# Patient Record
Sex: MALE | Race: BLACK OR AFRICAN AMERICAN | Employment: UNEMPLOYED | URBAN - METROPOLITAN AREA
[De-identification: names, ages, dates, MRNs, and addresses within clinical notes are randomized per-mention and may not be internally consistent; named-entity substitution may affect disease eponyms.]

---

## 2018-04-05 PROBLEM — N17.9 ACUTE KIDNEY INJURY (HCC): Status: ACTIVE | Noted: 2018-04-05

## 2018-04-09 PROBLEM — R31.9 HEMATURIA: Status: ACTIVE | Noted: 2018-04-09

## 2018-04-09 PROBLEM — N32.0 BLADDER OUTLET OBSTRUCTION: Status: ACTIVE | Noted: 2018-04-09

## 2018-04-12 PROBLEM — J44.1 COPD EXACERBATION (HCC): Status: ACTIVE | Noted: 2018-04-12

## 2018-04-29 PROBLEM — R31.0 GROSS HEMATURIA: Status: ACTIVE | Noted: 2018-04-29

## 2018-05-01 PROBLEM — N39.0 UTI (URINARY TRACT INFECTION): Status: ACTIVE | Noted: 2018-05-01

## 2018-05-31 PROBLEM — N39.0 UTI (URINARY TRACT INFECTION): Status: RESOLVED | Noted: 2018-05-01 | Resolved: 2018-05-31

## 2018-07-12 PROBLEM — R33.9 CHRONIC RETENTION OF URINE: Status: ACTIVE | Noted: 2018-07-12

## 2018-07-12 PROBLEM — Z90.79 S/P TURP: Status: ACTIVE | Noted: 2018-07-12

## 2018-09-05 NOTE — ANESTHESIA PRE-OP
2013. Last exacerbation 4/2018. Reports uses prednisone and Doxycycline as needed per [facility]. Former smoker, hx 1ppd x 30 years, quit 2014. Able to lie flat. PFT's 2013 Pre FEV1 0.85L, Post FEV1 1.09L  31% of pred. [facility] for [facility]. Followed by Dr. Gray Cantu @ [facility]. 3.  Controlled reflux, bowel resection s/p colostomy with reversal, 1974. Hx volvulus with SBO. Abdm mass excised with colectomy, 2013. 4.   BPH with hx hematuria, Urinary retention secondary to bladder outlet obstruction, 4/2018. Stevens placed. S/p TURP 7/2018. Planned repeat TURP. BUN: 32, CR: 1.4.     5.  Anemia. HGB: 11.1. Previous HGB: 10. HCT: 36.6. PLT: 463. 6.  DM, HbA1c from [facility] was 7.3, 6/2018. Random BS today: 210. On metformin. Counseled on tight glycemic control with medication compliance to promote healing and prevent diabetic complications. Informed if BS elevated DOS, surgery could be delayed or cancelled. 7.  Full ROM extremities. 8.  Poor dentition. periodontal dz  Missing and broken teeth. Denies loose teeth       Anesthesia Evaluation will follow by a certified practitioner prior to surgery.     Electronically signed by ERNESTO Calderón CNP on 9/5/2018 at 1:31 PM

## 2018-09-07 ENCOUNTER — HOSPITAL ENCOUNTER (OUTPATIENT)
Dept: PREADMISSION TESTING | Age: 73
Discharge: HOME | End: 2018-09-07
Attending: UROLOGY | Admitting: UROLOGY

## 2018-09-07 VITALS
DIASTOLIC BLOOD PRESSURE: 81 MMHG | HEIGHT: 71 IN | SYSTOLIC BLOOD PRESSURE: 125 MMHG | BODY MASS INDEX: 21.84 KG/M2 | TEMPERATURE: 97.8 F | WEIGHT: 156 LBS | RESPIRATION RATE: 16 BRPM | OXYGEN SATURATION: 95 % | HEART RATE: 76 BPM

## 2018-09-07 LAB
ANION GAP SERPL CALCULATED.3IONS-SCNC: 10 MMOL/L (ref 4–16)
BUN BLDV-MCNC: 32 MG/DL (ref 6–23)
CALCIUM SERPL-MCNC: 9.4 MG/DL (ref 8.3–10.6)
CHLORIDE BLD-SCNC: 103 MMOL/L (ref 99–110)
CO2: 28 MMOL/L (ref 21–32)
CREAT SERPL-MCNC: 1.4 MG/DL (ref 0.9–1.3)
GFR AFRICAN AMERICAN: >60 ML/MIN/1.73M2
GFR NON-AFRICAN AMERICAN: 50 ML/MIN/1.73M2
GLUCOSE BLD-MCNC: 210 MG/DL (ref 70–99)
HCT VFR BLD CALC: 36.6 % (ref 42–52)
HEMOGLOBIN: 11.1 GM/DL (ref 13.5–18)
MCH RBC QN AUTO: 27.8 PG (ref 27–31)
MCHC RBC AUTO-ENTMCNC: 30.3 % (ref 32–36)
MCV RBC AUTO: 91.5 FL (ref 78–100)
PDW BLD-RTO: 17.2 % (ref 11.7–14.9)
PLATELET # BLD: 463 K/CU MM (ref 140–440)
PMV BLD AUTO: 9.9 FL (ref 7.5–11.1)
POTASSIUM SERPL-SCNC: 4 MMOL/L (ref 3.5–5.1)
RBC # BLD: 4 M/CU MM (ref 4.6–6.2)
SODIUM BLD-SCNC: 141 MMOL/L (ref 135–145)
WBC # BLD: 8.5 K/CU MM (ref 4–10.5)

## 2018-09-07 RX ORDER — CIPROFLOXACIN 2 MG/ML
400 INJECTION, SOLUTION INTRAVENOUS ONCE
Status: CANCELLED | OUTPATIENT
Start: 2018-09-18

## 2018-09-07 RX ORDER — CIPROFLOXACIN 2 MG/ML
400 INJECTION, SOLUTION INTRAVENOUS ONCE
Status: DISCONTINUED | OUTPATIENT
Start: 2018-09-17 | End: 2018-09-07 | Stop reason: CLARIF

## 2018-09-07 RX ORDER — DOXYCYCLINE HYCLATE 100 MG/1
100 CAPSULE ORAL 2 TIMES DAILY PRN
Status: ON HOLD | COMMUNITY
End: 2018-09-18

## 2018-09-07 ASSESSMENT — PAIN SCALES - GENERAL: PAINLEVEL_OUTOF10: 0

## 2018-09-10 ENCOUNTER — TELEPHONE (OUTPATIENT)
Dept: CARDIOLOGY CLINIC | Age: 73
End: 2018-09-10

## 2018-09-10 LAB
CULTURE: NORMAL
Lab: NORMAL
ORGANISM: NORMAL
REPORT STATUS: NORMAL
SPECIMEN: NORMAL
TOTAL COLONY COUNT: NORMAL

## 2018-09-10 NOTE — TELEPHONE ENCOUNTER
Cardiac clearance request received from [facility] [id number] for Dr. Km Covarrubias.  FAX # [id number]  Scanned to media

## 2018-09-10 NOTE — LETTER
[facility] [facility] [address] [facility],Third [address]  Phone: [phone]    Fax [phone]                  Yasmine Virgen MD, Ramonita Garcia MD, [address] Gisel Murphy MD, MD [place] MD Hilliard Muscat, MD  [address] [facility] ERNESTO Mullen    Cardiac Risk Assessment     7/31/2018        Surgery Date: 9/17/18  Surgery: Cystoscopy, Redo TURP   Anesthesia Type: General  Fax Number: [phone]    To: Dr. Fabienne Pillai  From : ERNESTO Dennis, CNP    Patient Name: Magnus Bird     YOB: 1945    Magnus Bird was evaluated from a cardiac standpoint for his surgery and based on his history, diagnosis, recent cardiac testing, he is considered a low risk candidate for any phyllis-operative cardiac complications. Antiplatelet therapy can be held prior to the surgery at the discretion of the surgeon to be resumed as soon as possible if held. Please call with any further questions.     Respectfully,         Rober OROZCO-IAN  EN/cjs

## 2021-05-14 ENCOUNTER — APPOINTMENT (OUTPATIENT)
Dept: GENERAL RADIOLOGY | Age: 76
DRG: 552 | End: 2021-05-14
Payer: MEDICARE

## 2021-05-14 ENCOUNTER — HOSPITAL ENCOUNTER (INPATIENT)
Age: 76
LOS: 2 days | Discharge: HOME | DRG: 552 | End: 2021-05-19
Attending: EMERGENCY MEDICINE | Admitting: INTERNAL MEDICINE
Payer: MEDICARE

## 2021-05-14 ENCOUNTER — APPOINTMENT (OUTPATIENT)
Dept: CT IMAGING | Age: 76
DRG: 552 | End: 2021-05-14
Payer: MEDICARE

## 2021-05-14 DIAGNOSIS — R29.898 LEFT HAND WEAKNESS: ICD-10-CM

## 2021-05-14 DIAGNOSIS — R77.8 TROPONIN LEVEL ELEVATED: ICD-10-CM

## 2021-05-14 DIAGNOSIS — R20.2 PARESTHESIA: Primary | ICD-10-CM

## 2021-05-14 DIAGNOSIS — E83.42 HYPOMAGNESEMIA: ICD-10-CM

## 2021-05-14 DIAGNOSIS — M79.642 LEFT HAND PAIN: ICD-10-CM

## 2021-05-14 PROBLEM — R53.1 ACUTE LEFT-SIDED WEAKNESS: Status: ACTIVE | Noted: 2021-05-14

## 2021-05-14 LAB
ALBUMIN SERPL-MCNC: 3.8 GM/DL (ref 3.4–5)
ALP BLD-CCNC: 103 IU/L (ref 40–129)
ALT SERPL-CCNC: 12 U/L (ref 10–40)
ANION GAP SERPL CALCULATED.3IONS-SCNC: 4 MMOL/L (ref 4–16)
AST SERPL-CCNC: 18 IU/L (ref 15–37)
BASOPHILS ABSOLUTE: 0.1 K/CU MM
BASOPHILS RELATIVE PERCENT: 0.6 % (ref 0–1)
BILIRUB SERPL-MCNC: 0.3 MG/DL (ref 0–1)
BUN BLDV-MCNC: 17 MG/DL (ref 6–23)
CALCIUM SERPL-MCNC: 9.2 MG/DL (ref 8.3–10.6)
CHLORIDE BLD-SCNC: 103 MMOL/L (ref 99–110)
CO2: 32 MMOL/L (ref 21–32)
CREAT SERPL-MCNC: 1 MG/DL (ref 0.9–1.3)
DIFFERENTIAL TYPE: ABNORMAL
DOSE AMOUNT: ABNORMAL
DOSE TIME: ABNORMAL
EKG ATRIAL RATE: 70 BPM
EKG DIAGNOSIS: NORMAL
EKG P AXIS: 26 DEGREES
EKG P-R INTERVAL: 172 MS
EKG Q-T INTERVAL: 406 MS
EKG QRS DURATION: 148 MS
EKG QTC CALCULATION (BAZETT): 438 MS
EKG R AXIS: -79 DEGREES
EKG T AXIS: 102 DEGREES
EKG VENTRICULAR RATE: 70 BPM
EOSINOPHILS ABSOLUTE: 0.4 K/CU MM
EOSINOPHILS RELATIVE PERCENT: 4.5 % (ref 0–3)
FOLATE: 17.2 NG/ML (ref 3.1–17.5)
GFR AFRICAN AMERICAN: >60 ML/MIN/1.73M2
GFR NON-AFRICAN AMERICAN: >60 ML/MIN/1.73M2
GLUCOSE BLD-MCNC: 152 MG/DL (ref 70–99)
GLUCOSE BLD-MCNC: 171 MG/DL (ref 70–99)
GLUCOSE BLD-MCNC: 184 MG/DL (ref 70–99)
HCT VFR BLD CALC: 36.8 % (ref 42–52)
HEMOGLOBIN: 11.9 GM/DL (ref 13.5–18)
IMMATURE NEUTROPHIL %: 0.4 % (ref 0–0.43)
LYMPHOCYTES ABSOLUTE: 1.1 K/CU MM
LYMPHOCYTES RELATIVE PERCENT: 12.8 % (ref 24–44)
MAGNESIUM: 1.6 MG/DL (ref 1.8–2.4)
MCH RBC QN AUTO: 30.9 PG (ref 27–31)
MCHC RBC AUTO-ENTMCNC: 32.3 % (ref 32–36)
MCV RBC AUTO: 95.6 FL (ref 78–100)
MONOCYTES ABSOLUTE: 0.7 K/CU MM
MONOCYTES RELATIVE PERCENT: 9 % (ref 0–4)
NUCLEATED RBC %: 0 %
PDW BLD-RTO: 13.7 % (ref 11.7–14.9)
PLATELET # BLD: 315 K/CU MM (ref 140–440)
PMV BLD AUTO: 9.7 FL (ref 7.5–11.1)
POTASSIUM SERPL-SCNC: 4.3 MMOL/L (ref 3.5–5.1)
PRO-BNP: 663.2 PG/ML
RBC # BLD: 3.85 M/CU MM (ref 4.6–6.2)
SEGMENTED NEUTROPHILS ABSOLUTE COUNT: 6 K/CU MM
SEGMENTED NEUTROPHILS RELATIVE PERCENT: 72.7 % (ref 36–66)
SODIUM BLD-SCNC: 139 MMOL/L (ref 135–145)
THEOPHYLLINE LEVEL: <0.8 UG/ML (ref 10–20)
TOTAL CK: 148 IU/L (ref 38–174)
TOTAL IMMATURE NEUTOROPHIL: 0.03 K/CU MM
TOTAL NUCLEATED RBC: 0 K/CU MM
TOTAL PROTEIN: 6.3 GM/DL (ref 6.4–8.2)
TROPONIN T: 0.04 NG/ML
TROPONIN T: 0.04 NG/ML
TSH HIGH SENSITIVITY: 0.37 UIU/ML (ref 0.27–4.2)
VITAMIN B-12: >2000 PG/ML (ref 211–911)
WBC # BLD: 8.3 K/CU MM (ref 4–10.5)

## 2021-05-14 PROCEDURE — 82550 ASSAY OF CK (CPK): CPT

## 2021-05-14 PROCEDURE — G0378 HOSPITAL OBSERVATION PER HR: HCPCS | Performed by: INTERNAL MEDICINE

## 2021-05-14 PROCEDURE — 93005 ELECTROCARDIOGRAM TRACING: CPT | Performed by: EMERGENCY MEDICINE

## 2021-05-14 PROCEDURE — 36415 COLL VENOUS BLD VENIPUNCTURE: CPT

## 2021-05-14 PROCEDURE — G0378 HOSPITAL OBSERVATION PER HR: HCPCS

## 2021-05-14 PROCEDURE — 84484 ASSAY OF TROPONIN QUANT: CPT

## 2021-05-14 PROCEDURE — 6370000000 HC RX 637 (ALT 250 FOR IP): Performed by: EMERGENCY MEDICINE

## 2021-05-14 PROCEDURE — 80198 ASSAY OF THEOPHYLLINE: CPT

## 2021-05-14 PROCEDURE — 73130 X-RAY EXAM OF HAND: CPT

## 2021-05-14 PROCEDURE — 6360000002 HC RX W HCPCS: Performed by: EMERGENCY MEDICINE

## 2021-05-14 PROCEDURE — 83735 ASSAY OF MAGNESIUM: CPT

## 2021-05-14 PROCEDURE — 2500000003 HC RX 250 WO HCPCS: Performed by: NURSE PRACTITIONER

## 2021-05-14 PROCEDURE — 74018 RADEX ABDOMEN 1 VIEW: CPT

## 2021-05-14 PROCEDURE — 2580000003 HC RX 258: Performed by: NURSE PRACTITIONER

## 2021-05-14 PROCEDURE — 96366 THER/PROPH/DIAG IV INF ADDON: CPT

## 2021-05-14 PROCEDURE — 80053 COMPREHEN METABOLIC PANEL: CPT

## 2021-05-14 PROCEDURE — 82746 ASSAY OF FOLIC ACID SERUM: CPT

## 2021-05-14 PROCEDURE — 70450 CT HEAD/BRAIN W/O DYE: CPT

## 2021-05-14 PROCEDURE — 82607 VITAMIN B-12: CPT

## 2021-05-14 PROCEDURE — 71045 X-RAY EXAM CHEST 1 VIEW: CPT

## 2021-05-14 PROCEDURE — 73110 X-RAY EXAM OF WRIST: CPT

## 2021-05-14 PROCEDURE — 83880 ASSAY OF NATRIURETIC PEPTIDE: CPT

## 2021-05-14 PROCEDURE — 84443 ASSAY THYROID STIM HORMONE: CPT

## 2021-05-14 PROCEDURE — 6370000000 HC RX 637 (ALT 250 FOR IP): Performed by: NURSE PRACTITIONER

## 2021-05-14 PROCEDURE — 6360000002 HC RX W HCPCS: Performed by: NURSE PRACTITIONER

## 2021-05-14 PROCEDURE — 96365 THER/PROPH/DIAG IV INF INIT: CPT

## 2021-05-14 PROCEDURE — 94640 AIRWAY INHALATION TREATMENT: CPT

## 2021-05-14 PROCEDURE — 85025 COMPLETE CBC W/AUTO DIFF WBC: CPT

## 2021-05-14 PROCEDURE — 96375 TX/PRO/DX INJ NEW DRUG ADDON: CPT

## 2021-05-14 PROCEDURE — 93010 ELECTROCARDIOGRAM REPORT: CPT | Performed by: INTERNAL MEDICINE

## 2021-05-14 PROCEDURE — 96372 THER/PROPH/DIAG INJ SC/IM: CPT

## 2021-05-14 PROCEDURE — 82962 GLUCOSE BLOOD TEST: CPT

## 2021-05-14 PROCEDURE — 99285 EMERGENCY DEPT VISIT HI MDM: CPT

## 2021-05-14 RX ORDER — ONDANSETRON 2 MG/ML
4 INJECTION INTRAMUSCULAR; INTRAVENOUS EVERY 6 HOURS PRN
Status: DISCONTINUED | OUTPATIENT
Start: 2021-05-14 | End: 2021-05-19 | Stop reason: HOSPADM

## 2021-05-14 RX ORDER — ASPIRIN 81 MG/1
324 TABLET, CHEWABLE ORAL ONCE
Status: COMPLETED | OUTPATIENT
Start: 2021-05-14 | End: 2021-05-14

## 2021-05-14 RX ORDER — LORATADINE 10 MG/1
10 CAPSULE, LIQUID FILLED ORAL DAILY
Status: DISCONTINUED | OUTPATIENT
Start: 2021-05-14 | End: 2021-05-14 | Stop reason: CLARIF

## 2021-05-14 RX ORDER — ATORVASTATIN CALCIUM 80 MG/1
80 TABLET, FILM COATED ORAL NIGHTLY
Status: DISCONTINUED | OUTPATIENT
Start: 2021-05-14 | End: 2021-05-19 | Stop reason: HOSPADM

## 2021-05-14 RX ORDER — ALBUTEROL SULFATE 90 UG/1
2 AEROSOL, METERED RESPIRATORY (INHALATION) ONCE
Status: COMPLETED | OUTPATIENT
Start: 2021-05-14 | End: 2021-05-14

## 2021-05-14 RX ORDER — MONTELUKAST SODIUM 10 MG/1
10 TABLET ORAL NIGHTLY
Status: DISCONTINUED | OUTPATIENT
Start: 2021-05-14 | End: 2021-05-19 | Stop reason: HOSPADM

## 2021-05-14 RX ORDER — SODIUM CHLORIDE 0.9 % (FLUSH) 0.9 %
5-40 SYRINGE (ML) INJECTION PRN
Status: DISCONTINUED | OUTPATIENT
Start: 2021-05-14 | End: 2021-05-17 | Stop reason: SDUPTHER

## 2021-05-14 RX ORDER — ALBUTEROL SULFATE 90 UG/1
2 AEROSOL, METERED RESPIRATORY (INHALATION)
Status: DISCONTINUED | OUTPATIENT
Start: 2021-05-14 | End: 2021-05-19 | Stop reason: HOSPADM

## 2021-05-14 RX ORDER — LABETALOL HYDROCHLORIDE 5 MG/ML
10 INJECTION, SOLUTION INTRAVENOUS EVERY 10 MIN PRN
Status: DISCONTINUED | OUTPATIENT
Start: 2021-05-14 | End: 2021-05-19 | Stop reason: HOSPADM

## 2021-05-14 RX ORDER — PANTOPRAZOLE SODIUM 20 MG/1
20 TABLET, DELAYED RELEASE ORAL
Status: DISCONTINUED | OUTPATIENT
Start: 2021-05-15 | End: 2021-05-19 | Stop reason: HOSPADM

## 2021-05-14 RX ORDER — NICOTINE POLACRILEX 4 MG
15 LOZENGE BUCCAL PRN
Status: DISCONTINUED | OUTPATIENT
Start: 2021-05-14 | End: 2021-05-19 | Stop reason: HOSPADM

## 2021-05-14 RX ORDER — ASPIRIN 300 MG/1
300 SUPPOSITORY RECTAL DAILY
Status: DISCONTINUED | OUTPATIENT
Start: 2021-05-15 | End: 2021-05-19 | Stop reason: HOSPADM

## 2021-05-14 RX ORDER — ASPIRIN 81 MG/1
81 TABLET ORAL DAILY
Status: DISCONTINUED | OUTPATIENT
Start: 2021-05-15 | End: 2021-05-19 | Stop reason: HOSPADM

## 2021-05-14 RX ORDER — OMEPRAZOLE 20 MG/1
20 CAPSULE, DELAYED RELEASE ORAL DAILY
Status: DISCONTINUED | OUTPATIENT
Start: 2021-05-14 | End: 2021-05-14 | Stop reason: CLARIF

## 2021-05-14 RX ORDER — DEXTROSE MONOHYDRATE 50 MG/ML
100 INJECTION, SOLUTION INTRAVENOUS PRN
Status: DISCONTINUED | OUTPATIENT
Start: 2021-05-14 | End: 2021-05-19 | Stop reason: HOSPADM

## 2021-05-14 RX ORDER — DEXTROSE MONOHYDRATE 25 G/50ML
12.5 INJECTION, SOLUTION INTRAVENOUS PRN
Status: DISCONTINUED | OUTPATIENT
Start: 2021-05-14 | End: 2021-05-19 | Stop reason: HOSPADM

## 2021-05-14 RX ORDER — MAGNESIUM SULFATE IN WATER 40 MG/ML
2000 INJECTION, SOLUTION INTRAVENOUS ONCE
Status: COMPLETED | OUTPATIENT
Start: 2021-05-14 | End: 2021-05-14

## 2021-05-14 RX ORDER — SODIUM CHLORIDE 0.9 % (FLUSH) 0.9 %
5-40 SYRINGE (ML) INJECTION EVERY 12 HOURS SCHEDULED
Status: DISCONTINUED | OUTPATIENT
Start: 2021-05-14 | End: 2021-05-17 | Stop reason: SDUPTHER

## 2021-05-14 RX ORDER — SODIUM CHLORIDE 9 MG/ML
25 INJECTION, SOLUTION INTRAVENOUS PRN
Status: DISCONTINUED | OUTPATIENT
Start: 2021-05-14 | End: 2021-05-17 | Stop reason: SDUPTHER

## 2021-05-14 RX ORDER — PROMETHAZINE HYDROCHLORIDE 25 MG/1
12.5 TABLET ORAL EVERY 6 HOURS PRN
Status: DISCONTINUED | OUTPATIENT
Start: 2021-05-14 | End: 2021-05-19 | Stop reason: HOSPADM

## 2021-05-14 RX ORDER — CETIRIZINE HYDROCHLORIDE 10 MG/1
10 TABLET ORAL DAILY
Status: DISCONTINUED | OUTPATIENT
Start: 2021-05-15 | End: 2021-05-19 | Stop reason: HOSPADM

## 2021-05-14 RX ORDER — POLYETHYLENE GLYCOL 3350 17 G/17G
17 POWDER, FOR SOLUTION ORAL DAILY PRN
Status: DISCONTINUED | OUTPATIENT
Start: 2021-05-14 | End: 2021-05-19 | Stop reason: HOSPADM

## 2021-05-14 RX ORDER — BISACODYL 10 MG
10 SUPPOSITORY, RECTAL RECTAL DAILY PRN
Status: DISCONTINUED | OUTPATIENT
Start: 2021-05-14 | End: 2021-05-19 | Stop reason: HOSPADM

## 2021-05-14 RX ADMIN — THEOPHYLLINE ANHYDROUS 200 MG: 200 CAPSULE, EXTENDED RELEASE ORAL at 17:58

## 2021-05-14 RX ADMIN — SODIUM CHLORIDE, PRESERVATIVE FREE 10 ML: 5 INJECTION INTRAVENOUS at 22:00

## 2021-05-14 RX ADMIN — ALBUTEROL SULFATE 2 PUFF: 90 AEROSOL, METERED RESPIRATORY (INHALATION) at 20:19

## 2021-05-14 RX ADMIN — MAGNESIUM SULFATE IN WATER 2000 MG: 40 INJECTION, SOLUTION INTRAVENOUS at 15:31

## 2021-05-14 RX ADMIN — LABETALOL HYDROCHLORIDE 10 MG: 5 INJECTION, SOLUTION INTRAVENOUS at 17:58

## 2021-05-14 RX ADMIN — ALBUTEROL SULFATE 2 PUFF: 90 AEROSOL, METERED RESPIRATORY (INHALATION) at 12:39

## 2021-05-14 RX ADMIN — POTASSIUM BICARBONATE 20 MEQ: 782 TABLET, EFFERVESCENT ORAL at 17:58

## 2021-05-14 RX ADMIN — Medication 2 PUFF: at 20:19

## 2021-05-14 RX ADMIN — Medication 2 PUFF: at 12:39

## 2021-05-14 RX ADMIN — ENOXAPARIN SODIUM 30 MG: 30 INJECTION SUBCUTANEOUS at 17:59

## 2021-05-14 RX ADMIN — ATORVASTATIN CALCIUM 80 MG: 80 TABLET, FILM COATED ORAL at 21:28

## 2021-05-14 RX ADMIN — ASPIRIN 324 MG: 81 TABLET, CHEWABLE ORAL at 15:31

## 2021-05-14 RX ADMIN — MONTELUKAST 10 MG: 10 TABLET, FILM COATED ORAL at 21:28

## 2021-05-14 ASSESSMENT — ENCOUNTER SYMPTOMS
GASTROINTESTINAL NEGATIVE: 1
ALLERGIC/IMMUNOLOGIC NEGATIVE: 1
RESPIRATORY NEGATIVE: 1
EYES NEGATIVE: 1

## 2021-05-14 ASSESSMENT — PAIN DESCRIPTION - LOCATION: LOCATION: WRIST

## 2021-05-14 ASSESSMENT — PAIN DESCRIPTION - ORIENTATION: ORIENTATION: LEFT

## 2021-05-14 NOTE — ED NOTES
Patient noted to have bed bugs on all articles of clothing. Clothing bagged in yellow bags and closed shut. Patient placed in gown.       Giuseppe Luong RN  05/14/21 [id number]

## 2021-05-14 NOTE — ED NOTES
Patient updated on plan of care. Resting in bed with no signs of distress. Remains on cardiac monitor. Resps even and unlabored. Denies any needs. Side rails up x 2.       Gary Mckeon RN  05/14/21 8214

## 2021-05-14 NOTE — PLAN OF CARE
Problem: Pain:  Description: Pain management should include both nonpharmacologic and pharmacologic interventions.   Goal: Pain level will decrease  Description: Pain level will decrease  Outcome: Ongoing  Goal: Control of acute pain  Description: Control of acute pain  Outcome: Ongoing  Goal: Control of chronic pain  Description: Control of chronic pain  Outcome: Ongoing     Problem: Falls - Risk of:  Goal: Will remain free from falls  Description: Will remain free from falls  Outcome: Ongoing  Goal: Absence of physical injury  Description: Absence of physical injury  Outcome: Ongoing     Problem: HEMODYNAMIC STATUS  Goal: Patient has stable vital signs and fluid balance  Outcome: Ongoing     Problem: ACTIVITY INTOLERANCE/IMPAIRED MOBILITY  Goal: Mobility/activity is maintained at optimum level for patient  Outcome: Ongoing     Problem: COMMUNICATION IMPAIRMENT  Goal: Ability to express needs and understand communication  Outcome: Ongoing

## 2021-05-14 NOTE — ED NOTES
Patient updated on plan of care. Resting in bed with no signs of distress. Remains on cardiac monitor. Resps even and unlabored. Denies any needs. Side rails up x 2.       Amaury Chacon RN  05/14/21 0330

## 2021-05-14 NOTE — ED PROVIDER NOTES
[address] [facility]      TRIAGE CHIEF COMPLAINT:   Numbness (left arm numbess from elbow down for 2 days)      Eek:  Olivia Adkins is a 78 y.o. male that presents with complaint of left hand and wrist pain paresthesias weakness for the last 2 days. Denies any headache fever chest pain shortness of breath nausea vomiting diarrhea other weakness or other symptoms. Went to urgent care today, they sent him here. Denies history of stroke. No other questions or concerns he is right-handed. REVIEW OF SYSTEMS:  At least 10 systems reviewed and otherwise acutely negative except as in the [address] Review of Systems   Constitutional: Negative. HENT: Negative. Eyes: Negative. Respiratory: Negative. Cardiovascular: Negative. Gastrointestinal: Negative. Endocrine: Negative. Genitourinary: Negative. Musculoskeletal: Positive for arthralgias and myalgias. Skin: Negative. Allergic/Immunologic: Negative. Neurological: Positive for weakness and numbness. Hematological: Negative. Psychiatric/Behavioral: Negative. All other systems reviewed and are negative.       Past Medical History:   Diagnosis Date    Acid reflux     Anemia     \"dx age 11    COPD (chronic obstructive pulmonary disease) ([facility] [address]     follows with [facility] decay     Diabetes mellitus ([facility] [address]     \"dx 2015 or so\"    Emphysema of lung ([facility] [address]     Enlarged prostate     Stevens catheter in place     \"put in catheter since mid April 2018\"    H/O cardiovascular stress test 05/01/2018    Nml, 62%    Hematuria     dx with admission 4/29/2018    HX OTHER MEDICAL     \"tried 3 times to do sleep study could not tolerate    Hypertension     per pt on 7/11/2018\"my blood pressure has been low so stopped the Amlodopine for now\"    Low back sprain 1983    Megacolon     On home oxygen therapy     \"wear it at night at 3l/nc\"    Wears glasses     to read     Past Surgical History:   Procedure Laterality Date    ABDOMEN SURGERY      \"had abd surgery in the service one in [place] and one in [facility] [address]   [address]    volvulus\"had colostomy for 2 weeks then did reversal 2 weeks later\"( was in [facility])    APPENDECTOMY  7years old   [address]      exp lap,colon resection,subtotal colectomy,cysto     OTHER SURGICAL HISTORY  2013    exp lap,colon resection, subtotal colectomy, cysto    OTHER SURGICAL HISTORY  2018    TURP    REVISION COLOSTOMY  1974    TUMOR REMOVAL      ABD TUMOR/MASS 2013    TURP N/A 2018    cystoscopy, bipolar TURP     Family History   Problem Relation Age of Onset    Coronary Art Dis Mother     Diabetes Mother     Stroke Father     Other Sister         aneurysm    Stroke Brother      Social History     Socioeconomic History    Marital status:       Spouse name: Not on file    Number of children: 11    Years of education: Not on file    Highest education level: Not on file   Occupational History    Not on file   Social Needs    Financial resource strain: Not on file    Food insecurity     Worry: Not on file     Inability: Not on file    Transportation needs     Medical: Not on file     Non-medical: Not on file   Tobacco Use    Smoking status: Former Smoker     Packs/day: 0.50     Years: 30.00     Pack years: 15.00     Types: Cigarettes     Quit date: 10/25/2012     Years since quittin.5    Smokeless tobacco: Never Used   Substance and Sexual Activity    Alcohol use: No    Drug use: No    Sexual activity: Not on file   Lifestyle    Physical activity     Days per week: Not on file     Minutes per session: Not on file    Stress: Not on file   Relationships    Social connections     Talks on phone: Not on file     Gets together: Not on file     Attends Gnosticist service: Not on file     Active member of club or organization: Not on file     Attends meetings of clubs or organizations: Not on file     Relationship status: Not on file    Intimate partner violence     Fear of current or ex partner: Not on file     Emotionally abused: Not on file     Physically abused: Not on file     Forced sexual activity: Not on file   Other Topics Concern    Not on file   Social History Narrative    , semi retired , retired [employer]     Current Facility-Administered Medications   Medication Dose Route Frequency Provider Last Rate Last Admin    magnesium sulfate 2000 mg in 50 mL IVPB premix  2,000 mg Intravenous Once [employer], DO 25 mL/hr at 05/14/21 1531 2,000 mg at 05/14/21 1531     Current Outpatient Medications   Medication Sig Dispense Refill    potassium chloride (KLOR-CON) 20 MEQ packet Take 20 mEq by mouth daily      albuterol (ACCUNEB) 0.63 MG/3ML nebulizer solution Take 1 ampule by nebulization every 6 hours as needed for Wheezing      fluticasone (FLONASE) 50 MCG/ACT nasal spray 1 spray by Nasal route 2 times daily      loratadine (CLARITIN) 10 MG capsule Take 10 mg by mouth daily      predniSONE (DELTASONE) 10 MG tablet Take 20 mg by mouth daily as needed      guaiFENesin 400 MG tablet Take 400 mg by mouth 2 times daily      metFORMIN (GLUCOPHAGE-XR) 500 MG extended release tablet Take 500 mg by mouth daily (with breakfast)      montelukast (SINGULAIR) 10 MG tablet Take 10 mg by mouth nightly      omeprazole (PRILOSEC) 20 MG delayed release capsule Take 20 mg by mouth daily      cyanocobalamin 1000 MCG tablet Take 1,000 mcg by mouth daily      albuterol-ipratropium (COMBIVENT RESPIMAT)  MCG/ACT AERS inhaler Inhale 1 puff into the lungs every 6 hours 1 Inhaler 1    tiotropium (SPIRIVA) 18 MCG inhalation capsule Inhale 18 mcg into the lungs daily      Budesonide-Formoterol Fumarate (SYMBICORT IN) Inhale 2 puffs into the lungs daily      theophylline CR, 12 hour, (THEODUR) 200 MG CR tablet Take 1 tablet by mouth 2 times daily (Patient taking differently: Take 200 mg by mouth daily ) 60 tablet 0    amLODIPine (NORVASC) 10 MG tablet Take 1 tablet by mouth daily. 30 tablet 2    albuterol (PROVENTIL HFA;VENTOLIN HFA) 108 (90 BASE) MCG/ACT inhaler Inhale 2 puffs into the lungs every 6 hours as needed.           Allergies   Allergen Reactions    Dye [Iodides] Other (See Comments)     \"had iodine for IVP test- went into seizures\"    Lorazepam Other (See Comments)     CAUSES HIM TO HAVE NO SELF CONTROL     Current Facility-Administered Medications   Medication Dose Route Frequency Provider Last Rate Last Admin    magnesium sulfate 2000 mg in 50 mL IVPB premix  2,000 mg Intravenous Once Darrol Celia, DO 25 mL/hr at 05/14/21 1531 2,000 mg at 05/14/21 1531     Current Outpatient Medications   Medication Sig Dispense Refill    potassium chloride (KLOR-CON) 20 MEQ packet Take 20 mEq by mouth daily      albuterol (ACCUNEB) 0.63 MG/3ML nebulizer solution Take 1 ampule by nebulization every 6 hours as needed for Wheezing      fluticasone (FLONASE) 50 MCG/ACT nasal spray 1 spray by Nasal route 2 times daily      loratadine (CLARITIN) 10 MG capsule Take 10 mg by mouth daily      predniSONE (DELTASONE) 10 MG tablet Take 20 mg by mouth daily as needed      guaiFENesin 400 MG tablet Take 400 mg by mouth 2 times daily      metFORMIN (GLUCOPHAGE-XR) 500 MG extended release tablet Take 500 mg by mouth daily (with breakfast)      montelukast (SINGULAIR) 10 MG tablet Take 10 mg by mouth nightly      omeprazole (PRILOSEC) 20 MG delayed release capsule Take 20 mg by mouth daily      cyanocobalamin 1000 MCG tablet Take 1,000 mcg by mouth daily      albuterol-ipratropium (COMBIVENT RESPIMAT)  MCG/ACT AERS inhaler Inhale 1 puff into the lungs every 6 hours 1 Inhaler 1    tiotropium (SPIRIVA) 18 MCG inhalation capsule Inhale 18 mcg into the lungs daily      Budesonide-Formoterol Fumarate (SYMBICORT IN) Inhale 2 puffs into the lungs daily      theophylline CR, 12 hour, (THEODUR) 200 MG CR tablet Take 1 tablet by mouth 2 times daily (Patient taking differently: Take 200 mg by mouth daily ) 60 tablet 0    amLODIPine (NORVASC) 10 MG tablet Take 1 tablet by mouth daily. 30 tablet 2    albuterol (PROVENTIL HFA;VENTOLIN HFA) 108 (90 BASE) MCG/ACT inhaler Inhale 2 puffs into the lungs every 6 hours as needed. Nursing Notes Reviewed    VITAL SIGNS:  ED Triage Vitals [05/14/21 1023]   Enc Vitals Group      BP (!) 179/115      Pulse 69      Resp 20      Temp 97.9 °F (36.6 °C)      Temp Source Oral      SpO2 97 %      Weight 165 lb (74.8 kg)      Height 5' 11\" (1.803 m)      Head Circumference       Peak Flow       Pain Score       Pain Loc       Pain Edu? Excl. in [address]? PHYSICAL EXAM:  Physical Exam  Vitals signs and nursing note reviewed. Constitutional:       General: He is not in acute distress. Appearance: Normal appearance. He is well-developed and well-groomed. He is not ill-appearing, toxic-appearing or diaphoretic. HENT:      Head: Normocephalic. Right Ear: External ear normal.      Left Ear: External ear normal.      Nose: No congestion or rhinorrhea. Mouth/Throat:      Pharynx: No oropharyngeal exudate or posterior oropharyngeal erythema. Eyes:      General: No scleral icterus. Right eye: No discharge. Left eye: No discharge. Extraocular Movements: Extraocular movements intact. Conjunctiva/sclera: Conjunctivae normal.      Pupils: Pupils are equal, round, and reactive to light. Neck:      Musculoskeletal: Full passive range of motion without pain and normal range of motion. Normal range of motion. No edema, erythema, neck rigidity, crepitus, injury, pain with movement or torticollis. Vascular: No JVD. Trachea: Phonation normal.   Cardiovascular:      Rate and Rhythm: Normal rate and regular rhythm. Pulses: Normal pulses. Heart sounds: Normal heart sounds. No murmur. No friction rub.  No gallop. Pulmonary:      Effort: Pulmonary effort is normal. No respiratory distress. Breath sounds: Normal breath sounds. No stridor. No wheezing, rhonchi or rales. Abdominal:      General: There is no distension. Palpations: Abdomen is soft. There is no mass. Tenderness: There is no abdominal tenderness. There is no guarding or rebound. Negative signs include Borges's sign, Rovsing's sign and McBurney's sign. Hernia: No hernia is present. Musculoskeletal:         General: Tenderness present. No swelling, deformity or signs of injury. Right lower leg: No edema. Left lower leg: No edema. Skin:     General: Skin is warm. Coloration: Skin is not jaundiced or pale. Findings: No bruising, erythema, lesion or rash. Neurological:      General: No focal deficit present. Mental Status: He is alert and oriented to person, place, and time. GCS: GCS eye subscore is 4. GCS verbal subscore is 5. GCS motor subscore is 6. Cranial Nerves: Cranial nerves are intact. No cranial nerve deficit, dysarthria or facial asymmetry. Sensory: Sensory deficit present. Motor: Weakness present. No tremor, atrophy, abnormal muscle tone or seizure activity. Coordination: Coordination is intact. Coordination normal.      Comments: Paresthesias decreased  strength to left wrist.  Tinel's sign positive Phalen signs positive   Psychiatric:         Mood and Affect: Mood normal.         Behavior: Behavior normal. Behavior is cooperative. Thought Content:  Thought content normal.         Judgment: Judgment normal.           I have reviewed andinterpreted all of the currently available lab results from this visit (if applicable):    Results for orders placed or performed during the hospital encounter of 05/14/21   CBC Auto Differential   Result Value Ref Range    WBC 8.3 4.0 - 10.5 K/CU MM    RBC 3.85 (L) 4.6 - 6.2 M/CU MM    Hemoglobin 11.9 (L) 13.5 - 18.0 GM/DL Hematocrit 36.8 (L) 42 - 52 %    MCV 95.6 78 - 100 FL    MCH 30.9 27 - 31 PG    MCHC 32.3 32.0 - 36.0 %    RDW 13.7 11.7 - 14.9 %    Platelets 873 339 - 331 K/CU MM    MPV 9.7 7.5 - 11.1 FL    Differential Type AUTOMATED DIFFERENTIAL     Segs Relative 72.7 (H) 36 - 66 %    Lymphocytes % 12.8 (L) 24 - 44 %    Monocytes % 9.0 (H) 0 - 4 %    Eosinophils % 4.5 (H) 0 - 3 %    Basophils % 0.6 0 - 1 %    Segs Absolute 6.0 K/CU MM    Lymphocytes Absolute 1.1 K/CU MM    Monocytes Absolute 0.7 K/CU MM    Eosinophils Absolute 0.4 K/CU MM    Basophils Absolute 0.1 K/CU MM    Nucleated RBC % 0.0 %    Total Nucleated RBC 0.0 K/CU MM    Total Immature Neutrophil 0.03 K/CU MM    Immature Neutrophil % 0.4 0 - 0.43 %   CMP   Result Value Ref Range    Sodium 139 135 - 145 MMOL/L    Potassium 4.3 3.5 - 5.1 MMOL/L    Chloride 103 99 - 110 mMol/L    CO2 32 21 - 32 MMOL/L    BUN 17 6 - 23 MG/DL    CREATININE 1.0 0.9 - 1.3 MG/DL    Glucose 171 (H) 70 - 99 MG/DL    Calcium 9.2 8.3 - 10.6 MG/DL    Albumin 3.8 3.4 - 5.0 GM/DL    Total Protein 6.3 (L) 6.4 - 8.2 GM/DL    Total Bilirubin 0.3 0.0 - 1.0 MG/DL    ALT 12 10 - 40 U/L    AST 18 15 - 37 IU/L    Alkaline Phosphatase 103 40 - 129 IU/L    GFR Non-African American >60 >60 mL/min/1.73m2    GFR African American >60 >60 mL/min/1.73m2    Anion Gap 4 4 - 16   Troponin   Result Value Ref Range    Troponin T 0.035 (H) <0.01 NG/ML   Magnesium   Result Value Ref Range    Magnesium 1.6 (L) 1.8 - 2.4 mg/dl   TSH without Reflex   Result Value Ref Range    TSH, High Sensitivity 0.368 0.270 - 4.20 uIu/ml   Brain Natriuretic Peptide   Result Value Ref Range    Pro-.2 (H) <300 PG/ML   CK   Result Value Ref Range    Total  38 - 174 IU/L   EKG 12 Lead   Result Value Ref Range    Ventricular Rate 70 BPM    Atrial Rate 70 BPM    P-R Interval 172 ms    QRS Duration 148 ms    Q-T Interval 406 ms    QTc Calculation (Bazett) 438 ms    P Axis 26 degrees    R Axis -79 degrees    T Axis 102 degrees Diagnosis       Normal sinus rhythm  Left axis deviation  Right bundle branch block  T wave abnormality, consider lateral ischemia  Abnormal ECG  When compared with ECG of 29-APR-2018 15:11,  Questionable change in QRS duration          Radiographs (if obtained):  [] The following radiograph was interpreted by myself in the absence of a radiologist:  [x] Radiologist's Report Reviewed:    CT Bran, CXR, KUB, Xray left hand/wrist    EKG (if obtained): (All EKG's are interpreted by myself in the absence of a cardiologist)    12 lead EKG per my interpretation:  Normal Sinus Rhythm 70  Axis is   Left axis deviation  QTc is  438  There is no specific T wave changes appreciated. There is no specific ST wave changes appreciated. Prior EKG to compare with was not available         MDM:    Patient here with left hand pain weakness paresthesias. Again he was at urgent care today they sent him here. Symptoms for 2 days. He is right-handed. Denies any injury or trauma no fevers chest pain shortness of breath nausea vomiting diarrhea. History of megacolon. No other questions or concerns. Patient has good pulses does have slight decrease strength of his left wrist consistent with his history of weakness. Again 2 days I did not call stroke alert. Outside window for TPA. He does have a positive Tinel's sign and Phalen's test I am concerned with possible either arthritis of his left hand versus carpal tunnel. X-ray does show arthritis. Does have elevated troponin elevated blood pressure again no headache chest pain shortness of breath he does not take blood pressure medication. Due to his age and left hand weakness for 2 days I will admit for stroke work-up ACS work-up given abnormal labs. Otherwise patient stable but this is likely peripheral neuropathy will admit stable. KUB shows similar constipation negative: Has had before.   He has no abdominal pain    CLINICAL IMPRESSION:  Final diagnoses:   Paresthesia   Left

## 2021-05-14 NOTE — H&P
the wrist.  Denies visual changes, chest pain, shortness of breath, fever, chills, or trauma. History of megacolon, HTN, DM 2, and COPD. Review of Systems   Ten point ROS reviewed and negative, unless as noted below. GENERAL:  Denies fever, chills, night sweats, or changes in weight. EYES:  Denies recent visual changes. ENT:  Denies ear pain, hearing loss or tinnitus  RESP:  Denies any cough, dyspnea, or wheezing. CV:  Denies any chest pain with exertion or at rest, palpitations, syncope, or edema. GI:  Denies any dysphagia, nausea, vomiting, abdominal pain, heartburn, changes in bowel habit, melena or rectal bleeding  MUSCULOSKELETAL:  Denies any joint swelling, joint pain, or loss of range of motion. NEURO:  Denies any headaches, tremors, dizziness, vertigo, memory loss, confusion, weakness, + Left arm numbness and tingling. PSYCH:  Denies any sleeping problems, history of abuse, marital discord. HEME/LYMPHATIC/IMMUNO:  Denies , bruising, bleeding abnormalities   ENDO:  Denies any heat or cold intolerance, panemiaolyuria or polydipsia. Objective:   No intake or output data in the 24 hours ending 05/14/21 1401   Vitals:   Vitals:    05/14/21 1240   BP:    Pulse:    Resp:    Temp:    SpO2: 96%     Physical Exam:   Gen:  awake, alert, cooperative, no apparent distress  EYES:Lids and lashes normal, pupils equal, round ,extra ocular muscles intact, sclera clear, conjunctiva normal  ENT:  Normocephalic, oral pharynx with moist mucus membranes  NECK:  Supple, symmetrical, trachea midline, no adenopathy,  LUNGS:  Clear to auscultate bilaterally, no rales ronchi or wheezing noted. CARDIOVASCULAR:  regular rate and rhythm, normal S1 and S2,no murmur noted, peripheral pulses 2+, no pitting edema  ABDOMEN: Normal BS, Non tender, distended, no HSM noted. No rebound or guarding  MUSCULOSKELETAL:  ROM of all extremities grossly wnl  NEUROLOGIC: AOx 3,  Cranial nerves II-XII are grossly intact.   Motor is 4 out of 5 on left side. Sensory is intact, no lateralizing findings. No slurred speech or guarding  SKIN:  no bruising or bleeding, normal skin color, turgor, no redness, warmth, or swelling      Past Medical History:      Past Medical History:   Diagnosis Date    Acid reflux     Anemia     \"dx age 10    COPD (chronic obstructive pulmonary disease) ([facility]     follows with [address] [facility] [address] decay     Diabetes mellitus ([facility]     \"dx 2015 or so\"    Emphysema of lung ([facility]     Enlarged prostate     Stevens catheter in place     \"put in catheter since mid April 2018\"    H/O cardiovascular stress test 05/01/2018    Nml, 62%    Hematuria     dx with admission 4/29/2018    HX OTHER MEDICAL     \"tried 3 times to do sleep study could not tolerate    Hypertension     per pt on 7/11/2018\"my blood pressure has been low so stopped the Amlodopine for now\"    Low back sprain 1983    Megacolon     On home oxygen therapy     \"wear it at night at 3l/nc\"    Wears glasses     to read     PSHX:  has a past surgical history that includes Revision Colostomy (1974); Abdominal exploration surgery (4085); other surgical history; other surgical history (01 07 2013); tumor removal; Appendectomy (7years old); Abdomen surgery; TURP (N/A, 07/12/2018); Dental surgery; and other surgical history (09/18/2018). Allergies: Allergies   Allergen Reactions    Dye [Iodides] Other (See Comments)     \"had iodine for IVP test- went into seizures\"    Lorazepam Other (See Comments)     CAUSES HIM TO HAVE NO SELF CONTROL       FAM HX: family history includes Coronary Art Dis in his mother; Diabetes in his mother; Other in his sister; Stroke in his brother and father. Family history reviewed and is essentially negative unless as stated above. Soc HX:   Social History     Socioeconomic History    Marital status:       Spouse name: Not on file    Number of children: 11    Years of education: Not on file    Highest education level: Not on file   Occupational History    Not on file   Social Needs    Financial resource strain: Not on file    Food insecurity     Worry: Not on file     Inability: Not on file    Transportation needs     Medical: Not on file     Non-medical: Not on file   Tobacco Use    Smoking status: Former Smoker     Packs/day: 0.50     Years: 30.00     Pack years: 15.00     Types: Cigarettes     Quit date: 10/25/2012     Years since quittin.5    Smokeless tobacco: Never Used   Substance and Sexual Activity    Alcohol use: No    Drug use: No    Sexual activity: Not on file   Lifestyle    Physical activity     Days per week: Not on file     Minutes per session: Not on file    Stress: Not on file   Relationships    Social connections     Talks on phone: Not on file     Gets together: Not on file     Attends Confucianist service: Not on file     Active member of club or organization: Not on file     Attends meetings of clubs or organizations: Not on file     Relationship status: Not on file    Intimate partner violence     Fear of current or ex partner: Not on file     Emotionally abused: Not on file     Physically abused: Not on file     Forced sexual activity: Not on file   Other Topics Concern    Not on file   Social History Narrative    , semi retired , retired [employer]       Electronically signed by Kevin Dover, [address] [place] [address] Drive - [address] [facility] on 2021 at 2:01 PM

## 2021-05-15 ENCOUNTER — APPOINTMENT (OUTPATIENT)
Dept: MRI IMAGING | Age: 76
DRG: 552 | End: 2021-05-15
Payer: MEDICARE

## 2021-05-15 LAB
CHOLESTEROL: 122 MG/DL
ESTIMATED AVERAGE GLUCOSE: 237 MG/DL
GLUCOSE BLD-MCNC: 110 MG/DL (ref 70–99)
GLUCOSE BLD-MCNC: 187 MG/DL (ref 70–99)
GLUCOSE BLD-MCNC: 87 MG/DL (ref 70–99)
HBA1C MFR BLD: 9.9 % (ref 4.2–6.3)
HCT VFR BLD CALC: 38.5 % (ref 42–52)
HDLC SERPL-MCNC: 51 MG/DL
HEMOGLOBIN: 11.7 GM/DL (ref 13.5–18)
LDL CHOLESTEROL DIRECT: 70 MG/DL
MCH RBC QN AUTO: 29.5 PG (ref 27–31)
MCHC RBC AUTO-ENTMCNC: 30.4 % (ref 32–36)
MCV RBC AUTO: 97.2 FL (ref 78–100)
PDW BLD-RTO: 13.6 % (ref 11.7–14.9)
PLATELET # BLD: 314 K/CU MM (ref 140–440)
PMV BLD AUTO: 10.2 FL (ref 7.5–11.1)
RBC # BLD: 3.96 M/CU MM (ref 4.6–6.2)
TRIGL SERPL-MCNC: 41 MG/DL
TROPONIN T: 0.04 NG/ML
WBC # BLD: 8 K/CU MM (ref 4–10.5)

## 2021-05-15 PROCEDURE — 70551 MRI BRAIN STEM W/O DYE: CPT

## 2021-05-15 PROCEDURE — 6370000000 HC RX 637 (ALT 250 FOR IP): Performed by: INTERNAL MEDICINE

## 2021-05-15 PROCEDURE — 6370000000 HC RX 637 (ALT 250 FOR IP): Performed by: NURSE PRACTITIONER

## 2021-05-15 PROCEDURE — 82962 GLUCOSE BLOOD TEST: CPT

## 2021-05-15 PROCEDURE — 80061 LIPID PANEL: CPT

## 2021-05-15 PROCEDURE — 97161 PT EVAL LOW COMPLEX 20 MIN: CPT

## 2021-05-15 PROCEDURE — 94150 VITAL CAPACITY TEST: CPT

## 2021-05-15 PROCEDURE — 6360000002 HC RX W HCPCS: Performed by: NURSE PRACTITIONER

## 2021-05-15 PROCEDURE — 85027 COMPLETE CBC AUTOMATED: CPT

## 2021-05-15 PROCEDURE — 96372 THER/PROPH/DIAG INJ SC/IM: CPT

## 2021-05-15 PROCEDURE — 92610 EVALUATE SWALLOWING FUNCTION: CPT

## 2021-05-15 PROCEDURE — 94640 AIRWAY INHALATION TREATMENT: CPT

## 2021-05-15 PROCEDURE — 2700000000 HC OXYGEN THERAPY PER DAY

## 2021-05-15 PROCEDURE — G0378 HOSPITAL OBSERVATION PER HR: HCPCS

## 2021-05-15 PROCEDURE — 83036 HEMOGLOBIN GLYCOSYLATED A1C: CPT

## 2021-05-15 PROCEDURE — 36415 COLL VENOUS BLD VENIPUNCTURE: CPT

## 2021-05-15 PROCEDURE — 83721 ASSAY OF BLOOD LIPOPROTEIN: CPT

## 2021-05-15 PROCEDURE — 97110 THERAPEUTIC EXERCISES: CPT

## 2021-05-15 PROCEDURE — 2580000003 HC RX 258: Performed by: NURSE PRACTITIONER

## 2021-05-15 PROCEDURE — 94761 N-INVAS EAR/PLS OXIMETRY MLT: CPT

## 2021-05-15 RX ORDER — AMLODIPINE BESYLATE 10 MG/1
10 TABLET ORAL DAILY
Status: DISCONTINUED | OUTPATIENT
Start: 2021-05-15 | End: 2021-05-19 | Stop reason: HOSPADM

## 2021-05-15 RX ADMIN — Medication 2 PUFF: at 05:11

## 2021-05-15 RX ADMIN — SODIUM CHLORIDE, PRESERVATIVE FREE 10 ML: 5 INJECTION INTRAVENOUS at 09:03

## 2021-05-15 RX ADMIN — Medication 2 PUFF: at 12:11

## 2021-05-15 RX ADMIN — ALBUTEROL SULFATE 2 PUFF: 90 AEROSOL, METERED RESPIRATORY (INHALATION) at 08:44

## 2021-05-15 RX ADMIN — ENOXAPARIN SODIUM 30 MG: 30 INJECTION SUBCUTANEOUS at 08:59

## 2021-05-15 RX ADMIN — Medication 2 PUFF: at 16:10

## 2021-05-15 RX ADMIN — ALBUTEROL SULFATE 2 PUFF: 90 AEROSOL, METERED RESPIRATORY (INHALATION) at 16:09

## 2021-05-15 RX ADMIN — THEOPHYLLINE ANHYDROUS 200 MG: 200 CAPSULE, EXTENDED RELEASE ORAL at 08:59

## 2021-05-15 RX ADMIN — AMLODIPINE BESYLATE 10 MG: 10 TABLET ORAL at 20:27

## 2021-05-15 RX ADMIN — SODIUM CHLORIDE, PRESERVATIVE FREE 10 ML: 5 INJECTION INTRAVENOUS at 20:29

## 2021-05-15 RX ADMIN — ALBUTEROL SULFATE 2 PUFF: 90 AEROSOL, METERED RESPIRATORY (INHALATION) at 05:11

## 2021-05-15 RX ADMIN — Medication 2 PUFF: at 20:01

## 2021-05-15 RX ADMIN — ALBUTEROL SULFATE 2 PUFF: 90 AEROSOL, METERED RESPIRATORY (INHALATION) at 12:11

## 2021-05-15 RX ADMIN — POTASSIUM BICARBONATE 20 MEQ: 782 TABLET, EFFERVESCENT ORAL at 08:59

## 2021-05-15 RX ADMIN — PANTOPRAZOLE SODIUM 20 MG: 20 TABLET, DELAYED RELEASE ORAL at 06:45

## 2021-05-15 RX ADMIN — Medication 2 PUFF: at 08:44

## 2021-05-15 RX ADMIN — ATORVASTATIN CALCIUM 80 MG: 80 TABLET, FILM COATED ORAL at 20:27

## 2021-05-15 RX ADMIN — ASPIRIN 81 MG: 81 TABLET, COATED ORAL at 08:59

## 2021-05-15 RX ADMIN — ALBUTEROL SULFATE 2 PUFF: 90 AEROSOL, METERED RESPIRATORY (INHALATION) at 20:02

## 2021-05-15 RX ADMIN — MONTELUKAST 10 MG: 10 TABLET, FILM COATED ORAL at 20:27

## 2021-05-15 RX ADMIN — CETIRIZINE HYDROCHLORIDE 10 MG: 10 TABLET, FILM COATED ORAL at 08:58

## 2021-05-15 ASSESSMENT — PAIN DESCRIPTION - PAIN TYPE: TYPE: ACUTE PAIN

## 2021-05-15 ASSESSMENT — PAIN SCALES - GENERAL
PAINLEVEL_OUTOF10: 5
PAINLEVEL_OUTOF10: 0

## 2021-05-15 ASSESSMENT — PAIN DESCRIPTION - LOCATION: LOCATION: ARM;HAND

## 2021-05-15 ASSESSMENT — PAIN DESCRIPTION - PROGRESSION: CLINICAL_PROGRESSION: GRADUALLY IMPROVING

## 2021-05-15 ASSESSMENT — PAIN - FUNCTIONAL ASSESSMENT: PAIN_FUNCTIONAL_ASSESSMENT: ACTIVITIES ARE NOT PREVENTED

## 2021-05-15 NOTE — PLAN OF CARE
Problem: Pain:  Goal: Pain level will decrease  Description: Pain level will decrease  Outcome: Ongoing  Goal: Control of acute pain  Description: Control of acute pain  Outcome: Ongoing  Goal: Control of chronic pain  Description: Control of chronic pain  Outcome: Ongoing     Problem: Falls - Risk of:  Goal: Will remain free from falls  Description: Will remain free from falls  Outcome: Ongoing  Goal: Absence of physical injury  Description: Absence of physical injury  Outcome: Ongoing     Problem: HEMODYNAMIC STATUS  Goal: Patient has stable vital signs and fluid balance  Outcome: Ongoing     Problem: ACTIVITY INTOLERANCE/IMPAIRED MOBILITY  Goal: Mobility/activity is maintained at optimum level for patient  Outcome: Ongoing     Problem: COMMUNICATION IMPAIRMENT  Goal: Ability to express needs and understand communication  Outcome: Ongoing

## 2021-05-15 NOTE — PROGRESS NOTES
Speech Language Pathology  Facility/Department: [facility] EVALUATION    NAME: Tyshawn Kuo  : 1945  MRN: [id number]    IMPRESSIONS: Tyshawn Kuo was referred for a bedside swallow evaluation after being admitted to [facility] with left arm numbness and concern for CVA. MRI pending. Medical hx includes COPD, DM, HTN. No known history of dysphagia prior to admission. Pt seen for evaluation seated upright in bed, alert, pleasant and cooperative. Oral mechanism examination WFL/no asymmetry. Noted pt is edentulous. He reports his dentures are at home. He was presented with PO trials of thin liquids via cup/straw, puree, and regular solids. Oral stage WFL with intact mastication, AP transit, oral clearance. Pharyngeal stage WFL with intact swallow initiation/laryngeal elevation and no s/s aspiration. Speech/language/cognition screened and judged intact in conversation. Recommend continued regular diet/thin liquids. No further acute SLP needs identified at this time. ADMISSION DATE: 2021  ADMITTING DIAGNOSIS: has Megacolon; Bladder distention; Hypertension; Hypokalemia; Hypomagnesemia; Hyperglycemia; Respiratory acidosis; Pneumonia; Partial small bowel obstruction (HCC); COPD (chronic obstructive pulmonary disease) ([address] Chronic bronchitis ([address] Diabetes mellitus type II, non insulin dependent ([address] Acute kidney injury ([address] Hematuria; Bladder outlet obstruction; COPD exacerbation ([address] Gross hematuria; Dizziness; Diet-controlled diabetes mellitus ([address]  Chronic retention of urine; S/P TURP; and Acute left-sided weakness on their problem list.  ONSET DATE: this admission    Recent Chest Xray/CT of Chest: see chart    Date of Eval: 5/15/2021  Evaluating Therapist: Marisol Reyes    Current Diet level:  Current Diet : Regular  Current Liquid Diet : Thin      Primary Complaint  Patient Complaint: reports anxiety/perceived difficulty breathing, denies additional complaints    Pain:  Pain Assessment  Pain Assessment: 0-10  Pain Level: 5  Patient's Stated Pain Goal: No pain  Pain Type: Acute pain  Pain Location: Arm, Hand  Pain Orientation: Left  Pain Descriptors: Tingling  Pain Frequency: Continuous  Clinical Progression: Gradually improving  Functional Pain Assessment: Activities are not prevented    Reason for Referral  Isai Vargas was referred for a bedside swallow evaluation to assess the efficiency of his swallow function, identify signs and symptoms of aspiration and make recommendations regarding safe dietary consistencies, effective compensatory strategies, and safe eating environment. Impression  Dysphagia Diagnosis: Swallow function appears grossly intact  Dysphagia Outcome Severity Scale: Level 6: Within functional limits/Modified independence     Treatment Plan  Requires SLP Intervention: No  Duration/Frequency of Treatment: N/A          Recommended Diet and Intervention  Diet Solids Recommendation: Regular  Liquid Consistency Recommendation: Thin  Recommended Form of Meds: PO          Compensatory Swallowing Strategies  Compensatory Swallowing Strategies: Upright as possible for all oral intake    Treatment/Goals  Short-term Goals  Timeframe for Short-term Goals: N/A    General  Chart Reviewed: Yes  Behavior/Cognition: Alert; Cooperative;Pleasant mood (reports anxiety from perceived difficulty breathing)  Respiratory Status: O2 via nasual cannula  O2 Device: Nasal cannula  Communication Observation: Functional  Follows Directions: Simple  Dentition: Edentulous (reports he has dentures at home)  Patient Positioning: Upright in bed  Baseline Vocal Quality: Normal  Prior Dysphagia History: none known prior to admission  Consistencies Administered: Dysphagia Pureed (Dysphagia I); Thin - cup; Thin - straw;Reg solid           Vision/Hearing  Vision  Vision: Within Functional Limits  Hearing  Hearing: Within functional limits    Oral Motor Deficits  Oral/Motor  Oral Motor: Within functional limits    Oral Phase Dysfunction  Oral Phase  Oral Phase: WFL     Indicators of Pharyngeal Phase Dysfunction   Pharyngeal Phase  Pharyngeal Phase: WFL    Prognosis       Education  Patient Education: results, recommendations  Patient Education Response: Verbalizes understanding  Safety Devices in place: Yes  Type of devices:  All fall risk precautions in place       Therapy Time  SLP Individual Minutes  Time In: 1200  Time Out: [address]15 [address]  Minutes: [address] [facility], [address] [place] [address] Road  5/15/2021 12:59 PM

## 2021-05-15 NOTE — PROGRESS NOTES
Physical Therapy    Facility/Department: [facility]  Initial Assessment    NAME: Chayito Rivera  : 1945  MRN: [id number]    Date of Service: 5/15/2021    Discharge Recommendations:  Continue to assess pending progress        Assessment   Body structures, Functions, Activity limitations: Decreased ADL status; Decreased strength;Decreased sensation;Decreased high-level IADLs  Assessment: 78year old male admitted to [facility] with acute left sided weakness. Based on his impairments and functional limitations, he will benefit from skilled physical therapy intervention for left upper extremity strengthening. Treatment Diagnosis: Acute left sided weakness. Prognosis: Good  Decision Making: Low Complexity  Clinical Presentation: Patient presents with left upper extremity weakness about his left hand/ strength. PT Education: Goals;PT Role;Plan of Care;Home Exercise Program;General Safety  Barriers to Learning: None noted. REQUIRES PT FOLLOW UP: Yes  Activity Tolerance  Activity Tolerance: Patient Tolerated treatment well       Patient Diagnosis(es): The primary encounter diagnosis was Paresthesia. Diagnoses of Left hand pain, Left hand weakness, Troponin level elevated, and Hypomagnesemia were also pertinent to this visit. has a past medical history of Acid reflux, Anemia, COPD (chronic obstructive pulmonary disease) ([address] Dental decay, Diabetes mellitus ([address] Emphysema of lung ([address] Enlarged prostate, Stevens catheter in place, H/O cardiovascular stress test, Hematuria, HX OTHER MEDICAL, Hypertension, Low back sprain, Megacolon, On home oxygen therapy, and Wears glasses. has a past surgical history that includes Revision Colostomy (); Abdominal exploration surgery (283); other surgical history; other surgical history (2013); tumor removal; Appendectomy (7years old); Abdomen surgery; TURP (N/A, 2018); Dental surgery; and other surgical history (2018).     Restrictions Vision/Hearing  Vision: Within Functional Limits  Hearing: Within functional limits     Subjective  General  Chart Reviewed: Yes  Patient assessed for rehabilitation services?: Yes  Family / Caregiver Present: No  Referral Date : 05/14/21  Diagnosis: Acute left sided weakness. Follows Commands: Within Functional Limits  Subjective  Subjective: Patient stated he noticed left arm/hand tingling and weakness so he went to the hospital.  Reported he is under a lot of stress right now in his life due to bills and companies calling him regarding bills he owes. He is also stressed about his vehicle(s) needing repair and not running currently. Pain Screening  Patient Currently in Pain: Yes  Pain Assessment  Pain Assessment: 0-10  Pain Level: 5  Pain Type: Acute pain  Pain Location: Arm;Hand  Pain Orientation: Left  Pain Descriptors: Tingling  Pain Frequency: Continuous  Clinical Progression: Gradually improving  Functional Pain Assessment: Activities are not prevented  Vital Signs  Patient Currently in Pain: Yes       Orientation  Orientation  Overall Orientation Status: Within Normal Limits  Social/Functional History  Social/Functional History  Lives With: Alone  Type of Home: House  Home Layout: One level  Home Access: Stairs to enter without rails  Bathroom Shower/Tub: Walk-in shower  Bathroom Toilet: Standard  Home Equipment: Cane  Receives Help From: Other (comment) (Receives Meals on [employer])  ADL Assistance: Independent  Homemaking Assistance: Independent  Homemaking Responsibilities: Yes  Ambulation Assistance: Independent  Transfer Assistance: Independent  Active : Yes  Mode of Transportation: Car  Education: IT education as well as health care education for nursing assistant  Occupation: Retired  Type of occupation: IT and healthcare  Cognition        Objective     Observation/Palpation  Posture: Good  Observation: Patient found resting in bed.     PROM RLE (degrees)  RLE PROM: WFL  AROM RLE (degrees)  RLE ADLs without difficulty. Short term goal 2: Patient will tolerate performing > or = 1 set of 10 reps of left upper extremity therapeutic exercises. Patient Goals   Patient goals : To return home.        Therapy Time   Individual Concurrent Group Co-treatment   Time In 5901         Time Out 1144         Minutes 22         Timed Code Treatment Minutes: Matt Duarte

## 2021-05-15 NOTE — PROGRESS NOTES
mg Oral Daily    Or    aspirin  300 mg Rectal Daily    atorvastatin  80 mg Oral Nightly    albuterol sulfate HFA  2 puff Inhalation Q6H WA    pantoprazole  20 mg Oral QAM AC    cetirizine  10 mg Oral Daily    ipratropium  2 puff Inhalation Q6H WA      Infusions:    dextrose      sodium chloride       PRN Meds: glucose, 15 g, PRN  dextrose, 12.5 g, PRN  glucagon (rDNA), 1 mg, PRN  dextrose, 100 mL/hr, PRN  sodium chloride flush, 5-40 mL, PRN  sodium chloride, 25 mL, PRN  promethazine, 12.5 mg, Q6H PRN   Or  ondansetron, 4 mg, Q6H PRN  polyethylene glycol, 17 g, Daily PRN  labetalol, 10 mg, Q10 Min PRN  bisacodyl, 10 mg, Daily PRN            Pertinent New Labs & Imaging Studies     CBC with Differential:    Lab Results   Component Value Date    WBC 8.0 05/15/2021    RBC 3.96 05/15/2021    HGB 11.7 05/15/2021    HCT 38.5 05/15/2021     05/15/2021    MCV 97.2 05/15/2021    MCH 29.5 05/15/2021    MCHC 30.4 05/15/2021    RDW 13.6 05/15/2021    SEGSPCT 72.7 05/14/2021    BANDSPCT 28 04/13/2018    LYMPHOPCT 12.8 05/14/2021    MONOPCT 9.0 05/14/2021    MYELOPCT 1 01/12/2013    BASOPCT 0.6 05/14/2021    MONOSABS 0.7 05/14/2021    LYMPHSABS 1.1 05/14/2021    EOSABS 0.4 05/14/2021    BASOSABS 0.1 05/14/2021    DIFFTYPE AUTOMATED DIFFERENTIAL 05/14/2021     CMP:    Lab Results   Component Value Date     05/14/2021    K 4.3 05/14/2021     05/14/2021    CO2 32 05/14/2021    BUN 17 05/14/2021    CREATININE 1.0 05/14/2021    GFRAA >60 05/14/2021    LABGLOM >60 05/14/2021    GLUCOSE 171 05/14/2021    PROT 6.3 05/14/2021    PROT 7.9 03/09/2013    LABALBU 3.8 05/14/2021    CALCIUM 9.2 05/14/2021    BILITOT 0.3 05/14/2021    ALKPHOS 103 05/14/2021    AST 18 05/14/2021    ALT 12 05/14/2021     XR WRIST LEFT (MIN 3 VIEWS)    Result Date: 5/14/2021  EXAMINATION: THREE XRAY VIEWS OF THE LEFT HAND; THREE XRAY VIEWS OF THE LEFT WRIST 5/14/2021 11:52 am COMPARISON: None.  HISTORY: ORDERING SYSTEM PROVIDED HISTORY: hand TECHNOLOGIST PROVIDED HISTORY: Reason for exam:->hand; ORDERING SYSTEM PROVIDED HISTORY: pain TECHNOLOGIST PROVIDED HISTORY: Reason for exam:->pain FINDINGS: Left wrist: Osteopenia. There is chondrocalcinosis in the region of the triangular fibrocartilage complex. No acute fracture or dislocation. Mild degenerative changes of the 1st [facility] joint. Joint spaces and alignment are otherwise maintained. Left hand: Osteopenia. Mild degenerative changes of the 1st [facility] joint. No acute fracture or dislocation. Joint spaces and alignment are otherwise maintained. Soft tissues are unremarkable. Mild degenerative changes of the 1st [facility] joint with chondrocalcinosis in the region of the triangular fibrocartilage complex. No acute osseous abnormality in the left wrist or left hand. Diffuse osteopenia. XR HAND LEFT (MIN 3 VIEWS)    Result Date: 5/14/2021  EXAMINATION: THREE XRAY VIEWS OF THE LEFT HAND; THREE XRAY VIEWS OF THE LEFT WRIST 5/14/2021 11:52 am COMPARISON: None. HISTORY: ORDERING SYSTEM PROVIDED HISTORY: hand TECHNOLOGIST PROVIDED HISTORY: Reason for exam:->hand; ORDERING SYSTEM PROVIDED HISTORY: pain TECHNOLOGIST PROVIDED HISTORY: Reason for exam:->pain FINDINGS: Left wrist: Osteopenia. There is chondrocalcinosis in the region of the triangular fibrocartilage complex. No acute fracture or dislocation. Mild degenerative changes of the 1st [facility] joint. Joint spaces and alignment are otherwise maintained. Left hand: Osteopenia. Mild degenerative changes of the 1st [facility] joint. No acute fracture or dislocation. Joint spaces and alignment are otherwise maintained. Soft tissues are unremarkable. Mild degenerative changes of the 1st [facility] joint with chondrocalcinosis in the region of the triangular fibrocartilage complex. No acute osseous abnormality in the left wrist or left hand. Diffuse osteopenia.      XR ABDOMEN (KUB) (SINGLE AP VIEW)    Result Date: 5/14/2021  EXAMINATION: ONE SUPINE XRAY VIEW(S) OF THE ABDOMEN 5/14/2021 1:59 pm COMPARISON: 04/23/2016 HISTORY: ORDERING SYSTEM PROVIDED HISTORY: distended loops of bowel? TECHNOLOGIST PROVIDED HISTORY: Abd KUB Reason for exam:->distended loops of bowel? Reason for Exam: distended loops of bowel? Acuity: Acute Type of Exam: Initial Additional signs and symptoms: na Relevant Medical/Surgical History: copd, hypertension FINDINGS: Grossly distended colon with a large stool burden in the colon. Stool is seen in the rectum. No definite extraluminal gas. Grossly distended colon with a large stool burden, similar in appearance to 04/23/2016     CT HEAD WO CONTRAST    Result Date: 5/14/2021  EXAMINATION: CT OF THE HEAD WITHOUT CONTRAST  5/14/2021 11:46 am TECHNIQUE: CT of the head was performed without the administration of intravenous contrast. Dose modulation, iterative reconstruction, and/or weight based adjustment of the mA/kV was utilized to reduce the radiation dose to as low as reasonably achievable. COMPARISON: 02/06/2013 HISTORY: ORDERING SYSTEM PROVIDED HISTORY: L hand paresthesias TECHNOLOGIST PROVIDED HISTORY: Has a \"code stroke\" or \"stroke alert\" been called? ->No Reason for exam:->L hand paresthesias Decision Support Exception - unselect if not a suspected or confirmed emergency medical condition->Emergency Medical Condition (MA) Reason for Exam: L hand paresthesias Acuity: Acute Type of Exam: Initial FINDINGS: BRAIN/VENTRICLES: There is no acute intracranial hemorrhage, mass effect or midline shift. No abnormal extra-axial fluid collection. The gray-white differentiation is maintained without evidence of an acute infarct. There is no evidence of hydrocephalus. Moderate chronic microvascular ischemic change. ORBITS: The visualized portion of the orbits demonstrate no acute abnormality. SINUSES: The visualized paranasal sinuses and mastoid air cells demonstrate no acute abnormality.  SOFT TISSUES/SKULL:  No acute abnormality of the visualized skull or soft tissues. No acute intracranial abnormality. XR CHEST PORTABLE    Result Date: 5/14/2021  EXAMINATION: ONE XRAY VIEW OF THE CHEST 5/14/2021 11:52 am COMPARISON: 04/12/2018 HISTORY: ORDERING SYSTEM PROVIDED HISTORY: L hand paresthesias TECHNOLOGIST PROVIDED HISTORY: Reason for exam:->L hand paresthesias FINDINGS: Heart size stable. Linear opacities right lung base unchanged. No new airspace disease. No pneumothorax. No pleural effusion. Distended loops of bowel seen in the abdomen. No acute cardiopulmonary process Distended loops of bowel seen in the visualized portion of the abdomen       Assessment and Plan:   Sonya Jones is a 78 y.o.  male  who presents with Left upper extremity weakness    Left arm numbness  Start ASA and statins  Allow permissive HTN  Check lipid panel and A1c  MRI brain with no acute findings  Neuro check  Neurology following  Ordered CT cervical spine    Elevated troponin  EKG with some T wave changes  Consulted cardiology     Megacolon, hx  Xray abdomen: Grossly distended colon with a large stool burden, similar in appearance to 04/23/2016   Seems like chronic issue  Continue Miralax  Dulcolax PRN  Consider Enema if no improvement     Left wrist degenerative disease  Osteopenia  Xray left wrist: Mild degenerative changes of the 1st [facility] joint with chondrocalcinosis in the region of the triangular fibrocartilage complex.  No acute osseous abnormality in the left wrist or left hand. Diffuse osteopenia.    Pain control    COPD, not in exacerbation  Continue Singular, Combivent, and Symbicort  Check theophylline level     HTN  Resume Norvasc     DMII with chronic complications  Hold Metformin  SSI  Hypoglycemia protocol      Diet DIET GENERAL; Carb Control: 4 carb choices (60 gms)/meal   DVT Prophylaxis [x] Lovenox, []  Heparin, [] SCDs, [] Ambulation   GI Prophylaxis [x] PPI,  [] H2 Blocker,  [] Carafate,  [] Diet/Tube Feeds   Code Status Full Code   Disposition Patient requires continued admission due to left sided numbness and uncontrolled HTN   MDM [] Low, [x] Moderate,[]  High     Electronically signed by Marline Arrington MD on 5/15/2021 at 12:43 PM

## 2021-05-16 ENCOUNTER — APPOINTMENT (OUTPATIENT)
Dept: CT IMAGING | Age: 76
DRG: 552 | End: 2021-05-16
Payer: MEDICARE

## 2021-05-16 LAB
GLUCOSE BLD-MCNC: 122 MG/DL (ref 70–99)
GLUCOSE BLD-MCNC: 191 MG/DL (ref 70–99)
GLUCOSE BLD-MCNC: 204 MG/DL (ref 70–99)
GLUCOSE BLD-MCNC: 227 MG/DL (ref 70–99)

## 2021-05-16 PROCEDURE — 99244 OFF/OP CNSLTJ NEW/EST MOD 40: CPT | Performed by: INTERNAL MEDICINE

## 2021-05-16 PROCEDURE — 94761 N-INVAS EAR/PLS OXIMETRY MLT: CPT

## 2021-05-16 PROCEDURE — G0378 HOSPITAL OBSERVATION PER HR: HCPCS

## 2021-05-16 PROCEDURE — 6370000000 HC RX 637 (ALT 250 FOR IP): Performed by: INTERNAL MEDICINE

## 2021-05-16 PROCEDURE — 72125 CT NECK SPINE W/O DYE: CPT

## 2021-05-16 PROCEDURE — 6370000000 HC RX 637 (ALT 250 FOR IP): Performed by: NURSE PRACTITIONER

## 2021-05-16 PROCEDURE — 96372 THER/PROPH/DIAG INJ SC/IM: CPT

## 2021-05-16 PROCEDURE — 82962 GLUCOSE BLOOD TEST: CPT

## 2021-05-16 PROCEDURE — 6360000002 HC RX W HCPCS: Performed by: NURSE PRACTITIONER

## 2021-05-16 PROCEDURE — 2580000003 HC RX 258: Performed by: NURSE PRACTITIONER

## 2021-05-16 PROCEDURE — 94640 AIRWAY INHALATION TREATMENT: CPT

## 2021-05-16 RX ORDER — METOPROLOL SUCCINATE 25 MG/1
25 TABLET, EXTENDED RELEASE ORAL DAILY
Status: DISCONTINUED | OUTPATIENT
Start: 2021-05-16 | End: 2021-05-19 | Stop reason: HOSPADM

## 2021-05-16 RX ADMIN — CETIRIZINE HYDROCHLORIDE 10 MG: 10 TABLET, FILM COATED ORAL at 09:45

## 2021-05-16 RX ADMIN — SODIUM CHLORIDE, PRESERVATIVE FREE 10 ML: 5 INJECTION INTRAVENOUS at 09:45

## 2021-05-16 RX ADMIN — Medication 2 PUFF: at 20:59

## 2021-05-16 RX ADMIN — ALBUTEROL SULFATE 2 PUFF: 90 AEROSOL, METERED RESPIRATORY (INHALATION) at 15:23

## 2021-05-16 RX ADMIN — Medication 2 PUFF: at 12:09

## 2021-05-16 RX ADMIN — ALBUTEROL SULFATE 2 PUFF: 90 AEROSOL, METERED RESPIRATORY (INHALATION) at 12:09

## 2021-05-16 RX ADMIN — MONTELUKAST 10 MG: 10 TABLET, FILM COATED ORAL at 21:38

## 2021-05-16 RX ADMIN — ENOXAPARIN SODIUM 30 MG: 30 INJECTION SUBCUTANEOUS at 09:44

## 2021-05-16 RX ADMIN — ALBUTEROL SULFATE 2 PUFF: 90 AEROSOL, METERED RESPIRATORY (INHALATION) at 07:20

## 2021-05-16 RX ADMIN — THEOPHYLLINE ANHYDROUS 200 MG: 200 CAPSULE, EXTENDED RELEASE ORAL at 09:45

## 2021-05-16 RX ADMIN — ATORVASTATIN CALCIUM 80 MG: 80 TABLET, FILM COATED ORAL at 21:38

## 2021-05-16 RX ADMIN — ALBUTEROL SULFATE 2 PUFF: 90 AEROSOL, METERED RESPIRATORY (INHALATION) at 20:58

## 2021-05-16 RX ADMIN — AMLODIPINE BESYLATE 10 MG: 10 TABLET ORAL at 09:44

## 2021-05-16 RX ADMIN — ASPIRIN 81 MG: 81 TABLET, COATED ORAL at 09:45

## 2021-05-16 RX ADMIN — POTASSIUM BICARBONATE 20 MEQ: 782 TABLET, EFFERVESCENT ORAL at 09:44

## 2021-05-16 RX ADMIN — PANTOPRAZOLE SODIUM 20 MG: 20 TABLET, DELAYED RELEASE ORAL at 06:21

## 2021-05-16 RX ADMIN — ALBUTEROL SULFATE 2 PUFF: 90 AEROSOL, METERED RESPIRATORY (INHALATION) at 03:29

## 2021-05-16 RX ADMIN — Medication 2 PUFF: at 03:30

## 2021-05-16 RX ADMIN — Medication 2 PUFF: at 07:21

## 2021-05-16 RX ADMIN — Medication 2 PUFF: at 15:23

## 2021-05-16 RX ADMIN — METOPROLOL SUCCINATE 25 MG: 25 TABLET, EXTENDED RELEASE ORAL at 11:51

## 2021-05-16 ASSESSMENT — PAIN SCALES - GENERAL: PAINLEVEL_OUTOF10: 0

## 2021-05-16 NOTE — PLAN OF CARE
Problem: Pain:  Goal: Pain level will decrease  Description: Pain level will decrease  5/16/2021 0017 by Francisco Sepulveda RN  Outcome: Ongoing  5/15/2021 1606 by Cachorro Hayward RN  Outcome: Ongoing  Goal: Control of acute pain  Description: Control of acute pain  5/16/2021 0017 by Francisco Sepulveda RN  Outcome: Ongoing  5/15/2021 1606 by Cachorro Hayward RN  Outcome: Ongoing  Goal: Control of chronic pain  Description: Control of chronic pain  5/16/2021 0017 by Francisco Sepulveda RN  Outcome: Ongoing  5/15/2021 1606 by Cachorro Hayward RN  Outcome: Ongoing     Problem: Falls - Risk of:  Goal: Will remain free from falls  Description: Will remain free from falls  5/16/2021 0017 by Francisco Sepulveda RN  Outcome: Ongoing  5/15/2021 1606 by Cachorro Hayward RN  Outcome: Ongoing  Goal: Absence of physical injury  Description: Absence of physical injury  5/16/2021 0017 by Francisco Sepulveda RN  Outcome: Ongoing  5/15/2021 1606 by Cachorro Hayward RN  Outcome: Ongoing     Problem: HEMODYNAMIC STATUS  Goal: Patient has stable vital signs and fluid balance  5/16/2021 0017 by Francisco Sepulveda RN  Outcome: Ongoing  5/15/2021 1606 by Cachorro Hayward RN  Outcome: Ongoing     Problem: ACTIVITY INTOLERANCE/IMPAIRED MOBILITY  Goal: Mobility/activity is maintained at optimum level for patient  5/16/2021 0017 by Francisco Sepulveda RN  Outcome: Ongoing  5/15/2021 1606 by Cachorro Hayward RN  Outcome: Ongoing     Problem: COMMUNICATION IMPAIRMENT  Goal: Ability to express needs and understand communication  5/16/2021 0017 by Francisco Sepulveda RN  Outcome: Ongoing  5/15/2021 1606 by Cachorro Hayward RN  Outcome: Ongoing

## 2021-05-16 NOTE — PLAN OF CARE
Problem: Pain:  Goal: Pain level will decrease  Description: Pain level will decrease  5/16/2021 1651 by Anand Gill RN  Outcome: Ongoing  5/16/2021 1651 by Anand Gill RN  Outcome: Ongoing  Goal: Control of acute pain  Description: Control of acute pain  5/16/2021 1651 by Anand Gill RN  Outcome: Ongoing  5/16/2021 1651 by Anand Gill RN  Outcome: Ongoing  Goal: Control of chronic pain  Description: Control of chronic pain  5/16/2021 1651 by Anand Gill RN  Outcome: Ongoing  5/16/2021 1651 by Anand Gill RN  Outcome: Ongoing     Problem: Falls - Risk of:  Goal: Will remain free from falls  Description: Will remain free from falls  5/16/2021 1651 by Anand Gill RN  Outcome: Ongoing  5/16/2021 1651 by Anand Gill RN  Outcome: Ongoing  Goal: Absence of physical injury  Description: Absence of physical injury  5/16/2021 1651 by Anand Gill RN  Outcome: Ongoing  5/16/2021 1651 by Anand Gill RN  Outcome: Ongoing     Problem: HEMODYNAMIC STATUS  Goal: Patient has stable vital signs and fluid balance  5/16/2021 1651 by Anand Gill RN  Outcome: Ongoing  5/16/2021 1651 by Anand Gill RN  Outcome: Ongoing     Problem: ACTIVITY INTOLERANCE/IMPAIRED MOBILITY  Goal: Mobility/activity is maintained at optimum level for patient  5/16/2021 1651 by Anand Gill RN  Outcome: Ongoing  5/16/2021 1651 by Anand Gill RN  Outcome: Ongoing     Problem: COMMUNICATION IMPAIRMENT  Goal: Ability to express needs and understand communication  5/16/2021 1651 by Anand Gill RN  Outcome: Ongoing  5/16/2021 1651 by Anand Gill RN  Outcome: Ongoing

## 2021-05-16 NOTE — CONSULTS
INPATIENT CARDIOLOGY CONSULT NOTE       Reason for consultation:  Elevated troponins , HTN     Referring physician:  Hayder Glez MD     Primary care physician: Kadeem Oleary      Dear Hayder Glez MD Thank you for the consult    Chief Complaint   Patient presents with    Numbness     left arm numbess from elbow down for 2 days       History of present illness:Billy is a 78 y. o.year old who  presents with  Chief Complaint   Patient presents with    Numbness     left arm numbess from elbow down for 2 days       Patient is a pleasant 73-year gentleman with prior medical history significant for hypertension, type 1 diabetes mellitus, who presents to the hospital with chief complaint of left-sided numbness. Patient mentions that over the past few days she has been experiencing left-sided arm pressure as well as numbness in the finger  Patient denies any discrete chest pain or new shortness of breath. Patient has a family history which is positive for congestive heart failure and stroke    Cardiology consulted to evaluate patient for elevated troponin and abnormal EKG. Serial troponins obtained in the hospital shows a positive range from 0.035-0.038. Patient previously has had elevated troponins on prior admissions. EKG shows sinus rhythm with right bundle branch block and T wave inversions in lateral leads. Past medical history:    has a past medical history of Acid reflux, Anemia, COPD (chronic obstructive pulmonary disease) ([address] Dental decay, Diabetes mellitus ([address] Emphysema of lung ([address] Enlarged prostate, Stevens catheter in place, H/O cardiovascular stress test, Hematuria, HX OTHER MEDICAL, Hypertension, Low back sprain, Megacolon, On home oxygen therapy, and Wears glasses. Past surgical history:   has a past surgical history that includes Revision Colostomy (1974);  Abdominal exploration surgery (2101); other surgical history; other surgical history (01 07 2013); tumor removal; Appendectomy (7years old); Abdomen surgery; TURP (N/A, 07/12/2018); Dental surgery; and other surgical history (09/18/2018). Social History:   reports that he quit smoking about 8 years ago. His smoking use included cigarettes. He has a 15.00 pack-year smoking history. He has never used smokeless tobacco. He reports that he does not drink alcohol and does not use drugs.   Family history:   no family history of CAD, STROKE of DM    Allergies   Allergen Reactions    Lisinopril Other (See Comments)     Pt coughs uncontrollably    Dye [Iodides] Other (See Comments)     \"had iodine for IVP test- went into seizures\"    Lorazepam Other (See Comments)     CAUSES HIM TO HAVE NO SELF CONTROL       amLODIPine (NORVASC) tablet 10 mg, Daily  theophylline (RAMIRO-24) extended release capsule 200 mg, Daily  potassium bicarb-citric acid (EFFER-K) effervescent tablet 20 mEq, Daily  montelukast (SINGULAIR) tablet 10 mg, Nightly  insulin lispro (HUMALOG) injection vial 0-6 Units, TID WC  insulin lispro (HUMALOG) injection vial 0-3 Units, Nightly  glucose (GLUTOSE) 40 % oral gel 15 g, PRN  dextrose 50 % IV solution, PRN  glucagon (rDNA) injection 1 mg, PRN  dextrose 5 % solution, PRN  sodium chloride flush 0.9 % injection 5-40 mL, 2 times per day  sodium chloride flush 0.9 % injection 5-40 mL, PRN  0.9 % sodium chloride infusion, PRN  promethazine (PHENERGAN) tablet 12.5 mg, Q6H PRN   Or  ondansetron (ZOFRAN) injection 4 mg, Q6H PRN  polyethylene glycol (GLYCOLAX) packet 17 g, Daily PRN  enoxaparin (LOVENOX) injection 30 mg, Daily  aspirin EC tablet 81 mg, Daily   Or  aspirin suppository 300 mg, Daily  atorvastatin (LIPITOR) tablet 80 mg, Nightly  labetalol (NORMODYNE;TRANDATE) injection 10 mg, Q10 Min PRN  albuterol sulfate  (90 Base) MCG/ACT inhaler 2 puff, Q6H WA  pantoprazole (PROTONIX) tablet 20 mg, QAM AC  cetirizine (ZYRTEC) tablet 10 mg, Daily  ipratropium (ATROVENT HFA) 17 MCG/ACT inhaler 2 puff, Q6H WA  bisacodyl (DULCOLAX) suppository 10 mg, Daily PRN        Current Facility-Administered Medications   Medication Dose Route Frequency Provider Last Rate Last Admin    amLODIPine (NORVASC) tablet 10 mg  10 mg Oral Daily Vinicio De Dios MD   10 mg at 05/15/21 2027    theophylline (ARMIRO-24) extended release capsule 200 mg  200 mg Oral Daily ERNESTO Carter CNP   200 mg at 05/15/21 0859    potassium bicarb-citric acid (EFFER-K) effervescent tablet 20 mEq  20 mEq Oral Daily Margie Meza APRN - CNP   20 mEq at 05/15/21 0859    montelukast (SINGULAIR) tablet 10 mg  10 mg Oral Nightly ERNESTO Carter - CNP   10 mg at 05/15/21 2027    insulin lispro (HUMALOG) injection vial 0-6 Units  0-6 Units Subcutaneous TID WC Margie Meza APRN - CNP   1 Units at 05/15/21 1743    insulin lispro (HUMALOG) injection vial 0-3 Units  0-3 Units Subcutaneous Nightly ERNESTO Carter - CNP   1 Units at 05/14/21 2128    glucose (GLUTOSE) 40 % oral gel 15 g  15 g Oral PRN ERNESTO Carter - CNP        dextrose 50 % IV solution  12.5 g Intravenous PRN ERNESTO Carter - CNP        glucagon (rDNA) injection 1 mg  1 mg Intramuscular PRN ERNESTO Carter - CNP        dextrose 5 % solution  100 mL/hr Intravenous PRN ERNESTO Carter - CNP        sodium chloride flush 0.9 % injection 5-40 mL  5-40 mL Intravenous 2 times per day ERNESTO Carter - CNP   10 mL at 05/15/21 2029    sodium chloride flush 0.9 % injection 5-40 mL  5-40 mL Intravenous PRN ERNESTO Carter - CNP        0.9 % sodium chloride infusion  25 mL Intravenous PRN ERNESTO Carter - IAN        promethazine (PHENERGAN) tablet 12.5 mg  12.5 mg Oral Q6H PRN ERNESTO Carter CNP        Or    ondansetron (ZOFRAN) injection 4 mg  4 mg Intravenous Q6H PRN ERNESTO Carter - IAN        polyethylene glycol (GLYCOLAX) packet 17 g  17 g Oral Daily PRN ERNESTO Carter - CNP        enoxaparin obstructive pulmonary disease) ([facility] Dental decay, Diabetes mellitus ([facility] Emphysema of lung ([facility] Enlarged prostate, Stevens catheter in place, H/O cardiovascular stress test, Hematuria, HX OTHER MEDICAL, Hypertension, Low back sprain, Megacolon, On home oxygen therapy, and Wears glasses. Recommendations:      1. Left arm numbness with elevated troponins ? Angina equivalents -will obtain stress MPI and echocardiogram for risk stratification. Recommend to start patient on aspirin and statins. 2. Essential hypertension: Patient was previously on losartan from [facility] however quit taking since 5 years. Currently started on Norvasc. We will add Toprol-XL to current regimen. 3. Hyperlipidemia: With history of TIA/type 1 diabetes mellitus recommend statin therapy. Started on Lipitor 80 mg daily. 4. Health maintenance: Exercise and Diet  5. ? TIA : Neuro following   6.  DVT prophylaxis on Lovenox      Thank you for the consult    Dr. Mahsa Rosales  5/16/2021 8:11 AM

## 2021-05-16 NOTE — PROGRESS NOTES
Hospitalist Progress Note      Name:  Nela Pennington /Age/Sex: 1945  (89 y.o. male)   MRN & CSN:  [id number] & [id number] Admission Date/Time: 2021 10:22 AM   Location:  [id number]/[id number]-[id number] PCP: Madelynn Bosworth Day: 3    History of Present Illness:     Chief Complaint: Nela Pennington is a 78 y.o.  male  who presents with Left sided numbness     The patient seen and examined at bed side. Says his pain and numbness of the left upper extremity improved. Denies any chest pain or SOB. Ten point ROS reviewed negative, unless as noted above    Objective: Intake/Output Summary (Last 24 hours) at 2021 1408  Last data filed at 2021 0515  Gross per 24 hour   Intake --   Output 1150 ml   Net -1150 ml      Vitals:   Vitals:    21 1212   BP:    Pulse:    Resp:    Temp:    SpO2: 95%     Physical Exam:   General Appearance: alert and oriented to person, place and time, in no acute distress  Cardiovascular: normal rate, regular rhythm, normal S1 and S2  Pulmonary/Chest: clear to auscultation bilaterally  Abdomen: soft, non-tender, +distention, normal bowel sounds, no masses   Extremities: no cyanosis, clubbing or edema, left upper extremity weakness  Skin: warm and dry, no rash or erythema  Head: normocephalic and atraumatic  Eyes: pupils equal, round, and reactive to light  Neck: supple and non-tender without mass, no thyromegaly   Musculoskeletal: normal range of motion, no joint swelling, deformity or tenderness  Neurological: alert, oriented, normal speech, no focal findings or movement disorder noted.  Left ipper extremity weakness    Medications:   Medications:    metoprolol succinate  25 mg Oral Daily    amLODIPine  10 mg Oral Daily    theophylline  200 mg Oral Daily    potassium bicarb-citric acid  20 mEq Oral Daily    montelukast  10 mg Oral Nightly    insulin lispro  0-6 Units Subcutaneous TID WC    insulin lispro  0-3 Units Subcutaneous Nightly    sodium chloride flush  5-40 mL Intravenous 2 times per day    enoxaparin  30 mg Subcutaneous Daily    aspirin  81 mg Oral Daily    Or    aspirin  300 mg Rectal Daily    atorvastatin  80 mg Oral Nightly    albuterol sulfate HFA  2 puff Inhalation Q6H WA    pantoprazole  20 mg Oral QAM AC    cetirizine  10 mg Oral Daily    ipratropium  2 puff Inhalation Q6H WA      Infusions:    dextrose      sodium chloride       PRN Meds: glucose, 15 g, PRN  dextrose, 12.5 g, PRN  glucagon (rDNA), 1 mg, PRN  dextrose, 100 mL/hr, PRN  sodium chloride flush, 5-40 mL, PRN  sodium chloride, 25 mL, PRN  promethazine, 12.5 mg, Q6H PRN   Or  ondansetron, 4 mg, Q6H PRN  polyethylene glycol, 17 g, Daily PRN  labetalol, 10 mg, Q10 Min PRN  bisacodyl, 10 mg, Daily PRN            Pertinent New Labs & Imaging Studies     CBC with Differential:    Lab Results   Component Value Date    WBC 8.0 05/15/2021    RBC 3.96 05/15/2021    HGB 11.7 05/15/2021    HCT 38.5 05/15/2021     05/15/2021    MCV 97.2 05/15/2021    MCH 29.5 05/15/2021    MCHC 30.4 05/15/2021    RDW 13.6 05/15/2021    SEGSPCT 72.7 05/14/2021    BANDSPCT 28 04/13/2018    LYMPHOPCT 12.8 05/14/2021    MONOPCT 9.0 05/14/2021    MYELOPCT 1 01/12/2013    BASOPCT 0.6 05/14/2021    MONOSABS 0.7 05/14/2021    LYMPHSABS 1.1 05/14/2021    EOSABS 0.4 05/14/2021    BASOSABS 0.1 05/14/2021    DIFFTYPE AUTOMATED DIFFERENTIAL 05/14/2021     CMP:    Lab Results   Component Value Date     05/14/2021    K 4.3 05/14/2021     05/14/2021    CO2 32 05/14/2021    BUN 17 05/14/2021    CREATININE 1.0 05/14/2021    GFRAA >60 05/14/2021    LABGLOM >60 05/14/2021    GLUCOSE 171 05/14/2021    PROT 6.3 05/14/2021    PROT 7.9 03/09/2013    LABALBU 3.8 05/14/2021    CALCIUM 9.2 05/14/2021    BILITOT 0.3 05/14/2021    ALKPHOS 103 05/14/2021    AST 18 05/14/2021    ALT 12 05/14/2021     XR WRIST LEFT (MIN 3 VIEWS)    Result Date: 5/14/2021  EXAMINATION: THREE XRAY VIEWS OF THE LEFT HAND; THREE XRAY VIEWS OF THE LEFT WRIST 5/14/2021 11:52 am COMPARISON: None. HISTORY: ORDERING SYSTEM PROVIDED HISTORY: hand TECHNOLOGIST PROVIDED HISTORY: Reason for exam:->hand; ORDERING SYSTEM PROVIDED HISTORY: pain TECHNOLOGIST PROVIDED HISTORY: Reason for exam:->pain FINDINGS: Left wrist: Osteopenia. There is chondrocalcinosis in the region of the triangular fibrocartilage complex. No acute fracture or dislocation. Mild degenerative changes of the 1st [facility] joint. Joint spaces and alignment are otherwise maintained. Left hand: Osteopenia. Mild degenerative changes of the 1st [facility] joint. No acute fracture or dislocation. Joint spaces and alignment are otherwise maintained. Soft tissues are unremarkable. Mild degenerative changes of the 1st [facility] joint with chondrocalcinosis in the region of the triangular fibrocartilage complex. No acute osseous abnormality in the left wrist or left hand. Diffuse osteopenia. XR HAND LEFT (MIN 3 VIEWS)    Result Date: 5/14/2021  EXAMINATION: THREE XRAY VIEWS OF THE LEFT HAND; THREE XRAY VIEWS OF THE LEFT WRIST 5/14/2021 11:52 am COMPARISON: None. HISTORY: ORDERING SYSTEM PROVIDED HISTORY: hand TECHNOLOGIST PROVIDED HISTORY: Reason for exam:->hand; ORDERING SYSTEM PROVIDED HISTORY: pain TECHNOLOGIST PROVIDED HISTORY: Reason for exam:->pain FINDINGS: Left wrist: Osteopenia. There is chondrocalcinosis in the region of the triangular fibrocartilage complex. No acute fracture or dislocation. Mild degenerative changes of the 1st [facility] joint. Joint spaces and alignment are otherwise maintained. Left hand: Osteopenia. Mild degenerative changes of the 1st [facility] joint. No acute fracture or dislocation. Joint spaces and alignment are otherwise maintained. Soft tissues are unremarkable. Mild degenerative changes of the 1st [facility] joint with chondrocalcinosis in the region of the triangular fibrocartilage complex. No acute osseous abnormality in the left wrist or left hand.  Diffuse osteopenia. XR ABDOMEN (KUB) (SINGLE AP VIEW)    Result Date: 5/14/2021  EXAMINATION: ONE SUPINE XRAY VIEW(S) OF THE ABDOMEN 5/14/2021 1:59 pm COMPARISON: 04/23/2016 HISTORY: ORDERING SYSTEM PROVIDED HISTORY: distended loops of bowel? TECHNOLOGIST PROVIDED HISTORY: Abd KUB Reason for exam:->distended loops of bowel? Reason for Exam: distended loops of bowel? Acuity: Acute Type of Exam: Initial Additional signs and symptoms: na Relevant Medical/Surgical History: copd, hypertension FINDINGS: Grossly distended colon with a large stool burden in the colon. Stool is seen in the rectum. No definite extraluminal gas. Grossly distended colon with a large stool burden, similar in appearance to 04/23/2016     CT HEAD WO CONTRAST    Result Date: 5/14/2021  EXAMINATION: CT OF THE HEAD WITHOUT CONTRAST  5/14/2021 11:46 am TECHNIQUE: CT of the head was performed without the administration of intravenous contrast. Dose modulation, iterative reconstruction, and/or weight based adjustment of the mA/kV was utilized to reduce the radiation dose to as low as reasonably achievable. COMPARISON: 02/06/2013 HISTORY: ORDERING SYSTEM PROVIDED HISTORY: L hand paresthesias TECHNOLOGIST PROVIDED HISTORY: Has a \"code stroke\" or \"stroke alert\" been called? ->No Reason for exam:->L hand paresthesias Decision Support Exception - unselect if not a suspected or confirmed emergency medical condition->Emergency Medical Condition (MA) Reason for Exam: L hand paresthesias Acuity: Acute Type of Exam: Initial FINDINGS: BRAIN/VENTRICLES: There is no acute intracranial hemorrhage, mass effect or midline shift. No abnormal extra-axial fluid collection. The gray-white differentiation is maintained without evidence of an acute infarct. There is no evidence of hydrocephalus. Moderate chronic microvascular ischemic change. ORBITS: The visualized portion of the orbits demonstrate no acute abnormality.  SINUSES: The visualized paranasal sinuses and mastoid air cells demonstrate no acute abnormality. SOFT TISSUES/SKULL:  No acute abnormality of the visualized skull or soft tissues. No acute intracranial abnormality. XR CHEST PORTABLE    Result Date: 5/14/2021  EXAMINATION: ONE XRAY VIEW OF THE CHEST 5/14/2021 11:52 am COMPARISON: 04/12/2018 HISTORY: ORDERING SYSTEM PROVIDED HISTORY: L hand paresthesias TECHNOLOGIST PROVIDED HISTORY: Reason for exam:->L hand paresthesias FINDINGS: Heart size stable. Linear opacities right lung base unchanged. No new airspace disease. No pneumothorax. No pleural effusion. Distended loops of bowel seen in the abdomen. No acute cardiopulmonary process Distended loops of bowel seen in the visualized portion of the abdomen       Assessment and Plan:   Ag Hunt is a 78 y.o.  male  who presents with Left upper extremity weakness    Left arm numbness/weakness  Start ASA and statins  Allow permissive HTN  Check lipid panel and A1c  MRI brain with no acute findings  Neuro check  Neurology following  CT spine with severe cervical stenosis, ordered MRI of neck. Consult to neurosurgery placed  Cardiology following-Continue Aspirin and Statin. Cardiology to do further work up    Elevated troponin  EKG with some T wave changes  Consulted cardiology     Megacolon, hx  Xray abdomen: Grossly distended colon with a large stool burden, similar in appearance to 04/23/2016   Seems like chronic issue  Continue Miralax  Dulcolax PRN  Consider Enema if no improvement     Left wrist degenerative disease  Osteopenia  Xray left wrist: Mild degenerative changes of the 1st [facility] joint with chondrocalcinosis in the region of the triangular fibrocartilage complex.  No acute osseous abnormality in the left wrist or left hand. Diffuse osteopenia.    Pain control    COPD, not in exacerbation  Continue Singular, Combivent, and Symbicort  Check theophylline level     HTN  Resume Norvasc     DMII with chronic complications  Hold Metformin  SSI  Hypoglycemia protocol      Diet DIET GENERAL; Carb Control: 4 carb choices (60 gms)/meal   DVT Prophylaxis [x] Lovenox, []  Heparin, [] SCDs, [] Ambulation   GI Prophylaxis [x] PPI,  [] H2 Blocker,  [] Carafate,  [] Diet/Tube Feeds   Code Status Full Code   Disposition Patient requires continued admission due to left sided numbness and uncontrolled HTN   MDM [] Low, [x] Moderate,[]  High     Electronically signed by Bob Cohn MD on 5/16/2021 at 2:08 PM

## 2021-05-17 ENCOUNTER — APPOINTMENT (OUTPATIENT)
Dept: NUCLEAR MEDICINE | Age: 76
DRG: 552 | End: 2021-05-17
Payer: MEDICARE

## 2021-05-17 PROBLEM — R94.39 ABNORMAL NUCLEAR STRESS TEST: Status: ACTIVE | Noted: 2021-05-17

## 2021-05-17 LAB
GLUCOSE BLD-MCNC: 141 MG/DL (ref 70–99)
GLUCOSE BLD-MCNC: 221 MG/DL (ref 70–99)
GLUCOSE BLD-MCNC: 270 MG/DL (ref 70–99)
GLUCOSE BLD-MCNC: 370 MG/DL (ref 70–99)
LV EF: 38 %
LV EF: 39 %
LVEF MODALITY: NORMAL
LVEF MODALITY: NORMAL
SARS-COV-2, NAAT: NOT DETECTED
SOURCE: NORMAL

## 2021-05-17 PROCEDURE — 6370000000 HC RX 637 (ALT 250 FOR IP): Performed by: INTERNAL MEDICINE

## 2021-05-17 PROCEDURE — B2151ZZ FLUOROSCOPY OF LEFT HEART USING LOW OSMOLAR CONTRAST: ICD-10-PCS | Performed by: INTERNAL MEDICINE

## 2021-05-17 PROCEDURE — 3430000000 HC RX DIAGNOSTIC RADIOPHARMACEUTICAL: Performed by: INTERNAL MEDICINE

## 2021-05-17 PROCEDURE — 4A023N7 MEASUREMENT OF CARDIAC SAMPLING AND PRESSURE, LEFT HEART, PERCUTANEOUS APPROACH: ICD-10-PCS | Performed by: INTERNAL MEDICINE

## 2021-05-17 PROCEDURE — 93017 CV STRESS TEST TRACING ONLY: CPT

## 2021-05-17 PROCEDURE — 99233 SBSQ HOSP IP/OBS HIGH 50: CPT | Performed by: INTERNAL MEDICINE

## 2021-05-17 PROCEDURE — 2500000003 HC RX 250 WO HCPCS

## 2021-05-17 PROCEDURE — 93458 L HRT ARTERY/VENTRICLE ANGIO: CPT | Performed by: INTERNAL MEDICINE

## 2021-05-17 PROCEDURE — B2111ZZ FLUOROSCOPY OF MULTIPLE CORONARY ARTERIES USING LOW OSMOLAR CONTRAST: ICD-10-PCS | Performed by: INTERNAL MEDICINE

## 2021-05-17 PROCEDURE — 6360000002 HC RX W HCPCS

## 2021-05-17 PROCEDURE — G0378 HOSPITAL OBSERVATION PER HR: HCPCS

## 2021-05-17 PROCEDURE — 1200000000 HC SEMI PRIVATE

## 2021-05-17 PROCEDURE — A9500 TC99M SESTAMIBI: HCPCS | Performed by: INTERNAL MEDICINE

## 2021-05-17 PROCEDURE — 2709999900 HC NON-CHARGEABLE SUPPLY

## 2021-05-17 PROCEDURE — 2580000003 HC RX 258: Performed by: INTERNAL MEDICINE

## 2021-05-17 PROCEDURE — 6360000002 HC RX W HCPCS: Performed by: INTERNAL MEDICINE

## 2021-05-17 PROCEDURE — 82962 GLUCOSE BLOOD TEST: CPT

## 2021-05-17 PROCEDURE — 6360000004 HC RX CONTRAST MEDICATION

## 2021-05-17 PROCEDURE — 94640 AIRWAY INHALATION TREATMENT: CPT

## 2021-05-17 PROCEDURE — 2700000000 HC OXYGEN THERAPY PER DAY

## 2021-05-17 PROCEDURE — 94761 N-INVAS EAR/PLS OXIMETRY MLT: CPT

## 2021-05-17 PROCEDURE — 78452 HT MUSCLE IMAGE SPECT MULT: CPT

## 2021-05-17 PROCEDURE — 93306 TTE W/DOPPLER COMPLETE: CPT

## 2021-05-17 PROCEDURE — C1894 INTRO/SHEATH, NON-LASER: HCPCS

## 2021-05-17 PROCEDURE — 87635 SARS-COV-2 COVID-19 AMP PRB: CPT

## 2021-05-17 PROCEDURE — 93458 L HRT ARTERY/VENTRICLE ANGIO: CPT

## 2021-05-17 PROCEDURE — APPSS60 APP SPLIT SHARED TIME 46-60 MINUTES: Performed by: NURSE PRACTITIONER

## 2021-05-17 RX ORDER — SODIUM CHLORIDE 0.9 % (FLUSH) 0.9 %
5-40 SYRINGE (ML) INJECTION PRN
Status: DISCONTINUED | OUTPATIENT
Start: 2021-05-17 | End: 2021-05-19 | Stop reason: HOSPADM

## 2021-05-17 RX ORDER — SODIUM CHLORIDE 9 MG/ML
25 INJECTION, SOLUTION INTRAVENOUS PRN
Status: DISCONTINUED | OUTPATIENT
Start: 2021-05-17 | End: 2021-05-19 | Stop reason: HOSPADM

## 2021-05-17 RX ORDER — FLUTICASONE PROPIONATE 50 MCG
1 SPRAY, SUSPENSION (ML) NASAL 2 TIMES DAILY
Status: DISCONTINUED | OUTPATIENT
Start: 2021-05-17 | End: 2021-05-19 | Stop reason: HOSPADM

## 2021-05-17 RX ORDER — ACETAMINOPHEN 325 MG/1
650 TABLET ORAL EVERY 4 HOURS PRN
Status: DISCONTINUED | OUTPATIENT
Start: 2021-05-17 | End: 2021-05-19 | Stop reason: HOSPADM

## 2021-05-17 RX ORDER — SODIUM CHLORIDE 0.9 % (FLUSH) 0.9 %
5-40 SYRINGE (ML) INJECTION EVERY 12 HOURS SCHEDULED
Status: DISCONTINUED | OUTPATIENT
Start: 2021-05-17 | End: 2021-05-19 | Stop reason: HOSPADM

## 2021-05-17 RX ORDER — SODIUM CHLORIDE 0.9 % (FLUSH) 0.9 %
5-40 SYRINGE (ML) INJECTION PRN
Status: DISCONTINUED | OUTPATIENT
Start: 2021-05-17 | End: 2021-05-17

## 2021-05-17 RX ORDER — LANOLIN ALCOHOL/MO/W.PET/CERES
1000 CREAM (GRAM) TOPICAL DAILY
Status: DISCONTINUED | OUTPATIENT
Start: 2021-05-17 | End: 2021-05-19 | Stop reason: HOSPADM

## 2021-05-17 RX ORDER — SODIUM CHLORIDE 9 MG/ML
INJECTION, SOLUTION INTRAVENOUS CONTINUOUS
Status: DISCONTINUED | OUTPATIENT
Start: 2021-05-17 | End: 2021-05-17

## 2021-05-17 RX ORDER — ALBUTEROL SULFATE 2.5 MG/3ML
0.63 SOLUTION RESPIRATORY (INHALATION) EVERY 6 HOURS PRN
Status: DISCONTINUED | OUTPATIENT
Start: 2021-05-17 | End: 2021-05-19 | Stop reason: HOSPADM

## 2021-05-17 RX ORDER — SODIUM CHLORIDE 9 MG/ML
INJECTION, SOLUTION INTRAVENOUS CONTINUOUS
Status: DISCONTINUED | OUTPATIENT
Start: 2021-05-17 | End: 2021-05-19 | Stop reason: HOSPADM

## 2021-05-17 RX ORDER — GUAIFENESIN 100 MG/5ML
400 SOLUTION ORAL 2 TIMES DAILY
Status: DISCONTINUED | OUTPATIENT
Start: 2021-05-17 | End: 2021-05-19 | Stop reason: HOSPADM

## 2021-05-17 RX ORDER — SODIUM CHLORIDE 9 MG/ML
25 INJECTION, SOLUTION INTRAVENOUS PRN
Status: DISCONTINUED | OUTPATIENT
Start: 2021-05-17 | End: 2021-05-17

## 2021-05-17 RX ADMIN — ALBUTEROL SULFATE 2 PUFF: 90 AEROSOL, METERED RESPIRATORY (INHALATION) at 21:29

## 2021-05-17 RX ADMIN — Medication 10 MILLICURIE: at 07:20

## 2021-05-17 RX ADMIN — SODIUM CHLORIDE, PRESERVATIVE FREE 10 ML: 5 INJECTION INTRAVENOUS at 20:35

## 2021-05-17 RX ADMIN — Medication 30 MILLICURIE: at 11:20

## 2021-05-17 RX ADMIN — SODIUM CHLORIDE: 9 INJECTION, SOLUTION INTRAVENOUS at 15:30

## 2021-05-17 RX ADMIN — Medication 2 PUFF: at 21:30

## 2021-05-17 RX ADMIN — ATORVASTATIN CALCIUM 80 MG: 80 TABLET, FILM COATED ORAL at 20:35

## 2021-05-17 RX ADMIN — ALBUTEROL SULFATE 2 PUFF: 90 AEROSOL, METERED RESPIRATORY (INHALATION) at 15:12

## 2021-05-17 RX ADMIN — ALBUTEROL SULFATE 2 PUFF: 90 AEROSOL, METERED RESPIRATORY (INHALATION) at 08:42

## 2021-05-17 RX ADMIN — REGADENOSON 0.4 MG: 0.08 INJECTION, SOLUTION INTRAVENOUS at 11:20

## 2021-05-17 RX ADMIN — Medication 2 PUFF: at 01:53

## 2021-05-17 RX ADMIN — FLUTICASONE PROPIONATE 1 SPRAY: 50 SPRAY, METERED NASAL at 20:35

## 2021-05-17 RX ADMIN — Medication 2 PUFF: at 08:41

## 2021-05-17 RX ADMIN — PERFLUTREN 2.2 MG: 6.52 INJECTION, SUSPENSION INTRAVENOUS at 09:53

## 2021-05-17 RX ADMIN — Medication 2 PUFF: at 15:12

## 2021-05-17 RX ADMIN — ALBUTEROL SULFATE 2 PUFF: 90 AEROSOL, METERED RESPIRATORY (INHALATION) at 01:52

## 2021-05-17 RX ADMIN — MONTELUKAST 10 MG: 10 TABLET, FILM COATED ORAL at 20:35

## 2021-05-17 NOTE — PLAN OF CARE
Problem: Pain:  Goal: Pain level will decrease  Description: Pain level will decrease  5/17/2021 0129 by Kade Hernandez RN  Outcome: Ongoing  5/16/2021 1651 by Darren Saini RN  Outcome: Ongoing  5/16/2021 1651 by Darren Saini RN  Outcome: Ongoing  Goal: Control of acute pain  Description: Control of acute pain  5/17/2021 0129 by Kade Hernandez RN  Outcome: Ongoing  5/16/2021 1651 by Darren Saini RN  Outcome: Ongoing  5/16/2021 1651 by Darren Saini RN  Outcome: Ongoing  Goal: Control of chronic pain  Description: Control of chronic pain  5/17/2021 0129 by Kade Hernandez RN  Outcome: Ongoing  5/16/2021 1651 by Darren Saini RN  Outcome: Ongoing  5/16/2021 1651 by Darren Saini RN  Outcome: Ongoing     Problem: Falls - Risk of:  Goal: Will remain free from falls  Description: Will remain free from falls  5/17/2021 0129 by Kade Hernandez RN  Outcome: Ongoing  5/16/2021 1651 by Darren Saini RN  Outcome: Ongoing  5/16/2021 1651 by Darren Saini RN  Outcome: Ongoing  Goal: Absence of physical injury  Description: Absence of physical injury  5/17/2021 0129 by Kade Hernandez RN  Outcome: Ongoing  5/16/2021 1651 by Darren Saini RN  Outcome: Ongoing  5/16/2021 1651 by Darren Saini RN  Outcome: Ongoing     Problem: HEMODYNAMIC STATUS  Goal: Patient has stable vital signs and fluid balance  5/17/2021 0129 by Kade Hernandez RN  Outcome: Ongoing  5/16/2021 1651 by Darren Saini RN  Outcome: Ongoing  5/16/2021 1651 by Darren Saini RN  Outcome: Ongoing     Problem: ACTIVITY INTOLERANCE/IMPAIRED MOBILITY  Goal: Mobility/activity is maintained at optimum level for patient  5/17/2021 0129 by Kade Hernandez RN  Outcome: Ongoing  5/16/2021 1651 by Darren Saini RN  Outcome: Ongoing  5/16/2021 1651 by Darren Saini RN  Outcome: Ongoing     Problem: COMMUNICATION IMPAIRMENT  Goal: Ability to express needs and understand communication  5/17/2021 0129 by Som Romano

## 2021-05-17 NOTE — PLAN OF CARE
Problem: Pain:  Goal: Pain level will decrease  Description: Pain level will decrease  5/17/2021 1335 by Manda Krishnamurthy LPN  Outcome: Ongoing  5/17/2021 0129 by Heather De La Rosa RN  Outcome: Ongoing  Goal: Control of acute pain  Description: Control of acute pain  5/17/2021 1335 by Manda Krishnamurthy LPN  Outcome: Ongoing  5/17/2021 0129 by Heather De La Rosa RN  Outcome: Ongoing  Goal: Control of chronic pain  Description: Control of chronic pain  5/17/2021 1335 by Manda Krishnamurthy LPN  Outcome: Ongoing  5/17/2021 0129 by Heather De La Rosa RN  Outcome: Ongoing     Problem: Falls - Risk of:  Goal: Will remain free from falls  Description: Will remain free from falls  5/17/2021 1335 by Manda Krishnamurthy LPN  Outcome: Ongoing  5/17/2021 0129 by Heather De La Rosa RN  Outcome: Ongoing  Goal: Absence of physical injury  Description: Absence of physical injury  5/17/2021 1335 by Manda Krishnamurthy LPN  Outcome: Ongoing  5/17/2021 0129 by Heather De La Rosa RN  Outcome: Ongoing     Problem: HEMODYNAMIC STATUS  Goal: Patient has stable vital signs and fluid balance  5/17/2021 1335 by Manda Krishnamurthy LPN  Outcome: Ongoing  5/17/2021 0129 by Heather De La Rosa RN  Outcome: Ongoing     Problem: ACTIVITY INTOLERANCE/IMPAIRED MOBILITY  Goal: Mobility/activity is maintained at optimum level for patient  5/17/2021 1335 by Manda Krishnamurthy LPN  Outcome: Ongoing  5/17/2021 0129 by Heather De La Rosa RN  Outcome: Ongoing     Problem: COMMUNICATION IMPAIRMENT  Goal: Ability to express needs and understand communication  5/17/2021 1335 by Manda Krishnamurthy LPN  Outcome: Ongoing  5/17/2021 0129 by Heather De La Rosa RN  Outcome: Ongoing

## 2021-05-17 NOTE — PROGRESS NOTES
Hospitalist Progress Note      Name:  Cyn Friday /Age/Sex: 1945  (47 y.o. male)   MRN & CSN:  [id number] & [id number] Admission Date/Time: 2021 10:22 AM   Location:  8559/8563-Z PCP: Britton Charles Day: 4    History of Present Illness:     Chief Complaint: Cyn Friday is a 78 y.o.  male  who presents with Left sided numbness     The patient seen and examined at bed side. Says his pain and numbness of the left upper extremity improved. Denies any chest pain or SOB. Ten point ROS reviewed negative, unless as noted above    Objective:     No intake or output data in the 24 hours ending 21 1725   Vitals:   Vitals:    21 1505   BP: (!) 163/107   Pulse: 67   Resp:    Temp:    SpO2:      Physical Exam:   General Appearance: alert and oriented to person, place and time, in no acute distress  Cardiovascular: normal rate, regular rhythm, normal S1 and S2  Pulmonary/Chest: clear to auscultation bilaterally  Abdomen: soft, non-tender, +distention, normal bowel sounds, no masses   Extremities: no cyanosis, clubbing or edema, left upper extremity weakness  Skin: warm and dry, no rash or erythema  Head: normocephalic and atraumatic  Eyes: pupils equal, round, and reactive to light  Neck: supple and non-tender without mass, no thyromegaly   Musculoskeletal: normal range of motion, no joint swelling, deformity or tenderness  Neurological: alert, oriented, normal speech, no focal findings or movement disorder noted.  Left ipper extremity weakness    Medications:   Medications:    sodium chloride flush  5-40 mL Intravenous 2 times per day    cyanocobalamin  1,000 mcg Oral Daily    fluticasone  1 spray Nasal BID    guaiFENesin  400 mg Oral BID    sodium chloride flush  5-40 mL Intravenous 2 times per day    metoprolol succinate  25 mg Oral Daily    amLODIPine  10 mg Oral Daily    theophylline  200 mg Oral Daily    potassium bicarb-citric acid  20 mEq Oral Daily    montelukast  10 mg Oral Nightly    insulin lispro  0-6 Units Subcutaneous TID     insulin lispro  0-3 Units Subcutaneous Nightly    enoxaparin  30 mg Subcutaneous Daily    aspirin  81 mg Oral Daily    Or    aspirin  300 mg Rectal Daily    atorvastatin  80 mg Oral Nightly    albuterol sulfate HFA  2 puff Inhalation Q6H WA    pantoprazole  20 mg Oral QAM AC    cetirizine  10 mg Oral Daily    ipratropium  2 puff Inhalation Q6H WA      Infusions:    sodium chloride      sodium chloride      dextrose       PRN Meds: albuterol, 0.63 mg, Q6H PRN  sodium chloride flush, 5-40 mL, PRN  sodium chloride, 25 mL, PRN  acetaminophen, 650 mg, Q4H PRN  glucose, 15 g, PRN  dextrose, 12.5 g, PRN  glucagon (rDNA), 1 mg, PRN  dextrose, 100 mL/hr, PRN  promethazine, 12.5 mg, Q6H PRN   Or  ondansetron, 4 mg, Q6H PRN  polyethylene glycol, 17 g, Daily PRN  labetalol, 10 mg, Q10 Min PRN  bisacodyl, 10 mg, Daily PRN            Pertinent New Labs & Imaging Studies     CBC with Differential:    Lab Results   Component Value Date    WBC 8.0 05/15/2021    RBC 3.96 05/15/2021    HGB 11.7 05/15/2021    HCT 38.5 05/15/2021     05/15/2021    MCV 97.2 05/15/2021    MCH 29.5 05/15/2021    MCHC 30.4 05/15/2021    RDW 13.6 05/15/2021    SEGSPCT 72.7 05/14/2021    BANDSPCT 28 04/13/2018    LYMPHOPCT 12.8 05/14/2021    MONOPCT 9.0 05/14/2021    MYELOPCT 1 01/12/2013    BASOPCT 0.6 05/14/2021    MONOSABS 0.7 05/14/2021    LYMPHSABS 1.1 05/14/2021    EOSABS 0.4 05/14/2021    BASOSABS 0.1 05/14/2021    DIFFTYPE AUTOMATED DIFFERENTIAL 05/14/2021     CMP:    Lab Results   Component Value Date     05/14/2021    K 4.3 05/14/2021     05/14/2021    CO2 32 05/14/2021    BUN 17 05/14/2021    CREATININE 1.0 05/14/2021    GFRAA >60 05/14/2021    LABGLOM >60 05/14/2021    GLUCOSE 171 05/14/2021    PROT 6.3 05/14/2021    PROT 7.9 03/09/2013    LABALBU 3.8 05/14/2021    CALCIUM 9.2 05/14/2021    BILITOT 0.3 05/14/2021    ALKPHOS 103 05/14/2021    AST 18 05/14/2021    ALT 12 05/14/2021     XR WRIST LEFT (MIN 3 VIEWS)    Result Date: 5/14/2021  EXAMINATION: THREE XRAY VIEWS OF THE LEFT HAND; THREE XRAY VIEWS OF THE LEFT WRIST 5/14/2021 11:52 am COMPARISON: None. HISTORY: ORDERING SYSTEM PROVIDED HISTORY: hand TECHNOLOGIST PROVIDED HISTORY: Reason for exam:->hand; ORDERING SYSTEM PROVIDED HISTORY: pain TECHNOLOGIST PROVIDED HISTORY: Reason for exam:->pain FINDINGS: Left wrist: Osteopenia. There is chondrocalcinosis in the region of the triangular fibrocartilage complex. No acute fracture or dislocation. Mild degenerative changes of the 1st [facility] joint. Joint spaces and alignment are otherwise maintained. Left hand: Osteopenia. Mild degenerative changes of the 1st [facility] joint. No acute fracture or dislocation. Joint spaces and alignment are otherwise maintained. Soft tissues are unremarkable. Mild degenerative changes of the 1st [facility] joint with chondrocalcinosis in the region of the triangular fibrocartilage complex. No acute osseous abnormality in the left wrist or left hand. Diffuse osteopenia. XR HAND LEFT (MIN 3 VIEWS)    Result Date: 5/14/2021  EXAMINATION: THREE XRAY VIEWS OF THE LEFT HAND; THREE XRAY VIEWS OF THE LEFT WRIST 5/14/2021 11:52 am COMPARISON: None. HISTORY: ORDERING SYSTEM PROVIDED HISTORY: hand TECHNOLOGIST PROVIDED HISTORY: Reason for exam:->hand; ORDERING SYSTEM PROVIDED HISTORY: pain TECHNOLOGIST PROVIDED HISTORY: Reason for exam:->pain FINDINGS: Left wrist: Osteopenia. There is chondrocalcinosis in the region of the triangular fibrocartilage complex. No acute fracture or dislocation. Mild degenerative changes of the 1st [facility] joint. Joint spaces and alignment are otherwise maintained. Left hand: Osteopenia. Mild degenerative changes of the 1st [facility] joint. No acute fracture or dislocation. Joint spaces and alignment are otherwise maintained. Soft tissues are unremarkable.      Mild degenerative changes of the 1st [facility] joint with chondrocalcinosis in the region of the triangular fibrocartilage complex. No acute osseous abnormality in the left wrist or left hand. Diffuse osteopenia. XR ABDOMEN (KUB) (SINGLE AP VIEW)    Result Date: 5/14/2021  EXAMINATION: ONE SUPINE XRAY VIEW(S) OF THE ABDOMEN 5/14/2021 1:59 pm COMPARISON: 04/23/2016 HISTORY: ORDERING SYSTEM PROVIDED HISTORY: distended loops of bowel? TECHNOLOGIST PROVIDED HISTORY: Abd KUB Reason for exam:->distended loops of bowel? Reason for Exam: distended loops of bowel? Acuity: Acute Type of Exam: Initial Additional signs and symptoms: na Relevant Medical/Surgical History: copd, hypertension FINDINGS: Grossly distended colon with a large stool burden in the colon. Stool is seen in the rectum. No definite extraluminal gas. Grossly distended colon with a large stool burden, similar in appearance to 04/23/2016     CT HEAD WO CONTRAST    Result Date: 5/14/2021  EXAMINATION: CT OF THE HEAD WITHOUT CONTRAST  5/14/2021 11:46 am TECHNIQUE: CT of the head was performed without the administration of intravenous contrast. Dose modulation, iterative reconstruction, and/or weight based adjustment of the mA/kV was utilized to reduce the radiation dose to as low as reasonably achievable. COMPARISON: 02/06/2013 HISTORY: ORDERING SYSTEM PROVIDED HISTORY: L hand paresthesias TECHNOLOGIST PROVIDED HISTORY: Has a \"code stroke\" or \"stroke alert\" been called? ->No Reason for exam:->L hand paresthesias Decision Support Exception - unselect if not a suspected or confirmed emergency medical condition->Emergency Medical Condition (MA) Reason for Exam: L hand paresthesias Acuity: Acute Type of Exam: Initial FINDINGS: BRAIN/VENTRICLES: There is no acute intracranial hemorrhage, mass effect or midline shift. No abnormal extra-axial fluid collection. The gray-white differentiation is maintained without evidence of an acute infarct. There is no evidence of hydrocephalus.  Moderate chronic microvascular ischemic change. ORBITS: The visualized portion of the orbits demonstrate no acute abnormality. SINUSES: The visualized paranasal sinuses and mastoid air cells demonstrate no acute abnormality. SOFT TISSUES/SKULL:  No acute abnormality of the visualized skull or soft tissues. No acute intracranial abnormality. XR CHEST PORTABLE    Result Date: 5/14/2021  EXAMINATION: ONE XRAY VIEW OF THE CHEST 5/14/2021 11:52 am COMPARISON: 04/12/2018 HISTORY: ORDERING SYSTEM PROVIDED HISTORY: L hand paresthesias TECHNOLOGIST PROVIDED HISTORY: Reason for exam:->L hand paresthesias FINDINGS: Heart size stable. Linear opacities right lung base unchanged. No new airspace disease. No pneumothorax. No pleural effusion. Distended loops of bowel seen in the abdomen. No acute cardiopulmonary process Distended loops of bowel seen in the visualized portion of the abdomen       Assessment and Plan:   Lord Martin is a 78 y.o.  male  who presents with Left upper extremity weakness    Left arm numbness/weakness  Start ASA and statins  Allow permissive HTN  Check lipid panel and A1c  MRI brain with no acute findings  Neuro check  Neurology following  CT spine with severe cervical stenosis, ordered MRI of neck. Consult to neurosurgery recommendations pending  Cardiology following-Continue Aspirin and Statin.  Stress test abnormal. LHC with global hypokinesia with reduced LV function but no significant CAD    Elevated troponin  EKG with some T wave changes  Cardiology ruled out CAD but      Megacolon, hx  Xray abdomen: Grossly distended colon with a large stool burden, similar in appearance to 04/23/2016   Seems like chronic issue  Continue Miralax  Dulcolax PRN  Consider Enema if no improvement     Left wrist degenerative disease  Osteopenia  Xray left wrist: Mild degenerative changes of the 1st [facility] joint with chondrocalcinosis in the region of the triangular fibrocartilage complex.  No acute osseous

## 2021-05-17 NOTE — CARE COORDINATION
Spoke with patient at bedside . Patient is from home alone and is normally independent only using a cane prn. Patient follows at the [facility] for PCP with Dr Matthew Bhnadari. Patient has insurance to assist with RX coverage and receives meals on wheels. Patient stated that he plans to return home and denied having any discharge needs.  Case Management to follow in case any needs would arise

## 2021-05-18 LAB
ANION GAP SERPL CALCULATED.3IONS-SCNC: 10 MMOL/L (ref 4–16)
BUN BLDV-MCNC: 25 MG/DL (ref 6–23)
CALCIUM SERPL-MCNC: 8.6 MG/DL (ref 8.3–10.6)
CHLORIDE BLD-SCNC: 100 MMOL/L (ref 99–110)
CO2: 27 MMOL/L (ref 21–32)
CREAT SERPL-MCNC: 1.2 MG/DL (ref 0.9–1.3)
GFR AFRICAN AMERICAN: >60 ML/MIN/1.73M2
GFR NON-AFRICAN AMERICAN: 59 ML/MIN/1.73M2
GLUCOSE BLD-MCNC: 156 MG/DL (ref 70–99)
GLUCOSE BLD-MCNC: 162 MG/DL (ref 70–99)
GLUCOSE BLD-MCNC: 181 MG/DL (ref 70–99)
GLUCOSE BLD-MCNC: 189 MG/DL (ref 70–99)
GLUCOSE BLD-MCNC: 215 MG/DL (ref 70–99)
HCT VFR BLD CALC: 37 % (ref 42–52)
HEMOGLOBIN: 11.9 GM/DL (ref 13.5–18)
LV EF: 58 %
LVEF MODALITY: NORMAL
MCH RBC QN AUTO: 30.4 PG (ref 27–31)
MCHC RBC AUTO-ENTMCNC: 32.2 % (ref 32–36)
MCV RBC AUTO: 94.6 FL (ref 78–100)
PDW BLD-RTO: 13.3 % (ref 11.7–14.9)
PLATELET # BLD: 309 K/CU MM (ref 140–440)
PMV BLD AUTO: 10.2 FL (ref 7.5–11.1)
POTASSIUM SERPL-SCNC: 4.2 MMOL/L (ref 3.5–5.1)
RBC # BLD: 3.91 M/CU MM (ref 4.6–6.2)
SODIUM BLD-SCNC: 137 MMOL/L (ref 135–145)
WBC # BLD: 11.5 K/CU MM (ref 4–10.5)

## 2021-05-18 PROCEDURE — 2700000000 HC OXYGEN THERAPY PER DAY

## 2021-05-18 PROCEDURE — 93312 ECHO TRANSESOPHAGEAL: CPT

## 2021-05-18 PROCEDURE — 80048 BASIC METABOLIC PNL TOTAL CA: CPT

## 2021-05-18 PROCEDURE — 82962 GLUCOSE BLOOD TEST: CPT

## 2021-05-18 PROCEDURE — 94640 AIRWAY INHALATION TREATMENT: CPT

## 2021-05-18 PROCEDURE — 6360000002 HC RX W HCPCS: Performed by: INTERNAL MEDICINE

## 2021-05-18 PROCEDURE — 94761 N-INVAS EAR/PLS OXIMETRY MLT: CPT

## 2021-05-18 PROCEDURE — 6370000000 HC RX 637 (ALT 250 FOR IP): Performed by: INTERNAL MEDICINE

## 2021-05-18 PROCEDURE — 93312 ECHO TRANSESOPHAGEAL: CPT | Performed by: INTERNAL MEDICINE

## 2021-05-18 PROCEDURE — 99233 SBSQ HOSP IP/OBS HIGH 50: CPT | Performed by: INTERNAL MEDICINE

## 2021-05-18 PROCEDURE — 93325 DOPPLER ECHO COLOR FLOW MAPG: CPT | Performed by: INTERNAL MEDICINE

## 2021-05-18 PROCEDURE — 7100000000 HC PACU RECOVERY - FIRST 15 MIN

## 2021-05-18 PROCEDURE — 36415 COLL VENOUS BLD VENIPUNCTURE: CPT

## 2021-05-18 PROCEDURE — 2580000003 HC RX 258: Performed by: INTERNAL MEDICINE

## 2021-05-18 PROCEDURE — 85027 COMPLETE CBC AUTOMATED: CPT

## 2021-05-18 PROCEDURE — 1200000000 HC SEMI PRIVATE

## 2021-05-18 PROCEDURE — 7100000001 HC PACU RECOVERY - ADDTL 15 MIN

## 2021-05-18 RX ORDER — MINERAL OIL 100 G/100G
1 OIL RECTAL ONCE
Status: DISCONTINUED | OUTPATIENT
Start: 2021-05-18 | End: 2021-05-19 | Stop reason: HOSPADM

## 2021-05-18 RX ADMIN — THEOPHYLLINE ANHYDROUS 200 MG: 200 CAPSULE, EXTENDED RELEASE ORAL at 12:03

## 2021-05-18 RX ADMIN — ALBUTEROL SULFATE 2 PUFF: 90 AEROSOL, METERED RESPIRATORY (INHALATION) at 15:11

## 2021-05-18 RX ADMIN — ENOXAPARIN SODIUM 30 MG: 30 INJECTION SUBCUTANEOUS at 12:03

## 2021-05-18 RX ADMIN — MONTELUKAST 10 MG: 10 TABLET, FILM COATED ORAL at 20:12

## 2021-05-18 RX ADMIN — ALBUTEROL SULFATE 2 PUFF: 90 AEROSOL, METERED RESPIRATORY (INHALATION) at 07:38

## 2021-05-18 RX ADMIN — SODIUM CHLORIDE: 9 INJECTION, SOLUTION INTRAVENOUS at 06:14

## 2021-05-18 RX ADMIN — Medication 2 PUFF: at 07:38

## 2021-05-18 RX ADMIN — GUAIFENESIN 400 MG: 200 SOLUTION ORAL at 00:37

## 2021-05-18 RX ADMIN — ALBUTEROL SULFATE 2 PUFF: 90 AEROSOL, METERED RESPIRATORY (INHALATION) at 21:10

## 2021-05-18 RX ADMIN — PANTOPRAZOLE SODIUM 20 MG: 20 TABLET, DELAYED RELEASE ORAL at 06:14

## 2021-05-18 RX ADMIN — AMLODIPINE BESYLATE 10 MG: 10 TABLET ORAL at 12:03

## 2021-05-18 RX ADMIN — GUAIFENESIN 400 MG: 200 SOLUTION ORAL at 12:03

## 2021-05-18 RX ADMIN — POTASSIUM BICARBONATE 20 MEQ: 782 TABLET, EFFERVESCENT ORAL at 12:04

## 2021-05-18 RX ADMIN — FLUTICASONE PROPIONATE 1 SPRAY: 50 SPRAY, METERED NASAL at 19:06

## 2021-05-18 RX ADMIN — GUAIFENESIN 400 MG: 200 SOLUTION ORAL at 19:06

## 2021-05-18 RX ADMIN — METOPROLOL SUCCINATE 25 MG: 25 TABLET, EXTENDED RELEASE ORAL at 12:03

## 2021-05-18 RX ADMIN — FLUTICASONE PROPIONATE 1 SPRAY: 50 SPRAY, METERED NASAL at 12:04

## 2021-05-18 RX ADMIN — Medication 2 PUFF: at 21:10

## 2021-05-18 RX ADMIN — Medication 2 PUFF: at 15:11

## 2021-05-18 RX ADMIN — CYANOCOBALAMIN TAB 1000 MCG 1000 MCG: 1000 TAB at 12:03

## 2021-05-18 RX ADMIN — ASPIRIN 81 MG: 81 TABLET, COATED ORAL at 12:04

## 2021-05-18 RX ADMIN — ATORVASTATIN CALCIUM 80 MG: 80 TABLET, FILM COATED ORAL at 20:11

## 2021-05-18 RX ADMIN — CETIRIZINE HYDROCHLORIDE 10 MG: 10 TABLET, FILM COATED ORAL at 12:04

## 2021-05-18 ASSESSMENT — PAIN SCALES - GENERAL
PAINLEVEL_OUTOF10: 0
PAINLEVEL_OUTOF10: 0

## 2021-05-18 NOTE — PROGRESS NOTES
Physician Progress Note      Kiersten Guzman  [facility] #:                  [id number]  :                       1945  ADMIT DATE:       2021 10:22 AM  DISCH DATE:  RESPONDING  PROVIDER #:        Dilcia Hopson MD          QUERY TEXT:    Hospitalists,    Pt admitted with left side weakness. Pt noted to have COPD w/ home O2 use. If   possible, please document in the progress notes and discharge summary if you   are evaluating and/or treating any of the following: The medical record reflects the following:  Risk Factors: COPD  Clinical Indicators: documentation of COPD w/ home O2 use  Treatment: O2    Thank you,  Leatha Darby RN CDS  [phone]  Options provided:  -- Chronic respiratory failure with hypoxia  -- Chronic respiratory failure with hypercapnia  -- Other - I will add my own diagnosis  -- Disagree - Not applicable / Not valid  -- Disagree - Clinically unable to determine / Unknown  -- Refer to Clinical Documentation Reviewer    PROVIDER RESPONSE TEXT:    This patient has chronic respiratory failure with hypoxia. Query created by: Kyle Bell on 2021 8:48 AM      QUERY TEXT:    Hospitalists,    Patient admitted with left sided numbness. If possible, please document in the   progress notes and discharge summary the suspected cause of . The medical record reflects the following:  Risk Factors: nonspec  Clinical Indicators: Per Cardiology documentation, ?TIA, no acute findings on   MRI,CT-severe cervical stenosis  Treatment: labs, imaging, Cardiology consult    Thank you,  Leatha Darby RN CDS  [phone]  Options provided:  -- Left side weakness due to TIA  -- left side weakness due to cervical stenosis  -- Left side weakness due to ##please specify, please specify.   -- Other - I will add my own diagnosis  -- Disagree - Not applicable / Not valid  -- Disagree - Clinically unable to determine / Unknown  -- Refer to Clinical Documentation Reviewer    PROVIDER

## 2021-05-18 NOTE — PROCEDURES
NADJA   ASA/Mallapati  2/2     Full report to follow    Normal NADJA    Normal LVEF   No valvular heart dx   No PFO or shunts

## 2021-05-18 NOTE — PROGRESS NOTES
Cardiology Progress Note     Today's Plan: NADJA today     Admit Date:  5/14/2021    Consult reason/ Seen today for: elevated troponin     Subjective and  Overnight Events: Reports unchanged numbness in left arm. Assessment / Plan / Recommendation:     1. Left arm numbness with elevated troponins ? Angina equivalents  Abornal Stress followed by [facility] , non obstructive CAD. Continue with aspirin and statins  2. Cardiomyopathy ? As noted on LV gram. . Poor cardiac view on echo will get NADJA today. 3. Essential hypertension: Patient was previously on losartan from [facility] however quit taking since 5 years. Stable B/P and heart rate with medication changes, continue with Norvasc 10 mg and Toprol-XL 25 mg.   4. Hyperlipidemia: With history of TIA/type 1 diabetes mellitus recommend statin therapy. Started on Lipitor 80 mg daily. 5. Health maintenance: Exercise and Diet  6. ? TIA : Neuro following   7. DVT prophylaxis on Lovenox      History of Presenting Illness:    Chief complain on admission : 78 y. o.year old who is admitted for  Chief Complaint   Patient presents with    Numbness     left arm numbess from elbow down for 2 days        Past medical history:    has a past medical history of Acid reflux, Anemia, COPD (chronic obstructive pulmonary disease) ([address] Dental decay, Diabetes mellitus ([address] Emphysema of lung ([address] Enlarged prostate, Stevens catheter in place, H/O cardiovascular stress test, Hematuria, HX OTHER MEDICAL, Hypertension, Low back sprain, Megacolon, On home oxygen therapy, and Wears glasses. Past surgical history:   has a past surgical history that includes Revision Colostomy (1974); Abdominal exploration surgery (3907); other surgical history; other surgical history (01 07 2013); tumor removal; Appendectomy (7years old); Abdomen surgery; TURP (N/A, 07/12/2018);  Dental surgery; and other surgical history (09/18/2018). Social History:   reports that he quit smoking about 8 years ago. His smoking use included cigarettes. He has a 15.00 pack-year smoking history. He has never used smokeless tobacco. He reports that he does not drink alcohol and does not use drugs. Family history:  family history includes Coronary Art Dis in his mother; Diabetes in his mother; Other in his sister; Stroke in his brother and father. Allergies   Allergen Reactions    Lisinopril Other (See Comments)     Pt coughs uncontrollably    Dye [Iodides] Other (See Comments)     \"had iodine for IVP test- went into seizures\"    Lorazepam Other (See Comments)     CAUSES HIM TO HAVE NO SELF CONTROL       Review of Systems:  Review of Systems   Neurological: Positive for numbness. All other systems reviewed and are negative. BP (!) 156/84   Pulse 70   Temp 97.8 °F (36.6 °C) (Oral)   Resp 22   Ht 5' 11\" (1.803 m)   Wt 165 lb 6.4 oz (75 kg)   SpO2 100%   BMI 23.07 kg/m²   No intake or output data in the 24 hours ending 05/18/21 1034    Physical Exam:  Constitutional:  Well developed, Well nourished, No acute distress  HENT:  Normocephalic, Atraumatic, Bilateral external ears normal,  Nose normal.   Neck- trachea is midline  Eyes:  PERRL, Conjunctiva normal  Respiratory:  Normal breath sounds, No respiratory distress, No wheezing, No chest tenderness. Cardiovascular:  Normal heart rate, Normal rhythm, no murmurs appreciated, No rubs appreciated, No gallops appreciated, JVP not elevated  Abdomen/GI:  Soft, No tenderness  Musculoskeletal:  Intact distal pulses, no edema, No tenderness, No cyanosis, No clubbing. Integument:  Warm, Dry  Lymphatic:  No lymphadenopathy noted.    Neurologic:  Alert & oriented  Psychiatric:  Affect and Mood :pleasant     Medications:    sodium chloride flush  5-40 mL Intravenous 2 times per day    cyanocobalamin  1,000 mcg Oral Daily    fluticasone  1 spray Nasal BID    guaiFENesin  400 mg Oral BID    sodium chloride flush  5-40 mL Intravenous 2 times per day    metoprolol succinate  25 mg Oral Daily    amLODIPine  10 mg Oral Daily    theophylline  200 mg Oral Daily    potassium bicarb-citric acid  20 mEq Oral Daily    montelukast  10 mg Oral Nightly    insulin lispro  0-6 Units Subcutaneous TID WC    insulin lispro  0-3 Units Subcutaneous Nightly    enoxaparin  30 mg Subcutaneous Daily    aspirin  81 mg Oral Daily    Or    aspirin  300 mg Rectal Daily    atorvastatin  80 mg Oral Nightly    albuterol sulfate HFA  2 puff Inhalation Q6H WA    pantoprazole  20 mg Oral QAM AC    cetirizine  10 mg Oral Daily    ipratropium  2 puff Inhalation Q6H WA      sodium chloride 75 mL/hr at 05/18/21 0614    sodium chloride      dextrose       albuterol, sodium chloride flush, sodium chloride, acetaminophen, glucose, dextrose, glucagon (rDNA), dextrose, promethazine **OR** ondansetron, polyethylene glycol, labetalol, bisacodyl    Lab Data:  CBC:   Recent Labs     05/18/21  0251   WBC 11.5*   HGB 11.9*   HCT 37.0*   MCV 94.6        BMP:   Recent Labs     05/18/21  0251      K 4.2      CO2 27   BUN 25*   CREATININE 1.2     PT/INR: No results for input(s): PROTIME, INR in the last 72 hours. BNP:    No results for input(s): PROBNP in the last 72 hours. TROPONIN:   No results for input(s): TROPONINT in the last 72 hours. ECHO : 5/1/18  Technically limited exam due to extremely poor acoustical windows.    Left ventricular systolic function is normal.    Ejection fraction is visually estimated at 50-55%.    Mild left ventricular hypertrophy.    Diastolic dysfunction is present (unable to grade type).    Sclerotic, but non-stenotic aortic valve.    No evidence of any pericardial effusion.      NM Myocardial Spect: pending       Impression:  Principal Problem:    Abnormal nuclear stress test  Active Problems:    Diet-controlled diabetes mellitus ([address]    Hypertension    Acute left-sided

## 2021-05-18 NOTE — PROGRESS NOTES
Hospitalist Progress Note      Name:  Siva Hardy /Age/Sex: 1945  (54 y.o. male)   MRN & CSN:  [id number] & [id number] Admission Date/Time: 2021 10:22 AM   Location:  4085/3466-X PCP: Kris Aly Day: 5    Assessment and Plan:   Siva Hardy is a 78 y.o.  male  who presents with Abnormal nuclear stress test     1) Left arm numbness/weakness likely due to cervical stenosis  -Resolved  -Likely due to cervical stenosis  -MRI brain: No acute findings  -CT Cervical spine:  Moderate to severe cervical spine degenerative changes  -Awaiting MRI cervical   -NADJA megative  -Continue ASA and statin  -Awaits neurosurgery recommendation     2) Elevated Troponin  -Abnormal Stress Test  -LHC: No significant CAD  -Continue ASA and Statin  -Cardiology on board       3) Hx of Megacolon  -Xray abdomen: Grossly distended colon with a large stool burden, similar in appearance to 2016   -Continue laxative and enema    Other chronic medical conditions, medication resumed unless contraindicated    Left wrist degenerative disease     COPD, not in exacerbation     Essential HTN       DMII with chronic complications    Diet DIET CARB CONTROL; Carb Control: 4 carb choices (60 gms)/meal   DVT Prophylaxis [] Lovenox, []  Heparin, [] SCDs, [] Ambulation   GI Prophylaxis [] PPI,  [] H2 Blocker,  [] Carafate,  [] Diet/Tube Feeds   Code Status Full Code   Disposition Home   MDM      History of Present Illness:     Patient was seen and examined  Left upper extremity numbness has resolved  No focal deficits no speech abnormality  No chest pain, abdominal pain nausea vomiting  No acute events overnight  Ten point ROS reviewed negative, unless as noted above    Objective:   No intake or output data in the 24 hours ending 21 1208   Vitals:   Vitals:    21 1128   BP:    Pulse: 64   Resp: 18   Temp:    SpO2: 100%     Physical Exam:   GEN Awake male, sitting upright in bed in no apparent 05/18/21 5093    sodium chloride      dextrose       PRN Meds: albuterol, 0.63 mg, Q6H PRN  sodium chloride flush, 5-40 mL, PRN  sodium chloride, 25 mL, PRN  acetaminophen, 650 mg, Q4H PRN  glucose, 15 g, PRN  dextrose, 12.5 g, PRN  glucagon (rDNA), 1 mg, PRN  dextrose, 100 mL/hr, PRN  promethazine, 12.5 mg, Q6H PRN   Or  ondansetron, 4 mg, Q6H PRN  polyethylene glycol, 17 g, Daily PRN  labetalol, 10 mg, Q10 Min PRN  bisacodyl, 10 mg, Daily PRN          Electronically signed by Kristina Lyon MD on 5/18/2021 at 12:08 PM

## 2021-05-19 ENCOUNTER — APPOINTMENT (OUTPATIENT)
Dept: MRI IMAGING | Age: 76
DRG: 552 | End: 2021-05-19
Payer: MEDICARE

## 2021-05-19 VITALS
BODY MASS INDEX: 23.15 KG/M2 | DIASTOLIC BLOOD PRESSURE: 81 MMHG | SYSTOLIC BLOOD PRESSURE: 130 MMHG | OXYGEN SATURATION: 93 % | RESPIRATION RATE: 17 BRPM | HEART RATE: 60 BPM | WEIGHT: 165.4 LBS | TEMPERATURE: 97.8 F | HEIGHT: 71 IN

## 2021-05-19 LAB
GLUCOSE BLD-MCNC: 132 MG/DL (ref 70–99)
GLUCOSE BLD-MCNC: 195 MG/DL (ref 70–99)

## 2021-05-19 PROCEDURE — 6370000000 HC RX 637 (ALT 250 FOR IP): Performed by: INTERNAL MEDICINE

## 2021-05-19 PROCEDURE — 6360000002 HC RX W HCPCS: Performed by: INTERNAL MEDICINE

## 2021-05-19 PROCEDURE — 94150 VITAL CAPACITY TEST: CPT

## 2021-05-19 PROCEDURE — 82962 GLUCOSE BLOOD TEST: CPT

## 2021-05-19 PROCEDURE — 94761 N-INVAS EAR/PLS OXIMETRY MLT: CPT

## 2021-05-19 PROCEDURE — 94640 AIRWAY INHALATION TREATMENT: CPT

## 2021-05-19 PROCEDURE — 72141 MRI NECK SPINE W/O DYE: CPT

## 2021-05-19 RX ORDER — METOPROLOL SUCCINATE 25 MG/1
25 TABLET, EXTENDED RELEASE ORAL DAILY
Qty: 30 TABLET | Refills: 3 | Status: SHIPPED | OUTPATIENT
Start: 2021-05-20

## 2021-05-19 RX ADMIN — ENOXAPARIN SODIUM 30 MG: 30 INJECTION SUBCUTANEOUS at 08:14

## 2021-05-19 RX ADMIN — CYANOCOBALAMIN TAB 1000 MCG 1000 MCG: 1000 TAB at 08:14

## 2021-05-19 RX ADMIN — FLUTICASONE PROPIONATE 1 SPRAY: 50 SPRAY, METERED NASAL at 08:15

## 2021-05-19 RX ADMIN — ASPIRIN 81 MG: 81 TABLET, COATED ORAL at 08:14

## 2021-05-19 RX ADMIN — Medication 2 PUFF: at 05:24

## 2021-05-19 RX ADMIN — Medication 2 PUFF: at 11:32

## 2021-05-19 RX ADMIN — GUAIFENESIN 400 MG: 200 SOLUTION ORAL at 08:14

## 2021-05-19 RX ADMIN — METOPROLOL SUCCINATE 25 MG: 25 TABLET, EXTENDED RELEASE ORAL at 08:14

## 2021-05-19 RX ADMIN — ALBUTEROL SULFATE 2 PUFF: 90 AEROSOL, METERED RESPIRATORY (INHALATION) at 11:32

## 2021-05-19 RX ADMIN — ALBUTEROL SULFATE 2 PUFF: 90 AEROSOL, METERED RESPIRATORY (INHALATION) at 15:28

## 2021-05-19 RX ADMIN — THEOPHYLLINE ANHYDROUS 200 MG: 200 CAPSULE, EXTENDED RELEASE ORAL at 08:14

## 2021-05-19 RX ADMIN — ALBUTEROL SULFATE 2 PUFF: 90 AEROSOL, METERED RESPIRATORY (INHALATION) at 05:24

## 2021-05-19 RX ADMIN — POTASSIUM BICARBONATE 20 MEQ: 782 TABLET, EFFERVESCENT ORAL at 08:14

## 2021-05-19 RX ADMIN — Medication 2 PUFF: at 08:13

## 2021-05-19 RX ADMIN — Medication 2 PUFF: at 15:27

## 2021-05-19 RX ADMIN — AMLODIPINE BESYLATE 10 MG: 10 TABLET ORAL at 08:14

## 2021-05-19 RX ADMIN — PANTOPRAZOLE SODIUM 20 MG: 20 TABLET, DELAYED RELEASE ORAL at 06:30

## 2021-05-19 RX ADMIN — CETIRIZINE HYDROCHLORIDE 10 MG: 10 TABLET, FILM COATED ORAL at 08:14

## 2021-05-19 RX ADMIN — ALBUTEROL SULFATE 2 PUFF: 90 AEROSOL, METERED RESPIRATORY (INHALATION) at 08:13

## 2021-05-19 ASSESSMENT — PAIN SCALES - GENERAL
PAINLEVEL_OUTOF10: 0

## 2021-05-19 NOTE — PROGRESS NOTES
New Prescription called into [facility] on [address], as [facility] not open. [employer] set up for ride home.

## 2021-05-19 NOTE — PLAN OF CARE
Problem: Pain:  Goal: Pain level will decrease  Description: Pain level will decrease  5/19/2021 0852 by Diana Malagon LPN  Outcome: Completed  5/19/2021 0610 by Karis Ibarra RN  Outcome: Ongoing  Goal: Control of acute pain  Description: Control of acute pain  5/19/2021 0852 by Diana Malagon LPN  Outcome: Completed  5/19/2021 0610 by Karis Ibarra RN  Outcome: Ongoing  Goal: Control of chronic pain  Description: Control of chronic pain  5/19/2021 0852 by Diana Malagon LPN  Outcome: Completed  5/19/2021 0610 by Karis Ibarra RN  Outcome: Ongoing     Problem: Falls - Risk of:  Goal: Will remain free from falls  Description: Will remain free from falls  5/19/2021 0852 by Diana Malagon LPN  Outcome: Completed  5/19/2021 0610 by Karis Ibarra RN  Outcome: Ongoing  Goal: Absence of physical injury  Description: Absence of physical injury  5/19/2021 0852 by Diana Malagon LPN  Outcome: Completed  5/19/2021 0610 by Karis Ibarra RN  Outcome: Ongoing     Problem: HEMODYNAMIC STATUS  Goal: Patient has stable vital signs and fluid balance  5/19/2021 0852 by Diana Malagon LPN  Outcome: Completed  5/19/2021 0610 by Karis Ibarra RN  Outcome: Ongoing     Problem: ACTIVITY INTOLERANCE/IMPAIRED MOBILITY  Goal: Mobility/activity is maintained at optimum level for patient  5/19/2021 0852 by Diana Malagon LPN  Outcome: Completed  5/19/2021 0610 by Karis Ibarra RN  Outcome: Ongoing     Problem: COMMUNICATION IMPAIRMENT  Goal: Ability to express needs and understand communication  5/19/2021 0852 by Diana Malagon LPN  Outcome: Completed  5/19/2021 0610 by Karis Ibarra RN  Outcome: Ongoing

## 2021-05-19 NOTE — PROGRESS NOTES
Hospitalist Progress Note      Name:  Jacy Baltazar /Age/Sex: 1945  (89 y.o. male)   MRN & CSN:  [id number] & [id number] Admission Date/Time: 2021 10:22 AM   Location:  [address] [place] [address] PCP: Jonah Luciano Day: 6    Assessment and Plan:   Jacy Baltazar is a 78 y.o.  male  who presents with Abnormal nuclear stress test     1) Left arm numbness/weakness likely due to cervical stenosis  -Resolved  -MRI brain: No acute findings  -CT Cervical spine: Moderate to severe cervical spine degenerative changes  -MRI cervical:    Foraminal narrowing, moderate to severe bilateral C4-5, right C5-6 and   bilateral C6-7, moderate at bilateral C3-4 and left C5-6, mild at bilateral   C2-3     -NADJA negative  -Neurosurgery on board; recommending surgery. [facility]  Discussed with Dr Adiel Diamond; plans to discuss with patient later today    2) Elevated Troponin  -Abnormal Stress Test  -LHC: No significant CAD  -Continue ASA and Statin  -Cardiology on board       3) Hx of Megacolon  -Xray abdomen: Grossly distended colon with a large stool burden, similar in appearance to 2016   -Continue laxative and enema    Other chronic medical conditions, medication resumed unless contraindicated    Left wrist degenerative disease     COPD, not in exacerbation     Essential HTN       DMII with chronic complications    Diet DIET CARB CONTROL; Carb Control: 4 carb choices (60 gms)/meal   DVT Prophylaxis [] Lovenox, []  Heparin, [] SCDs, [] Ambulation   GI Prophylaxis [] PPI,  [] H2 Blocker,  [] Carafate,  [] Diet/Tube Feeds   Code Status Full Code   Disposition Home   MDM      History of Present Illness:     Patient was seen and examined  Left upper extremity numbness has resolved  No new deficit reported  No acute events overnight    Ten point ROS reviewed negative, unless as noted above    Objective:   No intake or output data in the 24 hours ending 21 1431   Vitals:   Vitals: 05/19/21 1135   BP:    Pulse:    Resp:    Temp:    SpO2: 94%     Physical Exam:   GEN Awake male, sitting upright in bed in no apparent distress. Appears given age. EYES Pupils are equally round. No scleral erythema, discharge, or conjunctivitis. HENT Mucous membranes are moist. Oral pharynx without exudates, no evidence of thrush. NECK Supple, no apparent thyromegaly or masses. RESP Clear to auscultation, no wheezes, rales or rhonchi. Symmetric chest movement while on room air. CARDIO/VASC S1/S2 auscultated. Regular rate without appreciable murmurs, rubs, or gallops. No JVD or carotid bruits. Peripheral pulses equal bilaterally and palpable. No peripheral edema. GI Abdomen is tympanitic, nontender. Bowel sounds are normoactive. Rectal exam deferred.  No costovertebral angle tenderness. Stevens catheter is not present. HEME/LYMPH No palpable cervical lymphadenopathy and no hepatosplenomegaly. No petechiae or ecchymoses. MSK No gross joint deformities. SKIN Normal coloration, warm, dry. NEURO Cranial nerves appear grossly intact, normal speech, no lateralizing weakness. PSYCH Awake, alert, oriented x 4. Affect appropriate.     Medications:   Medications:    mineral oil  1 enema Rectal Once    sodium chloride flush  5-40 mL Intravenous 2 times per day    cyanocobalamin  1,000 mcg Oral Daily    fluticasone  1 spray Nasal BID    guaiFENesin  400 mg Oral BID    sodium chloride flush  5-40 mL Intravenous 2 times per day    metoprolol succinate  25 mg Oral Daily    amLODIPine  10 mg Oral Daily    theophylline  200 mg Oral Daily    potassium bicarb-citric acid  20 mEq Oral Daily    montelukast  10 mg Oral Nightly    insulin lispro  0-6 Units Subcutaneous TID     insulin lispro  0-3 Units Subcutaneous Nightly    enoxaparin  30 mg Subcutaneous Daily    aspirin  81 mg Oral Daily    Or    aspirin  300 mg Rectal Daily    atorvastatin  80 mg Oral Nightly    albuterol sulfate HFA  2 puff Inhalation Q6H WA    pantoprazole  20 mg Oral QAM AC    cetirizine  10 mg Oral Daily    ipratropium  2 puff Inhalation Q6H WA      Infusions:    sodium chloride 75 mL/hr at 05/18/21 0614    sodium chloride      dextrose       PRN Meds: albuterol, 0.63 mg, Q6H PRN  sodium chloride flush, 5-40 mL, PRN  sodium chloride, 25 mL, PRN  acetaminophen, 650 mg, Q4H PRN  glucose, 15 g, PRN  dextrose, 12.5 g, PRN  glucagon (rDNA), 1 mg, PRN  dextrose, 100 mL/hr, PRN  promethazine, 12.5 mg, Q6H PRN   Or  ondansetron, 4 mg, Q6H PRN  polyethylene glycol, 17 g, Daily PRN  labetalol, 10 mg, Q10 Min PRN  bisacodyl, 10 mg, Daily PRN          Electronically signed by Doc Bonilla MD on 5/19/2021 at 2:31 PM

## 2021-05-19 NOTE — DISCHARGE SUMMARY
Discharge Summary    Name:  Lord Martin /Age/Sex: 1945  (89 y.o. male)   MRN & CSN:  [id number] & [id number] Admission Date/Time: 2021 10:22 AM   Attending:  Sherly Gonzalez MD Discharging Physician: Courtney Moralez MD     Hospital Course:   Lord Martin is a 78 y.o.  male  who presents with Abnormal nuclear stress test     1) Left arm numbness/weakness likely due to cervical stenosis  -Resolved  -MRI brain: No acute findings  -CT Cervical spine: Moderate to severe cervical spine degenerative changes  -MRI cervical:    Foraminal narrowing, moderate to severe bilateral C4-5, right C5-6 and   bilateral C6-7, moderate at bilateral C3-4 and left C5-6, mild at bilateral   C2-3   -NADJA negative  -Neurosurgery on board; OP follow up for elective surgery. 2) Elevated Troponin  -Abnormal Stress Test  -LHC: No significant CAD  -Continue ASA and Statin  -Cardiology on board        3) Hx of Megacolon  -Xray abdomen: Grossly distended colon with a large stool burden, similar in appearance to 2016   -Continue laxative and enema     Other chronic medical conditions, medication resumed unless contraindicated     Left wrist degenerative disease     COPD, not in exacerbation     Essential HTN        DMII with chronic complications      The patient expressed appropriate understanding of and agreement with the discharge recommendations, medications, and plan.      Consults this admission:  IP CONSULT TO HOSPITALIST  IP CONSULT TO CARDIOLOGY  IP CONSULT TO NEUROSURGERY    Discharge Instruction:   Follow up appointments: neurosurgery in 2 weeks  Primary care physician:  within 2 weeks    Diet:  diabetic diet   Activity: activity as tolerated  Disposition: Discharged to:   [x]Home, []C, []SNF, []Acute Rehab, []Hospice   Condition on discharge: Stable    Discharge Medications:      [address] Medication Instructions QG    Printed on:21 2978   Medication Information                      albuterol (ACCUNEB) 0.63 MG/3ML nebulizer solution  Take 1 ampule by nebulization every 6 hours as needed for Wheezing             albuterol (PROVENTIL HFA;VENTOLIN HFA) 108 (90 BASE) MCG/ACT inhaler  Inhale 2 puffs into the lungs every 6 hours as needed. albuterol-ipratropium (COMBIVENT RESPIMAT)  MCG/ACT AERS inhaler  Inhale 1 puff into the lungs every 6 hours             amLODIPine (NORVASC) 10 MG tablet  Take 1 tablet by mouth daily.              Budesonide-Formoterol Fumarate (SYMBICORT IN)  Inhale 2 puffs into the lungs daily             cyanocobalamin 1000 MCG tablet  Take 1,000 mcg by mouth daily             fluticasone (FLONASE) 50 MCG/ACT nasal spray  1 spray by Nasal route 2 times daily             guaiFENesin 400 MG tablet  Take 400 mg by mouth 2 times daily             loratadine (CLARITIN) 10 MG capsule  Take 10 mg by mouth daily             metFORMIN (GLUCOPHAGE-XR) 500 MG extended release tablet  Take 500 mg by mouth daily (with breakfast)             metoprolol succinate (TOPROL XL) 25 MG extended release tablet  Take 1 tablet by mouth daily             montelukast (SINGULAIR) 10 MG tablet  Take 10 mg by mouth nightly             omeprazole (PRILOSEC) 20 MG delayed release capsule  Take 20 mg by mouth daily             potassium chloride (KLOR-CON) 20 MEQ packet  Take 20 mEq by mouth daily             predniSONE (DELTASONE) 10 MG tablet  Take 20 mg by mouth daily as needed             theophylline CR, 12 hour, (THEODUR) 200 MG CR tablet  Take 1 tablet by mouth 2 times daily             tiotropium (SPIRIVA) 18 MCG inhalation capsule  Inhale 18 mcg into the lungs daily                 Objective Findings at Discharge:   /81   Pulse 60   Temp 97.8 °F (36.6 °C) (Oral)   Resp 17   Ht 5' 11\" (1.803 m)   Wt 165 lb 6.4 oz (75 kg)   SpO2 93%   BMI 23.07 kg/m²            PHYSICAL EXAM   GEN Awake male, sitting upright in bed in no apparent

## 2023-06-18 ENCOUNTER — APPOINTMENT (OUTPATIENT)
Dept: GENERAL RADIOLOGY | Age: 78
End: 2023-06-18
Attending: STUDENT IN AN ORGANIZED HEALTH CARE EDUCATION/TRAINING PROGRAM
Payer: OTHER GOVERNMENT

## 2023-06-18 ENCOUNTER — HOSPITAL ENCOUNTER (INPATIENT)
Age: 78
LOS: 5 days | Discharge: HOME | End: 2023-06-23
Attending: STUDENT IN AN ORGANIZED HEALTH CARE EDUCATION/TRAINING PROGRAM | Admitting: STUDENT IN AN ORGANIZED HEALTH CARE EDUCATION/TRAINING PROGRAM
Payer: OTHER GOVERNMENT

## 2023-06-18 PROBLEM — K56.2 VOLVULUS (HCC): Status: ACTIVE | Noted: 2023-06-18

## 2023-06-18 LAB
ALBUMIN SERPL-MCNC: 4.2 GM/DL (ref 3.4–5)
ALP BLD-CCNC: 118 IU/L (ref 40–129)
ALT SERPL-CCNC: 13 U/L (ref 10–40)
AMYLASE: 59 U/L (ref 25–115)
ANION GAP SERPL CALCULATED.3IONS-SCNC: 11 MMOL/L (ref 4–16)
APTT: 39.3 SECONDS (ref 25.1–37.1)
AST SERPL-CCNC: 31 IU/L (ref 15–37)
BASOPHILS ABSOLUTE: 0 K/CU MM
BASOPHILS RELATIVE PERCENT: 0.1 % (ref 0–1)
BILIRUB SERPL-MCNC: 0.3 MG/DL (ref 0–1)
BUN SERPL-MCNC: 20 MG/DL (ref 6–23)
CALCIUM SERPL-MCNC: 9 MG/DL (ref 8.3–10.6)
CHLORIDE BLD-SCNC: 103 MMOL/L (ref 99–110)
CO2: 27 MMOL/L (ref 21–32)
CREAT SERPL-MCNC: 0.8 MG/DL (ref 0.9–1.3)
DIFFERENTIAL TYPE: ABNORMAL
EOSINOPHILS ABSOLUTE: 0 K/CU MM
EOSINOPHILS RELATIVE PERCENT: 0 % (ref 0–3)
GFR SERPL CREATININE-BSD FRML MDRD: >60 ML/MIN/1.73M2
GLUCOSE BLD-MCNC: 186 MG/DL (ref 70–99)
GLUCOSE BLD-MCNC: 268 MG/DL (ref 70–99)
GLUCOSE SERPL-MCNC: 218 MG/DL (ref 70–99)
HCT VFR BLD CALC: 37.3 % (ref 42–52)
HEMOGLOBIN: 11.4 GM/DL (ref 13.5–18)
IMMATURE NEUTROPHIL %: 0.2 % (ref 0–0.43)
INR BLD: 1.16 INDEX
LIPASE: 15 IU/L (ref 13–60)
LYMPHOCYTES ABSOLUTE: 0.3 K/CU MM
LYMPHOCYTES RELATIVE PERCENT: 3.4 % (ref 24–44)
MCH RBC QN AUTO: 28.3 PG (ref 27–31)
MCHC RBC AUTO-ENTMCNC: 30.6 % (ref 32–36)
MCV RBC AUTO: 92.6 FL (ref 78–100)
MONOCYTES ABSOLUTE: 0.2 K/CU MM
MONOCYTES RELATIVE PERCENT: 2.4 % (ref 0–4)
NUCLEATED RBC %: 0 %
PDW BLD-RTO: 15.3 % (ref 11.7–14.9)
PLATELET # BLD: 320 K/CU MM (ref 140–440)
PMV BLD AUTO: 9.7 FL (ref 7.5–11.1)
POTASSIUM SERPL-SCNC: 4.6 MMOL/L (ref 3.5–5.1)
PROTHROMBIN TIME: 14.7 SECONDS (ref 11.7–14.5)
RBC # BLD: 4.03 M/CU MM (ref 4.6–6.2)
SEGMENTED NEUTROPHILS ABSOLUTE COUNT: 8 K/CU MM
SEGMENTED NEUTROPHILS RELATIVE PERCENT: 93.9 % (ref 36–66)
SODIUM BLD-SCNC: 141 MMOL/L (ref 135–145)
TOTAL IMMATURE NEUTOROPHIL: 0.02 K/CU MM
TOTAL NUCLEATED RBC: 0 K/CU MM
TOTAL PROTEIN: 6.7 GM/DL (ref 6.4–8.2)
WBC # BLD: 8.5 K/CU MM (ref 4–10.5)

## 2023-06-18 PROCEDURE — 6370000000 HC RX 637 (ALT 250 FOR IP): Performed by: STUDENT IN AN ORGANIZED HEALTH CARE EDUCATION/TRAINING PROGRAM

## 2023-06-18 PROCEDURE — 94640 AIRWAY INHALATION TREATMENT: CPT

## 2023-06-18 PROCEDURE — 2700000000 HC OXYGEN THERAPY PER DAY

## 2023-06-18 PROCEDURE — 74018 RADEX ABDOMEN 1 VIEW: CPT

## 2023-06-18 PROCEDURE — 85025 COMPLETE CBC W/AUTO DIFF WBC: CPT

## 2023-06-18 PROCEDURE — 94761 N-INVAS EAR/PLS OXIMETRY MLT: CPT

## 2023-06-18 PROCEDURE — 1200000000 HC SEMI PRIVATE

## 2023-06-18 PROCEDURE — 2580000003 HC RX 258: Performed by: STUDENT IN AN ORGANIZED HEALTH CARE EDUCATION/TRAINING PROGRAM

## 2023-06-18 PROCEDURE — C9113 INJ PANTOPRAZOLE SODIUM, VIA: HCPCS | Performed by: STUDENT IN AN ORGANIZED HEALTH CARE EDUCATION/TRAINING PROGRAM

## 2023-06-18 PROCEDURE — 80053 COMPREHEN METABOLIC PANEL: CPT

## 2023-06-18 PROCEDURE — 82962 GLUCOSE BLOOD TEST: CPT

## 2023-06-18 PROCEDURE — 83690 ASSAY OF LIPASE: CPT

## 2023-06-18 PROCEDURE — 99222 1ST HOSP IP/OBS MODERATE 55: CPT | Performed by: SURGERY

## 2023-06-18 PROCEDURE — 85610 PROTHROMBIN TIME: CPT

## 2023-06-18 PROCEDURE — 6360000002 HC RX W HCPCS: Performed by: STUDENT IN AN ORGANIZED HEALTH CARE EDUCATION/TRAINING PROGRAM

## 2023-06-18 PROCEDURE — 85730 THROMBOPLASTIN TIME PARTIAL: CPT

## 2023-06-18 PROCEDURE — 82150 ASSAY OF AMYLASE: CPT

## 2023-06-18 PROCEDURE — 94664 DEMO&/EVAL PT USE INHALER: CPT

## 2023-06-18 PROCEDURE — 36415 COLL VENOUS BLD VENIPUNCTURE: CPT

## 2023-06-18 RX ORDER — ENOXAPARIN SODIUM 100 MG/ML
40 INJECTION SUBCUTANEOUS DAILY
Status: DISCONTINUED | OUTPATIENT
Start: 2023-06-18 | End: 2023-06-23 | Stop reason: HOSPADM

## 2023-06-18 RX ORDER — ONDANSETRON 4 MG/1
4 TABLET, ORALLY DISINTEGRATING ORAL EVERY 8 HOURS PRN
Status: DISCONTINUED | OUTPATIENT
Start: 2023-06-18 | End: 2023-06-23 | Stop reason: HOSPADM

## 2023-06-18 RX ORDER — ALBUTEROL SULFATE 90 UG/1
2 AEROSOL, METERED RESPIRATORY (INHALATION) 3 TIMES DAILY
Status: DISCONTINUED | OUTPATIENT
Start: 2023-06-18 | End: 2023-06-20

## 2023-06-18 RX ORDER — ALBUTEROL SULFATE 90 UG/1
2 AEROSOL, METERED RESPIRATORY (INHALATION) EVERY 6 HOURS PRN
Status: DISCONTINUED | OUTPATIENT
Start: 2023-06-18 | End: 2023-06-20

## 2023-06-18 RX ORDER — PANTOPRAZOLE SODIUM 40 MG/10ML
40 INJECTION, POWDER, LYOPHILIZED, FOR SOLUTION INTRAVENOUS DAILY
Status: DISCONTINUED | OUTPATIENT
Start: 2023-06-18 | End: 2023-06-23 | Stop reason: HOSPADM

## 2023-06-18 RX ORDER — SODIUM CHLORIDE 9 MG/ML
INJECTION, SOLUTION INTRAVENOUS CONTINUOUS
Status: DISCONTINUED | OUTPATIENT
Start: 2023-06-18 | End: 2023-06-19

## 2023-06-18 RX ORDER — INSULIN LISPRO 100 [IU]/ML
0-4 INJECTION, SOLUTION INTRAVENOUS; SUBCUTANEOUS NIGHTLY
Status: DISCONTINUED | OUTPATIENT
Start: 2023-06-18 | End: 2023-06-23 | Stop reason: HOSPADM

## 2023-06-18 RX ORDER — INSULIN LISPRO 100 [IU]/ML
0-4 INJECTION, SOLUTION INTRAVENOUS; SUBCUTANEOUS
Status: DISCONTINUED | OUTPATIENT
Start: 2023-06-18 | End: 2023-06-23 | Stop reason: HOSPADM

## 2023-06-18 RX ORDER — GLIPIZIDE 5 MG/1
5 TABLET ORAL DAILY
COMMUNITY

## 2023-06-18 RX ORDER — POLYETHYLENE GLYCOL 3350 17 G/17G
17 POWDER, FOR SOLUTION ORAL DAILY PRN
Status: DISCONTINUED | OUTPATIENT
Start: 2023-06-18 | End: 2023-06-19

## 2023-06-18 RX ORDER — METOPROLOL SUCCINATE 25 MG/1
25 TABLET, EXTENDED RELEASE ORAL DAILY
Status: DISCONTINUED | OUTPATIENT
Start: 2023-06-18 | End: 2023-06-23 | Stop reason: HOSPADM

## 2023-06-18 RX ORDER — ACETAMINOPHEN 325 MG/1
650 TABLET ORAL EVERY 6 HOURS PRN
Status: DISCONTINUED | OUTPATIENT
Start: 2023-06-18 | End: 2023-06-23 | Stop reason: HOSPADM

## 2023-06-18 RX ORDER — SODIUM CHLORIDE 0.9 % (FLUSH) 0.9 %
5-40 SYRINGE (ML) INJECTION PRN
Status: DISCONTINUED | OUTPATIENT
Start: 2023-06-18 | End: 2023-06-23 | Stop reason: HOSPADM

## 2023-06-18 RX ORDER — BUDESONIDE AND FORMOTEROL FUMARATE DIHYDRATE 160; 4.5 UG/1; UG/1
2 AEROSOL RESPIRATORY (INHALATION) DAILY
Status: DISCONTINUED | OUTPATIENT
Start: 2023-06-18 | End: 2023-06-23 | Stop reason: HOSPADM

## 2023-06-18 RX ORDER — AMLODIPINE BESYLATE 10 MG/1
10 TABLET ORAL DAILY
Status: DISCONTINUED | OUTPATIENT
Start: 2023-06-18 | End: 2023-06-23 | Stop reason: HOSPADM

## 2023-06-18 RX ORDER — ACETAMINOPHEN 650 MG/1
650 SUPPOSITORY RECTAL EVERY 6 HOURS PRN
Status: DISCONTINUED | OUTPATIENT
Start: 2023-06-18 | End: 2023-06-23 | Stop reason: HOSPADM

## 2023-06-18 RX ORDER — SODIUM CHLORIDE 0.9 % (FLUSH) 0.9 %
5-40 SYRINGE (ML) INJECTION EVERY 12 HOURS SCHEDULED
Status: DISCONTINUED | OUTPATIENT
Start: 2023-06-18 | End: 2023-06-23 | Stop reason: HOSPADM

## 2023-06-18 RX ORDER — ATORVASTATIN CALCIUM 20 MG/1
10 TABLET, FILM COATED ORAL DAILY
COMMUNITY

## 2023-06-18 RX ORDER — ONDANSETRON 2 MG/ML
4 INJECTION INTRAMUSCULAR; INTRAVENOUS EVERY 6 HOURS PRN
Status: DISCONTINUED | OUTPATIENT
Start: 2023-06-18 | End: 2023-06-23 | Stop reason: HOSPADM

## 2023-06-18 RX ORDER — DEXTROSE MONOHYDRATE 100 MG/ML
INJECTION, SOLUTION INTRAVENOUS CONTINUOUS PRN
Status: DISCONTINUED | OUTPATIENT
Start: 2023-06-18 | End: 2023-06-23 | Stop reason: HOSPADM

## 2023-06-18 RX ORDER — ALBUTEROL SULFATE 2.5 MG/3ML
2.5 SOLUTION RESPIRATORY (INHALATION) EVERY 6 HOURS PRN
Status: DISCONTINUED | OUTPATIENT
Start: 2023-06-18 | End: 2023-06-18

## 2023-06-18 RX ORDER — GLUCAGON 1 MG/ML
1 KIT INJECTION PRN
Status: DISCONTINUED | OUTPATIENT
Start: 2023-06-18 | End: 2023-06-23 | Stop reason: HOSPADM

## 2023-06-18 RX ORDER — SODIUM CHLORIDE 9 MG/ML
INJECTION, SOLUTION INTRAVENOUS PRN
Status: DISCONTINUED | OUTPATIENT
Start: 2023-06-18 | End: 2023-06-23 | Stop reason: HOSPADM

## 2023-06-18 RX ORDER — DOXYCYCLINE HYCLATE 100 MG
100 TABLET ORAL EVERY 12 HOURS SCHEDULED
Status: COMPLETED | OUTPATIENT
Start: 2023-06-18 | End: 2023-06-22

## 2023-06-18 RX ORDER — IPRATROPIUM BROMIDE AND ALBUTEROL SULFATE 2.5; .5 MG/3ML; MG/3ML
1 SOLUTION RESPIRATORY (INHALATION) EVERY 6 HOURS
Status: DISCONTINUED | OUTPATIENT
Start: 2023-06-18 | End: 2023-06-18

## 2023-06-18 RX ADMIN — ALBUTEROL SULFATE 2 PUFF: 90 AEROSOL, METERED RESPIRATORY (INHALATION) at 17:44

## 2023-06-18 RX ADMIN — DOXYCYCLINE HYCLATE 100 MG: 100 TABLET, COATED ORAL at 13:27

## 2023-06-18 RX ADMIN — ALBUTEROL SULFATE 2 PUFF: 90 AEROSOL, METERED RESPIRATORY (INHALATION) at 11:58

## 2023-06-18 RX ADMIN — PANTOPRAZOLE SODIUM 40 MG: 40 INJECTION, POWDER, FOR SOLUTION INTRAVENOUS at 13:28

## 2023-06-18 RX ADMIN — TIOTROPIUM BROMIDE INHALATION SPRAY 2 PUFF: 3.12 SPRAY, METERED RESPIRATORY (INHALATION) at 11:59

## 2023-06-18 RX ADMIN — SODIUM CHLORIDE, PRESERVATIVE FREE 10 ML: 5 INJECTION INTRAVENOUS at 13:28

## 2023-06-18 RX ADMIN — AMLODIPINE BESYLATE 10 MG: 10 TABLET ORAL at 13:27

## 2023-06-18 RX ADMIN — BUDESONIDE AND FORMOTEROL FUMARATE DIHYDRATE 2 PUFF: 160; 4.5 AEROSOL RESPIRATORY (INHALATION) at 12:00

## 2023-06-18 RX ADMIN — METOPROLOL SUCCINATE 25 MG: 25 TABLET, EXTENDED RELEASE ORAL at 13:28

## 2023-06-18 RX ADMIN — DOXYCYCLINE HYCLATE 100 MG: 100 TABLET, COATED ORAL at 20:24

## 2023-06-18 RX ADMIN — METHYLPREDNISOLONE SODIUM SUCCINATE 40 MG: 40 INJECTION, POWDER, FOR SOLUTION INTRAMUSCULAR; INTRAVENOUS at 13:28

## 2023-06-18 RX ADMIN — ENOXAPARIN SODIUM 40 MG: 100 INJECTION SUBCUTANEOUS at 13:30

## 2023-06-18 RX ADMIN — METHYLPREDNISOLONE SODIUM SUCCINATE 40 MG: 40 INJECTION, POWDER, FOR SOLUTION INTRAMUSCULAR; INTRAVENOUS at 23:09

## 2023-06-18 RX ADMIN — SODIUM CHLORIDE: 9 INJECTION, SOLUTION INTRAVENOUS at 11:22

## 2023-06-19 LAB
ALBUMIN SERPL-MCNC: 3.9 GM/DL (ref 3.4–5)
ALP BLD-CCNC: 119 IU/L (ref 40–128)
ALT SERPL-CCNC: 14 U/L (ref 10–40)
ANION GAP SERPL CALCULATED.3IONS-SCNC: 11 MMOL/L (ref 4–16)
AST SERPL-CCNC: 29 IU/L (ref 15–37)
BASOPHILS ABSOLUTE: 0 K/CU MM
BASOPHILS RELATIVE PERCENT: 0.1 % (ref 0–1)
BILIRUB SERPL-MCNC: 0.2 MG/DL (ref 0–1)
BUN SERPL-MCNC: 22 MG/DL (ref 6–23)
CALCIUM SERPL-MCNC: 8.8 MG/DL (ref 8.3–10.6)
CHLORIDE BLD-SCNC: 103 MMOL/L (ref 99–110)
CO2: 24 MMOL/L (ref 21–32)
CREAT SERPL-MCNC: 0.8 MG/DL (ref 0.9–1.3)
DIFFERENTIAL TYPE: ABNORMAL
EOSINOPHILS ABSOLUTE: 0 K/CU MM
EOSINOPHILS RELATIVE PERCENT: 0.1 % (ref 0–3)
ESTIMATED AVERAGE GLUCOSE: 171 MG/DL
GFR SERPL CREATININE-BSD FRML MDRD: >60 ML/MIN/1.73M2
GLUCOSE BLD-MCNC: 172 MG/DL (ref 70–99)
GLUCOSE BLD-MCNC: 173 MG/DL (ref 70–99)
GLUCOSE BLD-MCNC: 232 MG/DL (ref 70–99)
GLUCOSE BLD-MCNC: 323 MG/DL (ref 70–99)
GLUCOSE SERPL-MCNC: 176 MG/DL (ref 70–99)
HBA1C MFR BLD: 7.6 % (ref 4.2–6.3)
HCT VFR BLD CALC: 39.5 % (ref 42–52)
HEMOGLOBIN: 11.2 GM/DL (ref 13.5–18)
IMMATURE NEUTROPHIL %: 0.4 % (ref 0–0.43)
LYMPHOCYTES ABSOLUTE: 0.4 K/CU MM
LYMPHOCYTES RELATIVE PERCENT: 2.5 % (ref 24–44)
MCH RBC QN AUTO: 28.6 PG (ref 27–31)
MCHC RBC AUTO-ENTMCNC: 28.4 % (ref 32–36)
MCV RBC AUTO: 100.8 FL (ref 78–100)
MONOCYTES ABSOLUTE: 1.1 K/CU MM
MONOCYTES RELATIVE PERCENT: 6.7 % (ref 0–4)
NUCLEATED RBC %: 0.5 %
PDW BLD-RTO: 15.7 % (ref 11.7–14.9)
PLATELET # BLD: 305 K/CU MM (ref 140–440)
PMV BLD AUTO: 10 FL (ref 7.5–11.1)
POTASSIUM SERPL-SCNC: 5 MMOL/L (ref 3.5–5.1)
RBC # BLD: 3.92 M/CU MM (ref 4.6–6.2)
SEGMENTED NEUTROPHILS ABSOLUTE COUNT: 14.3 K/CU MM
SEGMENTED NEUTROPHILS RELATIVE PERCENT: 90.2 % (ref 36–66)
SODIUM BLD-SCNC: 138 MMOL/L (ref 135–145)
TOTAL IMMATURE NEUTOROPHIL: 0.06 K/CU MM
TOTAL NUCLEATED RBC: 0.1 K/CU MM
TOTAL PROTEIN: 6.1 GM/DL (ref 6.4–8.2)
WBC # BLD: 15.9 K/CU MM (ref 4–10.5)

## 2023-06-19 PROCEDURE — 94010 BREATHING CAPACITY TEST: CPT

## 2023-06-19 PROCEDURE — 36415 COLL VENOUS BLD VENIPUNCTURE: CPT

## 2023-06-19 PROCEDURE — 6360000002 HC RX W HCPCS: Performed by: STUDENT IN AN ORGANIZED HEALTH CARE EDUCATION/TRAINING PROGRAM

## 2023-06-19 PROCEDURE — 6370000000 HC RX 637 (ALT 250 FOR IP): Performed by: STUDENT IN AN ORGANIZED HEALTH CARE EDUCATION/TRAINING PROGRAM

## 2023-06-19 PROCEDURE — 85025 COMPLETE CBC W/AUTO DIFF WBC: CPT

## 2023-06-19 PROCEDURE — C9113 INJ PANTOPRAZOLE SODIUM, VIA: HCPCS | Performed by: STUDENT IN AN ORGANIZED HEALTH CARE EDUCATION/TRAINING PROGRAM

## 2023-06-19 PROCEDURE — 2580000003 HC RX 258: Performed by: STUDENT IN AN ORGANIZED HEALTH CARE EDUCATION/TRAINING PROGRAM

## 2023-06-19 PROCEDURE — 80053 COMPREHEN METABOLIC PANEL: CPT

## 2023-06-19 PROCEDURE — 94640 AIRWAY INHALATION TREATMENT: CPT

## 2023-06-19 PROCEDURE — 1200000000 HC SEMI PRIVATE

## 2023-06-19 PROCEDURE — 2700000000 HC OXYGEN THERAPY PER DAY

## 2023-06-19 PROCEDURE — 2580000003 HC RX 258

## 2023-06-19 PROCEDURE — A4216 STERILE WATER/SALINE, 10 ML: HCPCS

## 2023-06-19 PROCEDURE — 83036 HEMOGLOBIN GLYCOSYLATED A1C: CPT

## 2023-06-19 PROCEDURE — 82962 GLUCOSE BLOOD TEST: CPT

## 2023-06-19 PROCEDURE — 94761 N-INVAS EAR/PLS OXIMETRY MLT: CPT

## 2023-06-19 RX ORDER — SODIUM CHLORIDE 9 MG/ML
INJECTION INTRAVENOUS
Status: COMPLETED
Start: 2023-06-19 | End: 2023-06-19

## 2023-06-19 RX ORDER — PREDNISONE 20 MG/1
40 TABLET ORAL DAILY
Status: DISCONTINUED | OUTPATIENT
Start: 2023-06-20 | End: 2023-06-21

## 2023-06-19 RX ORDER — POLYETHYLENE GLYCOL 3350 17 G/17G
17 POWDER, FOR SOLUTION ORAL 2 TIMES DAILY
Status: DISCONTINUED | OUTPATIENT
Start: 2023-06-19 | End: 2023-06-23 | Stop reason: HOSPADM

## 2023-06-19 RX ORDER — ATORVASTATIN CALCIUM 10 MG/1
20 TABLET, FILM COATED ORAL NIGHTLY
Status: DISCONTINUED | OUTPATIENT
Start: 2023-06-19 | End: 2023-06-23 | Stop reason: HOSPADM

## 2023-06-19 RX ADMIN — ENOXAPARIN SODIUM 40 MG: 100 INJECTION SUBCUTANEOUS at 08:50

## 2023-06-19 RX ADMIN — ALBUTEROL SULFATE 2 PUFF: 90 AEROSOL, METERED RESPIRATORY (INHALATION) at 01:12

## 2023-06-19 RX ADMIN — METHYLPREDNISOLONE SODIUM SUCCINATE 40 MG: 40 INJECTION, POWDER, FOR SOLUTION INTRAMUSCULAR; INTRAVENOUS at 23:23

## 2023-06-19 RX ADMIN — ATORVASTATIN CALCIUM 20 MG: 10 TABLET, FILM COATED ORAL at 20:25

## 2023-06-19 RX ADMIN — INSULIN LISPRO 3 UNITS: 100 INJECTION, SOLUTION INTRAVENOUS; SUBCUTANEOUS at 18:09

## 2023-06-19 RX ADMIN — TIOTROPIUM BROMIDE INHALATION SPRAY 2 PUFF: 3.12 SPRAY, METERED RESPIRATORY (INHALATION) at 07:16

## 2023-06-19 RX ADMIN — SODIUM CHLORIDE 10 ML: 9 INJECTION INTRAMUSCULAR; INTRAVENOUS; SUBCUTANEOUS at 11:50

## 2023-06-19 RX ADMIN — BUDESONIDE AND FORMOTEROL FUMARATE DIHYDRATE 2 PUFF: 160; 4.5 AEROSOL RESPIRATORY (INHALATION) at 07:16

## 2023-06-19 RX ADMIN — ALBUTEROL SULFATE 2 PUFF: 90 AEROSOL, METERED RESPIRATORY (INHALATION) at 14:52

## 2023-06-19 RX ADMIN — SODIUM CHLORIDE: 9 INJECTION, SOLUTION INTRAVENOUS at 17:12

## 2023-06-19 RX ADMIN — ALBUTEROL SULFATE 2 PUFF: 90 AEROSOL, METERED RESPIRATORY (INHALATION) at 20:33

## 2023-06-19 RX ADMIN — METOPROLOL SUCCINATE 25 MG: 25 TABLET, EXTENDED RELEASE ORAL at 08:48

## 2023-06-19 RX ADMIN — POLYETHYLENE GLYCOL 3350 17 G: 17 POWDER, FOR SOLUTION ORAL at 20:25

## 2023-06-19 RX ADMIN — ALBUTEROL SULFATE 2 PUFF: 90 AEROSOL, METERED RESPIRATORY (INHALATION) at 05:39

## 2023-06-19 RX ADMIN — DOXYCYCLINE HYCLATE 100 MG: 100 TABLET, COATED ORAL at 20:25

## 2023-06-19 RX ADMIN — SODIUM CHLORIDE: 9 INJECTION, SOLUTION INTRAVENOUS at 01:34

## 2023-06-19 RX ADMIN — DOXYCYCLINE HYCLATE 100 MG: 100 TABLET, COATED ORAL at 08:48

## 2023-06-19 RX ADMIN — METHYLPREDNISOLONE SODIUM SUCCINATE 40 MG: 40 INJECTION, POWDER, FOR SOLUTION INTRAMUSCULAR; INTRAVENOUS at 11:50

## 2023-06-19 RX ADMIN — BUDESONIDE AND FORMOTEROL FUMARATE DIHYDRATE 2 PUFF: 160; 4.5 AEROSOL RESPIRATORY (INHALATION) at 20:33

## 2023-06-19 RX ADMIN — ALBUTEROL SULFATE 2 PUFF: 90 AEROSOL, METERED RESPIRATORY (INHALATION) at 07:16

## 2023-06-19 RX ADMIN — PANTOPRAZOLE SODIUM 40 MG: 40 INJECTION, POWDER, FOR SOLUTION INTRAVENOUS at 11:49

## 2023-06-19 RX ADMIN — AMLODIPINE BESYLATE 10 MG: 10 TABLET ORAL at 08:48

## 2023-06-19 ASSESSMENT — COPD QUESTIONNAIRES
QUESTION3_CHESTTIGHTNESS: 3
QUESTION4_WALKINCLINE: 5
QUESTION1_COUGHFREQUENCY: 5
CAT_TOTALSCORE: 30
QUESTION5_HOMEACTIVITIES: 3
QUESTION8_ENERGYLEVEL: 3
QUESTION6_LEAVINGHOUSE: 4
GOLD_GRADE: 4
QUESTION7_SLEEPQUALITY: 4
QUESTION2_CHESTPHLEGM: 3
GOLD_GROUP: GROUP E
TOTAL_EXACERBATIONS_PASTYEAR: 0

## 2023-06-19 ASSESSMENT — PULMONARY FUNCTION TESTS
POST BRONCHODILATOR FEV1/FVC: 35
PIF_VALUE: 95
FEV1 (%PREDICTED): 24

## 2023-06-19 NOTE — CARE COORDINATION
Reviewed chart, discussed in IDR and spoke with pt about discharge needs/plans. Pt lives alone in 2 story home, he has home O2, a cane and has had nursing [facility]. He uses the [facility] clinic as PCP and [facility] for specialist and medications. Pt would like to return home with [facility] for nursing, PT and OT. Called/faxed info [phone] /[phone] to Dorothy at [facility]. CM will continue to follow. 06/19/23 2009   Service Assessment   Patient Orientation Alert and Oriented   Cognition Alert   History Provided By Patient   Primary Caregiver Self   Support Systems Children;Spouse/Significant Other   PCP Verified by CM Yes   Last Visit to PCP Within last 3 months   Prior Functional Level Independent in ADLs/IADLs   Current Functional Level Independent in ADLs/IADLs   Can patient return to prior living arrangement Yes   Ability to make needs known: Good   Family able to assist with home care needs: Yes   Would you like for me to discuss the discharge plan with any other family members/significant others, and if so, who? No   Financial Resources Medicare;[facility] ([facility]   Community Resources ECF/Home Care  ([address] [place] [address])   Social/Functional History   Lives With Alone   Home Layout Two level   Home Equipment Oxygen;Cane  (at [employer])   Receives Help From Home health  ([facility]urs[facility] through [facility])   Active  Yes   Mode of Transportation Car   Discharge Planning   Type of [address] PT;OT;Nursing Services  (Pt would like nursing, PT and OT)   Condition of Participation: Discharge Planning   The Patient and/Or Patient Representative agree with the Discharge Plan?  Yes

## 2023-06-19 NOTE — CONSULTS
[address], [facility]                                  CONSULTATION    PATIENT NAME: Deepak Kwok                   :        1945  MED REC NO:   [id number]                          ROOM:       [id number]  ACCOUNT NO:   [de-identified]                           ADMIT DATE: 2023  PROVIDER:     Demond Jaimes MD    CONSULT DATE:  2023    CHIEF COMPLAINT:  Chronic abdominal distention/abnormal imaging. HISTORY OF PRESENT ILLNESS:  The patient is a 77-year-old Afro-American  gentleman who has seen Dr. Hilton Both in the past, whom I am covering, with  past medical history significant for hypertension, diabetes mellitus,  hyperlipidemia, COPD, gastroesophageal reflux disease and history of  megacolon according to the patient all his life with five surgeries done  on his colon and finally had exploratory laparotomy with subtotal  colectomy and ileoproctostomy by Dr. Treva Pendleton on 2013, and the  patient is on home O2 as well. The patient presented to [facility] ER in [facility] yesterday with  shortness of breath. Upon examination, the patient was noted to have  abdominal distention which is chronic in nature and the chest/abdominal  x-ray showed possible free air in the abdomen. Subsequently, the  patient had a CAT scan done which did not show free air; however, did  show massive distention of the intestines. The ER physician from  [facility] called me last night with the above findings and the patient  was transferred to [facility] for further  workup and management and also surgical consult with Dr. Delaney Rosales is in order.     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

## 2023-06-19 NOTE — PROGRESS NOTES
V2.0    [facility] [place]      Name:  Rosaura Lizama /Age/Sex: 1945  (67 y.o. male)   MRN & CSN:  [id number] & [id number] Encounter Date/Time: 2023 7:21 AM EDT   Location:  [address] [place] [address]-A PCP: [id number] Day: 2    Assessment and Recommendations   Rosaura Lizama is a 70 y.o. male with pmh of hypertension, hyperlipidemia, type 2 diabetes mellitus, CAD, chronic respiratory failure on 3 L of O2 at home, COPD and history of megacolon status post resection as well as surgery of sigmoid volvulus  who presents with Volvulus ([facility] [address]      Plan:   #Chronic respiratory failure with COPD exacerbation --Presented to [facility] ED with worsening shortness of breath, at baseline uses 3L of O2 via NC at night. Currently on baseline home O2 and reports he has been needing it during the day. 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 --KUB on arrival showed colonic obstruction and obstipation  --Abdomen was non-acute, no N/V or abdominal pain. Reports distension is chronic and unchanged  --Started on IVF, with resumption of diet will dc  --GI and gen surg on board, appreciate recs  --Start bowel regimen, monitor for BM     #Type 2 diabetes mellitus  --Continue accucheks, A1c 7.6  --Cover with SSI and hypoglycemic protocol  --ADA diet     #Hypertension   --Continue home medication with holding parameters     #Hyperlipidemia   --Continue home medication      Diet ADULT DIET;  Regular   DVT Prophylaxis [x] Lovenox, []  Heparin, []

## 2023-06-19 NOTE — PROGRESS NOTES
06/19/23 1456   Spirometry Assessment   FEV1 (%PRED) 24   Post Bronchodilator FEV/FVC 35   COPD Exacerbations in last year 0   PIF 95 L/min   COPD Assessment (CAT Score)   Cough Assessment 5   Phlegm Assessment 3   Chest tightness 3   Walking on an incline 5   Home Activities 3   Confident Leaving The Home 4   Sleeping Soundly 4   Have Energy 3   Assessment Score 30   $RT COPD Assessment Yes   GOLD Staging   Gold Grade 4   Group Group E     Pt wears home O2 @ 3 LPM.

## 2023-06-19 NOTE — PROGRESS NOTES
GI NOTE:  Please note that I will be away until Monday, July 3-- please call Dr Kylah Ann if GI follow up needed before then

## 2023-06-20 ENCOUNTER — APPOINTMENT (OUTPATIENT)
Dept: GENERAL RADIOLOGY | Age: 78
End: 2023-06-20
Attending: STUDENT IN AN ORGANIZED HEALTH CARE EDUCATION/TRAINING PROGRAM
Payer: OTHER GOVERNMENT

## 2023-06-20 LAB
B PARAP IS1001 DNA NPH QL NAA+NON-PROBE: NOT DETECTED
B PERT.PT PRMT NPH QL NAA+NON-PROBE: NOT DETECTED
C PNEUM DNA NPH QL NAA+NON-PROBE: NOT DETECTED
FLUAV H1 2009 PAN RNA NPH NAA+NON-PROBE: NOT DETECTED
FLUAV H1 RNA NPH QL NAA+NON-PROBE: NOT DETECTED
FLUAV H3 RNA NPH QL NAA+NON-PROBE: NOT DETECTED
FLUAV RNA NPH QL NAA+NON-PROBE: NOT DETECTED
FLUBV RNA NPH QL NAA+NON-PROBE: NOT DETECTED
GLUCOSE BLD-MCNC: 176 MG/DL (ref 70–99)
GLUCOSE BLD-MCNC: 233 MG/DL (ref 70–99)
GLUCOSE BLD-MCNC: 306 MG/DL (ref 70–99)
GLUCOSE BLD-MCNC: 334 MG/DL (ref 70–99)
HADV DNA NPH QL NAA+NON-PROBE: NOT DETECTED
HCOV 229E RNA NPH QL NAA+NON-PROBE: NOT DETECTED
HCOV HKU1 RNA NPH QL NAA+NON-PROBE: NOT DETECTED
HCOV NL63 RNA NPH QL NAA+NON-PROBE: NOT DETECTED
HCOV OC43 RNA NPH QL NAA+NON-PROBE: NOT DETECTED
HCT VFR BLD CALC: 37 % (ref 42–52)
HEMOGLOBIN: 11.1 GM/DL (ref 13.5–18)
HMPV RNA NPH QL NAA+NON-PROBE: NOT DETECTED
HPIV1 RNA NPH QL NAA+NON-PROBE: NOT DETECTED
HPIV2 RNA NPH QL NAA+NON-PROBE: NOT DETECTED
HPIV3 RNA NPH QL NAA+NON-PROBE: NOT DETECTED
HPIV4 RNA NPH QL NAA+NON-PROBE: NOT DETECTED
M PNEUMO DNA NPH QL NAA+NON-PROBE: NOT DETECTED
MCH RBC QN AUTO: 28.4 PG (ref 27–31)
MCHC RBC AUTO-ENTMCNC: 30 % (ref 32–36)
MCV RBC AUTO: 94.6 FL (ref 78–100)
PDW BLD-RTO: 15.5 % (ref 11.7–14.9)
PLATELET # BLD: 320 K/CU MM (ref 140–440)
PMV BLD AUTO: 10.6 FL (ref 7.5–11.1)
RBC # BLD: 3.91 M/CU MM (ref 4.6–6.2)
RSV RNA NPH QL NAA+NON-PROBE: NOT DETECTED
RV+EV RNA NPH QL NAA+NON-PROBE: NOT DETECTED
SARS-COV-2 RNA NPH QL NAA+NON-PROBE: NOT DETECTED
WBC # BLD: 13.8 K/CU MM (ref 4–10.5)

## 2023-06-20 PROCEDURE — 6370000000 HC RX 637 (ALT 250 FOR IP): Performed by: STUDENT IN AN ORGANIZED HEALTH CARE EDUCATION/TRAINING PROGRAM

## 2023-06-20 PROCEDURE — 82962 GLUCOSE BLOOD TEST: CPT

## 2023-06-20 PROCEDURE — C9113 INJ PANTOPRAZOLE SODIUM, VIA: HCPCS | Performed by: STUDENT IN AN ORGANIZED HEALTH CARE EDUCATION/TRAINING PROGRAM

## 2023-06-20 PROCEDURE — A4216 STERILE WATER/SALINE, 10 ML: HCPCS

## 2023-06-20 PROCEDURE — 36415 COLL VENOUS BLD VENIPUNCTURE: CPT

## 2023-06-20 PROCEDURE — 94640 AIRWAY INHALATION TREATMENT: CPT

## 2023-06-20 PROCEDURE — 2580000003 HC RX 258: Performed by: STUDENT IN AN ORGANIZED HEALTH CARE EDUCATION/TRAINING PROGRAM

## 2023-06-20 PROCEDURE — 6360000002 HC RX W HCPCS: Performed by: STUDENT IN AN ORGANIZED HEALTH CARE EDUCATION/TRAINING PROGRAM

## 2023-06-20 PROCEDURE — 0202U NFCT DS 22 TRGT SARS-COV-2: CPT

## 2023-06-20 PROCEDURE — 1200000000 HC SEMI PRIVATE

## 2023-06-20 PROCEDURE — 2580000003 HC RX 258

## 2023-06-20 PROCEDURE — 94761 N-INVAS EAR/PLS OXIMETRY MLT: CPT

## 2023-06-20 PROCEDURE — 85027 COMPLETE CBC AUTOMATED: CPT

## 2023-06-20 PROCEDURE — 2700000000 HC OXYGEN THERAPY PER DAY

## 2023-06-20 PROCEDURE — 71045 X-RAY EXAM CHEST 1 VIEW: CPT

## 2023-06-20 PROCEDURE — 6370000000 HC RX 637 (ALT 250 FOR IP): Performed by: FAMILY MEDICINE

## 2023-06-20 RX ORDER — ALBUTEROL SULFATE 90 UG/1
2 AEROSOL, METERED RESPIRATORY (INHALATION) EVERY 6 HOURS PRN
Status: DISCONTINUED | OUTPATIENT
Start: 2023-06-20 | End: 2023-06-23 | Stop reason: HOSPADM

## 2023-06-20 RX ORDER — IPRATROPIUM BROMIDE AND ALBUTEROL SULFATE 2.5; .5 MG/3ML; MG/3ML
1 SOLUTION RESPIRATORY (INHALATION)
Status: DISCONTINUED | OUTPATIENT
Start: 2023-06-20 | End: 2023-06-21

## 2023-06-20 RX ORDER — GUAIFENESIN 600 MG/1
600 TABLET, EXTENDED RELEASE ORAL 2 TIMES DAILY
Status: DISCONTINUED | OUTPATIENT
Start: 2023-06-20 | End: 2023-06-23 | Stop reason: HOSPADM

## 2023-06-20 RX ORDER — SODIUM CHLORIDE 9 MG/ML
INJECTION INTRAVENOUS
Status: COMPLETED
Start: 2023-06-20 | End: 2023-06-20

## 2023-06-20 RX ADMIN — ALBUTEROL SULFATE 2 PUFF: 90 AEROSOL, METERED RESPIRATORY (INHALATION) at 15:17

## 2023-06-20 RX ADMIN — ENOXAPARIN SODIUM 40 MG: 100 INJECTION SUBCUTANEOUS at 10:13

## 2023-06-20 RX ADMIN — SODIUM CHLORIDE, PRESERVATIVE FREE 5 ML: 5 INJECTION INTRAVENOUS at 21:38

## 2023-06-20 RX ADMIN — PANTOPRAZOLE SODIUM 40 MG: 40 INJECTION, POWDER, FOR SOLUTION INTRAVENOUS at 10:13

## 2023-06-20 RX ADMIN — GUAIFENESIN 600 MG: 600 TABLET, EXTENDED RELEASE ORAL at 01:04

## 2023-06-20 RX ADMIN — DOXYCYCLINE HYCLATE 100 MG: 100 TABLET, COATED ORAL at 21:31

## 2023-06-20 RX ADMIN — POLYETHYLENE GLYCOL 3350 17 G: 17 POWDER, FOR SOLUTION ORAL at 10:13

## 2023-06-20 RX ADMIN — POLYETHYLENE GLYCOL 3350 17 G: 17 POWDER, FOR SOLUTION ORAL at 21:31

## 2023-06-20 RX ADMIN — GUAIFENESIN 600 MG: 600 TABLET, EXTENDED RELEASE ORAL at 21:31

## 2023-06-20 RX ADMIN — ALBUTEROL SULFATE 2 PUFF: 90 AEROSOL, METERED RESPIRATORY (INHALATION) at 10:02

## 2023-06-20 RX ADMIN — SODIUM CHLORIDE 10 ML: 9 INJECTION INTRAMUSCULAR; INTRAVENOUS; SUBCUTANEOUS at 10:14

## 2023-06-20 RX ADMIN — AMLODIPINE BESYLATE 10 MG: 10 TABLET ORAL at 10:12

## 2023-06-20 RX ADMIN — PREDNISONE 40 MG: 20 TABLET ORAL at 10:13

## 2023-06-20 RX ADMIN — INSULIN LISPRO 1 UNITS: 100 INJECTION, SOLUTION INTRAVENOUS; SUBCUTANEOUS at 10:13

## 2023-06-20 RX ADMIN — INSULIN LISPRO 3 UNITS: 100 INJECTION, SOLUTION INTRAVENOUS; SUBCUTANEOUS at 12:52

## 2023-06-20 RX ADMIN — TIOTROPIUM BROMIDE INHALATION SPRAY 2 PUFF: 3.12 SPRAY, METERED RESPIRATORY (INHALATION) at 10:03

## 2023-06-20 RX ADMIN — BUDESONIDE AND FORMOTEROL FUMARATE DIHYDRATE 2 PUFF: 160; 4.5 AEROSOL RESPIRATORY (INHALATION) at 10:03

## 2023-06-20 RX ADMIN — INSULIN LISPRO 4 UNITS: 100 INJECTION, SOLUTION INTRAVENOUS; SUBCUTANEOUS at 21:30

## 2023-06-20 RX ADMIN — METOPROLOL SUCCINATE 25 MG: 25 TABLET, EXTENDED RELEASE ORAL at 10:12

## 2023-06-20 RX ADMIN — GUAIFENESIN 600 MG: 600 TABLET, EXTENDED RELEASE ORAL at 10:12

## 2023-06-20 RX ADMIN — ATORVASTATIN CALCIUM 20 MG: 10 TABLET, FILM COATED ORAL at 21:31

## 2023-06-20 RX ADMIN — SODIUM CHLORIDE, PRESERVATIVE FREE 10 ML: 5 INJECTION INTRAVENOUS at 10:14

## 2023-06-20 RX ADMIN — DOXYCYCLINE HYCLATE 100 MG: 100 TABLET, COATED ORAL at 10:12

## 2023-06-20 NOTE — PLAN OF CARE
Problem: Discharge Planning  Goal: Discharge to home or other facility with appropriate resources  6/20/2023 0310 by Lola Gambino LPN  Outcome: Progressing  6/19/2023 1645 by Kev Lopez RN  Outcome: Progressing     Problem: Chronic Conditions and Co-morbidities  Goal: Patient's chronic conditions and co-morbidity symptoms are monitored and maintained or improved  6/20/2023 0310 by Lola Gambino LPN  Outcome: Progressing  6/19/2023 1645 by Kev Lopez RN  Outcome: Progressing     Problem: Safety - Adult  Goal: Free from fall injury  Outcome: Progressing

## 2023-06-20 NOTE — CARE COORDINATION
Reviewed chart, discussed in IDR, plan remains home with [facility] once medically stable. CM will continue to follow.

## 2023-06-20 NOTE — PROGRESS NOTES
06/20/23 1227   Encounter Summary   Encounter Overview/Reason  Initial Encounter   Service Provided For: Patient   Referral/Consult From: [facility]   Support System Friends/neighbors   Last Encounter  06/20/23  (Alexei Bailon sitting up in bed, receptive of  visit. Good discussion and time of prayer. Patient has a good support base.)   Complexity of Encounter Low   Begin Time 1220   End Time  1228   Total Time Calculated 8 min   Encounter    Type Initial Screen/Assessment   Spiritual/Emotional needs   Type Spiritual Support   Assessment/Intervention/Outcome   Assessment Calm;Coping; Hopeful   Intervention Active listening;Discussed relationship with God;Nurtured Hope;Prayer (assurance of)/[place];Sustaining Presence/Ministry of presence;Read/Provided Scripture   Outcome Acceptance;Comfort;Coping;Encouraged;Engaged in conversation;Expressed Gratitude;Expressed feelings, needs, and concerns;Expressed feelings of [place], Peace and/or Love   Plan and Referrals   Plan/Referrals Continue Support (comment)  (Patient informed how to contact )

## 2023-06-20 NOTE — PROGRESS NOTES
V2.0    [facility] [place] Progress Note      Name:  Jamin Collins /Age/Sex: 1945  (70 y.o. male)   MRN & CSN:  [id number] & [id number] Encounter Date/Time: 2023 7:21 AM EDT   Location:  [address] [place] [address]-A PCP: Omid Solis     Attending:Hu Delgado [facility] Day: 3    Assessment and Recommendations   Jamin Collins is a 70 y.o. male with pmh of hypertension, hyperlipidemia, type 2 diabetes mellitus, CAD, chronic respiratory failure on 3 L of O2 at home, COPD and history of megacolon status post resection as well as surgery of sigmoid volvulus  who presents with Volvulus ([facility] [address]      Plan:   #Chronic respiratory failure with COPD exacerbation --Presented to [facility] ED with worsening shortness of breath, at baseline uses 3L of O2 via NC at night. Currently on baseline home O2 and reports he has been needing it during the day. 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 --KUB on arrival showed colonic obstruction and obstipation  --Abdomen was non-acute, no N/V or abdominal pain. Reports distension is chronic and unchanged  --Started on IVF, with resumption of diet will dc  --GI and gen surg on board, appreciate recs  --Start bowel regimen, monitor for BM     #Type 2 diabetes mellitus  --Continue accucheks, A1c 7.6  --Cover with SSI and hypoglycemic protocol  --ADA diet     #Hypertension   --Continue home medication with holding parameters     #Hyperlipidemia   --Continue home medication      Diet ADULT DIET;  Regular; 4 carb choices (60

## 2023-06-21 LAB
ANION GAP SERPL CALCULATED.3IONS-SCNC: 9 MMOL/L (ref 4–16)
BASOPHILS ABSOLUTE: 0 K/CU MM
BASOPHILS RELATIVE PERCENT: 0.1 % (ref 0–1)
BUN SERPL-MCNC: 21 MG/DL (ref 6–23)
CALCIUM SERPL-MCNC: 8.6 MG/DL (ref 8.3–10.6)
CHLORIDE BLD-SCNC: 100 MMOL/L (ref 99–110)
CO2: 30 MMOL/L (ref 21–32)
CREAT SERPL-MCNC: 0.8 MG/DL (ref 0.9–1.3)
DIFFERENTIAL TYPE: ABNORMAL
EOSINOPHILS ABSOLUTE: 0 K/CU MM
EOSINOPHILS RELATIVE PERCENT: 0 % (ref 0–3)
GFR SERPL CREATININE-BSD FRML MDRD: >60 ML/MIN/1.73M2
GLUCOSE BLD-MCNC: 155 MG/DL (ref 70–99)
GLUCOSE BLD-MCNC: 200 MG/DL (ref 70–99)
GLUCOSE BLD-MCNC: 329 MG/DL (ref 70–99)
GLUCOSE BLD-MCNC: 343 MG/DL (ref 70–99)
GLUCOSE SERPL-MCNC: 200 MG/DL (ref 70–99)
HCT VFR BLD CALC: 38.2 % (ref 42–52)
HEMOGLOBIN: 11.7 GM/DL (ref 13.5–18)
IMMATURE NEUTROPHIL %: 0.4 % (ref 0–0.43)
LYMPHOCYTES ABSOLUTE: 0.7 K/CU MM
LYMPHOCYTES RELATIVE PERCENT: 5.4 % (ref 24–44)
MCH RBC QN AUTO: 28.9 PG (ref 27–31)
MCHC RBC AUTO-ENTMCNC: 30.6 % (ref 32–36)
MCV RBC AUTO: 94.3 FL (ref 78–100)
MONOCYTES ABSOLUTE: 1.2 K/CU MM
MONOCYTES RELATIVE PERCENT: 9.1 % (ref 0–4)
NUCLEATED RBC %: 0 %
PDW BLD-RTO: 15.3 % (ref 11.7–14.9)
PLATELET # BLD: 311 K/CU MM (ref 140–440)
PMV BLD AUTO: 10 FL (ref 7.5–11.1)
POTASSIUM SERPL-SCNC: 4.5 MMOL/L (ref 3.5–5.1)
RBC # BLD: 4.05 M/CU MM (ref 4.6–6.2)
SEGMENTED NEUTROPHILS ABSOLUTE COUNT: 11.3 K/CU MM
SEGMENTED NEUTROPHILS RELATIVE PERCENT: 85 % (ref 36–66)
SODIUM BLD-SCNC: 139 MMOL/L (ref 135–145)
TOTAL IMMATURE NEUTOROPHIL: 0.06 K/CU MM
TOTAL NUCLEATED RBC: 0 K/CU MM
WBC # BLD: 13.3 K/CU MM (ref 4–10.5)

## 2023-06-21 PROCEDURE — 6370000000 HC RX 637 (ALT 250 FOR IP): Performed by: STUDENT IN AN ORGANIZED HEALTH CARE EDUCATION/TRAINING PROGRAM

## 2023-06-21 PROCEDURE — 82962 GLUCOSE BLOOD TEST: CPT

## 2023-06-21 PROCEDURE — 6360000002 HC RX W HCPCS: Performed by: STUDENT IN AN ORGANIZED HEALTH CARE EDUCATION/TRAINING PROGRAM

## 2023-06-21 PROCEDURE — C9113 INJ PANTOPRAZOLE SODIUM, VIA: HCPCS | Performed by: STUDENT IN AN ORGANIZED HEALTH CARE EDUCATION/TRAINING PROGRAM

## 2023-06-21 PROCEDURE — 36415 COLL VENOUS BLD VENIPUNCTURE: CPT

## 2023-06-21 PROCEDURE — 6370000000 HC RX 637 (ALT 250 FOR IP): Performed by: FAMILY MEDICINE

## 2023-06-21 PROCEDURE — 85025 COMPLETE CBC W/AUTO DIFF WBC: CPT

## 2023-06-21 PROCEDURE — 94640 AIRWAY INHALATION TREATMENT: CPT

## 2023-06-21 PROCEDURE — 2700000000 HC OXYGEN THERAPY PER DAY

## 2023-06-21 PROCEDURE — 2580000003 HC RX 258: Performed by: STUDENT IN AN ORGANIZED HEALTH CARE EDUCATION/TRAINING PROGRAM

## 2023-06-21 PROCEDURE — 94150 VITAL CAPACITY TEST: CPT

## 2023-06-21 PROCEDURE — 80048 BASIC METABOLIC PNL TOTAL CA: CPT

## 2023-06-21 PROCEDURE — 94761 N-INVAS EAR/PLS OXIMETRY MLT: CPT

## 2023-06-21 PROCEDURE — 1200000000 HC SEMI PRIVATE

## 2023-06-21 RX ORDER — IPRATROPIUM BROMIDE AND ALBUTEROL SULFATE 2.5; .5 MG/3ML; MG/3ML
1 SOLUTION RESPIRATORY (INHALATION) EVERY 4 HOURS
Status: DISCONTINUED | OUTPATIENT
Start: 2023-06-21 | End: 2023-06-23 | Stop reason: HOSPADM

## 2023-06-21 RX ORDER — PREDNISONE 20 MG/1
40 TABLET ORAL DAILY
Status: DISCONTINUED | OUTPATIENT
Start: 2023-06-22 | End: 2023-06-23 | Stop reason: HOSPADM

## 2023-06-21 RX ADMIN — GUAIFENESIN 600 MG: 600 TABLET, EXTENDED RELEASE ORAL at 20:45

## 2023-06-21 RX ADMIN — METOPROLOL SUCCINATE 25 MG: 25 TABLET, EXTENDED RELEASE ORAL at 08:33

## 2023-06-21 RX ADMIN — ENOXAPARIN SODIUM 40 MG: 100 INJECTION SUBCUTANEOUS at 08:33

## 2023-06-21 RX ADMIN — SODIUM CHLORIDE, PRESERVATIVE FREE 10 ML: 5 INJECTION INTRAVENOUS at 08:35

## 2023-06-21 RX ADMIN — DOXYCYCLINE HYCLATE 100 MG: 100 TABLET, COATED ORAL at 20:45

## 2023-06-21 RX ADMIN — IPRATROPIUM BROMIDE AND ALBUTEROL SULFATE 1 DOSE: .5; 3 SOLUTION RESPIRATORY (INHALATION) at 02:47

## 2023-06-21 RX ADMIN — SODIUM CHLORIDE, PRESERVATIVE FREE 5 ML: 5 INJECTION INTRAVENOUS at 20:46

## 2023-06-21 RX ADMIN — DOXYCYCLINE HYCLATE 100 MG: 100 TABLET, COATED ORAL at 08:33

## 2023-06-21 RX ADMIN — POLYETHYLENE GLYCOL 3350 17 G: 17 POWDER, FOR SOLUTION ORAL at 08:32

## 2023-06-21 RX ADMIN — INSULIN LISPRO 4 UNITS: 100 INJECTION, SOLUTION INTRAVENOUS; SUBCUTANEOUS at 20:50

## 2023-06-21 RX ADMIN — PREDNISONE 40 MG: 20 TABLET ORAL at 08:33

## 2023-06-21 RX ADMIN — IPRATROPIUM BROMIDE AND ALBUTEROL SULFATE 1 DOSE: 2.5; .5 SOLUTION RESPIRATORY (INHALATION) at 20:23

## 2023-06-21 RX ADMIN — IPRATROPIUM BROMIDE AND ALBUTEROL SULFATE 1 DOSE: 2.5; .5 SOLUTION RESPIRATORY (INHALATION) at 11:03

## 2023-06-21 RX ADMIN — ATORVASTATIN CALCIUM 20 MG: 10 TABLET, FILM COATED ORAL at 20:45

## 2023-06-21 RX ADMIN — INSULIN LISPRO 1 UNITS: 100 INJECTION, SOLUTION INTRAVENOUS; SUBCUTANEOUS at 13:11

## 2023-06-21 RX ADMIN — INSULIN LISPRO 3 UNITS: 100 INJECTION, SOLUTION INTRAVENOUS; SUBCUTANEOUS at 17:59

## 2023-06-21 RX ADMIN — GUAIFENESIN 600 MG: 600 TABLET, EXTENDED RELEASE ORAL at 08:33

## 2023-06-21 RX ADMIN — IPRATROPIUM BROMIDE AND ALBUTEROL SULFATE 1 DOSE: .5; 3 SOLUTION RESPIRATORY (INHALATION) at 07:15

## 2023-06-21 RX ADMIN — POLYETHYLENE GLYCOL 3350 17 G: 17 POWDER, FOR SOLUTION ORAL at 20:46

## 2023-06-21 RX ADMIN — BUDESONIDE AND FORMOTEROL FUMARATE DIHYDRATE 2 PUFF: 160; 4.5 AEROSOL RESPIRATORY (INHALATION) at 07:16

## 2023-06-21 RX ADMIN — AMLODIPINE BESYLATE 10 MG: 10 TABLET ORAL at 08:32

## 2023-06-21 RX ADMIN — PANTOPRAZOLE SODIUM 40 MG: 40 INJECTION, POWDER, FOR SOLUTION INTRAVENOUS at 08:32

## 2023-06-21 RX ADMIN — IPRATROPIUM BROMIDE AND ALBUTEROL SULFATE 1 DOSE: 2.5; .5 SOLUTION RESPIRATORY (INHALATION) at 15:40

## 2023-06-21 NOTE — CARE COORDINATION
Reviewed chart and discussed in IDR,   Pt still having resp issues. Cont iv SM and nursing to ambulate pt in all to help evaluate daytime O2 needs. If needs home O2 24/7 will need a new home O2 eval d/t pt only has for nighttime prior to admission.   CM will continue to follow

## 2023-06-21 NOTE — PROGRESS NOTES
V2.0    [facility] [place] Progress Note      Name:  Heather Tovar /Age/Sex: 1945  (70 y.o. male)   MRN & CSN:  [id number] & [id number] Encounter Date/Time: 2023 7:21 AM EDT   Location:  [address] [place] [address]-A PCP: Lencho Scott     Attending:Hu Delgado [facility] Day: 4    Assessment and Recommendations   Heather Tovar is a 70 y.o. male with pmh of hypertension, hyperlipidemia, type 2 diabetes mellitus, CAD, chronic respiratory failure on 3 L of O2 at home, COPD and history of megacolon status post resection as well as surgery of sigmoid volvulus  who presents with Volvulus ([facility] [address]      Plan:   #Chronic respiratory failure with COPD exacerbation --Presented to [facility] ED with worsening shortness of breath, at baseline uses 3L of O2 via NC at night. Currently on baseline home O2 and reports he has been needing it during the day. 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 --KUB on arrival showed colonic obstruction and obstipation  --Abdomen was non-acute, no N/V or abdominal pain. Reports distension is chronic and unchanged  --Started on IVF, with resumption of diet will dc  --GI and gen surg on board, appreciate recs  --Start bowel regimen, monitor for BM     #Type 2 diabetes mellitus  --Continue accucheks, A1c 7.6  --Cover with SSI and hypoglycemic protocol  --ADA diet     #Hypertension   --Continue home medication with holding parameters     #Hyperlipidemia   --Continue home medication      Diet ADULT DIET;  Regular; 4 carb choices (60

## 2023-06-22 LAB
ANION GAP SERPL CALCULATED.3IONS-SCNC: 9 MMOL/L (ref 4–16)
BASOPHILS ABSOLUTE: 0 K/CU MM
BASOPHILS RELATIVE PERCENT: 0 % (ref 0–1)
BUN SERPL-MCNC: 24 MG/DL (ref 6–23)
CALCIUM SERPL-MCNC: 8.7 MG/DL (ref 8.3–10.6)
CHLORIDE BLD-SCNC: 98 MMOL/L (ref 99–110)
CO2: 30 MMOL/L (ref 21–32)
CREAT SERPL-MCNC: 0.9 MG/DL (ref 0.9–1.3)
DIFFERENTIAL TYPE: ABNORMAL
EOSINOPHILS ABSOLUTE: 0 K/CU MM
EOSINOPHILS RELATIVE PERCENT: 0.1 % (ref 0–3)
GFR SERPL CREATININE-BSD FRML MDRD: >60 ML/MIN/1.73M2
GLUCOSE BLD-MCNC: 182 MG/DL (ref 70–99)
GLUCOSE BLD-MCNC: 302 MG/DL (ref 70–99)
GLUCOSE BLD-MCNC: 317 MG/DL (ref 70–99)
GLUCOSE BLD-MCNC: 353 MG/DL (ref 70–99)
GLUCOSE SERPL-MCNC: 256 MG/DL (ref 70–99)
HCT VFR BLD CALC: 37.9 % (ref 42–52)
HEMOGLOBIN: 11.4 GM/DL (ref 13.5–18)
IMMATURE NEUTROPHIL %: 0.5 % (ref 0–0.43)
LYMPHOCYTES ABSOLUTE: 0.6 K/CU MM
LYMPHOCYTES RELATIVE PERCENT: 4.7 % (ref 24–44)
MCH RBC QN AUTO: 28.2 PG (ref 27–31)
MCHC RBC AUTO-ENTMCNC: 30.1 % (ref 32–36)
MCV RBC AUTO: 93.8 FL (ref 78–100)
MONOCYTES ABSOLUTE: 1.3 K/CU MM
MONOCYTES RELATIVE PERCENT: 10.1 % (ref 0–4)
NUCLEATED RBC %: 0 %
PDW BLD-RTO: 15.3 % (ref 11.7–14.9)
PLATELET # BLD: 323 K/CU MM (ref 140–440)
PMV BLD AUTO: 10.5 FL (ref 7.5–11.1)
POTASSIUM SERPL-SCNC: 4.3 MMOL/L (ref 3.5–5.1)
RBC # BLD: 4.04 M/CU MM (ref 4.6–6.2)
SEGMENTED NEUTROPHILS ABSOLUTE COUNT: 10.7 K/CU MM
SEGMENTED NEUTROPHILS RELATIVE PERCENT: 84.6 % (ref 36–66)
SODIUM BLD-SCNC: 137 MMOL/L (ref 135–145)
TOTAL IMMATURE NEUTOROPHIL: 0.06 K/CU MM
TOTAL NUCLEATED RBC: 0 K/CU MM
WBC # BLD: 12.6 K/CU MM (ref 4–10.5)

## 2023-06-22 PROCEDURE — 80048 BASIC METABOLIC PNL TOTAL CA: CPT

## 2023-06-22 PROCEDURE — 6370000000 HC RX 637 (ALT 250 FOR IP): Performed by: FAMILY MEDICINE

## 2023-06-22 PROCEDURE — C9113 INJ PANTOPRAZOLE SODIUM, VIA: HCPCS | Performed by: STUDENT IN AN ORGANIZED HEALTH CARE EDUCATION/TRAINING PROGRAM

## 2023-06-22 PROCEDURE — 6370000000 HC RX 637 (ALT 250 FOR IP): Performed by: STUDENT IN AN ORGANIZED HEALTH CARE EDUCATION/TRAINING PROGRAM

## 2023-06-22 PROCEDURE — 94761 N-INVAS EAR/PLS OXIMETRY MLT: CPT

## 2023-06-22 PROCEDURE — 36415 COLL VENOUS BLD VENIPUNCTURE: CPT

## 2023-06-22 PROCEDURE — 2580000003 HC RX 258: Performed by: STUDENT IN AN ORGANIZED HEALTH CARE EDUCATION/TRAINING PROGRAM

## 2023-06-22 PROCEDURE — 2700000000 HC OXYGEN THERAPY PER DAY

## 2023-06-22 PROCEDURE — 94640 AIRWAY INHALATION TREATMENT: CPT

## 2023-06-22 PROCEDURE — 94150 VITAL CAPACITY TEST: CPT

## 2023-06-22 PROCEDURE — 94618 PULMONARY STRESS TESTING: CPT

## 2023-06-22 PROCEDURE — 82962 GLUCOSE BLOOD TEST: CPT

## 2023-06-22 PROCEDURE — 6360000002 HC RX W HCPCS: Performed by: STUDENT IN AN ORGANIZED HEALTH CARE EDUCATION/TRAINING PROGRAM

## 2023-06-22 PROCEDURE — 85025 COMPLETE CBC W/AUTO DIFF WBC: CPT

## 2023-06-22 PROCEDURE — 1200000000 HC SEMI PRIVATE

## 2023-06-22 RX ADMIN — POLYETHYLENE GLYCOL 3350 17 G: 17 POWDER, FOR SOLUTION ORAL at 08:35

## 2023-06-22 RX ADMIN — IPRATROPIUM BROMIDE AND ALBUTEROL SULFATE 1 DOSE: 2.5; .5 SOLUTION RESPIRATORY (INHALATION) at 00:02

## 2023-06-22 RX ADMIN — ENOXAPARIN SODIUM 40 MG: 100 INJECTION SUBCUTANEOUS at 08:34

## 2023-06-22 RX ADMIN — IPRATROPIUM BROMIDE AND ALBUTEROL SULFATE 1 DOSE: 2.5; .5 SOLUTION RESPIRATORY (INHALATION) at 04:10

## 2023-06-22 RX ADMIN — AMLODIPINE BESYLATE 10 MG: 10 TABLET ORAL at 08:34

## 2023-06-22 RX ADMIN — PREDNISONE 40 MG: 20 TABLET ORAL at 08:34

## 2023-06-22 RX ADMIN — IPRATROPIUM BROMIDE AND ALBUTEROL SULFATE 1 DOSE: 2.5; .5 SOLUTION RESPIRATORY (INHALATION) at 10:59

## 2023-06-22 RX ADMIN — SODIUM CHLORIDE, PRESERVATIVE FREE 10 ML: 5 INJECTION INTRAVENOUS at 08:35

## 2023-06-22 RX ADMIN — IPRATROPIUM BROMIDE AND ALBUTEROL SULFATE 1 DOSE: 2.5; .5 SOLUTION RESPIRATORY (INHALATION) at 18:13

## 2023-06-22 RX ADMIN — POLYETHYLENE GLYCOL 3350 17 G: 17 POWDER, FOR SOLUTION ORAL at 20:25

## 2023-06-22 RX ADMIN — METOPROLOL SUCCINATE 25 MG: 25 TABLET, EXTENDED RELEASE ORAL at 08:34

## 2023-06-22 RX ADMIN — IPRATROPIUM BROMIDE AND ALBUTEROL SULFATE 1 DOSE: 2.5; .5 SOLUTION RESPIRATORY (INHALATION) at 07:14

## 2023-06-22 RX ADMIN — INSULIN LISPRO 3 UNITS: 100 INJECTION, SOLUTION INTRAVENOUS; SUBCUTANEOUS at 16:41

## 2023-06-22 RX ADMIN — BUDESONIDE AND FORMOTEROL FUMARATE DIHYDRATE 2 PUFF: 160; 4.5 AEROSOL RESPIRATORY (INHALATION) at 19:47

## 2023-06-22 RX ADMIN — PANTOPRAZOLE SODIUM 40 MG: 40 INJECTION, POWDER, FOR SOLUTION INTRAVENOUS at 08:34

## 2023-06-22 RX ADMIN — BUDESONIDE AND FORMOTEROL FUMARATE DIHYDRATE 2 PUFF: 160; 4.5 AEROSOL RESPIRATORY (INHALATION) at 07:14

## 2023-06-22 RX ADMIN — DOXYCYCLINE HYCLATE 100 MG: 100 TABLET, COATED ORAL at 08:34

## 2023-06-22 RX ADMIN — SODIUM CHLORIDE, PRESERVATIVE FREE 5 ML: 5 INJECTION INTRAVENOUS at 20:26

## 2023-06-22 RX ADMIN — GUAIFENESIN 600 MG: 600 TABLET, EXTENDED RELEASE ORAL at 08:34

## 2023-06-22 RX ADMIN — GUAIFENESIN 600 MG: 600 TABLET, EXTENDED RELEASE ORAL at 20:24

## 2023-06-22 RX ADMIN — INSULIN LISPRO 3 UNITS: 100 INJECTION, SOLUTION INTRAVENOUS; SUBCUTANEOUS at 12:00

## 2023-06-22 RX ADMIN — IPRATROPIUM BROMIDE AND ALBUTEROL SULFATE 1 DOSE: 2.5; .5 SOLUTION RESPIRATORY (INHALATION) at 14:48

## 2023-06-22 RX ADMIN — DOXYCYCLINE HYCLATE 100 MG: 100 TABLET, COATED ORAL at 20:24

## 2023-06-22 RX ADMIN — INSULIN LISPRO 4 UNITS: 100 INJECTION, SOLUTION INTRAVENOUS; SUBCUTANEOUS at 21:18

## 2023-06-22 RX ADMIN — ATORVASTATIN CALCIUM 20 MG: 10 TABLET, FILM COATED ORAL at 20:24

## 2023-06-22 ASSESSMENT — PAIN SCALES - GENERAL
PAINLEVEL_OUTOF10: 3
PAINLEVEL_OUTOF10: 0
PAINLEVEL_OUTOF10: 0

## 2023-06-22 ASSESSMENT — PAIN DESCRIPTION - LOCATION: LOCATION: HEAD;BACK

## 2023-06-22 NOTE — PROGRESS NOTES
Patient was seen in hospital for COPD, Chronic Bronchitis. I am prescribing oxygen because the diagnosis and testing requires the patient to have oxygen in the home. Conditions will improve or be benefited by oxygen use. The patient is able to perform good mobility and therefore requires the use of a portable oxygen system for ambulation.

## 2023-06-22 NOTE — PLAN OF CARE
Problem: Discharge Planning  Goal: Discharge to home or other facility with appropriate resources  6/22/2023 1231 by Cecily Lee RN  Outcome: Progressing  6/22/2023 0104 by Isaiah Patterson LPN  Outcome: Progressing     Problem: Chronic Conditions and Co-morbidities  Goal: Patient's chronic conditions and co-morbidity symptoms are monitored and maintained or improved  6/22/2023 1231 by Cecily Lee RN  Outcome: Progressing  6/22/2023 0104 by Isaiah Patterson LPN  Outcome: Progressing     Problem: Safety - Adult  Goal: Free from fall injury  6/22/2023 1231 by Cecily Lee RN  Outcome: Progressing  6/22/2023 0104 by Isaiah Patterson LPN  Outcome: Progressing     Problem: Pain  Goal: Verbalizes/displays adequate comfort level or baseline comfort level  Outcome: Progressing

## 2023-06-22 NOTE — PROGRESS NOTES
6/22/2023 3:12 PM  Patient Room #: 4101/4101-A  Patient Name: Candice Gambino    (Step 1 Done by RN if possible otherwise call Pulmonary Diagnostics)  Place patient on room air at rest for at least 30 minutes. If patient falls below 88% before 30 minutes then you can record the level and stop. Record room air saturation level _88_ %. If patient is at 88% or below, they will qualify for home oxygen and you can stop. If level does not fall below 88%, fill in level above. If indicated continue to Step 2. Signature:_Orville Gagnon RRT____ Date: _06/22/2023__  (Step 2&3 Done by P)  Ambulate patient on room air until saturation falls below 89%. Record level of room air saturation with ambulation___ %. Next, place patient back on ___lpm oxygen and ambulate, record level __%. (Note:  this level must show improvement from room air level done with ambulation.)  If patients saturation on room air with ambulation is 88% or below AND patient shows improvement with oxygen during ambulation, they will qualify for home oxygen and you can stop. If patient does not drop below 89%, then patient should have an overnight oximetry trending on room air to see if level falls below 88%. Complete level in Step 3 below. Room air overnight oximetry level 88 % for___  cumulative minutes. If patients room air oxygen level is below <89% for any amount of time they will qualify for nocturnal home oxygen. (Attach Night Trending Report)    Complete order below: Diagnosis:_COPD, Chronic Bronchitis___  Home oxygen at:  Length of Need: ?X Lifetime ?  3 Months     __2_lpm or __%   via  [x] nasal cannula  []mask  [] other         [x]continuous [x]  with activity  [x]  Nocturnal   [x] Portable Tanks [x]  Concentrator  [x] Conserving Device        Therapist Signature:__Orville Gagnon RRT_____     Date:  _06/22/2023__  Physician Signature:  __Electronically Signed in EMR_    Date:___  Physician Printed Name:

## 2023-06-22 NOTE — PLAN OF CARE
Problem: Discharge Planning  Goal: Discharge to home or other facility with appropriate resources  6/22/2023 0104 by Pipo Hull LPN  Outcome: Progressing  6/21/2023 1129 by Meryle Champion, RN  Outcome: Progressing     Problem: Chronic Conditions and Co-morbidities  Goal: Patient's chronic conditions and co-morbidity symptoms are monitored and maintained or improved  6/22/2023 0104 by Pipo Hull LPN  Outcome: Progressing  6/21/2023 1129 by Meryle Champion, RN  Outcome: Progressing     Problem: Safety - Adult  Goal: Free from fall injury  6/22/2023 0104 by Pipo Hull LPN  Outcome: Progressing  6/21/2023 1129 by Meryle Champion, RN  Outcome: Progressing

## 2023-06-22 NOTE — PROGRESS NOTES
V2.0    [facility] Progress Note      Name:  Shabnam Mondragon /Age/Sex: 1945  (70 y.o. male)   MRN & CSN:  [id number] & [id number] Encounter Date/Time: 2023 7:21 AM EDT   Location:  [address] [place] [address]-A PCP: Gldays Lee     Attending:Hu Delgado [facility] Day: 5    Assessment and Recommendations   Shabnam Mondragon is a 70 y.o. male with pmh of hypertension, hyperlipidemia, type 2 diabetes mellitus, CAD, chronic respiratory failure on 3 L of O2 at home, COPD and history of megacolon status post resection as well as surgery of sigmoid volvulus  who presents with Volvulus ([facility] [address]      Plan:   #Chronic respiratory failure with COPD exacerbation --Presented to [facility] ED with worsening shortness of breath, at baseline uses 3L of O2 via NC at night. Currently on baseline home O2 and reports he has been needing it during the day. 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 --KUB on arrival showed colonic obstruction and obstipation  --Abdomen was non-acute, no N/V or abdominal pain. Reports distension is chronic and unchanged  --Started on IVF, with resumption of diet will dc  --GI and gen surg on board, appreciate recs  --Start bowel regimen, monitor for BM     #Type 2 diabetes mellitus  --Continue accucheks, A1c 7.6  --Cover with SSI and hypoglycemic protocol  --ADA diet     #Hypertension   --Continue home medication with holding parameters     #Hyperlipidemia   --Continue home medication      Diet ADULT DIET;  Regular; 4 carb choices (60 gm/meal)   DVT Prophylaxis

## 2023-06-22 NOTE — CARE COORDINATION
Reviewed chart, discussed in IDR and spoke with Dr Macarena Almazan this PM.  Plan is home tomorrow ordering a home O2 evals and sending MN orders to the [place]. No other needs identified. CM will continue to follow.

## 2023-06-22 NOTE — PROGRESS NOTES
Pt currently doing home O2 evaluation at bedside. Pt consistently at 90-91% O2 on RA. Pt started coughing and dropped to 86% and quickly went back to 91%. Will continue to monitor.

## 2023-06-23 VITALS
OXYGEN SATURATION: 92 % | HEART RATE: 76 BPM | WEIGHT: 174.3 LBS | DIASTOLIC BLOOD PRESSURE: 78 MMHG | SYSTOLIC BLOOD PRESSURE: 127 MMHG | BODY MASS INDEX: 24.4 KG/M2 | TEMPERATURE: 98.3 F | RESPIRATION RATE: 20 BRPM | HEIGHT: 71 IN

## 2023-06-23 LAB
ANION GAP SERPL CALCULATED.3IONS-SCNC: 8 MMOL/L (ref 4–16)
BASOPHILS ABSOLUTE: 0 K/CU MM
BASOPHILS RELATIVE PERCENT: 0.1 % (ref 0–1)
BUN SERPL-MCNC: 23 MG/DL (ref 6–23)
CALCIUM SERPL-MCNC: 8.6 MG/DL (ref 8.3–10.6)
CHLORIDE BLD-SCNC: 99 MMOL/L (ref 99–110)
CO2: 31 MMOL/L (ref 21–32)
CREAT SERPL-MCNC: 0.9 MG/DL (ref 0.9–1.3)
DIFFERENTIAL TYPE: ABNORMAL
EOSINOPHILS ABSOLUTE: 0 K/CU MM
EOSINOPHILS RELATIVE PERCENT: 0.1 % (ref 0–3)
GFR SERPL CREATININE-BSD FRML MDRD: >60 ML/MIN/1.73M2
GLUCOSE BLD-MCNC: 192 MG/DL (ref 70–99)
GLUCOSE BLD-MCNC: 260 MG/DL (ref 70–99)
GLUCOSE SERPL-MCNC: 297 MG/DL (ref 70–99)
HCT VFR BLD CALC: 38.8 % (ref 42–52)
HEMOGLOBIN: 11.8 GM/DL (ref 13.5–18)
IMMATURE NEUTROPHIL %: 0.4 % (ref 0–0.43)
LYMPHOCYTES ABSOLUTE: 0.7 K/CU MM
LYMPHOCYTES RELATIVE PERCENT: 5.1 % (ref 24–44)
MCH RBC QN AUTO: 28.6 PG (ref 27–31)
MCHC RBC AUTO-ENTMCNC: 30.4 % (ref 32–36)
MCV RBC AUTO: 93.9 FL (ref 78–100)
MONOCYTES ABSOLUTE: 1.2 K/CU MM
MONOCYTES RELATIVE PERCENT: 9.3 % (ref 0–4)
NUCLEATED RBC %: 0 %
PDW BLD-RTO: 15.2 % (ref 11.7–14.9)
PLATELET # BLD: 324 K/CU MM (ref 140–440)
PMV BLD AUTO: 10.4 FL (ref 7.5–11.1)
POTASSIUM SERPL-SCNC: 4.5 MMOL/L (ref 3.5–5.1)
RBC # BLD: 4.13 M/CU MM (ref 4.6–6.2)
SEGMENTED NEUTROPHILS ABSOLUTE COUNT: 10.7 K/CU MM
SEGMENTED NEUTROPHILS RELATIVE PERCENT: 85 % (ref 36–66)
SODIUM BLD-SCNC: 138 MMOL/L (ref 135–145)
TOTAL IMMATURE NEUTOROPHIL: 0.05 K/CU MM
TOTAL NUCLEATED RBC: 0 K/CU MM
WBC # BLD: 12.6 K/CU MM (ref 4–10.5)

## 2023-06-23 PROCEDURE — 6360000002 HC RX W HCPCS: Performed by: STUDENT IN AN ORGANIZED HEALTH CARE EDUCATION/TRAINING PROGRAM

## 2023-06-23 PROCEDURE — 6370000000 HC RX 637 (ALT 250 FOR IP): Performed by: FAMILY MEDICINE

## 2023-06-23 PROCEDURE — 2580000003 HC RX 258: Performed by: STUDENT IN AN ORGANIZED HEALTH CARE EDUCATION/TRAINING PROGRAM

## 2023-06-23 PROCEDURE — 94150 VITAL CAPACITY TEST: CPT

## 2023-06-23 PROCEDURE — 36415 COLL VENOUS BLD VENIPUNCTURE: CPT

## 2023-06-23 PROCEDURE — 85025 COMPLETE CBC W/AUTO DIFF WBC: CPT

## 2023-06-23 PROCEDURE — 94761 N-INVAS EAR/PLS OXIMETRY MLT: CPT

## 2023-06-23 PROCEDURE — C9113 INJ PANTOPRAZOLE SODIUM, VIA: HCPCS | Performed by: STUDENT IN AN ORGANIZED HEALTH CARE EDUCATION/TRAINING PROGRAM

## 2023-06-23 PROCEDURE — 2700000000 HC OXYGEN THERAPY PER DAY

## 2023-06-23 PROCEDURE — 6370000000 HC RX 637 (ALT 250 FOR IP): Performed by: STUDENT IN AN ORGANIZED HEALTH CARE EDUCATION/TRAINING PROGRAM

## 2023-06-23 PROCEDURE — 80048 BASIC METABOLIC PNL TOTAL CA: CPT

## 2023-06-23 PROCEDURE — 94640 AIRWAY INHALATION TREATMENT: CPT

## 2023-06-23 PROCEDURE — 82962 GLUCOSE BLOOD TEST: CPT

## 2023-06-23 RX ORDER — PREDNISONE 20 MG/1
40 TABLET ORAL DAILY
Qty: 2 TABLET | Refills: 0 | Status: SHIPPED | OUTPATIENT
Start: 2023-06-24 | End: 2023-06-25

## 2023-06-23 RX ORDER — IPRATROPIUM BROMIDE AND ALBUTEROL SULFATE 2.5; .5 MG/3ML; MG/3ML
1 SOLUTION RESPIRATORY (INHALATION) EVERY 4 HOURS PRN
Qty: 360 ML | Refills: 0 | Status: SHIPPED | OUTPATIENT
Start: 2023-06-23 | End: 2023-07-23

## 2023-06-23 RX ORDER — IPRATROPIUM BROMIDE AND ALBUTEROL SULFATE 2.5; .5 MG/3ML; MG/3ML
1 SOLUTION RESPIRATORY (INHALATION) EVERY 4 HOURS PRN
Qty: 360 ML | Refills: 0 | Status: SHIPPED | OUTPATIENT
Start: 2023-06-23 | End: 2023-06-23 | Stop reason: SDUPTHER

## 2023-06-23 RX ORDER — PREDNISONE 20 MG/1
40 TABLET ORAL DAILY
Qty: 2 TABLET | Refills: 0 | Status: SHIPPED | OUTPATIENT
Start: 2023-06-24 | End: 2023-06-23 | Stop reason: SDUPTHER

## 2023-06-23 RX ADMIN — SODIUM CHLORIDE, PRESERVATIVE FREE 10 ML: 5 INJECTION INTRAVENOUS at 08:52

## 2023-06-23 RX ADMIN — METOPROLOL SUCCINATE 25 MG: 25 TABLET, EXTENDED RELEASE ORAL at 08:52

## 2023-06-23 RX ADMIN — IPRATROPIUM BROMIDE AND ALBUTEROL SULFATE 1 DOSE: 2.5; .5 SOLUTION RESPIRATORY (INHALATION) at 04:39

## 2023-06-23 RX ADMIN — IPRATROPIUM BROMIDE AND ALBUTEROL SULFATE 1 DOSE: 2.5; .5 SOLUTION RESPIRATORY (INHALATION) at 13:00

## 2023-06-23 RX ADMIN — PREDNISONE 40 MG: 20 TABLET ORAL at 08:52

## 2023-06-23 RX ADMIN — IPRATROPIUM BROMIDE AND ALBUTEROL SULFATE 1 DOSE: 2.5; .5 SOLUTION RESPIRATORY (INHALATION) at 00:03

## 2023-06-23 RX ADMIN — GUAIFENESIN 600 MG: 600 TABLET, EXTENDED RELEASE ORAL at 08:52

## 2023-06-23 RX ADMIN — ENOXAPARIN SODIUM 40 MG: 100 INJECTION SUBCUTANEOUS at 08:52

## 2023-06-23 RX ADMIN — POLYETHYLENE GLYCOL 3350 17 G: 17 POWDER, FOR SOLUTION ORAL at 08:52

## 2023-06-23 RX ADMIN — AMLODIPINE BESYLATE 10 MG: 10 TABLET ORAL at 08:52

## 2023-06-23 RX ADMIN — IPRATROPIUM BROMIDE AND ALBUTEROL SULFATE 1 DOSE: 2.5; .5 SOLUTION RESPIRATORY (INHALATION) at 08:57

## 2023-06-23 RX ADMIN — PANTOPRAZOLE SODIUM 40 MG: 40 INJECTION, POWDER, FOR SOLUTION INTRAVENOUS at 08:52

## 2023-06-23 RX ADMIN — INSULIN LISPRO 2 UNITS: 100 INJECTION, SOLUTION INTRAVENOUS; SUBCUTANEOUS at 12:13

## 2023-06-23 RX ADMIN — BUDESONIDE AND FORMOTEROL FUMARATE DIHYDRATE 2 PUFF: 160; 4.5 AEROSOL RESPIRATORY (INHALATION) at 08:58

## 2023-06-23 NOTE — PROGRESS NOTES
Pt has home oxygen for nocturnal use with the [place]. Pt qualified for continuous home oxygen. This RT called [facility] and was asked to call the [place] before getting a voucher. Called the [place] () but the needed person was unavailable. Waiting for a call back.

## 2023-06-23 NOTE — PROGRESS NOTES
Faxed Home Oxygen paperwork to Anju Case at the [facility]. Per Elizabeth Mcmillan, gave pt an oxygen tank. Pt was also given other paperwork from the [facility]. Pt will call the [facility] when he gets home.

## 2023-06-23 NOTE — DISCHARGE SUMMARY
V2.0  Discharge Summary    Name:  Brenna Jacob /Age/Sex: 1945 (17 y.o. male)   Admit Date: 2023  Discharge Date: 23    MRN & CSN:  [id number] & [id number] Encounter Date and Time 23 11:33 AM EDT    Attending:  Madeline Woo, * Discharging Provider: Madeline Woo MD       Hospital Course:     Brief HPI: Brenna Jacob is a 70 y.o. male  with a pmh of hypertension, hyperlipidemia, type 2 diabetes mellitus, CAD, chronic respiratory failure on 3 L of O2 at home, COPD and history of megacolon status post resection as well as surgery of sigmoid volvulus who transferred from [facility] ED with history of shortness of breath. Patient presented to [facility] ED with worsening shortness of breath associated with cough, congestion, rhinorrhea, nonproductive cough chest x-ray showed Possible pneumoperitoneum with continuous diaphragm sign. Small area of linear atelectasis right base. CT abdomen/pelvis showed Previously suspected pneumoperitoneum appears to reflect distended segments of large bowel with vertical and rightward orientation of the sigmoid suggesting possible sigmoid volvulus and resulting bowel obstruction at the level of the distal sigmoid. After discussing with GI transfer to [facility] for surgical evaluation. Patient denies any abdominal pain, nausea, vomiting, diarrhea, constipation and had last bowel movement yesterday       Brief Problem Based Course:       Acute on Chronic respiratory failure with COPD exacerbation --Presented to [facility] ED with worsening shortness of breath, at baseline uses 3L of O2 via NC at night. Currently on baseline home O2 and reports he has been needing it during the day. Very SOB with exertion. Patient was continued on home oxygen at all times at discharge. He will complete a course of steroids and have sent a prescription to his pharmacy for DuoNebs to add to his regiment.      #Colonic obstruction and constipation  CT

## 2023-06-23 NOTE — PLAN OF CARE
Problem: Discharge Planning  Goal: Discharge to home or other facility with appropriate resources  6/23/2023 0011 by Tiago Diaz LPN  Outcome: Progressing  6/22/2023 1231 by Josefina Melgar RN  Outcome: Progressing     Problem: Chronic Conditions and Co-morbidities  Goal: Patient's chronic conditions and co-morbidity symptoms are monitored and maintained or improved  6/23/2023 0011 by Tiago Diaz LPN  Outcome: Progressing  6/22/2023 1231 by Josefina Melgar RN  Outcome: Progressing     Problem: Safety - Adult  Goal: Free from fall injury  6/23/2023 0011 by Tiago Diaz LPN  Outcome: Progressing  6/22/2023 1231 by Josefina Melgar RN  Outcome: Progressing     Problem: Pain  Goal: Verbalizes/displays adequate comfort level or baseline comfort level  6/23/2023 0011 by Tiago Diaz LPN  Outcome: Progressing  6/22/2023 1231 by Josefina Melgar RN  Outcome: Progressing

## 2023-06-23 NOTE — PROGRESS NOTES
Perfect serve sent to Dr. Chandrakant Pearson to co-sign Lashawn Fang will then be faxed to [facility] [place] [place].

## 2023-06-23 NOTE — PROGRESS NOTES
Discharge instructions reviewed with patient; peripheral IV removed; oxygen tank delivered by pulmonary. Per patient, family en route to transport patient home. Prescriptions sent to [address] All questions answered.

## 2023-06-23 NOTE — PROGRESS NOTES
Fax paperwork to Sukhi William at the [place]. Called and left message for Maki to make sure she had all the paperwork that was needed. I did not get a call back.

## 2023-06-23 NOTE — PROGRESS NOTES
Pt's SpO2 was 88% while resting on room air. Pt's SpO2 was 95% while resting on 2 lpm O2 via nasal canula. Pt's SpO2 was 91% while ambulating on 2 lpm oxygen via nasal canula.

## 2023-06-23 NOTE — CARE COORDINATION
Pt discharged home today, Called and faxed info to [employer] [employer] Jose Albertojeanne JENNIFER fax [phone]. Called and faxed info to Dorothy at [facility], [phone] , [address] [facility] home orders. [facility] order to [facility].

## 2023-06-23 NOTE — DISCHARGE INSTRUCTIONS
Please follow-up with your pulmonologist at the [place]. IF you experience similar symptoms again in the future please return to the ER or call EMS.

## 2023-06-23 NOTE — PROGRESS NOTES
Nutrition Assessment     Type and Reason for Visit: Initial, RD Nutrition Re-Screen/LOS    Nutrition Assessment:  Seen for LOS. Admitted with volvulus, COPD excerbation. Hx multiple GI surgeries, HTN, HLD, CAD, Chronic respiratory failure. Currently on carb controlled diet, tolerates diet, eating better (%). Discussed tips for better nutrition for colon health. Denies wt loss. Denies further nutrition questions prior to discharge. Offer OP services as needed, as pt with uncontrolled BS. Will monitor as low nutrition risk at this time.     Discharge Planning:    Recommend pursue outpatient diabetes education     J Carlos Suresh [place]  Contact: [phone]

## 2023-09-14 ENCOUNTER — HOSPITAL ENCOUNTER (INPATIENT)
Age: 78
LOS: 4 days | Discharge: HOME | DRG: 191 | End: 2023-09-20
Attending: INTERNAL MEDICINE | Admitting: INTERNAL MEDICINE
Payer: OTHER GOVERNMENT

## 2023-09-14 PROBLEM — R53.1 GENERALIZED WEAKNESS: Status: ACTIVE | Noted: 2023-09-14

## 2023-09-14 LAB — GLUCOSE BLD-MCNC: 269 MG/DL (ref 70–99)

## 2023-09-14 PROCEDURE — 93005 ELECTROCARDIOGRAM TRACING: CPT | Performed by: STUDENT IN AN ORGANIZED HEALTH CARE EDUCATION/TRAINING PROGRAM

## 2023-09-14 PROCEDURE — 82962 GLUCOSE BLOOD TEST: CPT

## 2023-09-14 PROCEDURE — G0379 DIRECT REFER HOSPITAL OBSERV: HCPCS

## 2023-09-14 PROCEDURE — 6370000000 HC RX 637 (ALT 250 FOR IP): Performed by: STUDENT IN AN ORGANIZED HEALTH CARE EDUCATION/TRAINING PROGRAM

## 2023-09-14 PROCEDURE — 2580000003 HC RX 258: Performed by: STUDENT IN AN ORGANIZED HEALTH CARE EDUCATION/TRAINING PROGRAM

## 2023-09-14 PROCEDURE — 6370000000 HC RX 637 (ALT 250 FOR IP)

## 2023-09-14 PROCEDURE — G0378 HOSPITAL OBSERVATION PER HR: HCPCS

## 2023-09-14 RX ORDER — ACETAMINOPHEN 650 MG/1
650 SUPPOSITORY RECTAL EVERY 6 HOURS PRN
Status: DISCONTINUED | OUTPATIENT
Start: 2023-09-14 | End: 2023-09-20 | Stop reason: HOSPADM

## 2023-09-14 RX ORDER — ALBUTEROL SULFATE 90 UG/1
1 AEROSOL, METERED RESPIRATORY (INHALATION)
Status: DISCONTINUED | OUTPATIENT
Start: 2023-09-14 | End: 2023-09-15

## 2023-09-14 RX ORDER — ACETAMINOPHEN 325 MG/1
650 TABLET ORAL EVERY 6 HOURS PRN
Status: DISCONTINUED | OUTPATIENT
Start: 2023-09-14 | End: 2023-09-20 | Stop reason: HOSPADM

## 2023-09-14 RX ORDER — INSULIN LISPRO 100 [IU]/ML
0-4 INJECTION, SOLUTION INTRAVENOUS; SUBCUTANEOUS
Status: DISCONTINUED | OUTPATIENT
Start: 2023-09-15 | End: 2023-09-19

## 2023-09-14 RX ORDER — SIMETHICONE 80 MG
80 TABLET,CHEWABLE ORAL 4 TIMES DAILY
Status: COMPLETED | OUTPATIENT
Start: 2023-09-15 | End: 2023-09-15

## 2023-09-14 RX ORDER — MONTELUKAST SODIUM 10 MG/1
10 TABLET ORAL NIGHTLY
Status: DISCONTINUED | OUTPATIENT
Start: 2023-09-14 | End: 2023-09-20 | Stop reason: HOSPADM

## 2023-09-14 RX ORDER — ATORVASTATIN CALCIUM 10 MG/1
10 TABLET, FILM COATED ORAL DAILY
Status: DISCONTINUED | OUTPATIENT
Start: 2023-09-15 | End: 2023-09-20 | Stop reason: HOSPADM

## 2023-09-14 RX ORDER — INSULIN LISPRO 100 [IU]/ML
0-4 INJECTION, SOLUTION INTRAVENOUS; SUBCUTANEOUS NIGHTLY
Status: DISCONTINUED | OUTPATIENT
Start: 2023-09-14 | End: 2023-09-19

## 2023-09-14 RX ORDER — ONDANSETRON 4 MG/1
4 TABLET, ORALLY DISINTEGRATING ORAL EVERY 8 HOURS PRN
Status: DISCONTINUED | OUTPATIENT
Start: 2023-09-14 | End: 2023-09-20 | Stop reason: HOSPADM

## 2023-09-14 RX ORDER — POTASSIUM CHLORIDE 1.5 G/1.58G
20 POWDER, FOR SOLUTION ORAL DAILY
Status: DISCONTINUED | OUTPATIENT
Start: 2023-09-15 | End: 2023-09-14 | Stop reason: CLARIF

## 2023-09-14 RX ORDER — MAGNESIUM SULFATE 4 G/50ML
4000 INJECTION INTRAVENOUS ONCE
Status: COMPLETED | OUTPATIENT
Start: 2023-09-15 | End: 2023-09-15

## 2023-09-14 RX ORDER — DEXTROSE MONOHYDRATE 100 MG/ML
INJECTION, SOLUTION INTRAVENOUS CONTINUOUS PRN
Status: DISCONTINUED | OUTPATIENT
Start: 2023-09-14 | End: 2023-09-20 | Stop reason: HOSPADM

## 2023-09-14 RX ORDER — SODIUM CHLORIDE 0.9 % (FLUSH) 0.9 %
5-40 SYRINGE (ML) INJECTION PRN
Status: DISCONTINUED | OUTPATIENT
Start: 2023-09-14 | End: 2023-09-20 | Stop reason: HOSPADM

## 2023-09-14 RX ORDER — AMLODIPINE BESYLATE 10 MG/1
10 TABLET ORAL DAILY
Status: DISCONTINUED | OUTPATIENT
Start: 2023-09-15 | End: 2023-09-20 | Stop reason: HOSPADM

## 2023-09-14 RX ORDER — GLUCAGON 1 MG/ML
1 KIT INJECTION PRN
Status: DISCONTINUED | OUTPATIENT
Start: 2023-09-14 | End: 2023-09-20 | Stop reason: HOSPADM

## 2023-09-14 RX ORDER — POTASSIUM CHLORIDE 7.45 MG/ML
10 INJECTION INTRAVENOUS PRN
Status: DISCONTINUED | OUTPATIENT
Start: 2023-09-14 | End: 2023-09-20 | Stop reason: HOSPADM

## 2023-09-14 RX ORDER — SODIUM CHLORIDE 0.9 % (FLUSH) 0.9 %
5-40 SYRINGE (ML) INJECTION EVERY 12 HOURS SCHEDULED
Status: DISCONTINUED | OUTPATIENT
Start: 2023-09-14 | End: 2023-09-20 | Stop reason: HOSPADM

## 2023-09-14 RX ORDER — PANTOPRAZOLE SODIUM 40 MG/1
40 TABLET, DELAYED RELEASE ORAL
Status: DISCONTINUED | OUTPATIENT
Start: 2023-09-15 | End: 2023-09-20 | Stop reason: HOSPADM

## 2023-09-14 RX ORDER — POLYETHYLENE GLYCOL 3350 17 G/17G
17 POWDER, FOR SOLUTION ORAL DAILY PRN
Status: DISCONTINUED | OUTPATIENT
Start: 2023-09-14 | End: 2023-09-20 | Stop reason: HOSPADM

## 2023-09-14 RX ORDER — BUDESONIDE AND FORMOTEROL FUMARATE DIHYDRATE 160; 4.5 UG/1; UG/1
1 AEROSOL RESPIRATORY (INHALATION) DAILY
Status: DISCONTINUED | OUTPATIENT
Start: 2023-09-15 | End: 2023-09-20 | Stop reason: HOSPADM

## 2023-09-14 RX ORDER — SODIUM CHLORIDE 9 MG/ML
INJECTION, SOLUTION INTRAVENOUS PRN
Status: DISCONTINUED | OUTPATIENT
Start: 2023-09-14 | End: 2023-09-20 | Stop reason: HOSPADM

## 2023-09-14 RX ORDER — ENOXAPARIN SODIUM 100 MG/ML
40 INJECTION SUBCUTANEOUS EVERY EVENING
Status: DISCONTINUED | OUTPATIENT
Start: 2023-09-15 | End: 2023-09-20 | Stop reason: HOSPADM

## 2023-09-14 RX ORDER — METOPROLOL SUCCINATE 25 MG/1
25 TABLET, EXTENDED RELEASE ORAL DAILY
Status: DISCONTINUED | OUTPATIENT
Start: 2023-09-15 | End: 2023-09-20 | Stop reason: HOSPADM

## 2023-09-14 RX ORDER — ONDANSETRON 2 MG/ML
4 INJECTION INTRAMUSCULAR; INTRAVENOUS EVERY 6 HOURS PRN
Status: DISCONTINUED | OUTPATIENT
Start: 2023-09-14 | End: 2023-09-20 | Stop reason: HOSPADM

## 2023-09-14 RX ORDER — MAGNESIUM SULFATE IN WATER 40 MG/ML
2000 INJECTION, SOLUTION INTRAVENOUS PRN
Status: DISCONTINUED | OUTPATIENT
Start: 2023-09-14 | End: 2023-09-20 | Stop reason: HOSPADM

## 2023-09-14 RX ADMIN — SODIUM CHLORIDE, PRESERVATIVE FREE 10 ML: 5 INJECTION INTRAVENOUS at 23:35

## 2023-09-14 RX ADMIN — MONTELUKAST 10 MG: 10 TABLET, FILM COATED ORAL at 23:33

## 2023-09-14 RX ADMIN — GUAIFENESIN 400 MG: 200 SOLUTION ORAL at 23:33

## 2023-09-15 LAB
ANION GAP SERPL CALCULATED.3IONS-SCNC: 11 MMOL/L (ref 4–16)
BASOPHILS ABSOLUTE: 0 K/CU MM
BASOPHILS RELATIVE PERCENT: 0.1 % (ref 0–1)
BUN SERPL-MCNC: 21 MG/DL (ref 6–23)
CALCIUM SERPL-MCNC: 8.6 MG/DL (ref 8.3–10.6)
CHLORIDE BLD-SCNC: 96 MMOL/L (ref 99–110)
CO2: 26 MMOL/L (ref 21–32)
CREAT SERPL-MCNC: 0.9 MG/DL (ref 0.9–1.3)
DIFFERENTIAL TYPE: ABNORMAL
EKG ATRIAL RATE: 95 BPM
EKG DIAGNOSIS: NORMAL
EKG P AXIS: 65 DEGREES
EKG P-R INTERVAL: 172 MS
EKG Q-T INTERVAL: 380 MS
EKG QRS DURATION: 130 MS
EKG QTC CALCULATION (BAZETT): 477 MS
EKG R AXIS: -76 DEGREES
EKG T AXIS: 86 DEGREES
EKG VENTRICULAR RATE: 95 BPM
EOSINOPHILS ABSOLUTE: 0 K/CU MM
EOSINOPHILS RELATIVE PERCENT: 0 % (ref 0–3)
ESTIMATED AVERAGE GLUCOSE: 206 MG/DL
GFR SERPL CREATININE-BSD FRML MDRD: >60 ML/MIN/1.73M2
GLUCOSE BLD-MCNC: 247 MG/DL (ref 70–99)
GLUCOSE BLD-MCNC: 270 MG/DL (ref 70–99)
GLUCOSE BLD-MCNC: 285 MG/DL (ref 70–99)
GLUCOSE BLD-MCNC: 302 MG/DL (ref 70–99)
GLUCOSE SERPL-MCNC: 350 MG/DL (ref 70–99)
HBA1C MFR BLD: 8.8 % (ref 4.2–6.3)
HCT VFR BLD CALC: 34.2 % (ref 42–52)
HEMOGLOBIN: 10.4 GM/DL (ref 13.5–18)
IMMATURE NEUTROPHIL %: 0.4 % (ref 0–0.43)
INR BLD: 1.2 INDEX
LYMPHOCYTES ABSOLUTE: 0.2 K/CU MM
LYMPHOCYTES RELATIVE PERCENT: 2.6 % (ref 24–44)
MCH RBC QN AUTO: 28.4 PG (ref 27–31)
MCHC RBC AUTO-ENTMCNC: 30.4 % (ref 32–36)
MCV RBC AUTO: 93.4 FL (ref 78–100)
MONOCYTES ABSOLUTE: 0.2 K/CU MM
MONOCYTES RELATIVE PERCENT: 3 % (ref 0–4)
NUCLEATED RBC %: 0 %
PDW BLD-RTO: 14.8 % (ref 11.7–14.9)
PLATELET # BLD: 324 K/CU MM (ref 140–440)
PMV BLD AUTO: 9.9 FL (ref 7.5–11.1)
POTASSIUM SERPL-SCNC: 4.7 MMOL/L (ref 3.5–5.1)
PROTHROMBIN TIME: 15.7 SECONDS (ref 11.7–14.5)
RBC # BLD: 3.66 M/CU MM (ref 4.6–6.2)
SEGMENTED NEUTROPHILS ABSOLUTE COUNT: 7 K/CU MM
SEGMENTED NEUTROPHILS RELATIVE PERCENT: 93.9 % (ref 36–66)
SODIUM BLD-SCNC: 133 MMOL/L (ref 135–145)
TOTAL IMMATURE NEUTOROPHIL: 0.03 K/CU MM
TOTAL NUCLEATED RBC: 0 K/CU MM
WBC # BLD: 7.5 K/CU MM (ref 4–10.5)

## 2023-09-15 PROCEDURE — 97530 THERAPEUTIC ACTIVITIES: CPT

## 2023-09-15 PROCEDURE — 94640 AIRWAY INHALATION TREATMENT: CPT

## 2023-09-15 PROCEDURE — 80048 BASIC METABOLIC PNL TOTAL CA: CPT

## 2023-09-15 PROCEDURE — 6360000002 HC RX W HCPCS: Performed by: STUDENT IN AN ORGANIZED HEALTH CARE EDUCATION/TRAINING PROGRAM

## 2023-09-15 PROCEDURE — 6370000000 HC RX 637 (ALT 250 FOR IP)

## 2023-09-15 PROCEDURE — 82962 GLUCOSE BLOOD TEST: CPT

## 2023-09-15 PROCEDURE — 97162 PT EVAL MOD COMPLEX 30 MIN: CPT

## 2023-09-15 PROCEDURE — 2700000000 HC OXYGEN THERAPY PER DAY

## 2023-09-15 PROCEDURE — 2580000003 HC RX 258: Performed by: STUDENT IN AN ORGANIZED HEALTH CARE EDUCATION/TRAINING PROGRAM

## 2023-09-15 PROCEDURE — 97116 GAIT TRAINING THERAPY: CPT

## 2023-09-15 PROCEDURE — 6370000000 HC RX 637 (ALT 250 FOR IP): Performed by: NURSE PRACTITIONER

## 2023-09-15 PROCEDURE — 6370000000 HC RX 637 (ALT 250 FOR IP): Performed by: STUDENT IN AN ORGANIZED HEALTH CARE EDUCATION/TRAINING PROGRAM

## 2023-09-15 PROCEDURE — 94761 N-INVAS EAR/PLS OXIMETRY MLT: CPT

## 2023-09-15 PROCEDURE — 99254 IP/OBS CNSLTJ NEW/EST MOD 60: CPT | Performed by: INTERNAL MEDICINE

## 2023-09-15 PROCEDURE — 96366 THER/PROPH/DIAG IV INF ADDON: CPT

## 2023-09-15 PROCEDURE — 36415 COLL VENOUS BLD VENIPUNCTURE: CPT

## 2023-09-15 PROCEDURE — 83036 HEMOGLOBIN GLYCOSYLATED A1C: CPT

## 2023-09-15 PROCEDURE — 96365 THER/PROPH/DIAG IV INF INIT: CPT

## 2023-09-15 PROCEDURE — G0378 HOSPITAL OBSERVATION PER HR: HCPCS

## 2023-09-15 PROCEDURE — 96372 THER/PROPH/DIAG INJ SC/IM: CPT

## 2023-09-15 PROCEDURE — 85610 PROTHROMBIN TIME: CPT

## 2023-09-15 PROCEDURE — 97166 OT EVAL MOD COMPLEX 45 MIN: CPT

## 2023-09-15 PROCEDURE — 85025 COMPLETE CBC W/AUTO DIFF WBC: CPT

## 2023-09-15 PROCEDURE — 93010 ELECTROCARDIOGRAM REPORT: CPT | Performed by: INTERNAL MEDICINE

## 2023-09-15 RX ORDER — METFORMIN HYDROCHLORIDE 500 MG/1
500 TABLET, EXTENDED RELEASE ORAL
Status: DISCONTINUED | OUTPATIENT
Start: 2023-09-16 | End: 2023-09-20 | Stop reason: HOSPADM

## 2023-09-15 RX ORDER — INSULIN GLARGINE 100 [IU]/ML
12 INJECTION, SOLUTION SUBCUTANEOUS NIGHTLY
Status: DISCONTINUED | OUTPATIENT
Start: 2023-09-15 | End: 2023-09-16

## 2023-09-15 RX ORDER — AMOXICILLIN AND CLAVULANATE POTASSIUM 875; 125 MG/1; MG/1
1 TABLET, FILM COATED ORAL EVERY 12 HOURS SCHEDULED
Status: COMPLETED | OUTPATIENT
Start: 2023-09-15 | End: 2023-09-19

## 2023-09-15 RX ORDER — IPRATROPIUM BROMIDE AND ALBUTEROL SULFATE 2.5; .5 MG/3ML; MG/3ML
1 SOLUTION RESPIRATORY (INHALATION)
Status: DISCONTINUED | OUTPATIENT
Start: 2023-09-15 | End: 2023-09-20 | Stop reason: HOSPADM

## 2023-09-15 RX ORDER — PREDNISONE 20 MG/1
40 TABLET ORAL DAILY
Status: DISCONTINUED | OUTPATIENT
Start: 2023-09-15 | End: 2023-09-16

## 2023-09-15 RX ORDER — ALBUTEROL SULFATE 90 UG/1
2 AEROSOL, METERED RESPIRATORY (INHALATION) EVERY 4 HOURS PRN
Status: DISCONTINUED | OUTPATIENT
Start: 2023-09-15 | End: 2023-09-20 | Stop reason: HOSPADM

## 2023-09-15 RX ADMIN — INSULIN LISPRO 2 UNITS: 100 INJECTION, SOLUTION INTRAVENOUS; SUBCUTANEOUS at 08:42

## 2023-09-15 RX ADMIN — SODIUM CHLORIDE, PRESERVATIVE FREE 5 ML: 5 INJECTION INTRAVENOUS at 21:08

## 2023-09-15 RX ADMIN — SIMETHICONE 80 MG: 80 TABLET, CHEWABLE ORAL at 01:13

## 2023-09-15 RX ADMIN — BUDESONIDE AND FORMOTEROL FUMARATE DIHYDRATE 1 PUFF: 160; 4.5 AEROSOL RESPIRATORY (INHALATION) at 08:02

## 2023-09-15 RX ADMIN — ENOXAPARIN SODIUM 40 MG: 100 INJECTION SUBCUTANEOUS at 18:02

## 2023-09-15 RX ADMIN — METOPROLOL SUCCINATE 25 MG: 25 TABLET, EXTENDED RELEASE ORAL at 08:41

## 2023-09-15 RX ADMIN — AMLODIPINE BESYLATE 10 MG: 10 TABLET ORAL at 08:42

## 2023-09-15 RX ADMIN — SIMETHICONE 80 MG: 80 TABLET, CHEWABLE ORAL at 08:39

## 2023-09-15 RX ADMIN — PREDNISONE 40 MG: 20 TABLET ORAL at 08:47

## 2023-09-15 RX ADMIN — IPRATROPIUM BROMIDE AND ALBUTEROL SULFATE 1 DOSE: .5; 2.5 SOLUTION RESPIRATORY (INHALATION) at 16:40

## 2023-09-15 RX ADMIN — MONTELUKAST 10 MG: 10 TABLET, FILM COATED ORAL at 21:05

## 2023-09-15 RX ADMIN — MAGNESIUM SULFATE HEPTAHYDRATE 4000 MG: 80 INJECTION, SOLUTION INTRAVENOUS at 01:15

## 2023-09-15 RX ADMIN — IPRATROPIUM BROMIDE 1 PUFF: 17 AEROSOL, METERED RESPIRATORY (INHALATION) at 00:02

## 2023-09-15 RX ADMIN — AMOXICILLIN AND CLAVULANATE POTASSIUM 1 TABLET: 875; 125 TABLET, FILM COATED ORAL at 10:54

## 2023-09-15 RX ADMIN — AMOXICILLIN AND CLAVULANATE POTASSIUM 1 TABLET: 875; 125 TABLET, FILM COATED ORAL at 21:05

## 2023-09-15 RX ADMIN — GUAIFENESIN 400 MG: 200 SOLUTION ORAL at 08:40

## 2023-09-15 RX ADMIN — ALBUTEROL SULFATE 1 PUFF: 90 AEROSOL, METERED RESPIRATORY (INHALATION) at 00:00

## 2023-09-15 RX ADMIN — SIMETHICONE 80 MG: 80 TABLET, CHEWABLE ORAL at 13:26

## 2023-09-15 RX ADMIN — IPRATROPIUM BROMIDE AND ALBUTEROL SULFATE 1 DOSE: .5; 2.5 SOLUTION RESPIRATORY (INHALATION) at 08:01

## 2023-09-15 RX ADMIN — INSULIN LISPRO 1 UNITS: 100 INJECTION, SOLUTION INTRAVENOUS; SUBCUTANEOUS at 18:02

## 2023-09-15 RX ADMIN — INSULIN GLARGINE 12 UNITS: 100 INJECTION, SOLUTION SUBCUTANEOUS at 21:05

## 2023-09-15 RX ADMIN — IPRATROPIUM BROMIDE AND ALBUTEROL SULFATE 1 DOSE: .5; 2.5 SOLUTION RESPIRATORY (INHALATION) at 12:10

## 2023-09-15 RX ADMIN — GUAIFENESIN 400 MG: 200 SOLUTION ORAL at 18:02

## 2023-09-15 RX ADMIN — SODIUM CHLORIDE, PRESERVATIVE FREE 10 ML: 5 INJECTION INTRAVENOUS at 08:39

## 2023-09-15 RX ADMIN — POTASSIUM BICARBONATE 20 MEQ: 782 TABLET, EFFERVESCENT ORAL at 08:42

## 2023-09-15 RX ADMIN — SODIUM CHLORIDE 25 ML: 9 INJECTION, SOLUTION INTRAVENOUS at 01:14

## 2023-09-15 RX ADMIN — PANTOPRAZOLE SODIUM 40 MG: 40 TABLET, DELAYED RELEASE ORAL at 06:44

## 2023-09-15 RX ADMIN — ATORVASTATIN CALCIUM 10 MG: 10 TABLET, FILM COATED ORAL at 08:41

## 2023-09-15 RX ADMIN — IPRATROPIUM BROMIDE AND ALBUTEROL SULFATE 1 DOSE: .5; 2.5 SOLUTION RESPIRATORY (INHALATION) at 20:21

## 2023-09-15 RX ADMIN — INSULIN LISPRO 3 UNITS: 100 INJECTION, SOLUTION INTRAVENOUS; SUBCUTANEOUS at 13:25

## 2023-09-15 NOTE — PROGRESS NOTES
Physical Therapy  [facility] ACUTE CARE PHYSICAL THERAPY EVALUATION  Marilyn Rosario, 1945, 4120/4120-A, 9/15/2023    History  [place]:  There were no encounter diagnoses. Patient  has a past medical history of Acid reflux, Anemia, COPD (chronic obstructive pulmonary disease) ([address]), Dental decay, Diabetes mellitus ([address]), Emphysema of lung ([address]), Enlarged prostate, Stevens catheter in place, H/O cardiovascular stress test, Hematuria, HX OTHER MEDICAL, Hypertension, Low back sprain, Megacolon, On home oxygen therapy, and Wears glasses. Patient  has a past surgical history that includes Revision Colostomy (1974); Abdominal exploration surgery (5063); other surgical history; other surgical history (01 07 2013); tumor removal; Appendectomy (7years old); Abdomen surgery; TURP (N/A, 07/12/2018); Dental surgery; and other surgical history (09/18/2018). Subjective:  Patient states: \"I feel like I shouldn't be this tired with not much work\"    Pain:  denies   Communication with other providers:  Co-eval with Kamari LEMUS   Restrictions: general precautions, falls     Home Setup/Prior level of function  Social/Functional History  Lives With: Alone  Type of Home: House  Home Layout: One level  Bathroom Shower/Tub: Shower chair without back  Bathroom Toilet: Standard  Bathroom Equipment: Grab bars in [address] [place] [address]: Cane, Oxygen (3L O2)  ADL Assistance: Independent  Homemaking Assistance: Independent  Ambulation Assistance: Independent (Typically no AD)  Transfer Assistance: Independent  Active : Yes  Mode of Transportation: Car  Education: doent drive often  Occupation: Retired,   Leisure & Hobbies: cooking and writing    Examination of body systems (includes body structures/functions, activity/participation limitations):  Observation:  Supine in bed upon arrival. Cooperative with therapy.    Vision:  [place] [place]  Hearing:  WFL  Cardiopulmonary:  3L O2, stable vitals though noticeable SOB with dynamic activity x 3 minutes, single UE support Yolanda       Treatment plan:  Bed mobility, transfers, balance, gait, TA, TX, stairs     Recommendations for NURSING mobility: ambulate with RW     Time:   Time in: 1415  Time out: 1445  Timed treatment minutes: 15  Total time: 30 minutes     Electronically signed by:    Angella Mercado, [id number]  9/15/2023, 3:06 PM

## 2023-09-15 NOTE — H&P
History and Physical      Name:  Manjit Perez /Age/Sex: 1945  (21 y.o. male)   MRN & CSN:  [id number] & [id number] Encounter Date/Time: 2023 9:17 PM EDT   Location:  [facility] PCP: Reji Macias Day: 1    Assessment and Plan:     Patient is a 82-year-old male who presented with generalized weakness. Transferred from [address] [place] [address] ED for suspected acute exacerbation of COPD. # Generalized weakness  - Endorsed increased weakness over the past few days. No focal weaknesses, numbness, falls or other concerns. Having decreased PO intake as below. Doesn't require any assistive devices for ambulation.  - Exam non-focal. PT/OT consulted, appreciate assistance. Fall precautions. # Very severe COPD without exacerbation  # Chronic hypoxemic hypercapnic respiratory failure  - Denied any increase in SOB or change in cough. No fevers, sick contacts or travel. Quit smoking over 10 years prior. Baseline on 3L NC continuously. PFTs in  with severe obstruction.  - At [facility] ED, CXR nonacute. Received Solu-Medrol. Flu and COVID negative. VBG 7.36/53. - On arrival, patient speaking in full sentences, no increased oxygen requirements, no wheezing on exam.  - Low suspicion of acute exacerbation. Continue home inhalers. # Non-MI troponin elevation  - Denied any typical CP, unclear why it was obtained. - Initial Tn mildly elevated, repeat flat x2. ECG without acute ischemic changes. # Essential hypretension  - Continue home Norvasc and Toprol-XL. # Hypomagnesemia  - Moderate, repleted. - Follow-up repeat labs. # T2DM with hyperglycemia  - Last A1c 7.6% in 2023, repeat pending.   - Held Metformin and Glipizide. - LCSI. # GERD  - Continue home PPI. Checklist:  Advanced directive: full  Diet: cardiac  DVT ppx: Lovenox  Sugar: BG goal of 140-180 while inpatient    Disposition: place in observation. Estimated discharge: 1 day(s). Current living situation: home.   Expected

## 2023-09-15 NOTE — PROGRESS NOTES
4 Eyes Skin Assessment     NAME:  Sue Mcgovern  YOB: 1945  MEDICAL RECORD NUMBER:  [id number]    The patient is being assessed for  Admission    I agree that at least one RN has performed a thorough Head to Toe Skin Assessment on the patient. ALL assessment sites listed below have been assessed. Areas assessed by both nurses:    Head, Face, Ears, Shoulders, Back, Chest, Arms, Elbows, Hands, Sacrum. Buttock, Coccyx, Ischium, Legs. Feet and Heels, and Under Medical Devices         Does the Patient have a Wound?  No noted wound(s)       [place] Prevention initiated by RN: Yes  Wound Care Orders initiated by RN: Yes    Pressure Injury (Stage 3,4, Unstageable, DTI, NWPT, and Complex wounds) if present, place Wound referral order by RN under : No    New Ostomies, if present place, Ostomy referral order under : No     Nurse 1 eSignature: Electronically signed by Adelaida Pacheco RN on 9/15/23 at 4:57 AM EDT    **SHARE this note so that the co-signing nurse can place an eSignature**    Nurse 2 eSignature: {Esignature:[id number]}

## 2023-09-15 NOTE — CARE COORDINATION
If Discharge over the weekend:  Pt is active with [facility], [facility]. care. Please be sure orders are place. Call [facility] and notify them of the discharge at [phone] Please fax [phone] AVS and a [facility] order.

## 2023-09-15 NOTE — CONSULTS
[facility]             MD Shay Kennedy MD Maritza Dyke, APRN-CNP       Harvinder Sarmiento, APRN-CNP             [address] [facility] [address], [address] [facility]             [phone] fax [phone]         Reason for Consult:  Melena, with planned EGD on 26th    HISTORY OF PRESENT ILLNESS:              The patient is a 70 y.o. male with a past medical history partial small bowel obstruction, dizziness, diet-controlled DM, Megacolon, bladder distention, HTN, Hypokalemia, hypomagnesemia, hyperglycemia, Resp acidosis, Pneumonia, COPD, VIJAY, Chronic Bronchitis, S/P TURP, L-sided weakness, Volvulus and paresthesia. Extensive abdominal surgical history including revision of colostomy, Abdominal exploration surgery, several lap colon resections, appendectomy, and  other reported abdominal surgery while in the [address]. Patient was transferred from [facility] to [facility] for suspected acute exacerbation of COPD. He has remained hemodynamically stable and is on baseline 3L oxygen. Patient was examined at bedside. He denies any abdominal pain, break through reflux, N, V, D, hematochezia and melena. He verbalizes that he sometimes has intermittent dysphagia with solid food denies problems with liquids. He denies abdominal pain, bloating and distention. Patient is unaware he is having any black stools and denied them again on examination. Patient is unaware of where he has EGD scheduled. He is also unsure if it is at [facility] or outpatient through another office. Epic search did not reveal any upcoming EGDs. ROS: Per history of present illness. All other systems were reviewed and were negative. Allergies:     Allergies   Allergen Reactions    Lisinopril Other (See Comments)     Pt coughs uncontrollably    Dye [Iodides] Other (See Comments)     \"had iodine for IVP test- went into seizures\"    Lorazepam Other (See Comments)     CAUSES HIM TO 0.0 K/CU MM    Total Immature Neutrophil 0.03 K/CU MM    Immature Neutrophil % 0.4 0 - 0.43 %   Protime-INR    Collection Time: 09/15/23  5:31 AM   Result Value Ref Range    Protime 15.7 (H) 11.7 - 14.5 SECONDS    INR 1.2 INDEX   Hemoglobin A1c    Collection Time: 09/15/23  5:31 AM   Result Value Ref Range    Hemoglobin A1C 8.8 (H) 4.2 - 6.3 %    eAG 206 mg/dL   POCT Glucose    Collection Time: 09/15/23  7:42 AM   Result Value Ref Range    POC Glucose 285 (H) 70 - 99 MG/DL   POCT Glucose    Collection Time: 09/15/23 11:14 AM   Result Value Ref Range    POC Glucose 302 (H) 70 - 99 MG/DL       IMPRESSION/RECOMMENDATIONS:  1) GERD with dysphagia- likely due to chronic GERD/ gastritis   -Continue PPI  - OP EGD-call office to schedule   2) Acute/Chronic COPD  3) HTN  4)Hypomagnesemia  5)T2DM      I have personally seen and examined the patient independently. I have reviewed the patient's available data,including medical history and recent test results. Reviewed and discussed note as documented by KRISTY. I agree with the physical exam findings, assessment and plan. Briefly   Patient with past medical history of partial small bowel obstruction status post multiple surgeries, diet-controlled diabetes, bladder distention, hypokalemia. Patient currently endorses that he has intermittent dysphagia to solid food, GERD. Gen: normal mood, alert  ENT: normal TJ  Cardiovascular: RRR, No M/R/G  Respiratory: CTA BL  Gastrointestinal: Soft,NT ND  Genitourinary: no suprapubic tenderness  Musculoskeletal: no scars  Skin:moist  Neuro: alert and oriented x3    My assessment and plan reveals     #1 intermittent dysphagia. Patient currently asymptomatic recommend that he follows up outpatient with us for possible EGD. Continue PPI therapy  #2 GERD -continue PPI therapy  #3 history of megacolon history of volvulus status post surgical correction    My documented MDM is a substantive portion of the supervisory visit.       Comment:

## 2023-09-15 NOTE — CARE COORDINATION
Has nurse from [facility] that visits weekly from home, ind, on oxygen 3L at baseline. Not interested in SNF, would be willing to have more services for [facility]. Has dgtr who assists, [facility] [place] [facility]ts If needed. 09/15/23 9711   Service Assessment   Patient Orientation Alert and Oriented   Cognition Alert   History Provided By Patient   Primary Caregiver [address]  (daughter and friend)   [address] is: Legal Next of Kin   PCP Verified by CM Yes   Last Visit to PCP Within last 3 months   Prior Functional Level Independent in ADLs/IADLs   Current Functional Level Independent in ADLs/IADLs   Can patient return to prior living arrangement Yes   Ability to make needs known: Good   Family able to assist with home care needs: Yes   Would you like for me to discuss the discharge plan with any other family members/significant others, and if so, who? No   Financial Resources  ([facility]);Medicare   [facility] Resources None   Social/Functional History   Lives With Alone   Type of [address] [place] [address]  One level   Bathroom Shower/Tub Shower chair without back   [facility] bars in [address] Help From Family;Friend(s)   ADL Assistance Independent   Homemaking Assistance Independent   Ambulation Assistance Independent   Transfer Assistance Independent   Active  Yes   Mode of Transportation Car   Education doent drive often   Occupation Retired;   Discharge Planning   Type of [address] [facility] Prior To Admission Oxygen Therapy  (through [place] [facility] [phone])   DME Ordered?  No   Potential Assistance Purchasing Medications No   Type of Home Care Services None   Services At/After Discharge   Transition of Care Consult (CM Consult) N/A   Services At/After Discharge None

## 2023-09-15 NOTE — ACP (ADVANCE CARE PLANNING)
Advance Care Planning     Advance Care Planning Inpatient Note  Spiritual Care Department    Today's Date: 9/15/2023  Unit: [facility] 4N    Received request from HealthCare Provider and patient. Upon review of chart and communication with care team, patient's decision making abilities are not in question. . Patient was/were present in the room during visit. Goals of ACP Conversation:  Discuss advance care planning documents    Health Care Decision Makers:     No healthcare decision makers have been documented. Click here to complete [address] including selection of the Healthcare Decision Maker Relationship (ie \"Primary\")  Summary:  Completed New Documents    Advance Care Planning Documents (Patient Wishes):  Healthcare Power of /Advance Directive Appointment of Health Care Agent  Living Will/Advance Directive     Assessment:      Interventions:  Provided education on documents for clarity and greater understanding  Discussed and provided education on state decision maker hierarchy  Assisted in the completion of documents according to patient's wishes at this time    Care Preferences Communicated:   No    Outcomes/Plan:  ACP Discussion: Completed  New advance directive completed. Returned original document(s) to patient, as well as copies for distribution to appointed agents  Copy of advance directive given to staff to scan into medical record.     Electronically signed by Rosita Linares on 9/15/2023 at 3:34 PM

## 2023-09-15 NOTE — PROGRESS NOTES
Occupational Therapy  [facility] ACUTE CARE OCCUPATIONAL THERAPY EVALUATION    Asael Choe, 1945, 4120/4120-A, 9/15/2023    Discharge Recommendation: [address]      History:  [place]:  There were no encounter diagnoses.   Past Medical History:   Diagnosis Date    Acid reflux     Anemia     \"dx age 10    COPD (chronic obstructive pulmonary disease) ([address])     follows with [address] decay     Diabetes mellitus ([address])     \"dx 2015 or so\"    Emphysema of lung ([address])     Enlarged prostate     Stevens catheter in place     \"put in catheter since mid April 2018\"    H/O cardiovascular stress test 05/01/2018    Nml, 62%    Hematuria     dx with admission 4/29/2018    HX OTHER MEDICAL     \"tried 3 times to do sleep study could not tolerate    Hypertension     per pt on 7/11/2018\"my blood pressure has been low so stopped the Amlodopine for now\"    Low back sprain 1983    Megacolon     On home oxygen therapy     \"wear it at night at 3l/nc\"    Wears glasses     to read       Subjective:  Patient states: \"I just have so much going on\"  Pain:  denies  Communication with other providers: co-eval w/ PT, handoff to RN  Restrictions: General Precautions, Fall Risk    Home Setup/Prior level of function:  Social/Functional History  Lives With: Alone  Type of Home: House  Home Layout: One level  Bathroom Shower/Tub: Shower chair without back  Bathroom Toilet: Standard  Bathroom Equipment: Grab bars in [address] [place] [address]: Cane, Oxygen (3L O2)  ADL Assistance: Independent  Homemaking Assistance: Independent  Ambulation Assistance: Independent (Typically no AD)  Transfer Assistance: Independent  Active : Yes  Mode of Transportation: Car  Education: doent drive often  Occupation: Retired,   Leisure & Hobbies: cooking and writing     Examination:  Observation: Supine in bed upon arrival, agreeable to therapy eval.  Vision: WFL  Hearing: WFL  Vitals: Stable vitals throughout session on

## 2023-09-16 PROBLEM — R06.00 DYSPNEA: Status: ACTIVE | Noted: 2023-09-16

## 2023-09-16 LAB
GLUCOSE BLD-MCNC: 211 MG/DL (ref 70–99)
GLUCOSE BLD-MCNC: 232 MG/DL (ref 70–99)
GLUCOSE BLD-MCNC: 341 MG/DL (ref 70–99)
GLUCOSE BLD-MCNC: 351 MG/DL (ref 70–99)
PRO-BNP: 1282 PG/ML
TROPONIN T: 0.04 NG/ML

## 2023-09-16 PROCEDURE — 89220 SPUTUM SPECIMEN COLLECTION: CPT

## 2023-09-16 PROCEDURE — 1200000000 HC SEMI PRIVATE

## 2023-09-16 PROCEDURE — 84484 ASSAY OF TROPONIN QUANT: CPT

## 2023-09-16 PROCEDURE — 6370000000 HC RX 637 (ALT 250 FOR IP): Performed by: NURSE PRACTITIONER

## 2023-09-16 PROCEDURE — 6360000002 HC RX W HCPCS: Performed by: NURSE PRACTITIONER

## 2023-09-16 PROCEDURE — 6370000000 HC RX 637 (ALT 250 FOR IP): Performed by: STUDENT IN AN ORGANIZED HEALTH CARE EDUCATION/TRAINING PROGRAM

## 2023-09-16 PROCEDURE — 83880 ASSAY OF NATRIURETIC PEPTIDE: CPT

## 2023-09-16 PROCEDURE — G0378 HOSPITAL OBSERVATION PER HR: HCPCS

## 2023-09-16 PROCEDURE — 93005 ELECTROCARDIOGRAM TRACING: CPT | Performed by: NURSE PRACTITIONER

## 2023-09-16 PROCEDURE — 6360000002 HC RX W HCPCS: Performed by: STUDENT IN AN ORGANIZED HEALTH CARE EDUCATION/TRAINING PROGRAM

## 2023-09-16 PROCEDURE — 94761 N-INVAS EAR/PLS OXIMETRY MLT: CPT

## 2023-09-16 PROCEDURE — 2700000000 HC OXYGEN THERAPY PER DAY

## 2023-09-16 PROCEDURE — 36415 COLL VENOUS BLD VENIPUNCTURE: CPT

## 2023-09-16 PROCEDURE — 99223 1ST HOSP IP/OBS HIGH 75: CPT | Performed by: INTERNAL MEDICINE

## 2023-09-16 PROCEDURE — 2580000003 HC RX 258: Performed by: STUDENT IN AN ORGANIZED HEALTH CARE EDUCATION/TRAINING PROGRAM

## 2023-09-16 PROCEDURE — 97530 THERAPEUTIC ACTIVITIES: CPT

## 2023-09-16 PROCEDURE — 6370000000 HC RX 637 (ALT 250 FOR IP)

## 2023-09-16 PROCEDURE — 94640 AIRWAY INHALATION TREATMENT: CPT

## 2023-09-16 PROCEDURE — 82962 GLUCOSE BLOOD TEST: CPT

## 2023-09-16 RX ORDER — INSULIN GLARGINE 100 [IU]/ML
18 INJECTION, SOLUTION SUBCUTANEOUS NIGHTLY
Status: DISCONTINUED | OUTPATIENT
Start: 2023-09-16 | End: 2023-09-20 | Stop reason: HOSPADM

## 2023-09-16 RX ORDER — GUAIFENESIN 600 MG/1
600 TABLET, EXTENDED RELEASE ORAL 2 TIMES DAILY PRN
Status: DISCONTINUED | OUTPATIENT
Start: 2023-09-16 | End: 2023-09-20 | Stop reason: HOSPADM

## 2023-09-16 RX ORDER — FUROSEMIDE 10 MG/ML
20 INJECTION INTRAMUSCULAR; INTRAVENOUS ONCE
Status: COMPLETED | OUTPATIENT
Start: 2023-09-16 | End: 2023-09-16

## 2023-09-16 RX ORDER — FUROSEMIDE 10 MG/ML
20 INJECTION INTRAMUSCULAR; INTRAVENOUS DAILY
Status: DISCONTINUED | OUTPATIENT
Start: 2023-09-16 | End: 2023-09-16

## 2023-09-16 RX ADMIN — IPRATROPIUM BROMIDE AND ALBUTEROL SULFATE 1 DOSE: .5; 2.5 SOLUTION RESPIRATORY (INHALATION) at 19:48

## 2023-09-16 RX ADMIN — IPRATROPIUM BROMIDE AND ALBUTEROL SULFATE 1 DOSE: .5; 2.5 SOLUTION RESPIRATORY (INHALATION) at 08:43

## 2023-09-16 RX ADMIN — IPRATROPIUM BROMIDE AND ALBUTEROL SULFATE 1 DOSE: .5; 2.5 SOLUTION RESPIRATORY (INHALATION) at 15:19

## 2023-09-16 RX ADMIN — INSULIN GLARGINE 18 UNITS: 100 INJECTION, SOLUTION SUBCUTANEOUS at 21:06

## 2023-09-16 RX ADMIN — GUAIFENESIN 400 MG: 200 SOLUTION ORAL at 08:22

## 2023-09-16 RX ADMIN — SODIUM CHLORIDE, PRESERVATIVE FREE 10 ML: 5 INJECTION INTRAVENOUS at 08:24

## 2023-09-16 RX ADMIN — INSULIN LISPRO 4 UNITS: 100 INJECTION, SOLUTION INTRAVENOUS; SUBCUTANEOUS at 21:06

## 2023-09-16 RX ADMIN — FUROSEMIDE 20 MG: 20 INJECTION, SOLUTION INTRAMUSCULAR; INTRAVENOUS at 13:55

## 2023-09-16 RX ADMIN — AMOXICILLIN AND CLAVULANATE POTASSIUM 1 TABLET: 875; 125 TABLET, FILM COATED ORAL at 08:23

## 2023-09-16 RX ADMIN — PANTOPRAZOLE SODIUM 40 MG: 40 TABLET, DELAYED RELEASE ORAL at 06:16

## 2023-09-16 RX ADMIN — INSULIN LISPRO 3 UNITS: 100 INJECTION, SOLUTION INTRAVENOUS; SUBCUTANEOUS at 18:18

## 2023-09-16 RX ADMIN — AMLODIPINE BESYLATE 10 MG: 10 TABLET ORAL at 08:22

## 2023-09-16 RX ADMIN — GUAIFENESIN 600 MG: 600 TABLET, EXTENDED RELEASE ORAL at 01:27

## 2023-09-16 RX ADMIN — BUDESONIDE AND FORMOTEROL FUMARATE DIHYDRATE 1 PUFF: 160; 4.5 AEROSOL RESPIRATORY (INHALATION) at 08:43

## 2023-09-16 RX ADMIN — MONTELUKAST 10 MG: 10 TABLET, FILM COATED ORAL at 21:06

## 2023-09-16 RX ADMIN — ALBUTEROL SULFATE 2 PUFF: 90 AEROSOL, METERED RESPIRATORY (INHALATION) at 01:07

## 2023-09-16 RX ADMIN — INSULIN LISPRO 1 UNITS: 100 INJECTION, SOLUTION INTRAVENOUS; SUBCUTANEOUS at 13:55

## 2023-09-16 RX ADMIN — ATORVASTATIN CALCIUM 10 MG: 10 TABLET, FILM COATED ORAL at 08:23

## 2023-09-16 RX ADMIN — INSULIN LISPRO 1 UNITS: 100 INJECTION, SOLUTION INTRAVENOUS; SUBCUTANEOUS at 08:23

## 2023-09-16 RX ADMIN — GUAIFENESIN 400 MG: 200 SOLUTION ORAL at 18:18

## 2023-09-16 RX ADMIN — IPRATROPIUM BROMIDE AND ALBUTEROL SULFATE 1 DOSE: .5; 2.5 SOLUTION RESPIRATORY (INHALATION) at 11:45

## 2023-09-16 RX ADMIN — ENOXAPARIN SODIUM 40 MG: 100 INJECTION SUBCUTANEOUS at 18:19

## 2023-09-16 RX ADMIN — POTASSIUM BICARBONATE 20 MEQ: 782 TABLET, EFFERVESCENT ORAL at 08:22

## 2023-09-16 RX ADMIN — IPRATROPIUM BROMIDE AND ALBUTEROL SULFATE 1 DOSE: .5; 2.5 SOLUTION RESPIRATORY (INHALATION) at 04:48

## 2023-09-16 RX ADMIN — PREDNISONE 40 MG: 20 TABLET ORAL at 08:23

## 2023-09-16 RX ADMIN — BUDESONIDE AND FORMOTEROL FUMARATE DIHYDRATE 1 PUFF: 160; 4.5 AEROSOL RESPIRATORY (INHALATION) at 19:49

## 2023-09-16 RX ADMIN — METFORMIN HYDROCHLORIDE 500 MG: 500 TABLET, EXTENDED RELEASE ORAL at 08:22

## 2023-09-16 RX ADMIN — SODIUM CHLORIDE, PRESERVATIVE FREE 10 ML: 5 INJECTION INTRAVENOUS at 21:07

## 2023-09-16 RX ADMIN — AMOXICILLIN AND CLAVULANATE POTASSIUM 1 TABLET: 875; 125 TABLET, FILM COATED ORAL at 21:06

## 2023-09-16 RX ADMIN — METOPROLOL SUCCINATE 25 MG: 25 TABLET, EXTENDED RELEASE ORAL at 08:23

## 2023-09-16 NOTE — PROGRESS NOTES
V2.0  [facility] [place]      Name:  Erlinda Pearson /Age/Sex: 1945  (85 y.o. male)   MRN & CSN:  [id number] & [id number] Encounter Date/Time: 2023 11:51 AM EDT    Location:  [address] [place] [address]-A PCP: [facility] Day: 3    Assessment and Plan:   Erlinda Pearson is a 70 y.o. male with pmh of severe COPD, chronic respiratory failure on 3 L NC, hypertension, hyperlipidemia, type 2 diabetes who presents with severe dyspnea with generalized weakness. Plan:  Dyspnea  -Concerns of CHF exacerbation  -Worsening symptoms since 2 to 3 months ago, intolerance of activity  -Had history of elevated troponin  -Cardiac cath in  showed upper normal LV function with LVEF 35 to 40%  -But echocardiogram in the same year showed normal systolic function with LVEF of 55 to 60%  -Check BNP, EKG, troponin, echocardiogram  -Order 1 dose of Lasix  -Cardiology consult    Generalized weakness  -Severe shortness of breath with a few steps walking  -Started 2 to 3 months ago  -OT recommended discharge to SNF  -Pending PT evaluation    Very severe COPD  Chronic hypoxia hypercapnia respiratory failure  -Currently on 3 L NC on his baseline  -Very mild wheezing  -Continue breathing treatment and Augmentin  -Hold prednisone due to concerns of CHF    GERD  -Continue home PPI  -Outpatient follow-up with GI for EGD    # Non-MI troponin elevation  -History of elevated troponin  -We will check troponin level     # Essential hypretension  - Continue home Norvasc and Toprol-XL. # Hypomagnesemia  - Moderate, repleted. - Follow-up repeat labs. # T2DM with hyperglycemia  - Ha1c 8.8   -Glucose is poorly controlled, increase Lantus to 20 units nightly, insulin sliding scale       Diet ADULT DIET; Regular; 5 carb choices (75 gm/meal);  Low Fat/Low Chol/High Fiber/MILDRED; Low Sodium (2 gm)   DVT Prophylaxis [] Lovenox, []  Heparin, [] SCDs, [] Ambulation,  [] Eliquis, [] Xarelto  [] Coumadin   Code Status Full

## 2023-09-16 NOTE — PROGRESS NOTES
minutes:  28  Total treatment time:  29      Electronically signed by:    Akhil Mckeon OT,   9/16/2023, 3:21 PM

## 2023-09-16 NOTE — CONSULTS
INPATIENT CARDIOLOGY CONSULT NOTE         Reason for consultation: ? CHF       History of present illness:Billy is a 70 y. o.year old who is admitted with shortness of breath  Cardiology is consulted to evaluate patient for dyspnea with concerns of congestive heart failure  Patient does not follow-up with cardiology as routine, he is currently being treated for COPD exacerbation    Patient has prior medical history significant for essential hypertension, diabetes mellitus, hyperlipidemia. He underwent transesophageal echocardiogram in May 2021 which showed normal ejection fraction no valvular heart disease no PFO or shunts were noted. A previous transthoracic echocardiogram was of poor study due to underlying severe COPD. However subsequent echocardiogram was performed in May 2021 which showed preserved LV ejection fraction. Of note patient also underwent coronary angiogram The same year which did not reveal any coronary artery disease however ejection fraction was noted to be around 35 to 40%    Electrocardiogram shows sinus rhythm, WPW pattern with likely pseudo Q waves in inferior leads. PACs also noted. Pertinent Lab Personally Review     Recent Labs     09/15/23  0531   WBC 7.5   HGB 10.4*   HCT 34.2*         Recent Labs     09/15/23  0531   *   K 4.7   CL 96*   CO2 26   BUN 21   CREATININE 0.9     No results for input(s): \"AST\", \"ALT\", \"ALB\", \"BILIDIR\", \"BILITOT\", \"ALKPHOS\" in the last 72 hours.   Recent Labs     09/16/23  1124   TROPONINT 0.037*     Lab Results   Component Value Date    PROBNP 1,282 (H) 09/16/2023    PROBNP 663.2 (H) 05/14/2021    PROBNP 337.3 (H) 04/12/2018         Past medical history:    has a past medical history of Acid reflux, Anemia, COPD (chronic obstructive pulmonary disease) ([address]), Dental decay, Diabetes mellitus ([address]), Emphysema of lung ([address]), Enlarged prostate, Stevens catheter in place, H/O cardiovascular stress test, Hematuria, HX OTHER

## 2023-09-17 LAB
ALBUMIN SERPL-MCNC: 4.1 GM/DL (ref 3.4–5)
ALP BLD-CCNC: 134 IU/L (ref 40–129)
ALT SERPL-CCNC: 10 U/L (ref 10–40)
ANION GAP SERPL CALCULATED.3IONS-SCNC: 5 MMOL/L (ref 4–16)
AST SERPL-CCNC: 11 IU/L (ref 15–37)
BASOPHILS ABSOLUTE: 0 K/CU MM
BASOPHILS RELATIVE PERCENT: 0.1 % (ref 0–1)
BILIRUB SERPL-MCNC: 0.1 MG/DL (ref 0–1)
BUN SERPL-MCNC: 29 MG/DL (ref 6–23)
CALCIUM SERPL-MCNC: 9.4 MG/DL (ref 8.3–10.6)
CHLORIDE BLD-SCNC: 98 MMOL/L (ref 99–110)
CO2: 32 MMOL/L (ref 21–32)
CREAT SERPL-MCNC: 0.9 MG/DL (ref 0.9–1.3)
DIFFERENTIAL TYPE: ABNORMAL
EOSINOPHILS ABSOLUTE: 0 K/CU MM
EOSINOPHILS RELATIVE PERCENT: 0.1 % (ref 0–3)
GFR SERPL CREATININE-BSD FRML MDRD: >60 ML/MIN/1.73M2
GLUCOSE BLD-MCNC: 195 MG/DL (ref 70–99)
GLUCOSE BLD-MCNC: 239 MG/DL (ref 70–99)
GLUCOSE BLD-MCNC: 270 MG/DL (ref 70–99)
GLUCOSE BLD-MCNC: 288 MG/DL (ref 70–99)
GLUCOSE SERPL-MCNC: 208 MG/DL (ref 70–99)
HCT VFR BLD CALC: 37 % (ref 42–52)
HEMOGLOBIN: 11.1 GM/DL (ref 13.5–18)
IMMATURE NEUTROPHIL %: 0.4 % (ref 0–0.43)
LYMPHOCYTES ABSOLUTE: 0.6 K/CU MM
LYMPHOCYTES RELATIVE PERCENT: 4.7 % (ref 24–44)
MCH RBC QN AUTO: 28.6 PG (ref 27–31)
MCHC RBC AUTO-ENTMCNC: 30 % (ref 32–36)
MCV RBC AUTO: 95.4 FL (ref 78–100)
MONOCYTES ABSOLUTE: 1.1 K/CU MM
MONOCYTES RELATIVE PERCENT: 8.2 % (ref 0–4)
NUCLEATED RBC %: 0 %
PDW BLD-RTO: 15.1 % (ref 11.7–14.9)
PLATELET # BLD: 353 K/CU MM (ref 140–440)
PMV BLD AUTO: 10.6 FL (ref 7.5–11.1)
POTASSIUM SERPL-SCNC: 4.9 MMOL/L (ref 3.5–5.1)
RBC # BLD: 3.88 M/CU MM (ref 4.6–6.2)
SEGMENTED NEUTROPHILS ABSOLUTE COUNT: 11.9 K/CU MM
SEGMENTED NEUTROPHILS RELATIVE PERCENT: 86.5 % (ref 36–66)
SODIUM BLD-SCNC: 135 MMOL/L (ref 135–145)
T4 FREE SERPL-MCNC: 1.44 NG/DL (ref 0.9–1.8)
TOTAL IMMATURE NEUTOROPHIL: 0.05 K/CU MM
TOTAL NUCLEATED RBC: 0 K/CU MM
TOTAL PROTEIN: 6.4 GM/DL (ref 6.4–8.2)
TROPONIN T: 0.03 NG/ML
TROPONIN T: 0.04 NG/ML
TROPONIN T: 0.04 NG/ML
TSH SERPL DL<=0.005 MIU/L-ACNC: 0.14 UIU/ML (ref 0.27–4.2)
WBC # BLD: 13.7 K/CU MM (ref 4–10.5)

## 2023-09-17 PROCEDURE — 80053 COMPREHEN METABOLIC PANEL: CPT

## 2023-09-17 PROCEDURE — 6370000000 HC RX 637 (ALT 250 FOR IP): Performed by: STUDENT IN AN ORGANIZED HEALTH CARE EDUCATION/TRAINING PROGRAM

## 2023-09-17 PROCEDURE — 36415 COLL VENOUS BLD VENIPUNCTURE: CPT

## 2023-09-17 PROCEDURE — 2700000000 HC OXYGEN THERAPY PER DAY

## 2023-09-17 PROCEDURE — 82962 GLUCOSE BLOOD TEST: CPT

## 2023-09-17 PROCEDURE — 85025 COMPLETE CBC W/AUTO DIFF WBC: CPT

## 2023-09-17 PROCEDURE — 2580000003 HC RX 258: Performed by: STUDENT IN AN ORGANIZED HEALTH CARE EDUCATION/TRAINING PROGRAM

## 2023-09-17 PROCEDURE — 89220 SPUTUM SPECIMEN COLLECTION: CPT

## 2023-09-17 PROCEDURE — 6370000000 HC RX 637 (ALT 250 FOR IP): Performed by: NURSE PRACTITIONER

## 2023-09-17 PROCEDURE — 94640 AIRWAY INHALATION TREATMENT: CPT

## 2023-09-17 PROCEDURE — 94669 MECHANICAL CHEST WALL OSCILL: CPT

## 2023-09-17 PROCEDURE — 6360000002 HC RX W HCPCS: Performed by: STUDENT IN AN ORGANIZED HEALTH CARE EDUCATION/TRAINING PROGRAM

## 2023-09-17 PROCEDURE — 99233 SBSQ HOSP IP/OBS HIGH 50: CPT | Performed by: INTERNAL MEDICINE

## 2023-09-17 PROCEDURE — 84484 ASSAY OF TROPONIN QUANT: CPT

## 2023-09-17 PROCEDURE — 84443 ASSAY THYROID STIM HORMONE: CPT

## 2023-09-17 PROCEDURE — 94667 MNPJ CHEST WALL 1ST: CPT

## 2023-09-17 PROCEDURE — 97535 SELF CARE MNGMENT TRAINING: CPT

## 2023-09-17 PROCEDURE — 84439 ASSAY OF FREE THYROXINE: CPT

## 2023-09-17 PROCEDURE — 97116 GAIT TRAINING THERAPY: CPT

## 2023-09-17 PROCEDURE — 97530 THERAPEUTIC ACTIVITIES: CPT

## 2023-09-17 PROCEDURE — 1200000000 HC SEMI PRIVATE

## 2023-09-17 PROCEDURE — 93005 ELECTROCARDIOGRAM TRACING: CPT | Performed by: NURSE PRACTITIONER

## 2023-09-17 PROCEDURE — 6370000000 HC RX 637 (ALT 250 FOR IP)

## 2023-09-17 PROCEDURE — 94761 N-INVAS EAR/PLS OXIMETRY MLT: CPT

## 2023-09-17 PROCEDURE — APPSS60 APP SPLIT SHARED TIME 46-60 MINUTES: Performed by: NURSE PRACTITIONER

## 2023-09-17 RX ORDER — FUROSEMIDE 10 MG/ML
20 INJECTION INTRAMUSCULAR; INTRAVENOUS ONCE
Status: DISCONTINUED | OUTPATIENT
Start: 2023-09-17 | End: 2023-09-17

## 2023-09-17 RX ORDER — PREDNISONE 20 MG/1
40 TABLET ORAL DAILY
Status: COMPLETED | OUTPATIENT
Start: 2023-09-17 | End: 2023-09-20

## 2023-09-17 RX ADMIN — GUAIFENESIN 400 MG: 200 SOLUTION ORAL at 09:05

## 2023-09-17 RX ADMIN — AMLODIPINE BESYLATE 10 MG: 10 TABLET ORAL at 09:04

## 2023-09-17 RX ADMIN — IPRATROPIUM BROMIDE AND ALBUTEROL SULFATE 1 DOSE: .5; 2.5 SOLUTION RESPIRATORY (INHALATION) at 23:24

## 2023-09-17 RX ADMIN — INSULIN LISPRO 2 UNITS: 100 INJECTION, SOLUTION INTRAVENOUS; SUBCUTANEOUS at 17:49

## 2023-09-17 RX ADMIN — MONTELUKAST 10 MG: 10 TABLET, FILM COATED ORAL at 21:56

## 2023-09-17 RX ADMIN — IPRATROPIUM BROMIDE AND ALBUTEROL SULFATE 1 DOSE: .5; 2.5 SOLUTION RESPIRATORY (INHALATION) at 08:36

## 2023-09-17 RX ADMIN — METFORMIN HYDROCHLORIDE 500 MG: 500 TABLET, EXTENDED RELEASE ORAL at 09:04

## 2023-09-17 RX ADMIN — INSULIN LISPRO 1 UNITS: 100 INJECTION, SOLUTION INTRAVENOUS; SUBCUTANEOUS at 13:04

## 2023-09-17 RX ADMIN — METOPROLOL SUCCINATE 25 MG: 25 TABLET, EXTENDED RELEASE ORAL at 09:04

## 2023-09-17 RX ADMIN — PREDNISONE 40 MG: 20 TABLET ORAL at 10:44

## 2023-09-17 RX ADMIN — AMOXICILLIN AND CLAVULANATE POTASSIUM 1 TABLET: 875; 125 TABLET, FILM COATED ORAL at 09:04

## 2023-09-17 RX ADMIN — IPRATROPIUM BROMIDE AND ALBUTEROL SULFATE 1 DOSE: .5; 2.5 SOLUTION RESPIRATORY (INHALATION) at 11:54

## 2023-09-17 RX ADMIN — IPRATROPIUM BROMIDE AND ALBUTEROL SULFATE 1 DOSE: .5; 2.5 SOLUTION RESPIRATORY (INHALATION) at 16:02

## 2023-09-17 RX ADMIN — AMOXICILLIN AND CLAVULANATE POTASSIUM 1 TABLET: 875; 125 TABLET, FILM COATED ORAL at 21:56

## 2023-09-17 RX ADMIN — ENOXAPARIN SODIUM 40 MG: 100 INJECTION SUBCUTANEOUS at 17:50

## 2023-09-17 RX ADMIN — IPRATROPIUM BROMIDE AND ALBUTEROL SULFATE 1 DOSE: .5; 2.5 SOLUTION RESPIRATORY (INHALATION) at 01:15

## 2023-09-17 RX ADMIN — POTASSIUM BICARBONATE 20 MEQ: 782 TABLET, EFFERVESCENT ORAL at 09:04

## 2023-09-17 RX ADMIN — PANTOPRAZOLE SODIUM 40 MG: 40 TABLET, DELAYED RELEASE ORAL at 05:39

## 2023-09-17 RX ADMIN — SODIUM CHLORIDE, PRESERVATIVE FREE 10 ML: 5 INJECTION INTRAVENOUS at 09:06

## 2023-09-17 RX ADMIN — IPRATROPIUM BROMIDE AND ALBUTEROL SULFATE 1 DOSE: .5; 2.5 SOLUTION RESPIRATORY (INHALATION) at 20:47

## 2023-09-17 RX ADMIN — ATORVASTATIN CALCIUM 10 MG: 10 TABLET, FILM COATED ORAL at 09:04

## 2023-09-17 RX ADMIN — GUAIFENESIN 400 MG: 200 SOLUTION ORAL at 17:50

## 2023-09-17 RX ADMIN — INSULIN GLARGINE 18 UNITS: 100 INJECTION, SOLUTION SUBCUTANEOUS at 21:56

## 2023-09-17 RX ADMIN — BUDESONIDE AND FORMOTEROL FUMARATE DIHYDRATE 1 PUFF: 160; 4.5 AEROSOL RESPIRATORY (INHALATION) at 08:34

## 2023-09-17 ASSESSMENT — ENCOUNTER SYMPTOMS: SHORTNESS OF BREATH: 1

## 2023-09-17 ASSESSMENT — PAIN SCALES - GENERAL: PAINLEVEL_OUTOF10: 0

## 2023-09-17 NOTE — PROGRESS NOTES
(09/15/23 0333)    dextrose       PRN Meds: guaiFENesin, 600 mg, BID PRN  albuterol sulfate HFA, 2 puff, Q4H PRN   And  ipratropium, 2 puff, Q4H PRN  sodium chloride flush, 5-40 mL, PRN  sodium chloride, , PRN  potassium chloride, 10 mEq, PRN  magnesium sulfate, 2,000 mg, PRN  ondansetron, 4 mg, Q8H PRN   Or  ondansetron, 4 mg, Q6H PRN  polyethylene glycol, 17 g, Daily PRN  acetaminophen, 650 mg, Q6H PRN   Or  acetaminophen, 650 mg, Q6H PRN  glucose, 4 tablet, PRN  dextrose bolus, 125 mL, PRN   Or  dextrose bolus, 250 mL, PRN  glucagon (rDNA), 1 mg, PRN  dextrose, , Continuous PRN        Labs      Recent Results (from the past 24 hour(s))   EKG 12 Lead    Collection Time: 09/16/23 11:19 AM   Result Value Ref Range    Ventricular Rate 73 BPM    Atrial Rate 73 BPM    P-R Interval 174 ms    QRS Duration 132 ms    Q-T Interval 386 ms    QTc Calculation (Bazett) 425 ms    P Axis 63 degrees    R Axis -69 degrees    T Axis 60 degrees    Diagnosis       Sinus rhythm with fusion complexes  Left axis deviation  Right bundle branch block  Inferior infarct , age undetermined  Abnormal ECG  When compared with ECG of 16-SEP-2023 11:18,  fusion complexes are now present  premature ventricular complexes are no longer present  Sinus rhythm is no longer with ventricular escape complexes  Ion-Parkinson-White is no longer present     Brain Natriuretic Peptide    Collection Time: 09/16/23 11:24 AM   Result Value Ref Range    Pro-BNP 1,282 (H) <300 PG/ML   Troponin    Collection Time: 09/16/23 11:24 AM   Result Value Ref Range    Troponin T 0.037 (H) <0.01 NG/ML   POCT Glucose    Collection Time: 09/16/23 12:04 PM   Result Value Ref Range    POC Glucose 211 (H) 70 - 99 MG/DL   POCT Glucose    Collection Time: 09/16/23  5:34 PM   Result Value Ref Range    POC Glucose 341 (H) 70 - 99 MG/DL   POCT Glucose    Collection Time: 09/16/23  8:42 PM   Result Value Ref Range    POC Glucose 351 (H) 70 - 99 MG/DL   POCT Glucose    Collection Time:

## 2023-09-17 NOTE — PROGRESS NOTES
Occupational Therapy    Occupational Therapy Treatment Note    Name: Elaine Seip MRN: [id number] :   1945   Date:  2023   Admission Date: 2023 Room:  [address] [place] [address]       Restrictions/Precautions:          fall risk, general precautions    Communication with other providers: RN    Subjective:  Patient states:  \"I just feel so old. I've decided to go to rehab and fix that\"  Pain:   Location, Type, Intensity (0/10 to 10/10):  denies    Objective:    Observation: supine in bed, agreeable   Objective Measures:  vitals stable on 3L O2 at rest, 4L O2 with ambulation    Treatment, including education:  Self Care Training:   Cues were given for safety, sequence, UE/LE placement, visual cues, and balance. Activities performed today included UB/LB dressing tasks    Doffed/donned gown while seated EOB w/ min A. Doffed/donned socks while seated EOB w/ SBA. Therapeutic Activity Training:   Therapeutic activity training was instructed today. Cues were given for safety, sequence, UE/LE placement, awareness, and balance. Activities performed today included bed mobility training, sup-sit, sit-stand, ambulation. Supine to sitting EOB w/ SBA. STS from EOB w/ SBA. Ambulated functional household distance ~150 ft using RW w/ SBA, Vcs for RW mgmt and proper breathing technique. Stand to sit to EOB w/ SBA, Vcs for alignment. Initially requested sink level ADLs in BR after seated RB, required extensive RB >10 min while seated EOB impacted d/t frequently coughing up mucus. Requested breathing tx, RN notified. Declined sink ADLs d/t fatigue, continued SOB and coughing, O2 90% on 4L. Agreeable to sitting up in recliner while waiting for AM meal and breathing tx. STS from EOB w/ SBA, ambulated ~3 ft w/o AD to recliner w/ CGA. Stand to sit to recliner w/ SBA, left seated in recliner w/ all needs met.      Educated on energy conservation strategies during ADL/IADL and mobility to prolong activity

## 2023-09-17 NOTE — PROGRESS NOTES
Physical Therapy    Physical Therapy Treatment Note  Name: Andi Dillard MRN: [id number] :   1945   Date:  2023   Admission Date: 2023 Room:  [address] [place], [place] [address]A   Restrictions/Precautions:          general precautions, falls   Communication with other providers:  per chart review appr. For tx. Subjective:  Patient states:  pt agreeable and motivated. Pt states fearful of returning home due to feeling  weak and lives alone. Pain:   Location, Type, Intensity (0/10 to 10/10):  no c/o pain during tx session  Objective:    Observation:  alert and oriented on 3 liters O2 in bed and 4 liters for amb. Treatment, including education/measures:  Sup<=>sit sba  Sitting EOB pt educated on importance of deep breathing and encouraged to do trunk stretches and deep breathing before amb and again after returning to room. Sit<=>stand sba  Amb with rw 160' needing cues for posture and PLB. Pt has SOB with gt. Pt stood at walker and educated on posture and was able to work on standing for 30-60 secs with out UE support and deep breathing with good standing posture. Safety  Patient left safely in the bed, with call light/phone in reach with alarm applied. Gait belt was used for transfers and gait. Assessment / Impression:      Patient's tolerance of treatment:  good   Adverse Reaction: na  Significant change in status and impact:  na  Barriers to improvement:  SOB with activity. Plan for Next Session:    Cont.  POC  Time in:  1435  Time out:  1500  Timed treatment minutes:  25  Total treatment time:  25    Previously filed items:  Social/Functional History  Lives With: Alone  Type of Home: House  Home Layout: One level  Bathroom Shower/Tub: Shower chair without back  Bathroom Toilet: Standard  Bathroom Equipment: Grab bars in [address] [place] [address]: Cane, Oxygen (3L O2)  ADL Assistance: Independent  Homemaking Assistance: Independent  Ambulation Assistance: Independent (Typically no AD)  Transfer Assistance: Independent  Active : Yes  Mode of Transportation: Car  Education: doent drive often  Occupation: Retired,   Leisure & Hobbies: cooking and writing        Short Term Goals  Time Frame for Short Term Goals: 2 weeks  Short Term Goal 1: Pt will perform sit><supine Yolanda  Short Term Goal 2: Pt will perform transfers [address] Goal 3: Pt will ambulate 200ft with LRAD Yolanda  Short Term Goal 4: Pt will perform standing light dynamic activity x 3 minutes, single UE support Yolanda    Electronically signed by:    Kelvin Dunn PTA  9/17/2023, 2:32 PM

## 2023-09-17 NOTE — PROGRESS NOTES
Cardiology Progress Note     Today's Plan: echo tomorrow    Admit Date:  9/14/2023    Consult reason/ Seen today for: SOB    Subjective and  Overnight Events:  SOB improved. History of Presenting Illness:    Chief complain on admission : 70 y. o.year old who is admitted for SOB that has progressed over the last 2 months along with generalized weakness. Past medical history:    has a past medical history of Acid reflux, Anemia, COPD (chronic obstructive pulmonary disease) ([address]), Dental decay, Diabetes mellitus ([address]), Emphysema of lung ([address]), Enlarged prostate, Stevens catheter in place, H/O cardiovascular stress test, Hematuria, HX OTHER MEDICAL, Hypertension, Low back sprain, Megacolon, On home oxygen therapy, and Wears glasses. Past surgical history:   has a past surgical history that includes Revision Colostomy (1974); Abdominal exploration surgery (8172); other surgical history; other surgical history (01 07 2013); tumor removal; Appendectomy (7years old); Abdomen surgery; TURP (N/A, 07/12/2018); Dental surgery; and other surgical history (09/18/2018). Social History:   reports that he quit smoking about 10 years ago. His smoking use included cigarettes. He has a 15.00 pack-year smoking history. He has never used smokeless tobacco. He reports that he does not drink alcohol and does not use drugs. Family history:  family history includes Coronary Art Dis in his mother; Diabetes in his mother; Other in his sister; Stroke in his brother and father. Allergies   Allergen Reactions    Lisinopril Other (See Comments)     Pt coughs uncontrollably    Dye [Iodides] Other (See Comments)     \"had iodine for IVP test- went into seizures\"    Lorazepam Other (See Comments)     CAUSES HIM TO HAVE NO SELF CONTROL       Review of Systems:  Review of Systems   Respiratory:  Positive for shortness of breath.     Cardiovascular:  Negative for chest pain, palpitations and leg **AND** ipratropium, sodium chloride flush, sodium chloride, potassium chloride, magnesium sulfate, ondansetron **OR** ondansetron, polyethylene glycol, acetaminophen **OR** acetaminophen, glucose, dextrose bolus **OR** dextrose bolus, glucagon (rDNA), dextrose    Lab Data:  CBC:   Recent Labs     09/15/23  0531 09/17/23  0758   WBC 7.5 13.7*   HGB 10.4* 11.1*   HCT 34.2* 37.0*   MCV 93.4 95.4    353     BMP:   Recent Labs     09/15/23  0531 09/17/23  0758   * 135   K 4.7 4.9   CL 96* 98*   CO2 26 32   BUN 21 29*   CREATININE 0.9 0.9     PT/INR:   Recent Labs     09/15/23  0531   PROTIME 15.7*   INR 1.2     BNP:    Recent Labs     09/16/23  1124   PROBNP 1,282*     TROPONIN:   Recent Labs     09/16/23  1124 09/17/23  0758   TROPONINT 0.037* 0.036*        Impression:  Principal Problem:    Generalized weakness  Active Problems:    Dyspnea  Resolved Problems:    * No resolved hospital problems. *             All labs, medications and tests reviewed by myself, continue all other medications of all above medical condition listed as is except for changes mentioned above. Thank you   Please call with questions. Electronically signed by ERNESTO Levine CNP on 9/17/2023 at 9:33 AM             CARDIOLOGY ATTENDING ADDENDUM    I have seen, spoken to and examined this patient personally, independent of the NP/PAC. I have reviewed the hospital care given to date and reviewed all pertinent labs and imaging. I have spoken with patient, nursing staff and provided written and verbal instructions . The above note has been reviewed. I have spent substantive amount of time in formulating patient care. Physical Exam:    General:  alert  Head: normal  Eye: normal  Chest: Bilateral wheezing on examination  Cardiovascular:  S1S2   Abdomen: soft   Extremities:  0 edema  Pulses :  palpable      MEDICAL DECISION MAKING :        SOB:COPD ? Cardiomyopathy.  Echo tomorrow  CHF:Acute on chronic

## 2023-09-18 ENCOUNTER — APPOINTMENT (OUTPATIENT)
Dept: GENERAL RADIOLOGY | Age: 78
DRG: 191 | End: 2023-09-18
Attending: INTERNAL MEDICINE
Payer: OTHER GOVERNMENT

## 2023-09-18 PROBLEM — I45.6 WPW (WOLFF-PARKINSON-WHITE SYNDROME): Status: ACTIVE | Noted: 2023-09-18

## 2023-09-18 LAB
EKG ATRIAL RATE: 69 BPM
EKG ATRIAL RATE: 73 BPM
EKG ATRIAL RATE: 80 BPM
EKG DIAGNOSIS: NORMAL
EKG P AXIS: 56 DEGREES
EKG P AXIS: 63 DEGREES
EKG P AXIS: 70 DEGREES
EKG P-R INTERVAL: 118 MS
EKG P-R INTERVAL: 126 MS
EKG P-R INTERVAL: 174 MS
EKG Q-T INTERVAL: 386 MS
EKG Q-T INTERVAL: 410 MS
EKG Q-T INTERVAL: 450 MS
EKG QRS DURATION: 132 MS
EKG QRS DURATION: 146 MS
EKG QRS DURATION: 148 MS
EKG QTC CALCULATION (BAZETT): 425 MS
EKG QTC CALCULATION (BAZETT): 472 MS
EKG QTC CALCULATION (BAZETT): 482 MS
EKG R AXIS: -44 DEGREES
EKG R AXIS: -47 DEGREES
EKG R AXIS: -69 DEGREES
EKG T AXIS: 165 DEGREES
EKG T AXIS: 170 DEGREES
EKG T AXIS: 60 DEGREES
EKG VENTRICULAR RATE: 69 BPM
EKG VENTRICULAR RATE: 73 BPM
EKG VENTRICULAR RATE: 80 BPM
GLUCOSE BLD-MCNC: 219 MG/DL (ref 70–99)
GLUCOSE BLD-MCNC: 262 MG/DL (ref 70–99)
GLUCOSE BLD-MCNC: 276 MG/DL (ref 70–99)
GLUCOSE BLD-MCNC: 348 MG/DL (ref 70–99)

## 2023-09-18 PROCEDURE — 82962 GLUCOSE BLOOD TEST: CPT

## 2023-09-18 PROCEDURE — 6370000000 HC RX 637 (ALT 250 FOR IP): Performed by: STUDENT IN AN ORGANIZED HEALTH CARE EDUCATION/TRAINING PROGRAM

## 2023-09-18 PROCEDURE — 6360000002 HC RX W HCPCS: Performed by: STUDENT IN AN ORGANIZED HEALTH CARE EDUCATION/TRAINING PROGRAM

## 2023-09-18 PROCEDURE — APPSS30 APP SPLIT SHARED TIME 16-30 MINUTES

## 2023-09-18 PROCEDURE — 6370000000 HC RX 637 (ALT 250 FOR IP)

## 2023-09-18 PROCEDURE — 93010 ELECTROCARDIOGRAM REPORT: CPT | Performed by: INTERNAL MEDICINE

## 2023-09-18 PROCEDURE — 99233 SBSQ HOSP IP/OBS HIGH 50: CPT | Performed by: INTERNAL MEDICINE

## 2023-09-18 PROCEDURE — 97530 THERAPEUTIC ACTIVITIES: CPT

## 2023-09-18 PROCEDURE — 6370000000 HC RX 637 (ALT 250 FOR IP): Performed by: NURSE PRACTITIONER

## 2023-09-18 PROCEDURE — 71045 X-RAY EXAM CHEST 1 VIEW: CPT

## 2023-09-18 PROCEDURE — 2700000000 HC OXYGEN THERAPY PER DAY

## 2023-09-18 PROCEDURE — 94761 N-INVAS EAR/PLS OXIMETRY MLT: CPT

## 2023-09-18 PROCEDURE — 1200000000 HC SEMI PRIVATE

## 2023-09-18 PROCEDURE — 94640 AIRWAY INHALATION TREATMENT: CPT

## 2023-09-18 PROCEDURE — APPNB60 APP NON BILLABLE TIME 46-60 MINS: Performed by: NURSE PRACTITIONER

## 2023-09-18 PROCEDURE — 99222 1ST HOSP IP/OBS MODERATE 55: CPT | Performed by: INTERNAL MEDICINE

## 2023-09-18 PROCEDURE — 94668 MNPJ CHEST WALL SBSQ: CPT

## 2023-09-18 PROCEDURE — 97116 GAIT TRAINING THERAPY: CPT

## 2023-09-18 RX ADMIN — INSULIN LISPRO 3 UNITS: 100 INJECTION, SOLUTION INTRAVENOUS; SUBCUTANEOUS at 16:51

## 2023-09-18 RX ADMIN — POTASSIUM BICARBONATE 20 MEQ: 782 TABLET, EFFERVESCENT ORAL at 07:57

## 2023-09-18 RX ADMIN — INSULIN GLARGINE 18 UNITS: 100 INJECTION, SOLUTION SUBCUTANEOUS at 20:43

## 2023-09-18 RX ADMIN — INSULIN LISPRO 4 UNITS: 100 INJECTION, SOLUTION INTRAVENOUS; SUBCUTANEOUS at 20:48

## 2023-09-18 RX ADMIN — METOPROLOL SUCCINATE 25 MG: 25 TABLET, EXTENDED RELEASE ORAL at 07:57

## 2023-09-18 RX ADMIN — BUDESONIDE AND FORMOTEROL FUMARATE DIHYDRATE 1 PUFF: 160; 4.5 AEROSOL RESPIRATORY (INHALATION) at 07:34

## 2023-09-18 RX ADMIN — IPRATROPIUM BROMIDE AND ALBUTEROL SULFATE 1 DOSE: .5; 2.5 SOLUTION RESPIRATORY (INHALATION) at 15:35

## 2023-09-18 RX ADMIN — IPRATROPIUM BROMIDE AND ALBUTEROL SULFATE 1 DOSE: .5; 2.5 SOLUTION RESPIRATORY (INHALATION) at 07:34

## 2023-09-18 RX ADMIN — GUAIFENESIN 400 MG: 200 SOLUTION ORAL at 16:51

## 2023-09-18 RX ADMIN — INSULIN LISPRO 1 UNITS: 100 INJECTION, SOLUTION INTRAVENOUS; SUBCUTANEOUS at 08:48

## 2023-09-18 RX ADMIN — PANTOPRAZOLE SODIUM 40 MG: 40 TABLET, DELAYED RELEASE ORAL at 04:54

## 2023-09-18 RX ADMIN — AMLODIPINE BESYLATE 10 MG: 10 TABLET ORAL at 07:57

## 2023-09-18 RX ADMIN — INSULIN LISPRO 2 UNITS: 100 INJECTION, SOLUTION INTRAVENOUS; SUBCUTANEOUS at 12:10

## 2023-09-18 RX ADMIN — AMOXICILLIN AND CLAVULANATE POTASSIUM 1 TABLET: 875; 125 TABLET, FILM COATED ORAL at 20:43

## 2023-09-18 RX ADMIN — MONTELUKAST 10 MG: 10 TABLET, FILM COATED ORAL at 20:43

## 2023-09-18 RX ADMIN — IPRATROPIUM BROMIDE AND ALBUTEROL SULFATE 1 DOSE: .5; 2.5 SOLUTION RESPIRATORY (INHALATION) at 11:12

## 2023-09-18 RX ADMIN — GUAIFENESIN 400 MG: 200 SOLUTION ORAL at 07:57

## 2023-09-18 RX ADMIN — METFORMIN HYDROCHLORIDE 500 MG: 500 TABLET, EXTENDED RELEASE ORAL at 07:58

## 2023-09-18 RX ADMIN — ENOXAPARIN SODIUM 40 MG: 100 INJECTION SUBCUTANEOUS at 16:51

## 2023-09-18 RX ADMIN — ATORVASTATIN CALCIUM 10 MG: 10 TABLET, FILM COATED ORAL at 07:57

## 2023-09-18 RX ADMIN — AMOXICILLIN AND CLAVULANATE POTASSIUM 1 TABLET: 875; 125 TABLET, FILM COATED ORAL at 07:57

## 2023-09-18 RX ADMIN — PREDNISONE 40 MG: 20 TABLET ORAL at 07:57

## 2023-09-18 RX ADMIN — IPRATROPIUM BROMIDE AND ALBUTEROL SULFATE 1 DOSE: .5; 2.5 SOLUTION RESPIRATORY (INHALATION) at 20:52

## 2023-09-18 ASSESSMENT — PAIN SCALES - GENERAL
PAINLEVEL_OUTOF10: 0

## 2023-09-18 ASSESSMENT — PAIN SCALES - WONG BAKER: WONGBAKER_NUMERICALRESPONSE: 0

## 2023-09-18 NOTE — CARE COORDINATION
Met with pt, offered list of SNF. Chose [facility] head first, and then [facility] as second choice. Referral called to both. Accepted to both [facility] and [facility]. Precert to be started [address] as that is first choice.

## 2023-09-18 NOTE — CONSULTS
0-4 Units, SubCUTAneous, Nightly  glucose chewable tablet 16 g, 4 tablet, Oral, PRN  dextrose bolus 10% 125 mL, 125 mL, IntraVENous, PRN **OR** dextrose bolus 10% 250 mL, 250 mL, IntraVENous, PRN  glucagon injection 1 mg, 1 mg, SubCUTAneous, PRN  dextrose 10 % infusion, , IntraVENous, Continuous PRN  guaiFENesin (ROBITUSSIN) 100 MG/5ML oral solution 400 mg, 400 mg, Oral, BID  potassium bicarb-citric acid (EFFER-K) effervescent tablet 20 mEq, 20 mEq, Oral, Daily    Allergies   Allergen Reactions    Lisinopril Other (See Comments)     Pt coughs uncontrollably    Dye [Iodides] Other (See Comments)     \"had iodine for IVP test- went into seizures\"    Lorazepam Other (See Comments)     CAUSES HIM TO HAVE NO SELF CONTROL       Social History:    Social History     Socioeconomic History    Marital status:     Number of children: 5   Tobacco Use    Smoking status: Former     Packs/day: 0.50     Years: 30.00     Additional pack years: 0.00     Total pack years: 15.00     Types: Cigarettes     Quit date: 10/25/2012     Years since quitting: 10.9    Smokeless tobacco: Never   Vaping Use    Vaping Use: Never used   Substance and Sexual Activity    Alcohol use: No    Drug use: No   Social History Narrative    , semi retired , retired [employer]       Family History:   Family History   Problem Relation Age of Onset    Coronary Art Dis Mother     Diabetes Mother     Stroke Father     Other Sister         aneurysm    Stroke Brother        Immunization:  Immunization History   Administered Date(s) Administered    Pneumococcal, PCV-13, PREVNAR 15, (age 6w+), IM, 0.5mL 08/11/2017         REVIEW OF SYSTEMS:    CONSTITUTIONAL:  negative for fevers, chills, diaphoresis, activity change, appetite change, fatigue, night sweats and unexpected weight change.    EYES:  negative for blurred vision, eye discharge, visual disturbance and icterus  HEENT:  negative for hearing loss, tinnitus, ear drainage, sinus pressure, or effusion of the joints. No clubbing, cyanosis of the digits. RIGHT AND LEFT LOWER EXTREMITIES: No edema, no inflammation, no tenderness. SKIN:  normal skin color, texture, turgor and no redness, warmth, or swelling. No palpable nodules    DATA:                 9/18/2023      Narrative & Impression  EXAMINATION:  ONE XRAY VIEW OF THE CHEST     9/18/2023 12:12 am     COMPARISON:  06/20/2023     HISTORY:  ORDERING SYSTEM PROVIDED HISTORY: Increased SOB, work up breathing  TECHNOLOGIST PROVIDED HISTORY:  Reason for exam:->Increased SOB, work up breathing  Reason for Exam: Increased SOB, work up breathing     FINDINGS:  There are a few bands of right greater than left of basilar chronic  atelectasis/scarring. Lungs are emphysematous and otherwise clear. No acute  confluent airspace disease, pneumothorax or pleural effusion. The heart is  borderline enlarged. Descending thoracic aorta is mildly tortuous and  ectatic, unchanged. There is moderate gaseous distention of bowel in the  upper abdomen similar to the prior study. IMPRESSION:  Emphysema with bibasilar chronic atelectasis, right greater than left. No acute disease. Moderate gaseous distention of bowel in the upper abdomen. Assessment:      Ac copd  Ch hypoxemic resp failure  Ac bronchitis          Plan:     D/w pt  Continue adequate o2 adm  Cont symbicort  On duoneb by medneb  Currently on prednisone 40 mg daily  Full pft as outpt  Cont augmentin for bronchitis  Thanks will follow

## 2023-09-18 NOTE — PROGRESS NOTES
Interpolated PVC vs Accessory pathway conduction  CAD  COPD  DM-2  HTN      Patient EKG is very interesting    Patient has sinus conduction but at times patient has qrs which almost gives appearance of conduction through accessory pathway. Patient though at baseline has right bundle branch block and intermittently with same sinus rate has a different ventricular morphology conduction. Explanation could be conduction from accessory pathway alternate beat but it could also be an interpolated PVC which is falling at the timing of sinus beat - unusual but possible. Ideally we could have got it evaluated to see what happens with GXT but patient report he cannot walk on treadmill. It could have given us information regarding the conduction at higher rates and if this arrhythmia exists or not with exertion. Even if its conduction through pathway it may not be that robust pathway.      Please get an echo  If symptomatic may consider EP study as an out patient    Full note to follow

## 2023-09-18 NOTE — PROGRESS NOTES
Patient complaining of difficulty breathing, tightness in chest and coughing frequently. Patient using oral suction without any relief. Patient O2 sat 87% on 4L. Sat patient up and increased O2 to 6L, O2 increased to 92%. RT called to give PRN breathing tx. RT at [facility]. Provider notified. 1155 - Pt O2 97% on 6L, decreased down to 4L d/t base being 3L. Patient states he is feeling better, walked to bathroom against education to wait a little longer. Returned to bed feeling slightly \"winded\". After resting and educating pt to take deep breaths pt able to adjust self in bed. Pts O2 95% on 4L. STAT CXR ordered, awaiting radiology to come to bedside at this time.

## 2023-09-18 NOTE — PROGRESS NOTES
V2.0  [facility] [place]      Name:  Naomy Jurado /Age/Sex: 1945  (89 y.o. male)   MRN & CSN:  [id number] & [id number] Encounter Date/Time: 2023 12:28 PM EDT    Location:  [address] [place] [address]-A PCP: [facility] Day: 5    Assessment and Plan:   Naomy Jurado is a 70 y.o. male with pmh of severe COPD, chronic respiratory failure on 3 L NC, hypertension, hyperlipidemia, type 2 diabetes who presents with Generalized weakness. Pending echocardiogram.  SNF placement. Referral made by . Plan:  Dyspnea  -Concerns of CHF exacerbation  -Worsening symptoms since 2 to 3 months ago, intolerance of activity  -Had history of elevated troponin  -Cardiac cath in  showed upper normal LV function with LVEF 35 to 40%  -But echocardiogram in the same year showed normal systolic function with LVEF of 55 to 60%  -BNP elevated 1200  -Appreciate cardiology input: No symptoms of congestive heart failure, we will obtain transthoracic echocardiogram Monday morning. Possible Takotsubo with COPD, pulmonology consult. Hold off diuretics for now. Suggest steroids treatment  -Pending echocardiogram     Generalized weakness  -Severe shortness of breath with a few steps walking  -Started 2 to 3 months ago  -OT recommended discharge to SNF  - is on board for referral to nursing home     Very severe COPD  Chronic hypoxia hypercapnia respiratory failure  -Currently on 3 L NC on his baseline  -Very mild wheezing  -Continue breathing treatment and Augmentin  -Restart prednisone 40 Mg daily     GERD  -Continue home PPI  -Outpatient follow-up with GI for EGD     # Non-MI troponin elevation  -History of elevated troponin  -We will check troponin level     # Essential hypretension  - Continue home Norvasc and Toprol-XL. # Hypomagnesemia  - Moderate, repleted. - Follow-up repeat labs.      # T2DM with hyperglycemia  - Ha1c 8.8   -Glucose is poorly controlled, increase Lantus to

## 2023-09-18 NOTE — PROGRESS NOTES
Occupational Therapy    Attempted at 1505 hrs c pt received in semi-fowlers in bed. Pt conversant and expressing feeling \"almost depressed\" about COPD condition. Reports worked c PT earlier and feels too fatigued to participate in therapy at this time. Pt provided educational review of role of OT and rationale for therapeutic intervention. Emotional support given. Plan is to re-attempt as schedule allows.      Electronically signed by:    PIA Naqvi  9/18/2023, 1:33 PM

## 2023-09-18 NOTE — PROGRESS NOTES
Yolanda  Short Term Goal 4: Pt will perform standing light dynamic activity x 3 minutes, single UE support Yolanda    Electronically signed by:    Juan Stuart PTA  9/18/2023, 2:12 PM

## 2023-09-18 NOTE — CONSULTS
Electrophysiology Consult Note      Reason for consultation: WPW    Chief complaint: shortness of breath    Referring physician:  Dr Felisa Villarreal      Primary care physician: Madelaine Lozada      History of Present Illness:     Phuong Coates is a 70year old male with a history of COPD, diabetes type 2, hypertension, chronic respiratory failure on chronic oxygen. He presented with complaints of fatigue and shortness of breath. He reports that he has had gradually worsening fatigue and shortness of breath with cough over the past 3-4 days. He denies aggravating or alleviating factors. He has associated headache and body aches also. He states that he has also noted to have a decreased  appetite. He originally presented to the [facility] ED and was then transferred to [facility] for treatment and management of exacerbation of COPD. On EKG there was concern for WPW. Patient denies chest pain, palpitations, lightheadedness, dizziness, edema or syncope. Patient reports that he does not have much energy and is unable to ambulate very far. He will become fatigued very quickly.    Most recent labs , K 4.9, Creatinine 0.9, ProBNP 1282, Troponin 0.028, TSH 0.137  EF in 2021 55-60%  LHC in 2021 revealed no significant CAD      Past medical history:   Past Medical History:   Diagnosis Date    Acid reflux     Anemia     \"dx age 10    COPD (chronic obstructive pulmonary disease) ([address])     follows with [address] decay     Diabetes mellitus ([address])     \"dx 2015 or so\"    Emphysema of lung ([address])     Enlarged prostate     Stevens catheter in place     \"put in catheter since mid April 2018\"    H/O cardiovascular stress test 05/01/2018    Nml, 62%    Hematuria     dx with admission 4/29/2018    HX OTHER MEDICAL     \"tried 3 times to do sleep study could not tolerate    Hypertension     per pt on 7/11/2018\"my blood pressure has been low so stopped the Amlodopine for now\"    Low back sprain 1983    Megacolon

## 2023-09-19 PROBLEM — I49.3: Status: ACTIVE | Noted: 2023-09-19

## 2023-09-19 LAB
GLUCOSE BLD-MCNC: 128 MG/DL (ref 70–99)
GLUCOSE BLD-MCNC: 239 MG/DL (ref 70–99)
GLUCOSE BLD-MCNC: 332 MG/DL (ref 70–99)
GLUCOSE BLD-MCNC: 411 MG/DL (ref 70–99)
PRO-BNP: 869.1 PG/ML

## 2023-09-19 PROCEDURE — 6370000000 HC RX 637 (ALT 250 FOR IP)

## 2023-09-19 PROCEDURE — 97116 GAIT TRAINING THERAPY: CPT

## 2023-09-19 PROCEDURE — 94640 AIRWAY INHALATION TREATMENT: CPT

## 2023-09-19 PROCEDURE — 1200000000 HC SEMI PRIVATE

## 2023-09-19 PROCEDURE — 2700000000 HC OXYGEN THERAPY PER DAY

## 2023-09-19 PROCEDURE — 6370000000 HC RX 637 (ALT 250 FOR IP): Performed by: STUDENT IN AN ORGANIZED HEALTH CARE EDUCATION/TRAINING PROGRAM

## 2023-09-19 PROCEDURE — 99232 SBSQ HOSP IP/OBS MODERATE 35: CPT | Performed by: INTERNAL MEDICINE

## 2023-09-19 PROCEDURE — 97110 THERAPEUTIC EXERCISES: CPT

## 2023-09-19 PROCEDURE — 94761 N-INVAS EAR/PLS OXIMETRY MLT: CPT

## 2023-09-19 PROCEDURE — 6370000000 HC RX 637 (ALT 250 FOR IP): Performed by: INTERNAL MEDICINE

## 2023-09-19 PROCEDURE — 97530 THERAPEUTIC ACTIVITIES: CPT

## 2023-09-19 PROCEDURE — 83880 ASSAY OF NATRIURETIC PEPTIDE: CPT

## 2023-09-19 PROCEDURE — 6370000000 HC RX 637 (ALT 250 FOR IP): Performed by: NURSE PRACTITIONER

## 2023-09-19 PROCEDURE — 36415 COLL VENOUS BLD VENIPUNCTURE: CPT

## 2023-09-19 PROCEDURE — 82962 GLUCOSE BLOOD TEST: CPT

## 2023-09-19 PROCEDURE — APPSS30 APP SPLIT SHARED TIME 16-30 MINUTES

## 2023-09-19 PROCEDURE — 6360000002 HC RX W HCPCS: Performed by: STUDENT IN AN ORGANIZED HEALTH CARE EDUCATION/TRAINING PROGRAM

## 2023-09-19 RX ORDER — INSULIN LISPRO 100 [IU]/ML
0-8 INJECTION, SOLUTION INTRAVENOUS; SUBCUTANEOUS
Status: DISCONTINUED | OUTPATIENT
Start: 2023-09-19 | End: 2023-09-19

## 2023-09-19 RX ORDER — INSULIN LISPRO 100 [IU]/ML
0-4 INJECTION, SOLUTION INTRAVENOUS; SUBCUTANEOUS NIGHTLY
Status: DISCONTINUED | OUTPATIENT
Start: 2023-09-19 | End: 2023-09-20 | Stop reason: HOSPADM

## 2023-09-19 RX ORDER — INSULIN LISPRO 100 [IU]/ML
0-4 INJECTION, SOLUTION INTRAVENOUS; SUBCUTANEOUS NIGHTLY
Status: DISCONTINUED | OUTPATIENT
Start: 2023-09-19 | End: 2023-09-19

## 2023-09-19 RX ORDER — INSULIN LISPRO 100 [IU]/ML
0-16 INJECTION, SOLUTION INTRAVENOUS; SUBCUTANEOUS
Status: DISCONTINUED | OUTPATIENT
Start: 2023-09-19 | End: 2023-09-20 | Stop reason: HOSPADM

## 2023-09-19 RX ADMIN — IPRATROPIUM BROMIDE AND ALBUTEROL SULFATE 1 DOSE: .5; 2.5 SOLUTION RESPIRATORY (INHALATION) at 11:50

## 2023-09-19 RX ADMIN — IPRATROPIUM BROMIDE AND ALBUTEROL SULFATE 1 DOSE: .5; 2.5 SOLUTION RESPIRATORY (INHALATION) at 07:56

## 2023-09-19 RX ADMIN — POTASSIUM BICARBONATE 20 MEQ: 782 TABLET, EFFERVESCENT ORAL at 09:08

## 2023-09-19 RX ADMIN — IPRATROPIUM BROMIDE AND ALBUTEROL SULFATE 1 DOSE: .5; 2.5 SOLUTION RESPIRATORY (INHALATION) at 15:30

## 2023-09-19 RX ADMIN — BUDESONIDE AND FORMOTEROL FUMARATE DIHYDRATE 1 PUFF: 160; 4.5 AEROSOL RESPIRATORY (INHALATION) at 07:57

## 2023-09-19 RX ADMIN — ENOXAPARIN SODIUM 40 MG: 100 INJECTION SUBCUTANEOUS at 17:07

## 2023-09-19 RX ADMIN — METFORMIN HYDROCHLORIDE 500 MG: 500 TABLET, EXTENDED RELEASE ORAL at 09:08

## 2023-09-19 RX ADMIN — AMOXICILLIN AND CLAVULANATE POTASSIUM 1 TABLET: 875; 125 TABLET, FILM COATED ORAL at 22:01

## 2023-09-19 RX ADMIN — MONTELUKAST 10 MG: 10 TABLET, FILM COATED ORAL at 22:01

## 2023-09-19 RX ADMIN — GUAIFENESIN 400 MG: 200 SOLUTION ORAL at 17:08

## 2023-09-19 RX ADMIN — INSULIN LISPRO 4 UNITS: 100 INJECTION, SOLUTION INTRAVENOUS; SUBCUTANEOUS at 22:02

## 2023-09-19 RX ADMIN — PREDNISONE 40 MG: 20 TABLET ORAL at 09:07

## 2023-09-19 RX ADMIN — METOPROLOL SUCCINATE 25 MG: 25 TABLET, EXTENDED RELEASE ORAL at 09:08

## 2023-09-19 RX ADMIN — GUAIFENESIN 600 MG: 600 TABLET, EXTENDED RELEASE ORAL at 22:01

## 2023-09-19 RX ADMIN — IPRATROPIUM BROMIDE AND ALBUTEROL SULFATE 1 DOSE: .5; 2.5 SOLUTION RESPIRATORY (INHALATION) at 20:44

## 2023-09-19 RX ADMIN — AMLODIPINE BESYLATE 10 MG: 10 TABLET ORAL at 09:08

## 2023-09-19 RX ADMIN — AMOXICILLIN AND CLAVULANATE POTASSIUM 1 TABLET: 875; 125 TABLET, FILM COATED ORAL at 09:08

## 2023-09-19 RX ADMIN — GUAIFENESIN 600 MG: 600 TABLET, EXTENDED RELEASE ORAL at 05:39

## 2023-09-19 RX ADMIN — ATORVASTATIN CALCIUM 10 MG: 10 TABLET, FILM COATED ORAL at 09:07

## 2023-09-19 RX ADMIN — IPRATROPIUM BROMIDE AND ALBUTEROL SULFATE 1 DOSE: .5; 2.5 SOLUTION RESPIRATORY (INHALATION) at 03:28

## 2023-09-19 RX ADMIN — INSULIN LISPRO 1 UNITS: 100 INJECTION, SOLUTION INTRAVENOUS; SUBCUTANEOUS at 12:26

## 2023-09-19 RX ADMIN — INSULIN LISPRO 16 UNITS: 100 INJECTION, SOLUTION INTRAVENOUS; SUBCUTANEOUS at 17:07

## 2023-09-19 RX ADMIN — PANTOPRAZOLE SODIUM 40 MG: 40 TABLET, DELAYED RELEASE ORAL at 05:37

## 2023-09-19 RX ADMIN — INSULIN GLARGINE 18 UNITS: 100 INJECTION, SOLUTION SUBCUTANEOUS at 22:01

## 2023-09-19 ASSESSMENT — PAIN SCALES - GENERAL
PAINLEVEL_OUTOF10: 0
PAINLEVEL_OUTOF10: 0

## 2023-09-19 ASSESSMENT — ENCOUNTER SYMPTOMS
BLOOD IN STOOL: 0
COLOR CHANGE: 0
PHOTOPHOBIA: 0
EYE PAIN: 0
NAUSEA: 0
ABDOMINAL PAIN: 0
WHEEZING: 0
COUGH: 0
DIARRHEA: 0
CHEST TIGHTNESS: 0
EYE REDNESS: 0
VOMITING: 0
SINUS PAIN: 0
COUGH: 1
SHORTNESS OF BREATH: 1
SORE THROAT: 0
BACK PAIN: 0
CONSTIPATION: 0
SINUS PRESSURE: 0
EYE ITCHING: 0

## 2023-09-19 NOTE — CARE COORDINATION
Precert initiated via [employer] for [facility] member ID [id number]  [id number]  [facility]  09/19/2023 09/19/2023  Pending    Received call requesting updated clinical/ uploaded to [email or web address] portal.    Pt may need P2P.

## 2023-09-19 NOTE — PROGRESS NOTES
Physical Therapy  Name: aCrl Baltazar MRN: [id number] :   1945   Date:  2023   Admission Date: 2023 [facility]   Restrictions/Precautions:         fall risk, gen precautions  Communication with other providers:  Suadrshan Clement RN states pt is ok to see for therapy  Subjective:  Patient states:  he wants to do therapy, he likes to test his limits with walking, ex and ADL's to see if he can get better because he gets so winded with activity  Pain:   Location, Type, Intensity (0/10 to 10/10):  0/10  Objective:    Observation:  pt was sitting up in the chair  Treatment, including education/measures:  Sitting Exercises: Ankle pumps x 10  Glute sets x 10  LAQ's x 10  Marching x 10   Therapeutic Exercise:  Therapeutic exercises were instructed today. Cues were given for technique, safety, recruitment, and rationale. Cues were verbal and/or tactile. Transfers with line management of tele and O2  Rolling: Ind  Sit to supine :CGA for LE's  Scooting :SBA  Sit to stand :CGA  Stand to sit :CGA  Pt returned from his walk and sat in the chair. Pt then asked to return to bed to recover from his walk  SPT:chair to bed with CGA and RW  Gait:  Pt amb with RW for 100 ft x 2 with CGA and assist for equipment. Pt has a slow pace and becomes sob with activity  Pt needed VC's for safety, energy conservation and not to stretch the boundaries in a negative way  Gait Comments: with VC's' and TC's for B LE placement, walker placement and sequence throughout ambulation; with VC's and TC's to maintain upright posture in order to avoid COM shifting outside of TIKA; with VC's for PLB throughout ambulation  Safety  Patient left safely in the bed, with call light/phone in reach. Gait belt was used for transfers and gait.   Assessment / Impression:     Patient's tolerance of treatment:  good, pt having trouble with when to push and when to limit self with activities   Adverse Reaction: sob with gt  Significant change in status and

## 2023-09-19 NOTE — PROGRESS NOTES
V2.0  [facility] [place]      Name:  Trina Riley /Age/Sex: 1945  (83 y.o. male)   MRN & CSN:  [id number] & [id number] Encounter Date/Time: 2023 12:28 PM EDT    Location:  [address] [place] [address]-A PCP: [facility] Day: 6    Assessment and Plan:   Trina Riley is a 70 y.o. male with pmh of severe COPD, chronic respiratory failure on 3 L NC, hypertension, hyperlipidemia, type 2 diabetes who presents with Generalized weakness. Pending echocardiogram.  SNF placement. Referral made by . Plan:  COPD Exacerbation, Less likely HFpEF  -Worsening symptoms since 2 to 3 months ago, intolerance of activity  -Had history of elevated troponin  -Cardiac cath in  showed upper normal LV function with LVEF 35 to 40%  -But echocardiogram in the same year showed normal systolic function with LVEF of 55 to 60%  - Pulm on board   - On augmentin 875-125mg Q12H PO   - On prednisone 40mg PO Daily      Generalized weakness  -Severe shortness of breath with a few steps walking  -Started 2 to 3 months ago  -PT/OT recommended discharge to SNF - pending pre-cert. Very severe COPD  Chronic hypoxia hypercapnia respiratory failure  -Currently on 3 L NC - which is his baseline  -Very mild wheezing initially, not wheezing today. - On steroids   - Pulm on board      GERD  -Continue home PPI  -Outpatient follow-up with GI for EGD     # Non-MI troponin elevation  -History of elevated troponin     # Essential hypretension  - Continue home Norvasc and Toprol-XL. # Hypomagnesemia  - Moderate, repleted. - Follow-up repeat labs. # T2DM with hyperglycemia  - Hba1c 8.8   -Glucose is poorly controlled, On Lantus 18 units nightly, increase to MDSS. Continue metformin 500mg daily.   - Likely worsened due to steroids     ? 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  - EP on board   - Outpatient EP study   - EP recommending echo     Diet ADULT DIET; Regular; 5 carb choices (75 gm/meal);  Low Fat/Low Chol/High Inhalation Q4H WA RT    amoxicillin-clavulanate  1 tablet Oral 2 times per day    metFORMIN  500 mg Oral Daily with breakfast    amLODIPine  10 mg Oral Daily    atorvastatin  10 mg Oral Daily    budesonide-formoterol  1 puff Inhalation Daily    metoprolol succinate  25 mg Oral Daily    montelukast  10 mg Oral Nightly    pantoprazole  40 mg Oral QAM AC    sodium chloride flush  5-40 mL IntraVENous 2 times per day    enoxaparin  40 mg SubCUTAneous QPM    insulin lispro  0-4 Units SubCUTAneous TID WC    insulin lispro  0-4 Units SubCUTAneous Nightly    guaiFENesin  400 mg Oral BID    potassium bicarb-citric acid  20 mEq Oral Daily      Infusions:    sodium chloride Stopped (09/15/23 0333)    dextrose       PRN Meds: guaiFENesin, 600 mg, BID PRN  albuterol sulfate HFA, 2 puff, Q4H PRN   And  ipratropium, 2 puff, Q4H PRN  sodium chloride flush, 5-40 mL, PRN  sodium chloride, , PRN  potassium chloride, 10 mEq, PRN  magnesium sulfate, 2,000 mg, PRN  ondansetron, 4 mg, Q8H PRN   Or  ondansetron, 4 mg, Q6H PRN  polyethylene glycol, 17 g, Daily PRN  acetaminophen, 650 mg, Q6H PRN   Or  acetaminophen, 650 mg, Q6H PRN  glucose, 4 tablet, PRN  dextrose bolus, 125 mL, PRN   Or  dextrose bolus, 250 mL, PRN  glucagon (rDNA), 1 mg, PRN  dextrose, , Continuous PRN        Labs      Recent Results (from the past 24 hour(s))   POCT Glucose    Collection Time: 09/18/23  4:45 PM   Result Value Ref Range    POC Glucose 276 (H) 70 - 99 MG/DL   POCT Glucose    Collection Time: 09/18/23  8:26 PM   Result Value Ref Range    POC Glucose 348 (H) 70 - 99 MG/DL   POCT Glucose    Collection Time: 09/19/23  7:51 AM   Result Value Ref Range    POC Glucose 128 (H) 70 - 99 MG/DL   POCT Glucose    Collection Time: 09/19/23 11:52 AM   Result Value Ref Range    POC Glucose 239 (H) 70 - 99 MG/DL      Results       ** No results found for the last 336 hours.  **              Imaging/Diagnostics Last 24 Hours   XR CHEST PORTABLE    Result Date:

## 2023-09-19 NOTE — CARE COORDINATION
Ins has asked for more clinicals, and may need P2P. Dr Cee Esparza aware. CUrrently Precert pending for [employer] at this time. If approved after hours and medically stable:  Discharging Nurse; Upon discharge pt will be going to [facility], report to be called to [phone], ask for fax number to send AVS when calling report. Set up transportation with [address] [place] [address] Road [employer] [phone].

## 2023-09-20 VITALS
WEIGHT: 186.07 LBS | OXYGEN SATURATION: 95 % | DIASTOLIC BLOOD PRESSURE: 75 MMHG | SYSTOLIC BLOOD PRESSURE: 102 MMHG | RESPIRATION RATE: 18 BRPM | TEMPERATURE: 97.8 F | HEART RATE: 82 BPM | BODY MASS INDEX: 25.95 KG/M2

## 2023-09-20 LAB
ANION GAP SERPL CALCULATED.3IONS-SCNC: 10 MMOL/L (ref 4–16)
BASOPHILS ABSOLUTE: 0 K/CU MM
BASOPHILS RELATIVE PERCENT: 0.2 % (ref 0–1)
BUN SERPL-MCNC: 24 MG/DL (ref 6–23)
CALCIUM SERPL-MCNC: 8.9 MG/DL (ref 8.3–10.6)
CHLORIDE BLD-SCNC: 98 MMOL/L (ref 99–110)
CO2: 31 MMOL/L (ref 21–32)
CREAT SERPL-MCNC: 1 MG/DL (ref 0.9–1.3)
DIFFERENTIAL TYPE: ABNORMAL
EOSINOPHILS ABSOLUTE: 0.1 K/CU MM
EOSINOPHILS RELATIVE PERCENT: 0.6 % (ref 0–3)
ESTIMATED AVERAGE GLUCOSE: 209 MG/DL
GFR SERPL CREATININE-BSD FRML MDRD: >60 ML/MIN/1.73M2
GLUCOSE BLD-MCNC: 129 MG/DL (ref 70–99)
GLUCOSE BLD-MCNC: 263 MG/DL (ref 70–99)
GLUCOSE BLD-MCNC: 312 MG/DL (ref 70–99)
GLUCOSE SERPL-MCNC: 282 MG/DL (ref 70–99)
HBA1C MFR BLD: 8.9 % (ref 4.2–6.3)
HCT VFR BLD CALC: 37.5 % (ref 42–52)
HEMOGLOBIN: 11 GM/DL (ref 13.5–18)
IMMATURE NEUTROPHIL %: 0.6 % (ref 0–0.43)
LYMPHOCYTES ABSOLUTE: 0.6 K/CU MM
LYMPHOCYTES RELATIVE PERCENT: 4.5 % (ref 24–44)
MAGNESIUM: 1.4 MG/DL (ref 1.8–2.4)
MCH RBC QN AUTO: 28.3 PG (ref 27–31)
MCHC RBC AUTO-ENTMCNC: 29.3 % (ref 32–36)
MCV RBC AUTO: 96.4 FL (ref 78–100)
MONOCYTES ABSOLUTE: 0.8 K/CU MM
MONOCYTES RELATIVE PERCENT: 5.8 % (ref 0–4)
NUCLEATED RBC %: 0 %
PDW BLD-RTO: 14.9 % (ref 11.7–14.9)
PHOSPHORUS: 3 MG/DL (ref 2.5–4.9)
PLATELET # BLD: 332 K/CU MM (ref 140–440)
PMV BLD AUTO: 10.6 FL (ref 7.5–11.1)
POTASSIUM SERPL-SCNC: 5.1 MMOL/L (ref 3.5–5.1)
RBC # BLD: 3.89 M/CU MM (ref 4.6–6.2)
SEGMENTED NEUTROPHILS ABSOLUTE COUNT: 11.6 K/CU MM
SEGMENTED NEUTROPHILS RELATIVE PERCENT: 88.3 % (ref 36–66)
SODIUM BLD-SCNC: 139 MMOL/L (ref 135–145)
TOTAL IMMATURE NEUTOROPHIL: 0.08 K/CU MM
TOTAL NUCLEATED RBC: 0 K/CU MM
WBC # BLD: 13.1 K/CU MM (ref 4–10.5)

## 2023-09-20 PROCEDURE — 89220 SPUTUM SPECIMEN COLLECTION: CPT

## 2023-09-20 PROCEDURE — 6370000000 HC RX 637 (ALT 250 FOR IP): Performed by: FAMILY MEDICINE

## 2023-09-20 PROCEDURE — 94668 MNPJ CHEST WALL SBSQ: CPT

## 2023-09-20 PROCEDURE — 80048 BASIC METABOLIC PNL TOTAL CA: CPT

## 2023-09-20 PROCEDURE — 6360000002 HC RX W HCPCS: Performed by: INTERNAL MEDICINE

## 2023-09-20 PROCEDURE — 94640 AIRWAY INHALATION TREATMENT: CPT

## 2023-09-20 PROCEDURE — 97110 THERAPEUTIC EXERCISES: CPT

## 2023-09-20 PROCEDURE — 6370000000 HC RX 637 (ALT 250 FOR IP): Performed by: STUDENT IN AN ORGANIZED HEALTH CARE EDUCATION/TRAINING PROGRAM

## 2023-09-20 PROCEDURE — 36415 COLL VENOUS BLD VENIPUNCTURE: CPT

## 2023-09-20 PROCEDURE — 84100 ASSAY OF PHOSPHORUS: CPT

## 2023-09-20 PROCEDURE — 83036 HEMOGLOBIN GLYCOSYLATED A1C: CPT

## 2023-09-20 PROCEDURE — 2700000000 HC OXYGEN THERAPY PER DAY

## 2023-09-20 PROCEDURE — 94761 N-INVAS EAR/PLS OXIMETRY MLT: CPT

## 2023-09-20 PROCEDURE — 2580000003 HC RX 258: Performed by: INTERNAL MEDICINE

## 2023-09-20 PROCEDURE — 82962 GLUCOSE BLOOD TEST: CPT

## 2023-09-20 PROCEDURE — 85025 COMPLETE CBC W/AUTO DIFF WBC: CPT

## 2023-09-20 PROCEDURE — 6370000000 HC RX 637 (ALT 250 FOR IP): Performed by: INTERNAL MEDICINE

## 2023-09-20 PROCEDURE — 6370000000 HC RX 637 (ALT 250 FOR IP): Performed by: NURSE PRACTITIONER

## 2023-09-20 PROCEDURE — 83735 ASSAY OF MAGNESIUM: CPT

## 2023-09-20 RX ORDER — BUDESONIDE AND FORMOTEROL FUMARATE DIHYDRATE 160; 4.5 UG/1; UG/1
2 AEROSOL RESPIRATORY (INHALATION) 2 TIMES DAILY
Qty: 10.2 G | Refills: 0 | Status: SHIPPED | OUTPATIENT
Start: 2023-09-20

## 2023-09-20 RX ORDER — INSULIN LISPRO 100 [IU]/ML
5 INJECTION, SOLUTION INTRAVENOUS; SUBCUTANEOUS
Qty: 5 ADJUSTABLE DOSE PRE-FILLED PEN SYRINGE | Refills: 0 | Status: SHIPPED | OUTPATIENT
Start: 2023-09-20 | End: 2023-09-20 | Stop reason: SDUPTHER

## 2023-09-20 RX ORDER — BUDESONIDE AND FORMOTEROL FUMARATE DIHYDRATE 160; 4.5 UG/1; UG/1
2 AEROSOL RESPIRATORY (INHALATION) 2 TIMES DAILY
Qty: 10.2 G | Refills: 0 | Status: SHIPPED | OUTPATIENT
Start: 2023-09-20 | End: 2023-09-20 | Stop reason: SDUPTHER

## 2023-09-20 RX ORDER — INSULIN GLARGINE 100 [IU]/ML
20 INJECTION, SOLUTION SUBCUTANEOUS NIGHTLY
Qty: 5 ADJUSTABLE DOSE PRE-FILLED PEN SYRINGE | Refills: 0 | Status: SHIPPED | OUTPATIENT
Start: 2023-09-20 | End: 2023-09-20 | Stop reason: SDUPTHER

## 2023-09-20 RX ORDER — GUAIFENESIN 400 MG/1
400 TABLET ORAL 2 TIMES DAILY
Qty: 56 TABLET | Refills: 0 | Status: SHIPPED | OUTPATIENT
Start: 2023-09-20 | End: 2023-09-20 | Stop reason: SDUPTHER

## 2023-09-20 RX ORDER — METFORMIN HYDROCHLORIDE 500 MG/1
500 TABLET, EXTENDED RELEASE ORAL
Qty: 30 TABLET | Refills: 0 | Status: SHIPPED | OUTPATIENT
Start: 2023-09-20

## 2023-09-20 RX ORDER — AMLODIPINE BESYLATE 10 MG/1
10 TABLET ORAL DAILY
Qty: 30 TABLET | Refills: 0 | Status: SHIPPED | OUTPATIENT
Start: 2023-09-20

## 2023-09-20 RX ORDER — TIOTROPIUM BROMIDE 18 UG/1
18 CAPSULE ORAL; RESPIRATORY (INHALATION) DAILY
Qty: 30 CAPSULE | Refills: 0 | Status: SHIPPED | OUTPATIENT
Start: 2023-09-20 | End: 2023-09-20 | Stop reason: SDUPTHER

## 2023-09-20 RX ORDER — METFORMIN HYDROCHLORIDE 500 MG/1
500 TABLET, EXTENDED RELEASE ORAL
Qty: 30 TABLET | Refills: 0 | Status: SHIPPED | OUTPATIENT
Start: 2023-09-20 | End: 2023-09-20 | Stop reason: SDUPTHER

## 2023-09-20 RX ORDER — TIOTROPIUM BROMIDE 18 UG/1
18 CAPSULE ORAL; RESPIRATORY (INHALATION) DAILY
Qty: 30 CAPSULE | Refills: 0 | Status: SHIPPED | OUTPATIENT
Start: 2023-09-20

## 2023-09-20 RX ORDER — ALBUTEROL SULFATE 0.63 MG/3ML
1 SOLUTION RESPIRATORY (INHALATION) EVERY 6 HOURS PRN
Qty: 270 ML | Refills: 0 | Status: SHIPPED | OUTPATIENT
Start: 2023-09-20

## 2023-09-20 RX ORDER — GUAIFENESIN 400 MG/1
400 TABLET ORAL 2 TIMES DAILY
Qty: 56 TABLET | Refills: 0 | Status: SHIPPED | OUTPATIENT
Start: 2023-09-20

## 2023-09-20 RX ORDER — ALBUTEROL SULFATE 90 UG/1
2 AEROSOL, METERED RESPIRATORY (INHALATION) EVERY 6 HOURS PRN
Qty: 18 G | Refills: 0 | Status: SHIPPED | OUTPATIENT
Start: 2023-09-20

## 2023-09-20 RX ORDER — INSULIN GLARGINE 100 [IU]/ML
20 INJECTION, SOLUTION SUBCUTANEOUS NIGHTLY
Qty: 5 ADJUSTABLE DOSE PRE-FILLED PEN SYRINGE | Refills: 0 | Status: SHIPPED | OUTPATIENT
Start: 2023-09-20

## 2023-09-20 RX ORDER — IPRATROPIUM BROMIDE AND ALBUTEROL SULFATE 2.5; .5 MG/3ML; MG/3ML
1 SOLUTION RESPIRATORY (INHALATION) EVERY 4 HOURS PRN
Qty: 360 ML | Refills: 0 | Status: SHIPPED | OUTPATIENT
Start: 2023-09-20 | End: 2023-09-20 | Stop reason: SDUPTHER

## 2023-09-20 RX ORDER — IPRATROPIUM BROMIDE AND ALBUTEROL SULFATE 2.5; .5 MG/3ML; MG/3ML
1 SOLUTION RESPIRATORY (INHALATION) EVERY 4 HOURS PRN
Qty: 360 ML | Refills: 0 | Status: SHIPPED | OUTPATIENT
Start: 2023-09-20 | End: 2023-10-20

## 2023-09-20 RX ORDER — ALBUTEROL SULFATE 0.63 MG/3ML
1 SOLUTION RESPIRATORY (INHALATION) EVERY 6 HOURS PRN
Qty: 270 ML | Refills: 0 | Status: SHIPPED | OUTPATIENT
Start: 2023-09-20 | End: 2023-09-20 | Stop reason: SDUPTHER

## 2023-09-20 RX ORDER — CALCIUM CARBONATE/VITAMIN D3 500-10/5ML
400 LIQUID (ML) ORAL DAILY
Qty: 30 CAPSULE | Refills: 0 | Status: SHIPPED | OUTPATIENT
Start: 2023-09-20

## 2023-09-20 RX ORDER — AMLODIPINE BESYLATE 10 MG/1
10 TABLET ORAL DAILY
Qty: 30 TABLET | Refills: 0 | Status: SHIPPED | OUTPATIENT
Start: 2023-09-20 | End: 2023-09-20 | Stop reason: SDUPTHER

## 2023-09-20 RX ORDER — ALBUTEROL SULFATE 90 UG/1
2 AEROSOL, METERED RESPIRATORY (INHALATION) EVERY 6 HOURS PRN
Qty: 18 G | Refills: 3 | Status: SHIPPED | OUTPATIENT
Start: 2023-09-20 | End: 2023-09-20 | Stop reason: SDUPTHER

## 2023-09-20 RX ORDER — INSULIN LISPRO 100 [IU]/ML
5 INJECTION, SOLUTION INTRAVENOUS; SUBCUTANEOUS
Qty: 5 ADJUSTABLE DOSE PRE-FILLED PEN SYRINGE | Refills: 0 | Status: SHIPPED | OUTPATIENT
Start: 2023-09-20

## 2023-09-20 RX ORDER — CALCIUM CARBONATE/VITAMIN D3 500-10/5ML
400 LIQUID (ML) ORAL DAILY
Qty: 30 CAPSULE | Refills: 0 | Status: SHIPPED | OUTPATIENT
Start: 2023-09-20 | End: 2023-09-20 | Stop reason: SDUPTHER

## 2023-09-20 RX ADMIN — POTASSIUM BICARBONATE 20 MEQ: 782 TABLET, EFFERVESCENT ORAL at 08:42

## 2023-09-20 RX ADMIN — MAGNESIUM SULFATE HEPTAHYDRATE 6000 MG: 500 INJECTION, SOLUTION INTRAMUSCULAR; INTRAVENOUS at 14:24

## 2023-09-20 RX ADMIN — IPRATROPIUM BROMIDE AND ALBUTEROL SULFATE 1 DOSE: .5; 2.5 SOLUTION RESPIRATORY (INHALATION) at 03:27

## 2023-09-20 RX ADMIN — INSULIN LISPRO 12 UNITS: 100 INJECTION, SOLUTION INTRAVENOUS; SUBCUTANEOUS at 12:24

## 2023-09-20 RX ADMIN — PANTOPRAZOLE SODIUM 40 MG: 40 TABLET, DELAYED RELEASE ORAL at 05:05

## 2023-09-20 RX ADMIN — GUAIFENESIN 400 MG: 200 SOLUTION ORAL at 16:59

## 2023-09-20 RX ADMIN — METFORMIN HYDROCHLORIDE 500 MG: 500 TABLET, EXTENDED RELEASE ORAL at 08:42

## 2023-09-20 RX ADMIN — ATORVASTATIN CALCIUM 10 MG: 10 TABLET, FILM COATED ORAL at 08:42

## 2023-09-20 RX ADMIN — INSULIN LISPRO 4 UNITS: 100 INJECTION, SOLUTION INTRAVENOUS; SUBCUTANEOUS at 16:59

## 2023-09-20 RX ADMIN — IPRATROPIUM BROMIDE AND ALBUTEROL SULFATE 1 DOSE: .5; 2.5 SOLUTION RESPIRATORY (INHALATION) at 08:04

## 2023-09-20 RX ADMIN — BUDESONIDE AND FORMOTEROL FUMARATE DIHYDRATE 1 PUFF: 160; 4.5 AEROSOL RESPIRATORY (INHALATION) at 08:04

## 2023-09-20 RX ADMIN — PREDNISONE 40 MG: 20 TABLET ORAL at 08:42

## 2023-09-20 RX ADMIN — GUAIFENESIN 400 MG: 200 SOLUTION ORAL at 05:05

## 2023-09-20 RX ADMIN — IPRATROPIUM BROMIDE AND ALBUTEROL SULFATE 1 DOSE: .5; 2.5 SOLUTION RESPIRATORY (INHALATION) at 11:11

## 2023-09-20 ASSESSMENT — PAIN SCALES - GENERAL: PAINLEVEL_OUTOF10: 0

## 2023-09-20 NOTE — PROGRESS NOTES
Pt identified as a candidate for COPD Gold assessment. Patient's last Pulmonary Function testing on record was in 2013.   Patient would benefit if a new pft were to be ordered, after discharged and able to complete PFT testing as an out-patient

## 2023-09-20 NOTE — PLAN OF CARE
Underwent P2P with Dr. Trisha Zavala. Patient denied SNF.      Electronically signed by Rachel Rangel MD on 9/20/2023 at 12:04 PM

## 2023-09-20 NOTE — CARE COORDINATION
Received message pt denied/ P2P option offered   [phone] option 5 ,  1945 Member ID [id number]  Updated Dr. Rosi Culver      Per Dr. Joanna Cadet was called and denial upheld.

## 2023-09-20 NOTE — DISCHARGE SUMMARY
glargine  Inject 20 Units into the skin nightly     Magnesium Oxide 400 MG Caps  Take 400 mg by mouth daily            CHANGE how you take these medications      * albuterol sulfate  (90 Base) MCG/ACT inhaler  Commonly known as: PROVENTIL;VENTOLIN;PROAIR  Inhale 2 puffs into the lungs every 6 hours as needed for Wheezing or Shortness of Breath  What changed: reasons to take this     * albuterol 0.63 MG/3ML nebulizer solution  Commonly known as: ACCUNEB  Take 3 mLs by nebulization every 6 hours as needed for Wheezing  What changed: Another medication with the same name was changed. Make sure you understand how and when to take each. budesonide-formoterol 160-4.5 MCG/ACT Aero  Commonly known as: Symbicort  Inhale 2 puffs into the lungs 2 times daily  What changed:   medication strength  when to take this     ipratropium 0.5 mg-albuterol 2.5 mg 0.5-2.5 (3) MG/3ML Soln nebulizer solution  Commonly known as: DUONEB  Inhale 3 mLs into the lungs every 4 hours as needed for Shortness of Breath  What changed: Another medication with the same name was removed. Continue taking this medication, and follow the directions you see here. * This list has 2 medication(s) that are the same as other medications prescribed for you. Read the directions carefully, and ask your doctor or other care provider to review them with you.                 CONTINUE taking these medications      amLODIPine 10 MG tablet  Commonly known as: NORVASC  Take 1 tablet by mouth daily     atorvastatin 20 MG tablet  Commonly known as: LIPITOR     cyanocobalamin 1000 MCG tablet     fluticasone 50 MCG/ACT nasal spray  Commonly known as: FLONASE     guaiFENesin 400 MG tablet  Take 1 tablet by mouth in the morning and 1 tablet in the evening.     loratadine 10 MG capsule  Commonly known as: CLARITIN     metFORMIN 500 MG extended release tablet  Commonly known as: GLUCOPHAGE-XR  Take 1 tablet by mouth daily (with breakfast)     metoprolol succinate 25 MG extended release tablet  Commonly known as: TOPROL XL  Take 1 tablet by mouth daily     montelukast 10 MG tablet  Commonly known as: SINGULAIR     omeprazole 20 MG delayed release capsule  Commonly known as: PRILOSEC     tiotropium 18 MCG inhalation capsule  Commonly known as: SPIRIVA  Inhale 1 capsule into the lungs daily            STOP taking these medications      glipiZIDE 5 MG tablet  Commonly known as: GLUCOTROL     potassium chloride 20 MEQ packet  Commonly known as: KLOR-CON               Where to Get Your Medications        These medications were sent to [facility], [address] [place] [address],[address]  [facility], [address] [address]      Phone: [phone]   albuterol 0.63 MG/3ML nebulizer solution  albuterol sulfate  (90 Base) MCG/ACT inhaler  amLODIPine 10 MG tablet  budesonide-formoterol 160-4.5 MCG/ACT Aero  guaiFENesin 400 MG tablet  insulin lispro (1 Unit Dial) 100 UNIT/ML Sopn  ipratropium 0.5 mg-albuterol 2.5 mg 0.5-2.5 (3) MG/3ML Soln nebulizer solution  Lantus SoloStar 100 UNIT/ML injection pen  Magnesium Oxide 400 MG Caps  metFORMIN 500 MG extended release tablet  tiotropium 18 MCG inhalation capsule         Objective Findings at Discharge:       BMP/CBC  Recent Labs     09/20/23  1035      K 5.1   CL 98*   CO2 31   BUN 24*   CREATININE 1.0   WBC 13.1*   HCT 37.5*          IMAGING:  XR CHEST PORTABLE    Result Date: 9/18/2023  EXAMINATION: ONE XRAY VIEW OF THE CHEST 9/18/2023 12:12 am COMPARISON: 06/20/2023 HISTORY: ORDERING SYSTEM PROVIDED HISTORY: Increased SOB, work up breathing TECHNOLOGIST PROVIDED HISTORY: Reason for exam:->Increased SOB, work up breathing Reason for Exam: Increased SOB, work up breathing FINDINGS: There are a few bands of right greater than left of basilar chronic atelectasis/scarring. Lungs are emphysematous and otherwise clear.   No acute confluent

## 2023-09-20 NOTE — PROGRESS NOTES
Physical Therapy  Name: Emery Hoff MRN: [id number] :   1945   Date:  2023   Admission Date: 2023 Room:  [address] [place], [place] [address]A   Restrictions/Precautions:         fall risk, gen precautions  Communication with other providers:    Subjective:  Patient states:  I really wanted to go to rehab so they would make me stronger and show me what to do at home to be able to do something at home. I want to live life  I am being discharged home today  Pain:   Location, Type, Intensity (0/10 to 10/10):  0/10  Objective:    Observation:  pt was sitting up in the chair  Treatment, including education/measures:   Exercises: Ankle pumps x 10  Marching x 10  Hip abd x 10  Glut sets x 10  LAQ's x 10  Therapeutic Exercise:  Therapeutic exercises were instructed today. Cues were given for technique, safety, recruitment, and rationale. Cues were verbal and/or tactile. Pt was given hand outs for home on energy conservation for home, meal prep and grooming  Pt was also given a HEP for supine, sitting and standing with instructions to do 2 sets of 5 and build up to 10 reps as he tolerates ex  Safety  Patient left safely in the chair, with call light/phone in reach with alarm applied. Gait belt was used for transfers and gait.   Assessment / Impression:     Patient's tolerance of treatment:  good, pt searching for answers of maintaining his present level of activity and try to build up his tolerance to be able to do things again   Adverse Reaction: none  Significant change in status and impact:  progressing with education  Barriers to improvement:  limited endurance,   Plan for Next Session:    Will cont to work towards pt's goals per her tolerance  Time in:  1410  Time out:  1425  Timed treatment minutes:  15  Total treatment time:  15  Previously filed items:  Social/Functional History  Lives With: Alone  Type of Home: House  Home Layout: One level  Bathroom Shower/Tub: Shower chair without back  Bathroom Toilet: Standard  Bathroom Equipment: Grab bars in shower  Home Equipment: Cane, Oxygen (3L O2)  ADL Assistance: Independent  Homemaking Assistance: Independent  Ambulation Assistance: Independent (Typically no AD)  Transfer Assistance: Independent  Active : Yes  Mode of Transportation: Car  Education: doent drive often  Occupation: Retired,   Leisure & Hobbies: cooking and writing  Short Term Goals  Time Frame for Short Term Goals: 2 weeks  Short Term Goal 1: Pt will perform sit><supine Yolanda  Short Term Goal 2: Pt will perform transfers Yolanda  Short Term Goal 3: Pt will ambulate 200ft with LRAD Yolanda  Short Term Goal 4: Pt will perform standing light dynamic activity x 3 minutes, single UE support Yolanda     Electronically signed by:     Shreya Parker PTA  9/20/2023, 2:37 PM

## 2023-09-20 NOTE — PROGRESS NOTES
This nurse paged Dr. Demetrius Becerra is asking if he can have some Robitussin. He has it scheduled BID, but would like some now. He does not want the Mucinex at this time.

## 2023-09-20 NOTE — DISCHARGE INSTRUCTIONS
Internal Medicine Discharge Instruction    Discharge to:  Home  Diet: Cardiac, diabetic diet   Activity: As tolerated       Be compliant with medications  Please take medications as prescribed   Please get blood work done in a few days - home healthcare should draw your blood   I have sent you on insulin - please check your sugars 4 times a day (once before breakfast, then 2 hours after breakfast/lunch/dinner)         Electronically signed by Florine Paget, MD on 9/20/2023 at 12:51 PM

## 2023-09-20 NOTE — CARE COORDINATION
Precert denied, plan is home with [facility]. [facility] order, dc summary and avs faxed, and called [facility] to let them know he was discharging. Pt to set up ride home.

## 2023-10-15 ENCOUNTER — HOSPITAL ENCOUNTER (INPATIENT)
Age: 78
LOS: 10 days | Discharge: HOME HEALTH | DRG: 190 | End: 2023-10-25
Attending: STUDENT IN AN ORGANIZED HEALTH CARE EDUCATION/TRAINING PROGRAM | Admitting: STUDENT IN AN ORGANIZED HEALTH CARE EDUCATION/TRAINING PROGRAM
Payer: OTHER GOVERNMENT

## 2023-10-15 LAB
BASE EXCESS MIXED: 4.2 (ref 0–1.2)
COMMENT: ABNORMAL
EKG ATRIAL RATE: 93 BPM
EKG DIAGNOSIS: NORMAL
EKG P AXIS: 56 DEGREES
EKG P-R INTERVAL: 170 MS
EKG Q-T INTERVAL: 368 MS
EKG QRS DURATION: 110 MS
EKG QTC CALCULATION (BAZETT): 457 MS
EKG R AXIS: -56 DEGREES
EKG T AXIS: 118 DEGREES
EKG VENTRICULAR RATE: 93 BPM
FOLATE SERPL-MCNC: 17.9 NG/ML (ref 3.1–17.5)
GLUCOSE BLD-MCNC: 212 MG/DL (ref 70–99)
GLUCOSE BLD-MCNC: 324 MG/DL (ref 70–99)
GLUCOSE BLD-MCNC: 360 MG/DL (ref 70–99)
HCO3 VENOUS: 30.3 MMOL/L (ref 19–25)
O2 SAT, VEN: 93.6 % (ref 50–70)
PCO2, VEN: 50 MMHG (ref 38–52)
PH VENOUS: 7.39 (ref 7.32–7.42)
PO2, VEN: 145 MMHG (ref 28–48)
TROPONIN, HIGH SENSITIVITY: 43 NG/L (ref 0–22)
TROPONIN, HIGH SENSITIVITY: 46 NG/L (ref 0–22)
VITAMIN B-12: 747.4 PG/ML (ref 211–911)

## 2023-10-15 PROCEDURE — 82607 VITAMIN B-12: CPT

## 2023-10-15 PROCEDURE — 82962 GLUCOSE BLOOD TEST: CPT

## 2023-10-15 PROCEDURE — 2700000000 HC OXYGEN THERAPY PER DAY

## 2023-10-15 PROCEDURE — 84484 ASSAY OF TROPONIN QUANT: CPT

## 2023-10-15 PROCEDURE — 6370000000 HC RX 637 (ALT 250 FOR IP): Performed by: STUDENT IN AN ORGANIZED HEALTH CARE EDUCATION/TRAINING PROGRAM

## 2023-10-15 PROCEDURE — 82746 ASSAY OF FOLIC ACID SERUM: CPT

## 2023-10-15 PROCEDURE — 87899 AGENT NOS ASSAY W/OPTIC: CPT

## 2023-10-15 PROCEDURE — 36415 COLL VENOUS BLD VENIPUNCTURE: CPT

## 2023-10-15 PROCEDURE — 1200000000 HC SEMI PRIVATE

## 2023-10-15 PROCEDURE — 87040 BLOOD CULTURE FOR BACTERIA: CPT

## 2023-10-15 PROCEDURE — 87449 NOS EACH ORGANISM AG IA: CPT

## 2023-10-15 PROCEDURE — 6360000002 HC RX W HCPCS: Performed by: STUDENT IN AN ORGANIZED HEALTH CARE EDUCATION/TRAINING PROGRAM

## 2023-10-15 PROCEDURE — 93005 ELECTROCARDIOGRAM TRACING: CPT | Performed by: STUDENT IN AN ORGANIZED HEALTH CARE EDUCATION/TRAINING PROGRAM

## 2023-10-15 PROCEDURE — 94640 AIRWAY INHALATION TREATMENT: CPT

## 2023-10-15 PROCEDURE — 87205 SMEAR GRAM STAIN: CPT

## 2023-10-15 PROCEDURE — 82805 BLOOD GASES W/O2 SATURATION: CPT

## 2023-10-15 PROCEDURE — 2580000003 HC RX 258: Performed by: STUDENT IN AN ORGANIZED HEALTH CARE EDUCATION/TRAINING PROGRAM

## 2023-10-15 PROCEDURE — 87070 CULTURE OTHR SPECIMN AEROBIC: CPT

## 2023-10-15 PROCEDURE — 94761 N-INVAS EAR/PLS OXIMETRY MLT: CPT

## 2023-10-15 RX ORDER — MONTELUKAST SODIUM 10 MG/1
10 TABLET ORAL NIGHTLY
Status: DISCONTINUED | OUTPATIENT
Start: 2023-10-15 | End: 2023-10-25 | Stop reason: HOSPADM

## 2023-10-15 RX ORDER — ATORVASTATIN CALCIUM 10 MG/1
10 TABLET, FILM COATED ORAL DAILY
Status: DISCONTINUED | OUTPATIENT
Start: 2023-10-15 | End: 2023-10-25 | Stop reason: HOSPADM

## 2023-10-15 RX ORDER — AZITHROMYCIN 250 MG/1
500 TABLET, FILM COATED ORAL DAILY
Status: DISCONTINUED | OUTPATIENT
Start: 2023-10-15 | End: 2023-10-16

## 2023-10-15 RX ORDER — ALBUTEROL SULFATE 2.5 MG/3ML
2.5 SOLUTION RESPIRATORY (INHALATION)
Status: DISCONTINUED | OUTPATIENT
Start: 2023-10-15 | End: 2023-10-15

## 2023-10-15 RX ORDER — GLIPIZIDE 5 MG/1
5 TABLET ORAL
COMMUNITY

## 2023-10-15 RX ORDER — AMLODIPINE BESYLATE 10 MG/1
10 TABLET ORAL DAILY
Status: DISCONTINUED | OUTPATIENT
Start: 2023-10-15 | End: 2023-10-25 | Stop reason: HOSPADM

## 2023-10-15 RX ORDER — PANTOPRAZOLE SODIUM 40 MG/1
40 TABLET, DELAYED RELEASE ORAL
Status: DISCONTINUED | OUTPATIENT
Start: 2023-10-16 | End: 2023-10-25 | Stop reason: HOSPADM

## 2023-10-15 RX ORDER — ACETAMINOPHEN 325 MG/1
650 TABLET ORAL EVERY 6 HOURS PRN
Status: DISCONTINUED | OUTPATIENT
Start: 2023-10-15 | End: 2023-10-25 | Stop reason: HOSPADM

## 2023-10-15 RX ORDER — ACETAMINOPHEN 650 MG/1
650 SUPPOSITORY RECTAL EVERY 6 HOURS PRN
Status: DISCONTINUED | OUTPATIENT
Start: 2023-10-15 | End: 2023-10-25 | Stop reason: HOSPADM

## 2023-10-15 RX ORDER — INSULIN LISPRO 100 [IU]/ML
0-4 INJECTION, SOLUTION INTRAVENOUS; SUBCUTANEOUS
Status: DISCONTINUED | OUTPATIENT
Start: 2023-10-15 | End: 2023-10-16

## 2023-10-15 RX ORDER — IPRATROPIUM BROMIDE AND ALBUTEROL SULFATE 2.5; .5 MG/3ML; MG/3ML
1 SOLUTION RESPIRATORY (INHALATION) EVERY 4 HOURS PRN
Status: DISCONTINUED | OUTPATIENT
Start: 2023-10-15 | End: 2023-10-25 | Stop reason: HOSPADM

## 2023-10-15 RX ORDER — BENZONATATE 100 MG/1
100 CAPSULE ORAL 3 TIMES DAILY PRN
Status: DISCONTINUED | OUTPATIENT
Start: 2023-10-15 | End: 2023-10-25 | Stop reason: HOSPADM

## 2023-10-15 RX ORDER — SODIUM CHLORIDE 9 MG/ML
INJECTION, SOLUTION INTRAVENOUS PRN
Status: DISCONTINUED | OUTPATIENT
Start: 2023-10-15 | End: 2023-10-25 | Stop reason: HOSPADM

## 2023-10-15 RX ORDER — PREDNISONE 20 MG/1
40 TABLET ORAL DAILY
Status: DISCONTINUED | OUTPATIENT
Start: 2023-10-17 | End: 2023-10-16

## 2023-10-15 RX ORDER — FLUTICASONE PROPIONATE 110 UG/1
1 AEROSOL, METERED RESPIRATORY (INHALATION)
Status: DISCONTINUED | OUTPATIENT
Start: 2023-10-15 | End: 2023-10-25 | Stop reason: HOSPADM

## 2023-10-15 RX ORDER — GUAIFENESIN/DEXTROMETHORPHAN 100-10MG/5
5 SYRUP ORAL EVERY 4 HOURS PRN
Status: DISCONTINUED | OUTPATIENT
Start: 2023-10-15 | End: 2023-10-25 | Stop reason: HOSPADM

## 2023-10-15 RX ORDER — CETIRIZINE HYDROCHLORIDE 5 MG/1
5 TABLET ORAL DAILY
Status: DISCONTINUED | OUTPATIENT
Start: 2023-10-15 | End: 2023-10-25 | Stop reason: HOSPADM

## 2023-10-15 RX ORDER — LANOLIN ALCOHOL/MO/W.PET/CERES
400 CREAM (GRAM) TOPICAL DAILY
Status: DISCONTINUED | OUTPATIENT
Start: 2023-10-15 | End: 2023-10-25 | Stop reason: HOSPADM

## 2023-10-15 RX ORDER — GLUCAGON 1 MG/ML
1 KIT INJECTION PRN
Status: DISCONTINUED | OUTPATIENT
Start: 2023-10-15 | End: 2023-10-25 | Stop reason: HOSPADM

## 2023-10-15 RX ORDER — IPRATROPIUM BROMIDE AND ALBUTEROL SULFATE 2.5; .5 MG/3ML; MG/3ML
1 SOLUTION RESPIRATORY (INHALATION)
Status: DISCONTINUED | OUTPATIENT
Start: 2023-10-15 | End: 2023-10-17

## 2023-10-15 RX ORDER — ONDANSETRON 4 MG/1
4 TABLET, ORALLY DISINTEGRATING ORAL EVERY 8 HOURS PRN
Status: DISCONTINUED | OUTPATIENT
Start: 2023-10-15 | End: 2023-10-25 | Stop reason: HOSPADM

## 2023-10-15 RX ORDER — DEXTROSE MONOHYDRATE 100 MG/ML
INJECTION, SOLUTION INTRAVENOUS CONTINUOUS PRN
Status: DISCONTINUED | OUTPATIENT
Start: 2023-10-15 | End: 2023-10-25 | Stop reason: HOSPADM

## 2023-10-15 RX ORDER — INSULIN LISPRO 100 [IU]/ML
0-4 INJECTION, SOLUTION INTRAVENOUS; SUBCUTANEOUS NIGHTLY
Status: DISCONTINUED | OUTPATIENT
Start: 2023-10-15 | End: 2023-10-16

## 2023-10-15 RX ORDER — SODIUM CHLORIDE 0.9 % (FLUSH) 0.9 %
5-40 SYRINGE (ML) INJECTION EVERY 12 HOURS SCHEDULED
Status: DISCONTINUED | OUTPATIENT
Start: 2023-10-15 | End: 2023-10-25 | Stop reason: HOSPADM

## 2023-10-15 RX ORDER — ONDANSETRON 2 MG/ML
4 INJECTION INTRAMUSCULAR; INTRAVENOUS EVERY 6 HOURS PRN
Status: DISCONTINUED | OUTPATIENT
Start: 2023-10-15 | End: 2023-10-25 | Stop reason: HOSPADM

## 2023-10-15 RX ORDER — ENOXAPARIN SODIUM 100 MG/ML
40 INJECTION SUBCUTANEOUS DAILY
Status: DISCONTINUED | OUTPATIENT
Start: 2023-10-15 | End: 2023-10-25 | Stop reason: HOSPADM

## 2023-10-15 RX ORDER — FLUTICASONE PROPIONATE 50 MCG
1 SPRAY, SUSPENSION (ML) NASAL 2 TIMES DAILY
Status: DISCONTINUED | OUTPATIENT
Start: 2023-10-15 | End: 2023-10-25 | Stop reason: HOSPADM

## 2023-10-15 RX ORDER — SODIUM CHLORIDE 0.9 % (FLUSH) 0.9 %
5-40 SYRINGE (ML) INJECTION PRN
Status: DISCONTINUED | OUTPATIENT
Start: 2023-10-15 | End: 2023-10-25 | Stop reason: HOSPADM

## 2023-10-15 RX ORDER — POLYETHYLENE GLYCOL 3350 17 G/17G
17 POWDER, FOR SOLUTION ORAL DAILY PRN
Status: DISCONTINUED | OUTPATIENT
Start: 2023-10-15 | End: 2023-10-25 | Stop reason: HOSPADM

## 2023-10-15 RX ADMIN — IPRATROPIUM BROMIDE AND ALBUTEROL SULFATE 1 DOSE: 2.5; .5 SOLUTION RESPIRATORY (INHALATION) at 20:13

## 2023-10-15 RX ADMIN — ATORVASTATIN CALCIUM 10 MG: 10 TABLET, FILM COATED ORAL at 13:15

## 2023-10-15 RX ADMIN — AMLODIPINE BESYLATE 10 MG: 10 TABLET ORAL at 13:16

## 2023-10-15 RX ADMIN — MAGNESIUM OXIDE 400 MG (241.3 MG MAGNESIUM) TABLET 400 MG: TABLET at 13:15

## 2023-10-15 RX ADMIN — GUAIFENESIN SYRUP AND DEXTROMETHORPHAN 5 ML: 100; 10 SYRUP ORAL at 13:15

## 2023-10-15 RX ADMIN — FLUTICASONE PROPIONATE 1 SPRAY: 50 SPRAY, METERED NASAL at 14:54

## 2023-10-15 RX ADMIN — FLUTICASONE PROPIONATE 1 PUFF: 110 AEROSOL, METERED RESPIRATORY (INHALATION) at 20:14

## 2023-10-15 RX ADMIN — INSULIN LISPRO 3 UNITS: 100 INJECTION, SOLUTION INTRAVENOUS; SUBCUTANEOUS at 17:21

## 2023-10-15 RX ADMIN — METHYLPREDNISOLONE SODIUM SUCCINATE 40 MG: 40 INJECTION, POWDER, FOR SOLUTION INTRAMUSCULAR; INTRAVENOUS at 18:40

## 2023-10-15 RX ADMIN — IPRATROPIUM BROMIDE AND ALBUTEROL SULFATE 1 DOSE: 2.5; .5 SOLUTION RESPIRATORY (INHALATION) at 16:27

## 2023-10-15 RX ADMIN — SODIUM CHLORIDE, PRESERVATIVE FREE 10 ML: 5 INJECTION INTRAVENOUS at 21:24

## 2023-10-15 RX ADMIN — FLUTICASONE PROPIONATE 1 SPRAY: 50 SPRAY, METERED NASAL at 18:46

## 2023-10-15 RX ADMIN — IPRATROPIUM BROMIDE AND ALBUTEROL SULFATE 1 DOSE: 2.5; .5 SOLUTION RESPIRATORY (INHALATION) at 23:54

## 2023-10-15 RX ADMIN — INSULIN LISPRO 4 UNITS: 100 INJECTION, SOLUTION INTRAVENOUS; SUBCUTANEOUS at 13:17

## 2023-10-15 RX ADMIN — CETIRIZINE HYDROCHLORIDE 5 MG: 5 SOLUTION ORAL at 14:53

## 2023-10-15 RX ADMIN — MONTELUKAST 10 MG: 10 TABLET, FILM COATED ORAL at 21:24

## 2023-10-15 RX ADMIN — TIOTROPIUM BROMIDE INHALATION SPRAY 2 PUFF: 3.12 SPRAY, METERED RESPIRATORY (INHALATION) at 14:27

## 2023-10-15 RX ADMIN — BENZONATATE 100 MG: 100 CAPSULE ORAL at 17:24

## 2023-10-15 RX ADMIN — ALBUTEROL SULFATE 2.5 MG: 2.5 SOLUTION RESPIRATORY (INHALATION) at 14:17

## 2023-10-15 RX ADMIN — ENOXAPARIN SODIUM 40 MG: 100 INJECTION SUBCUTANEOUS at 15:44

## 2023-10-15 RX ADMIN — AZITHROMYCIN DIHYDRATE 500 MG: 250 TABLET ORAL at 13:15

## 2023-10-15 SDOH — HEALTH STABILITY: PHYSICAL HEALTH: ON AVERAGE, HOW MANY MINUTES DO YOU ENGAGE IN EXERCISE AT THIS LEVEL?: 0 MIN

## 2023-10-15 SDOH — ECONOMIC STABILITY: INCOME INSECURITY: IN THE LAST 12 MONTHS, WAS THERE A TIME WHEN YOU WERE NOT ABLE TO PAY THE MORTGAGE OR RENT ON TIME?: NO

## 2023-10-15 SDOH — HEALTH STABILITY: PHYSICAL HEALTH: ON AVERAGE, HOW MANY DAYS PER WEEK DO YOU ENGAGE IN MODERATE TO STRENUOUS EXERCISE (LIKE A BRISK WALK)?: 0 DAYS

## 2023-10-15 SDOH — ECONOMIC STABILITY: FOOD INSECURITY: WITHIN THE PAST 12 MONTHS, YOU WORRIED THAT YOUR FOOD WOULD RUN OUT BEFORE YOU GOT MONEY TO BUY MORE.: NEVER TRUE

## 2023-10-15 SDOH — ECONOMIC STABILITY: FOOD INSECURITY: WITHIN THE PAST 12 MONTHS, THE FOOD YOU BOUGHT JUST DIDN'T LAST AND YOU DIDN'T HAVE MONEY TO GET MORE.: NEVER TRUE

## 2023-10-15 SDOH — ECONOMIC STABILITY: HOUSING INSECURITY
IN THE LAST 12 MONTHS, WAS THERE A TIME WHEN YOU DID NOT HAVE A STEADY PLACE TO SLEEP OR SLEPT IN A SHELTER (INCLUDING NOW)?: NO

## 2023-10-15 SDOH — ECONOMIC STABILITY: TRANSPORTATION INSECURITY
IN THE PAST 12 MONTHS, HAS LACK OF TRANSPORTATION KEPT YOU FROM MEETINGS, WORK, OR FROM GETTING THINGS NEEDED FOR DAILY LIVING?: NO

## 2023-10-15 SDOH — ECONOMIC STABILITY: HOUSING INSECURITY: IN THE LAST 12 MONTHS, HOW MANY PLACES HAVE YOU LIVED?: 1

## 2023-10-15 SDOH — ECONOMIC STABILITY: TRANSPORTATION INSECURITY
IN THE PAST 12 MONTHS, HAS THE LACK OF TRANSPORTATION KEPT YOU FROM MEDICAL APPOINTMENTS OR FROM GETTING MEDICATIONS?: NO

## 2023-10-15 ASSESSMENT — SOCIAL DETERMINANTS OF HEALTH (SDOH)
WITHIN THE LAST YEAR, HAVE TO BEEN RAPED OR FORCED TO HAVE ANY KIND OF SEXUAL ACTIVITY BY YOUR PARTNER OR EX-PARTNER?: NO
HOW HARD IS IT FOR YOU TO PAY FOR THE VERY BASICS LIKE FOOD, HOUSING, MEDICAL CARE, AND HEATING?: NOT HARD AT ALL
WITHIN THE LAST YEAR, HAVE YOU BEEN AFRAID OF YOUR PARTNER OR EX-PARTNER?: NO
HOW OFTEN DO YOU ATTENT MEETINGS OF THE CLUB OR ORGANIZATION YOU BELONG TO?: NEVER
HOW OFTEN DO YOU GET TOGETHER WITH FRIENDS OR RELATIVES?: MORE THAN THREE TIMES A WEEK
WITHIN THE LAST YEAR, HAVE YOU BEEN KICKED, HIT, SLAPPED, OR OTHERWISE PHYSICALLY HURT BY YOUR PARTNER OR EX-PARTNER?: NO
HOW OFTEN DO YOU ATTEND CHURCH OR RELIGIOUS SERVICES?: NEVER
IN A TYPICAL WEEK, HOW MANY TIMES DO YOU TALK ON THE PHONE WITH FAMILY, FRIENDS, OR NEIGHBORS?: MORE THAN THREE TIMES A WEEK
DO YOU BELONG TO ANY CLUBS OR ORGANIZATIONS SUCH AS CHURCH GROUPS UNIONS, FRATERNAL OR ATHLETIC GROUPS, OR SCHOOL GROUPS?: NO
WITHIN THE LAST YEAR, HAVE YOU BEEN HUMILIATED OR EMOTIONALLY ABUSED IN OTHER WAYS BY YOUR PARTNER OR EX-PARTNER?: NO

## 2023-10-15 ASSESSMENT — PAIN SCALES - WONG BAKER
WONGBAKER_NUMERICALRESPONSE: 0

## 2023-10-15 ASSESSMENT — PAIN SCALES - GENERAL
PAINLEVEL_OUTOF10: 0

## 2023-10-15 ASSESSMENT — PATIENT HEALTH QUESTIONNAIRE - PHQ9
SUM OF ALL RESPONSES TO PHQ9 QUESTIONS 1 & 2: 2
2. FEELING DOWN, DEPRESSED OR HOPELESS: SEVERAL DAYS
10. IF YOU CHECKED OFF ANY PROBLEMS, HOW DIFFICULT HAVE THESE PROBLEMS MADE IT FOR YOU TO DO YOUR WORK, TAKE CARE OF THINGS AT HOME, OR GET ALONG WITH OTHER PEOPLE: NOT DIFFICULT AT ALL
1. LITTLE INTEREST OR PLEASURE IN DOING THINGS: SEVERAL DAYS

## 2023-10-15 ASSESSMENT — LIFESTYLE VARIABLES
HOW MANY STANDARD DRINKS CONTAINING ALCOHOL DO YOU HAVE ON A TYPICAL DAY: PATIENT DOES NOT DRINK
HOW OFTEN DO YOU HAVE A DRINK CONTAINING ALCOHOL: NEVER
HOW MANY STANDARD DRINKS CONTAINING ALCOHOL DO YOU HAVE ON A TYPICAL DAY: PATIENT DOES NOT DRINK
HOW OFTEN DO YOU HAVE A DRINK CONTAINING ALCOHOL: NEVER

## 2023-10-15 NOTE — H&P
V2.0  History and Physical      Name:  Erlinda Pearson /Age/Sex: 1945  (70 y.o. male)   MRN & CSN:  [id number] & [id number] Encounter Date/Time: 10/15/2023 11:38 AM EDT   Location:  [address] [place] [address]-A PCP: Pramod Rolon Day: 1    Assessment and Plan:   Erlinda Pearson is a 70 y.o. male with a pmh of very severe COPD with chronic hypoxemic hypercapnic respiratory failure (on 3L NC continuously), HTN, HLD and T2DM  who presents with COPD exacerbation [facility]    Hospital Problems             Last Modified POA    * (Principal) COPD exacerbation ([address]) 10/15/2023 Yes       Plan:  Shortness of Breath, COPD Exacerbation:  Lower extremity swelling:  - Pt presenting w worsening cough and shortness of breath for 5 days , O2 Sat >90% on RA, ,   - Admit to MedSurg  -Telemetry   -O2 PRN, keep O2 Sat 88-92% in setting of underlying COPD -BiPAP PRN   - Follow abg  -CXR with no acute changes  -Follow  Procal/CRP_   - s/w Duonebs Q6H and Q4H PRN   - Antitussives   - s/w Solumedrol IV, switch to PO when appropriatex 5 days of steroids    - Azithromycin x 3 days   -Wean O2 as able  to baseline   -Patient denies any smoking   -6 min walk test prior to discharge for Home O2 eval  -Follow BNP  -get TTE    Elevated troponin:  -Get EKG  -Pt denies chest pain  -Trend trops, consult cadiology if ekg w changes or trop uptrending/ CP    Normo/Macrocytic anemia: Hb at baseline 11, monitor Hb, follow folate and B12  Essential hypertension-continue home Norvasc and Toprol  Type 2 diabetes mellitus with hyperglycemia-hold oral hypoglycemics, low-dose sliding scale insulin with hypoglycemia protocol  GERD-continue PPI    Disposition:   Current Living situation: Home  Expected Disposition: Home  Estimated D/C: 3 days    Diet No diet orders on file   DVT Prophylaxis [x] Lovenox, []  Heparin, [] SCDs, [] Ambulation,  [] Eliquis, [] Xarelto, [] Coumadin   Code Status Full Code   Surrogate Decision Maker/ POA Self     Personally MD Yohan   loratadine (CLARITIN) 10 MG capsule Take 10 mg by mouth daily    ProviderYohan MD   montelukast (SINGULAIR) 10 MG tablet Take 10 mg by mouth nightly    ProviderYohan MD   omeprazole (PRILOSEC) 20 MG delayed release capsule Take 20 mg by mouth daily    ProviderYohan MD   cyanocobalamin 1000 MCG tablet Take 1,000 mcg by mouth daily    ProviderYohan MD       Physical Exam:    Physical Exam     General: NAD  Eyes: EOMI  ENT: neck supple  Cardiovascular: Regular rate. Respiratory: b/l wheezes on auscultation  Gastrointestinal: Soft, non tender  Genitourinary: no suprapubic tenderness  Musculoskeletal: No edema  Skin: warm, dry  Neuro: Alert. Psych: Mood appropriate. Past Medical History:   PMHx   Past Medical History:   Diagnosis Date    Acid reflux     Anemia     \"dx age 10    COPD (chronic obstructive pulmonary disease) ([address])     follows with [address] decay     Diabetes mellitus ([address])     \"dx 2015 or so\"    Emphysema of lung ([address])     Enlarged prostate     Stevens catheter in place     \"put in catheter since mid April 2018\"    H/O cardiovascular stress test 05/01/2018    Nml, 62%    Hematuria     dx with admission 4/29/2018    HX OTHER MEDICAL     \"tried 3 times to do sleep study could not tolerate    Hypertension     per pt on 7/11/2018\"my blood pressure has been low so stopped the Amlodopine for now\"    Low back sprain 1983    Megacolon     On home oxygen therapy     \"wear it at night at 3l/nc\"    Wears glasses     to read     PSHX:  has a past surgical history that includes Revision Colostomy (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 Allergies:    Allergies   Allergen Reactions    Lisinopril Other (See Comments)     Pt coughs uncontrollably    Dye

## 2023-10-15 NOTE — PROGRESS NOTES
4 Eyes Skin Assessment     NAME:  David López  YOB: 1945  MEDICAL RECORD NUMBER:  [id number]    The patient is being assessed for  Admission    I agree that at least one RN has performed a thorough Head to Toe Skin Assessment on the patient. ALL assessment sites listed below have been assessed. Areas assessed by both nurses:    Head, Face, Ears, Shoulders, Back, Chest, Arms, Elbows, Hands, Sacrum. Buttock, Coccyx, Ischium, and Legs. Feet and Heels        Does the Patient have a Wound? Yes wound(s) were present on assessment.  LDA wound assessment was Initiated and completed by RN       Moreno Prevention initiated by RN: Yes  Wound Care Orders initiated by RN: Yes    Pressure Injury (Stage 3,4, Unstageable, DTI, NWPT, and Complex wounds) if present, place Wound referral order by RN under : No    New Ostomies, if present place, Ostomy referral order under : No     Nurse 1 eSignature: Electronically signed by Amaris Tiwari RN on 10/15/23 at 3:09 PM EDT    **SHARE this note so that the co-signing nurse can place an eSignature**    Nurse 2 eSignature: Electronically signed by Endy Maldonado LPN on 81/01/24 at 7:64 PM EDT

## 2023-10-16 ENCOUNTER — APPOINTMENT (OUTPATIENT)
Dept: GENERAL RADIOLOGY | Age: 78
DRG: 190 | End: 2023-10-16
Attending: STUDENT IN AN ORGANIZED HEALTH CARE EDUCATION/TRAINING PROGRAM
Payer: OTHER GOVERNMENT

## 2023-10-16 LAB
ALBUMIN SERPL-MCNC: 3.9 GM/DL (ref 3.4–5)
ALP BLD-CCNC: 133 IU/L (ref 40–128)
ALT SERPL-CCNC: 7 U/L (ref 10–40)
ANION GAP SERPL CALCULATED.3IONS-SCNC: 10 MMOL/L (ref 4–16)
ANISOCYTOSIS: ABNORMAL
AST SERPL-CCNC: 18 IU/L (ref 15–37)
B PARAP IS1001 DNA NPH QL NAA+NON-PROBE: NOT DETECTED
B PERT.PT PRMT NPH QL NAA+NON-PROBE: NOT DETECTED
BILIRUB SERPL-MCNC: 0.2 MG/DL (ref 0–1)
BUN SERPL-MCNC: 20 MG/DL (ref 6–23)
BURR CELLS: ABNORMAL
C PNEUM DNA NPH QL NAA+NON-PROBE: NOT DETECTED
CALCIUM SERPL-MCNC: 8.8 MG/DL (ref 8.3–10.6)
CHLORIDE BLD-SCNC: 95 MMOL/L (ref 99–110)
CO2: 27 MMOL/L (ref 21–32)
CREAT SERPL-MCNC: 0.8 MG/DL (ref 0.9–1.3)
DIFFERENTIAL TYPE: ABNORMAL
EKG ATRIAL RATE: 98 BPM
EKG DIAGNOSIS: NORMAL
EKG P AXIS: 17 DEGREES
EKG P-R INTERVAL: 134 MS
EKG Q-T INTERVAL: 374 MS
EKG QRS DURATION: 108 MS
EKG QTC CALCULATION (BAZETT): 477 MS
EKG R AXIS: -57 DEGREES
EKG T AXIS: 116 DEGREES
EKG VENTRICULAR RATE: 98 BPM
FLUAV H1 2009 PAN RNA NPH NAA+NON-PROBE: NOT DETECTED
FLUAV H1 RNA NPH QL NAA+NON-PROBE: NOT DETECTED
FLUAV H3 RNA NPH QL NAA+NON-PROBE: NOT DETECTED
FLUAV RNA NPH QL NAA+NON-PROBE: NOT DETECTED
FLUBV RNA NPH QL NAA+NON-PROBE: NOT DETECTED
GFR SERPL CREATININE-BSD FRML MDRD: >60 ML/MIN/1.73M2
GLUCOSE BLD-MCNC: 293 MG/DL (ref 70–99)
GLUCOSE BLD-MCNC: 295 MG/DL (ref 70–99)
GLUCOSE BLD-MCNC: 329 MG/DL (ref 70–99)
GLUCOSE BLD-MCNC: 350 MG/DL (ref 70–99)
GLUCOSE SERPL-MCNC: 336 MG/DL (ref 70–99)
HADV DNA NPH QL NAA+NON-PROBE: NOT DETECTED
HCOV 229E RNA NPH QL NAA+NON-PROBE: NOT DETECTED
HCOV HKU1 RNA NPH QL NAA+NON-PROBE: NOT DETECTED
HCOV NL63 RNA NPH QL NAA+NON-PROBE: NOT DETECTED
HCOV OC43 RNA NPH QL NAA+NON-PROBE: NOT DETECTED
HCT VFR BLD CALC: 34.3 % (ref 42–52)
HEMOGLOBIN: 9.9 GM/DL (ref 13.5–18)
HMPV RNA NPH QL NAA+NON-PROBE: NOT DETECTED
HPIV1 RNA NPH QL NAA+NON-PROBE: NOT DETECTED
HPIV2 RNA NPH QL NAA+NON-PROBE: NOT DETECTED
HPIV3 RNA NPH QL NAA+NON-PROBE: NOT DETECTED
HPIV4 RNA NPH QL NAA+NON-PROBE: NOT DETECTED
L PNEUMO AG UR QL IA: NEGATIVE
LACTATE: 0.9 MMOL/L (ref 0.5–1.9)
LYMPHOCYTES ABSOLUTE: 0.4 K/CU MM
LYMPHOCYTES RELATIVE PERCENT: 4 % (ref 24–44)
M PNEUMO DNA NPH QL NAA+NON-PROBE: NOT DETECTED
MCH RBC QN AUTO: 28 PG (ref 27–31)
MCHC RBC AUTO-ENTMCNC: 28.9 % (ref 32–36)
MCV RBC AUTO: 96.9 FL (ref 78–100)
MONOCYTES ABSOLUTE: 0.2 K/CU MM
MONOCYTES RELATIVE PERCENT: 2 % (ref 0–4)
PDW BLD-RTO: 15.4 % (ref 11.7–14.9)
PLATELET # BLD: 304 K/CU MM (ref 140–440)
PMV BLD AUTO: 11.7 FL (ref 7.5–11.1)
POTASSIUM SERPL-SCNC: 5.6 MMOL/L (ref 3.5–5.1)
PRO-BNP: 1433 PG/ML
RBC # BLD: 3.54 M/CU MM (ref 4.6–6.2)
RSV RNA NPH QL NAA+NON-PROBE: NOT DETECTED
RV+EV RNA NPH QL NAA+NON-PROBE: NOT DETECTED
S PNEUM AG CSF QL: NORMAL
SARS-COV-2 RNA NPH QL NAA+NON-PROBE: NOT DETECTED
SEGMENTED NEUTROPHILS ABSOLUTE COUNT: 8.6 K/CU MM
SEGMENTED NEUTROPHILS RELATIVE PERCENT: 94 % (ref 36–66)
SODIUM BLD-SCNC: 132 MMOL/L (ref 135–145)
TOTAL PROTEIN: 6.2 GM/DL (ref 6.4–8.2)
TROPONIN, HIGH SENSITIVITY: 43 NG/L (ref 0–22)
TROPONIN, HIGH SENSITIVITY: 43 NG/L (ref 0–22)
WBC # BLD: 9.2 K/CU MM (ref 4–10.5)

## 2023-10-16 PROCEDURE — 0202U NFCT DS 22 TRGT SARS-COV-2: CPT

## 2023-10-16 PROCEDURE — 94761 N-INVAS EAR/PLS OXIMETRY MLT: CPT

## 2023-10-16 PROCEDURE — 83605 ASSAY OF LACTIC ACID: CPT

## 2023-10-16 PROCEDURE — 2700000000 HC OXYGEN THERAPY PER DAY

## 2023-10-16 PROCEDURE — 1200000000 HC SEMI PRIVATE

## 2023-10-16 PROCEDURE — 6370000000 HC RX 637 (ALT 250 FOR IP): Performed by: STUDENT IN AN ORGANIZED HEALTH CARE EDUCATION/TRAINING PROGRAM

## 2023-10-16 PROCEDURE — 85027 COMPLETE CBC AUTOMATED: CPT

## 2023-10-16 PROCEDURE — 93306 TTE W/DOPPLER COMPLETE: CPT

## 2023-10-16 PROCEDURE — 93010 ELECTROCARDIOGRAM REPORT: CPT | Performed by: INTERNAL MEDICINE

## 2023-10-16 PROCEDURE — 99211 OFF/OP EST MAY X REQ PHY/QHP: CPT

## 2023-10-16 PROCEDURE — 36415 COLL VENOUS BLD VENIPUNCTURE: CPT

## 2023-10-16 PROCEDURE — 83735 ASSAY OF MAGNESIUM: CPT

## 2023-10-16 PROCEDURE — 80053 COMPREHEN METABOLIC PANEL: CPT

## 2023-10-16 PROCEDURE — 84484 ASSAY OF TROPONIN QUANT: CPT

## 2023-10-16 PROCEDURE — 71045 X-RAY EXAM CHEST 1 VIEW: CPT

## 2023-10-16 PROCEDURE — 6370000000 HC RX 637 (ALT 250 FOR IP): Performed by: INTERNAL MEDICINE

## 2023-10-16 PROCEDURE — 82962 GLUCOSE BLOOD TEST: CPT

## 2023-10-16 PROCEDURE — 83880 ASSAY OF NATRIURETIC PEPTIDE: CPT

## 2023-10-16 PROCEDURE — 94664 DEMO&/EVAL PT USE INHALER: CPT

## 2023-10-16 PROCEDURE — 6360000002 HC RX W HCPCS: Performed by: STUDENT IN AN ORGANIZED HEALTH CARE EDUCATION/TRAINING PROGRAM

## 2023-10-16 PROCEDURE — 2500000003 HC RX 250 WO HCPCS: Performed by: INTERNAL MEDICINE

## 2023-10-16 PROCEDURE — 85007 BL SMEAR W/DIFF WBC COUNT: CPT

## 2023-10-16 PROCEDURE — 84132 ASSAY OF SERUM POTASSIUM: CPT

## 2023-10-16 PROCEDURE — 2580000003 HC RX 258: Performed by: STUDENT IN AN ORGANIZED HEALTH CARE EDUCATION/TRAINING PROGRAM

## 2023-10-16 PROCEDURE — 6360000002 HC RX W HCPCS

## 2023-10-16 PROCEDURE — 6370000000 HC RX 637 (ALT 250 FOR IP): Performed by: FAMILY MEDICINE

## 2023-10-16 PROCEDURE — 99222 1ST HOSP IP/OBS MODERATE 55: CPT | Performed by: INTERNAL MEDICINE

## 2023-10-16 PROCEDURE — 94640 AIRWAY INHALATION TREATMENT: CPT

## 2023-10-16 PROCEDURE — 6360000002 HC RX W HCPCS: Performed by: INTERNAL MEDICINE

## 2023-10-16 RX ORDER — SENNA AND DOCUSATE SODIUM 50; 8.6 MG/1; MG/1
1 TABLET, FILM COATED ORAL 2 TIMES DAILY
Status: DISCONTINUED | OUTPATIENT
Start: 2023-10-16 | End: 2023-10-17

## 2023-10-16 RX ORDER — FUROSEMIDE 10 MG/ML
20 INJECTION INTRAMUSCULAR; INTRAVENOUS ONCE
Status: COMPLETED | OUTPATIENT
Start: 2023-10-16 | End: 2023-10-16

## 2023-10-16 RX ORDER — MORPHINE SULFATE 2 MG/ML
2 INJECTION, SOLUTION INTRAMUSCULAR; INTRAVENOUS ONCE
Status: COMPLETED | OUTPATIENT
Start: 2023-10-16 | End: 2023-10-16

## 2023-10-16 RX ORDER — METOPROLOL SUCCINATE 25 MG/1
25 TABLET, EXTENDED RELEASE ORAL DAILY
Status: DISCONTINUED | OUTPATIENT
Start: 2023-10-16 | End: 2023-10-18

## 2023-10-16 RX ORDER — INSULIN LISPRO 100 [IU]/ML
0-4 INJECTION, SOLUTION INTRAVENOUS; SUBCUTANEOUS NIGHTLY
Status: DISCONTINUED | OUTPATIENT
Start: 2023-10-16 | End: 2023-10-17

## 2023-10-16 RX ORDER — LEVOFLOXACIN 5 MG/ML
500 INJECTION, SOLUTION INTRAVENOUS EVERY 24 HOURS
Status: COMPLETED | OUTPATIENT
Start: 2023-10-16 | End: 2023-10-20

## 2023-10-16 RX ORDER — HYDROXYZINE PAMOATE 25 MG/1
25 CAPSULE ORAL 3 TIMES DAILY PRN
Status: DISPENSED | OUTPATIENT
Start: 2023-10-16 | End: 2023-10-17

## 2023-10-16 RX ORDER — INSULIN LISPRO 100 [IU]/ML
0-16 INJECTION, SOLUTION INTRAVENOUS; SUBCUTANEOUS
Status: DISCONTINUED | OUTPATIENT
Start: 2023-10-16 | End: 2023-10-17

## 2023-10-16 RX ADMIN — CETIRIZINE HYDROCHLORIDE 5 MG: 5 SOLUTION ORAL at 09:39

## 2023-10-16 RX ADMIN — PANTOPRAZOLE SODIUM 40 MG: 40 TABLET, DELAYED RELEASE ORAL at 06:16

## 2023-10-16 RX ADMIN — MONTELUKAST 10 MG: 10 TABLET, FILM COATED ORAL at 20:36

## 2023-10-16 RX ADMIN — METHYLPREDNISOLONE SODIUM SUCCINATE 40 MG: 40 INJECTION, POWDER, FOR SOLUTION INTRAMUSCULAR; INTRAVENOUS at 18:08

## 2023-10-16 RX ADMIN — SODIUM CHLORIDE, PRESERVATIVE FREE 5 ML: 5 INJECTION INTRAVENOUS at 09:38

## 2023-10-16 RX ADMIN — MORPHINE SULFATE 2 MG: 2 INJECTION, SOLUTION INTRAMUSCULAR; INTRAVENOUS at 01:16

## 2023-10-16 RX ADMIN — METHYLPREDNISOLONE SODIUM SUCCINATE 40 MG: 40 INJECTION, POWDER, FOR SOLUTION INTRAMUSCULAR; INTRAVENOUS at 06:16

## 2023-10-16 RX ADMIN — FLUTICASONE PROPIONATE 1 PUFF: 110 AEROSOL, METERED RESPIRATORY (INHALATION) at 20:01

## 2023-10-16 RX ADMIN — INSULIN LISPRO 2 UNITS: 100 INJECTION, SOLUTION INTRAVENOUS; SUBCUTANEOUS at 09:39

## 2023-10-16 RX ADMIN — HYDROXYZINE PAMOATE 25 MG: 25 CAPSULE ORAL at 23:26

## 2023-10-16 RX ADMIN — METHYLPREDNISOLONE SODIUM SUCCINATE 40 MG: 40 INJECTION, POWDER, FOR SOLUTION INTRAMUSCULAR; INTRAVENOUS at 23:26

## 2023-10-16 RX ADMIN — DOCUSATE SODIUM AND SENNOSIDES 1 TABLET: 8.6; 5 TABLET, FILM COATED ORAL at 10:40

## 2023-10-16 RX ADMIN — Medication 1 LOZENGE: at 23:26

## 2023-10-16 RX ADMIN — METOPROLOL SUCCINATE 25 MG: 25 TABLET, EXTENDED RELEASE ORAL at 17:21

## 2023-10-16 RX ADMIN — AZITHROMYCIN DIHYDRATE 500 MG: 250 TABLET ORAL at 09:34

## 2023-10-16 RX ADMIN — SODIUM CHLORIDE, PRESERVATIVE FREE 10 ML: 5 INJECTION INTRAVENOUS at 20:37

## 2023-10-16 RX ADMIN — IPRATROPIUM BROMIDE AND ALBUTEROL SULFATE 1 DOSE: 2.5; .5 SOLUTION RESPIRATORY (INHALATION) at 14:43

## 2023-10-16 RX ADMIN — INSULIN LISPRO 3 UNITS: 100 INJECTION, SOLUTION INTRAVENOUS; SUBCUTANEOUS at 17:21

## 2023-10-16 RX ADMIN — MAGNESIUM OXIDE 400 MG (241.3 MG MAGNESIUM) TABLET 400 MG: TABLET at 09:36

## 2023-10-16 RX ADMIN — INSULIN LISPRO 12 UNITS: 100 INJECTION, SOLUTION INTRAVENOUS; SUBCUTANEOUS at 17:44

## 2023-10-16 RX ADMIN — METHYLPREDNISOLONE SODIUM SUCCINATE 40 MG: 40 INJECTION, POWDER, FOR SOLUTION INTRAMUSCULAR; INTRAVENOUS at 12:35

## 2023-10-16 RX ADMIN — MICONAZOLE NITRATE: 2 POWDER TOPICAL at 12:25

## 2023-10-16 RX ADMIN — LEVOFLOXACIN 500 MG: 5 INJECTION, SOLUTION INTRAVENOUS at 17:33

## 2023-10-16 RX ADMIN — DOCUSATE SODIUM AND SENNOSIDES 1 TABLET: 8.6; 5 TABLET, FILM COATED ORAL at 20:36

## 2023-10-16 RX ADMIN — FLUTICASONE PROPIONATE 1 SPRAY: 50 SPRAY, METERED NASAL at 17:44

## 2023-10-16 RX ADMIN — FUROSEMIDE 20 MG: 10 INJECTION, SOLUTION INTRAMUSCULAR; INTRAVENOUS at 18:12

## 2023-10-16 RX ADMIN — IPRATROPIUM BROMIDE AND ALBUTEROL SULFATE 1 DOSE: 2.5; .5 SOLUTION RESPIRATORY (INHALATION) at 20:01

## 2023-10-16 RX ADMIN — IPRATROPIUM BROMIDE AND ALBUTEROL SULFATE 1 DOSE: 2.5; .5 SOLUTION RESPIRATORY (INHALATION) at 11:55

## 2023-10-16 RX ADMIN — ATORVASTATIN CALCIUM 10 MG: 10 TABLET, FILM COATED ORAL at 09:34

## 2023-10-16 RX ADMIN — AMLODIPINE BESYLATE 10 MG: 10 TABLET ORAL at 09:35

## 2023-10-16 RX ADMIN — FLUTICASONE PROPIONATE 1 SPRAY: 50 SPRAY, METERED NASAL at 09:36

## 2023-10-16 RX ADMIN — MICONAZOLE NITRATE: 2 POWDER TOPICAL at 20:36

## 2023-10-16 RX ADMIN — INSULIN LISPRO 2 UNITS: 100 INJECTION, SOLUTION INTRAVENOUS; SUBCUTANEOUS at 12:32

## 2023-10-16 RX ADMIN — IPRATROPIUM BROMIDE AND ALBUTEROL SULFATE 1 DOSE: 2.5; .5 SOLUTION RESPIRATORY (INHALATION) at 04:46

## 2023-10-16 RX ADMIN — FLUTICASONE PROPIONATE 1 PUFF: 110 AEROSOL, METERED RESPIRATORY (INHALATION) at 07:31

## 2023-10-16 RX ADMIN — IPRATROPIUM BROMIDE AND ALBUTEROL SULFATE 1 DOSE: 2.5; .5 SOLUTION RESPIRATORY (INHALATION) at 07:25

## 2023-10-16 RX ADMIN — METHYLPREDNISOLONE SODIUM SUCCINATE 40 MG: 40 INJECTION, POWDER, FOR SOLUTION INTRAMUSCULAR; INTRAVENOUS at 01:16

## 2023-10-16 RX ADMIN — SODIUM CHLORIDE: 9 INJECTION, SOLUTION INTRAVENOUS at 17:33

## 2023-10-16 RX ADMIN — BENZONATATE 100 MG: 100 CAPSULE ORAL at 23:26

## 2023-10-16 RX ADMIN — INSULIN LISPRO 4 UNITS: 100 INJECTION, SOLUTION INTRAVENOUS; SUBCUTANEOUS at 20:57

## 2023-10-16 RX ADMIN — ENOXAPARIN SODIUM 40 MG: 100 INJECTION SUBCUTANEOUS at 09:38

## 2023-10-16 ASSESSMENT — PAIN SCALES - GENERAL
PAINLEVEL_OUTOF10: 5
PAINLEVEL_OUTOF10: 2
PAINLEVEL_OUTOF10: 0

## 2023-10-16 ASSESSMENT — PAIN SCALES - WONG BAKER: WONGBAKER_NUMERICALRESPONSE: 0

## 2023-10-16 ASSESSMENT — PAIN DESCRIPTION - DESCRIPTORS: DESCRIPTORS: TIGHTNESS

## 2023-10-16 ASSESSMENT — PAIN - FUNCTIONAL ASSESSMENT: PAIN_FUNCTIONAL_ASSESSMENT: ACTIVITIES ARE NOT PREVENTED

## 2023-10-16 ASSESSMENT — PAIN DESCRIPTION - LOCATION: LOCATION: OTHER (COMMENT)

## 2023-10-16 NOTE — PLAN OF CARE
Problem: Discharge Planning  Goal: Discharge to home or other facility with appropriate resources  Outcome: Progressing  Flowsheets (Taken 10/15/2023 1254 by Fidel Geronimo LPN)  Discharge to home or other facility with appropriate resources:   Identify barriers to discharge with patient and caregiver   Arrange for needed discharge resources and transportation as appropriate     Problem: Safety - Adult  Goal: Free from fall injury  Outcome: Progressing

## 2023-10-16 NOTE — CONSULTS
CO2 27 10/16/2023 06:33 AM    BUN 20 10/16/2023 06:33 AM    CREATININE 0.8 10/16/2023 06:33 AM    GFRAA >60 05/18/2021 02:51 AM    LABGLOM >60 10/16/2023 06:33 AM    GLUCOSE 336 10/16/2023 06:33 AM    PROT 6.2 10/16/2023 06:33 AM    PROT 7.9 03/09/2013 03:05 PM    LABALBU 3.9 10/16/2023 06:33 AM    CALCIUM 8.8 10/16/2023 06:33 AM    BILITOT 0.2 10/16/2023 06:33 AM    ALKPHOS 133 10/16/2023 06:33 AM    AST 18 10/16/2023 06:33 AM    ALT 7 10/16/2023 06:33 AM     Albumin:    Lab Results   Component Value Date/Time    LABALBU 3.9 10/16/2023 06:33 AM     PT/INR:    Lab Results   Component Value Date/Time    PROTIME 15.7 09/15/2023 05:31 AM    INR 1.2 09/15/2023 05:31 AM     HgBA1c:    Lab Results   Component Value Date/Time    LABA1C 8.9 09/20/2023 10:35 AM         Assessment:     Patient Active Problem List   Diagnosis    Megacolon    Bladder distention    Hypertension    Hypokalemia    Hypomagnesemia    Hyperglycemia    Respiratory acidosis    Pneumonia    Partial small bowel obstruction (HCC)    COPD (chronic obstructive pulmonary disease) ([facility])    Chronic bronchitis ([facility])    Diabetes mellitus type II, non insulin dependent ([address])    Acute kidney injury ([address])    Hematuria    Bladder outlet obstruction    COPD exacerbation ([address])    Gross hematuria    Dizziness    Diet-controlled diabetes mellitus ([address])    Chronic retention of urine    S/P TURP    Acute left-sided weakness    Abnormal nuclear stress test    Paresthesia    Volvulus (HCC)    Generalized weakness    Dyspnea    WPW (Ion-Parkinson-White syndrome)    Interpolated PVCs       Measurements:       Response to treatment:  Well tolerated by patient. Pain Assessment:  Severity:  none  Quality of pain: na  Wound Pain Timing/Severity: na  Premedicated: no    Plan:     Plan of Care:      Pt in bed. Agreeable to skin assessment. Heels and sacrum intact. No wounds noted.  Groins and intergluteal cleft with blanchable erythema and moist. MASD with possible candidiasis. Recommend Micotin powder. Relayed to MD. Pt is a mild risk for skin breakdown AEB Moreno. Follow Moreno orders. Re consult wound care if further needs arise. Specialty Bed Required : yes  [] Low Air Loss   [x] Pressure Redistribution  [] Fluid Immersion  [] Bariatric  [] Total Pressure Relief  [] Other:     Discharge Plan:  Placement for patient upon discharge: tbd  Hospice Care: no  Patient appropriate for Outpatient [address]: no    Patient/Caregiver Teaching:  Level of patient/caregiver understanding able to:   Voiced understanding.         Electronically signed by Keesha Gómez RN, Francisco Finney on 10/16/2023 at 11:26 AM

## 2023-10-16 NOTE — CARE COORDINATION
10/16/23 0934   Service Assessment   Patient Orientation Alert and Oriented   Cognition Alert   History Provided By Medical Record; Patient   Primary [facility]   Patient's Healthcare Decision Maker is: Legal Next of Kin   PCP Verified by CM Yes   Last Visit to PCP Within last 3 months   Prior Functional Level Independent in ADLs/IADLs   Current Functional Level Independent in ADLs/IADLs   Can patient return to prior living arrangement Yes   Ability to make needs known: Good   Family able to assist with home care needs: Yes   Would you like for me to discuss the discharge plan with any other family members/significant others, and if so, who? No   Financial Resources Medicare; ([place])   Community Resources ECF/Home Care  ([place])   Social/Functional History   Lives With Alone   Type of [facility] [address] [facility] level     From home, independent prior to admission. Recently discharged with [facility]. CM following. No needs at this time.

## 2023-10-16 NOTE — CONSULTS
Name:  Danelle Pierce /Age/Sex: 1945  (14 y.o. male)   MRN & CSN:  [id number] & [id number] Admission Date/Time: 10/15/2023 11:11 AM   Location:  [address] [place], [place] [address] PCP: Jovani Chandler Day: 2          Referring physician:  Larissa Montemayor MD         Reason for consultation: Ventricular tachycardia        Thanks for referral.    Information source: Patient    CC; short of breath      HPI:   Thank you for involving me in taking  care of Danelle Pierce who  is a 70 y. o.year  Old male  Presents with has severe COPD, hypertension, hyperlipidemia, diabetes had a cath done couple of years ago which did not show any significant CAD now admitted complaining of worsening dyspnea and lower extremity swelling patient was noted to have a short run of VT hence cardiology has been consulted at the present time appears to be comfortable denies any chest pain                     Past medical history:    has a past medical history of Acid reflux, Anemia, COPD (chronic obstructive pulmonary disease) ([address]), Dental decay, Diabetes mellitus ([address]), Emphysema of lung ([address]), Enlarged prostate, Stevens catheter in place, H/O cardiovascular stress test, Hematuria, HX OTHER MEDICAL, Hypertension, Low back sprain, Megacolon, On home oxygen therapy, and Wears glasses. Past surgical history:   has a past surgical history that includes Revision Colostomy (); Abdominal exploration surgery (); other surgical history; other surgical history (2013); tumor removal; Appendectomy (7years old); Abdomen surgery; TURP (N/A, 2018); Dental surgery; and other surgical history (2018). Social History:   reports that he quit smoking about 10 years ago. His smoking use included cigarettes. He has a 15.00 pack-year smoking history. He has never used smokeless tobacco. He reports that he does not drink alcohol and does not use drugs.   Family history:  family history includes Coronary Art Dis in his mother; 4.6 - 6.2 M/CU MM    Hemoglobin 9.9 (L) 13.5 - 18.0 GM/DL    Hematocrit 34.3 (L) 42 - 52 %    MCV 96.9 78 - 100 FL    MCH 28.0 27 - 31 PG    MCHC 28.9 (L) 32.0 - 36.0 %    RDW 15.4 (H) 11.7 - 14.9 %    Platelets 604 750 - 973 K/CU MM    MPV 11.7 (H) 7.5 - 11.1 FL    Segs Relative 94.0 (H) 36 - 66 %    Lymphocytes % 4.0 (L) 24 - 44 %    Monocytes % 2.0 0 - 4 %    Segs Absolute 8.6 K/CU MM    Lymphocytes Absolute 0.4 K/CU MM    Monocytes Absolute 0.2 K/CU MM    Differential Type MANUAL DIFFERENTIAL     Anisocytosis 1+     Jacobo Cells 1+    Troponin    Collection Time: 10/16/23  6:33 AM   Result Value Ref Range    Troponin, High Sensitivity 43 (H) 0 - 22 ng/L   POCT Glucose    Collection Time: 10/16/23  7:01 AM   Result Value Ref Range    POC Glucose 295 (H) 70 - 99 MG/DL   POCT Glucose    Collection Time: 10/16/23 11:08 AM   Result Value Ref Range    POC Glucose 293 (H) 70 - 99 MG/DL   Respiratory Panel, Molecular, with COVID-19 (Restricted: peds pts or suitable admitted adults)    Collection Time: 10/16/23  2:30 PM    Specimen: Nasopharyngeal   Result Value Ref Range    Adenovirus Detection by PCR NOT DETECTED NOT DETECTED    Coronavirus 229E PCR NOT DETECTED NOT DETECTED    Coronavirus HKU1 PCR NOT DETECTED NOT DETECTED    Coronavirus NL63 PCR NOT DETECTED NOT DETECTED    Coronavirus OC43 PCR NOT DETECTED NOT DETECTED    SARS-CoV-2 NOT DETECTED NOT DETECTED    Human Metapneumovirus PCR NOT DETECTED NOT DETECTED    Rhinovirus Enterovirus PCR NOT DETECTED NOT DETECTED    Influenza A by PCR NOT DETECTED NOT DETECTED    Influenza A H1 Pandemic PCR NOT DETECTED NOT DETECTED    Influenza A H1 (2009) PCR NOT DETECTED NOT DETECTED    Influenza A H3 PCR NOT DETECTED NOT DETECTED    Influenza B by PCR NOT DETECTED NOT DETECTED    Parainfluenza 1 PCR NOT DETECTED NOT DETECTED    Parainfluenza 2 PCR NOT DETECTED NOT DETECTED    Parainfluenza 3 PCR NOT DETECTED NOT DETECTED    Parainfluenza 4 PCR NOT DETECTED NOT DETECTED    RSV

## 2023-10-17 PROBLEM — I47.29 NSVT (NONSUSTAINED VENTRICULAR TACHYCARDIA) (HCC): Status: ACTIVE | Noted: 2023-10-17

## 2023-10-17 LAB
GLUCOSE BLD-MCNC: 195 MG/DL (ref 70–99)
GLUCOSE BLD-MCNC: 206 MG/DL (ref 70–99)
GLUCOSE BLD-MCNC: 259 MG/DL (ref 70–99)
GLUCOSE BLD-MCNC: 317 MG/DL (ref 70–99)
GLUCOSE BLD-MCNC: 55 MG/DL (ref 70–99)
GLUCOSE BLD-MCNC: 56 MG/DL (ref 70–99)
GLUCOSE BLD-MCNC: 60 MG/DL (ref 70–99)

## 2023-10-17 PROCEDURE — 2700000000 HC OXYGEN THERAPY PER DAY

## 2023-10-17 PROCEDURE — APPNB60 APP NON BILLABLE TIME 46-60 MINS: Performed by: NURSE PRACTITIONER

## 2023-10-17 PROCEDURE — 83036 HEMOGLOBIN GLYCOSYLATED A1C: CPT

## 2023-10-17 PROCEDURE — 85025 COMPLETE CBC W/AUTO DIFF WBC: CPT

## 2023-10-17 PROCEDURE — 6370000000 HC RX 637 (ALT 250 FOR IP): Performed by: FAMILY MEDICINE

## 2023-10-17 PROCEDURE — 94761 N-INVAS EAR/PLS OXIMETRY MLT: CPT

## 2023-10-17 PROCEDURE — 99222 1ST HOSP IP/OBS MODERATE 55: CPT | Performed by: INTERNAL MEDICINE

## 2023-10-17 PROCEDURE — 80048 BASIC METABOLIC PNL TOTAL CA: CPT

## 2023-10-17 PROCEDURE — 99232 SBSQ HOSP IP/OBS MODERATE 35: CPT | Performed by: INTERNAL MEDICINE

## 2023-10-17 PROCEDURE — 6360000002 HC RX W HCPCS: Performed by: INTERNAL MEDICINE

## 2023-10-17 PROCEDURE — 6370000000 HC RX 637 (ALT 250 FOR IP): Performed by: INTERNAL MEDICINE

## 2023-10-17 PROCEDURE — 6360000002 HC RX W HCPCS: Performed by: STUDENT IN AN ORGANIZED HEALTH CARE EDUCATION/TRAINING PROGRAM

## 2023-10-17 PROCEDURE — 6370000000 HC RX 637 (ALT 250 FOR IP): Performed by: STUDENT IN AN ORGANIZED HEALTH CARE EDUCATION/TRAINING PROGRAM

## 2023-10-17 PROCEDURE — 83735 ASSAY OF MAGNESIUM: CPT

## 2023-10-17 PROCEDURE — 94640 AIRWAY INHALATION TREATMENT: CPT

## 2023-10-17 PROCEDURE — 84132 ASSAY OF SERUM POTASSIUM: CPT

## 2023-10-17 PROCEDURE — 1200000000 HC SEMI PRIVATE

## 2023-10-17 PROCEDURE — 82962 GLUCOSE BLOOD TEST: CPT

## 2023-10-17 PROCEDURE — 2580000003 HC RX 258: Performed by: STUDENT IN AN ORGANIZED HEALTH CARE EDUCATION/TRAINING PROGRAM

## 2023-10-17 RX ORDER — SENNA AND DOCUSATE SODIUM 50; 8.6 MG/1; MG/1
2 TABLET, FILM COATED ORAL 2 TIMES DAILY
Status: DISCONTINUED | OUTPATIENT
Start: 2023-10-17 | End: 2023-10-25 | Stop reason: HOSPADM

## 2023-10-17 RX ORDER — ALBUTEROL SULFATE 2.5 MG/3ML
2.5 SOLUTION RESPIRATORY (INHALATION)
Status: DISCONTINUED | OUTPATIENT
Start: 2023-10-17 | End: 2023-10-17

## 2023-10-17 RX ORDER — BISACODYL 10 MG
10 SUPPOSITORY, RECTAL RECTAL DAILY PRN
Status: DISCONTINUED | OUTPATIENT
Start: 2023-10-17 | End: 2023-10-25 | Stop reason: HOSPADM

## 2023-10-17 RX ORDER — INSULIN LISPRO 100 [IU]/ML
15 INJECTION, SOLUTION INTRAVENOUS; SUBCUTANEOUS
Status: DISCONTINUED | OUTPATIENT
Start: 2023-10-17 | End: 2023-10-19

## 2023-10-17 RX ORDER — GUAIFENESIN 600 MG/1
600 TABLET, EXTENDED RELEASE ORAL 2 TIMES DAILY
Status: DISCONTINUED | OUTPATIENT
Start: 2023-10-17 | End: 2023-10-25 | Stop reason: HOSPADM

## 2023-10-17 RX ORDER — INSULIN LISPRO 100 [IU]/ML
0-4 INJECTION, SOLUTION INTRAVENOUS; SUBCUTANEOUS
Status: DISCONTINUED | OUTPATIENT
Start: 2023-10-17 | End: 2023-10-25 | Stop reason: HOSPADM

## 2023-10-17 RX ORDER — INSULIN LISPRO 100 [IU]/ML
0-8 INJECTION, SOLUTION INTRAVENOUS; SUBCUTANEOUS
Status: DISCONTINUED | OUTPATIENT
Start: 2023-10-17 | End: 2023-10-22

## 2023-10-17 RX ORDER — IPRATROPIUM BROMIDE AND ALBUTEROL SULFATE 2.5; .5 MG/3ML; MG/3ML
1 SOLUTION RESPIRATORY (INHALATION)
Status: DISCONTINUED | OUTPATIENT
Start: 2023-10-17 | End: 2023-10-25 | Stop reason: HOSPADM

## 2023-10-17 RX ORDER — INSULIN GLARGINE 100 [IU]/ML
35 INJECTION, SOLUTION SUBCUTANEOUS NIGHTLY
Status: DISCONTINUED | OUTPATIENT
Start: 2023-10-17 | End: 2023-10-19

## 2023-10-17 RX ADMIN — MONTELUKAST 10 MG: 10 TABLET, FILM COATED ORAL at 20:56

## 2023-10-17 RX ADMIN — Medication 1 LOZENGE: at 07:18

## 2023-10-17 RX ADMIN — IPRATROPIUM BROMIDE AND ALBUTEROL SULFATE 1 DOSE: 2.5; .5 SOLUTION RESPIRATORY (INHALATION) at 00:12

## 2023-10-17 RX ADMIN — AMLODIPINE BESYLATE 10 MG: 10 TABLET ORAL at 08:24

## 2023-10-17 RX ADMIN — GUAIFENESIN SYRUP AND DEXTROMETHORPHAN 5 ML: 100; 10 SYRUP ORAL at 21:56

## 2023-10-17 RX ADMIN — METOPROLOL SUCCINATE 25 MG: 25 TABLET, EXTENDED RELEASE ORAL at 08:23

## 2023-10-17 RX ADMIN — MAGNESIUM OXIDE 400 MG (241.3 MG MAGNESIUM) TABLET 400 MG: TABLET at 08:24

## 2023-10-17 RX ADMIN — IPRATROPIUM BROMIDE AND ALBUTEROL SULFATE 1 DOSE: 2.5; .5 SOLUTION RESPIRATORY (INHALATION) at 17:23

## 2023-10-17 RX ADMIN — IPRATROPIUM BROMIDE AND ALBUTEROL SULFATE 1 DOSE: 2.5; .5 SOLUTION RESPIRATORY (INHALATION) at 23:48

## 2023-10-17 RX ADMIN — PANTOPRAZOLE SODIUM 40 MG: 40 TABLET, DELAYED RELEASE ORAL at 06:04

## 2023-10-17 RX ADMIN — INSULIN LISPRO 4 UNITS: 100 INJECTION, SOLUTION INTRAVENOUS; SUBCUTANEOUS at 21:11

## 2023-10-17 RX ADMIN — INSULIN GLARGINE 35 UNITS: 100 INJECTION, SOLUTION SUBCUTANEOUS at 21:00

## 2023-10-17 RX ADMIN — BENZONATATE 100 MG: 100 CAPSULE ORAL at 06:04

## 2023-10-17 RX ADMIN — ALBUTEROL SULFATE 2.5 MG: 2.5 SOLUTION RESPIRATORY (INHALATION) at 04:15

## 2023-10-17 RX ADMIN — INSULIN LISPRO 15 UNITS: 100 INJECTION, SOLUTION INTRAVENOUS; SUBCUTANEOUS at 08:54

## 2023-10-17 RX ADMIN — METHYLPREDNISOLONE SODIUM SUCCINATE 40 MG: 40 INJECTION, POWDER, FOR SOLUTION INTRAMUSCULAR; INTRAVENOUS at 18:45

## 2023-10-17 RX ADMIN — IPRATROPIUM BROMIDE AND ALBUTEROL SULFATE 1 DOSE: 2.5; .5 SOLUTION RESPIRATORY (INHALATION) at 12:22

## 2023-10-17 RX ADMIN — MICONAZOLE NITRATE: 2 POWDER TOPICAL at 21:12

## 2023-10-17 RX ADMIN — GUAIFENESIN 600 MG: 600 TABLET, EXTENDED RELEASE ORAL at 20:55

## 2023-10-17 RX ADMIN — ATORVASTATIN CALCIUM 10 MG: 10 TABLET, FILM COATED ORAL at 08:24

## 2023-10-17 RX ADMIN — LEVOFLOXACIN 500 MG: 5 INJECTION, SOLUTION INTRAVENOUS at 14:27

## 2023-10-17 RX ADMIN — IPRATROPIUM BROMIDE AND ALBUTEROL SULFATE 1 DOSE: 2.5; .5 SOLUTION RESPIRATORY (INHALATION) at 09:15

## 2023-10-17 RX ADMIN — ENOXAPARIN SODIUM 40 MG: 100 INJECTION SUBCUTANEOUS at 08:53

## 2023-10-17 RX ADMIN — CETIRIZINE HYDROCHLORIDE 5 MG: 5 SOLUTION ORAL at 08:24

## 2023-10-17 RX ADMIN — INSULIN LISPRO 15 UNITS: 100 INJECTION, SOLUTION INTRAVENOUS; SUBCUTANEOUS at 11:36

## 2023-10-17 RX ADMIN — IPRATROPIUM BROMIDE AND ALBUTEROL SULFATE 1 DOSE: 2.5; .5 SOLUTION RESPIRATORY (INHALATION) at 07:54

## 2023-10-17 RX ADMIN — METHYLPREDNISOLONE SODIUM SUCCINATE 40 MG: 40 INJECTION, POWDER, FOR SOLUTION INTRAMUSCULAR; INTRAVENOUS at 06:04

## 2023-10-17 RX ADMIN — FLUTICASONE PROPIONATE 1 PUFF: 110 AEROSOL, METERED RESPIRATORY (INHALATION) at 07:53

## 2023-10-17 RX ADMIN — HYDROXYZINE PAMOATE 25 MG: 25 CAPSULE ORAL at 06:04

## 2023-10-17 RX ADMIN — SENNOSIDES AND DOCUSATE SODIUM 2 TABLET: 50; 8.6 TABLET ORAL at 20:56

## 2023-10-17 RX ADMIN — FLUTICASONE PROPIONATE 1 SPRAY: 50 SPRAY, METERED NASAL at 08:25

## 2023-10-17 RX ADMIN — IPRATROPIUM BROMIDE AND ALBUTEROL SULFATE 1 DOSE: 2.5; .5 SOLUTION RESPIRATORY (INHALATION) at 19:52

## 2023-10-17 RX ADMIN — Medication 16 G: at 17:18

## 2023-10-17 RX ADMIN — FLUTICASONE PROPIONATE 1 PUFF: 110 AEROSOL, METERED RESPIRATORY (INHALATION) at 19:53

## 2023-10-17 RX ADMIN — MICONAZOLE NITRATE: 2 POWDER TOPICAL at 08:29

## 2023-10-17 RX ADMIN — SODIUM CHLORIDE, PRESERVATIVE FREE 10 ML: 5 INJECTION INTRAVENOUS at 21:02

## 2023-10-17 RX ADMIN — DOCUSATE SODIUM AND SENNOSIDES 1 TABLET: 8.6; 5 TABLET, FILM COATED ORAL at 08:25

## 2023-10-17 RX ADMIN — METHYLPREDNISOLONE SODIUM SUCCINATE 40 MG: 40 INJECTION, POWDER, FOR SOLUTION INTRAMUSCULAR; INTRAVENOUS at 12:30

## 2023-10-17 RX ADMIN — GUAIFENESIN 600 MG: 600 TABLET, EXTENDED RELEASE ORAL at 14:24

## 2023-10-17 RX ADMIN — Medication 1 LOZENGE: at 23:53

## 2023-10-17 RX ADMIN — IPRATROPIUM BROMIDE AND ALBUTEROL SULFATE 1 DOSE: 2.5; .5 SOLUTION RESPIRATORY (INHALATION) at 15:11

## 2023-10-17 ASSESSMENT — ENCOUNTER SYMPTOMS
BLOOD IN STOOL: 0
VOMITING: 0
COUGH: 0
EYE REDNESS: 0
WHEEZING: 0
SORE THROAT: 0
NAUSEA: 0
COUGH: 1
CHEST TIGHTNESS: 0
CHEST TIGHTNESS: 1
COLOR CHANGE: 0
DIARRHEA: 0
SINUS PAIN: 0
PHOTOPHOBIA: 0
BACK PAIN: 0
ABDOMINAL PAIN: 0
EYE ITCHING: 0
EYE PAIN: 0
CONSTIPATION: 0
SHORTNESS OF BREATH: 1
SINUS PRESSURE: 0

## 2023-10-17 ASSESSMENT — PAIN SCALES - GENERAL
PAINLEVEL_OUTOF10: 2
PAINLEVEL_OUTOF10: 2

## 2023-10-17 ASSESSMENT — PAIN SCALES - WONG BAKER: WONGBAKER_NUMERICALRESPONSE: 0

## 2023-10-17 ASSESSMENT — PAIN DESCRIPTION - DESCRIPTORS: DESCRIPTORS: ACHING

## 2023-10-17 ASSESSMENT — PAIN - FUNCTIONAL ASSESSMENT: PAIN_FUNCTIONAL_ASSESSMENT: ACTIVITIES ARE NOT PREVENTED

## 2023-10-17 ASSESSMENT — PAIN DESCRIPTION - LOCATION: LOCATION: CHEST

## 2023-10-17 ASSESSMENT — PAIN DESCRIPTION - ORIENTATION: ORIENTATION: MID

## 2023-10-17 NOTE — PROGRESS NOTES
V2.0    [facility] Progress Note      Name:  Ivone Sweeney /Age/Sex: 1945  (70 y.o. male)   MRN & CSN:  [id number] & [id number] Encounter Date/Time: 10/17/2023 12:33 PM EDT   Location:  -A PCP: Néstor Dick MD       Hospital Day: 3    Assessment and Recommendations     Ivone Sweeney is a 70 y.o. male with a pmh of very severe COPD with chronic hypoxemic hypercapnic respiratory failure (on 3L NC continuously), HTN, HLD and T2DM  who presents with COPD exacerbation [facility]     Hospital Problems               Last Modified POA     * (Principal) COPD exacerbation ([address]) 10/15/2023 Yes         Plan:  Shortness of Breath, acute COPD Exacerbation, Chronic resp failure on 3-4L/min at baseline   Lower extremity swelling:  - Pt presenting w worsening cough and shortness of breath for 5 days , O2 Sat >90% on RA, ,   -Telemetry   -O2 PRN, keep O2 Sat 88-92% in setting of underlying COPD -BiPAP PRN   -CXR with no acute changes  - s/w Duonebs Q6H and Q4H PRN   - Antitussives   - s/w Solumedrol IV, switch to PO when appropriate. - on levofloxacin 500mg IV daily   -Patient denies any smoking  Pulmonary consulted, pt would like outpatient pulmonary follow up, pt on 3-4 L NC oxygen at home, reports cannot get to [facility] for pulm appt due to transportation issues  Pt with tachypnea, RR 30 on 4L NC still   - Viral panel -ve   - Sputum culture pending   -Pulm on board. Elevated troponin:  -Pt denies chest pain  -Trend trops, EKG with normal Sinus rhythm with occasional premature ventricular complexes   Left axis deviation   Incomplete right bundle branch block   ST & T wave abnormality in lateral leads  Likely due to hypoxia, acute COPD exacerbation,  may need outpatient stress test after acute COPD exacerbation resolved, consulted cardio and EP due to EKG changes, SOB, and NSVT on tele, elevated trop  F/U Cardio and EP recs      Hyperkalemia .  K 5.6. Pending labs today. PRN        Labs and Imaging   No results found. CBC:   Recent Labs     10/16/23  0633   WBC 9.2   HGB 9.9*          BMP:    Recent Labs     10/16/23  0633   *   K 5.6*   CL 95*   CO2 27   BUN 20   CREATININE 0.8*   GLUCOSE 336*       Hepatic:   Recent Labs     10/16/23  0633   AST 18   ALT 7*   BILITOT 0.2   ALKPHOS 133*       Lipids:   Lab Results   Component Value Date/Time    CHOL 122 05/15/2021 04:10 AM    HDL 51 05/15/2021 04:10 AM    TRIG 41 05/15/2021 04:10 AM     Hemoglobin A1C:   Lab Results   Component Value Date/Time    LABA1C 8.9 09/20/2023 10:35 AM     TSH: No results found for: \"TSH\"  Troponin:   Lab Results   Component Value Date/Time    TROPONINT 0.028 09/17/2023 04:22 PM    TROPONINT 0.039 09/17/2023 12:45 PM    TROPONINT 0.036 09/17/2023 07:58 AM     Lactic Acid: No results for input(s): \"LACTA\" in the last 72 hours.   BNP:   Recent Labs     10/16/23  0633   PROBNP 1,433*       UA:  Lab Results   Component Value Date/Time    NITRU POSITIVE 04/29/2018 03:15 PM    COLORU YELLOW 04/29/2018 03:15 PM    WBCUA 948 04/29/2018 03:15 PM    RBCUA 11,278 04/29/2018 03:15 PM    MUCUS RARE 04/06/2018 01:20 AM    TRICHOMONAS NONE SEEN 04/29/2018 03:15 PM    YEAST OCCASIONAL 01/13/2013 02:00 AM    BACTERIA RARE 04/29/2018 03:15 PM    CLARITYU SLIGHTLY CLOUDY 04/29/2018 03:15 PM    SPECGRAV 1.014 04/29/2018 03:15 PM    LEUKOCYTESUR TRACE 04/29/2018 03:15 PM    UROBILINOGEN NORMAL 04/29/2018 03:15 PM    BILIRUBINUR NEGATIVE 04/29/2018 03:15 PM    BLOODU MODERATE 04/29/2018 03:15 PM    KETUA NEGATIVE 04/29/2018 03:15 PM     Results       Procedure Component Value Units Date/Time    Respiratory Panel, Molecular, with COVID-19 (Restricted: peds pts or suitable admitted adults) [[id number]] Collected: 10/16/23 1430    Order Status: Completed Specimen: Nasopharyngeal Updated: 10/16/23 4514     Adenovirus Detection by PCR NOT DETECTED     Coronavirus 229E PCR NOT DETECTED     Coronavirus HKU1 PCR NOT

## 2023-10-17 NOTE — PROGRESS NOTES
Cardiology Progress Note     Today's Plan cpm   Awaiting labs     Admit Date:  10/15/2023    Consult reason / Seen today for: VT    Subjective and Overnight Events:  c/o shortness of breath and non productive cough      Telemetry: SR  No further VT noted on telemetry review     Assessment / Plan:   VT: had short burst of VT: : msec: EKG Sinus with PVCs/ Left axis deviation:K  5.6  EP consulted; on magnesium 500 mg daily Toprol xl 25 mg daily :no labs yet today   Elevated HS troponin: 68-46-66-77: he denies chest pain: EKG SR with T wave inversion in lateral leads-Echo completed: normal EF : difficult study d/t lung interference :unable to r/o wall motion abnormality : LHC in 05/2021-mild 40-50% stenosis of LAD: medical manage   HTN: stable -amlodipine   COPD exacerbation         History of Presenting Illness:    Chief complain on admission : 70 y. o.year old who is admitted forNo chief complaint on file. Past medical history:    has a past medical history of Acid reflux, Anemia, COPD (chronic obstructive pulmonary disease) ([address]), Dental decay, Diabetes mellitus ([address]), Emphysema of lung ([address]), Enlarged prostate, Stevens catheter in place, H/O cardiovascular stress test, Hematuria, HX OTHER MEDICAL, Hypertension, Low back sprain, Megacolon, On home oxygen therapy, and Wears glasses. Past surgical history:   has a past surgical history that includes Revision Colostomy (1974); Abdominal exploration surgery (5499); other surgical history; other surgical history (01 07 2013); tumor removal; Appendectomy (7years old); Abdomen surgery; TURP (N/A, 07/12/2018); Dental surgery; and other surgical history (09/18/2018). Social History:   reports that he quit smoking about 10 years ago. His smoking use included cigarettes. He has a 15.00 pack-year smoking history.  He has never used smokeless tobacco. He reports that he does not drink sennosides-docusate sodium  2 tablet Oral BID    guaiFENesin  600 mg Oral BID    miconazole   Topical BID    methylPREDNISolone  40 mg IntraVENous Q6H    levofloxacin  500 mg IntraVENous Q24H    metoprolol succinate  25 mg Oral Daily    amLODIPine  10 mg Oral Daily    atorvastatin  10 mg Oral Daily    fluticasone  1 spray Nasal BID    cetirizine HCl  5 mg Oral Daily    magnesium oxide  400 mg Oral Daily    montelukast  10 mg Oral Nightly    pantoprazole  40 mg Oral QAM AC    sodium chloride flush  5-40 mL IntraVENous 2 times per day    enoxaparin  40 mg SubCUTAneous Daily    fluticasone  1 puff Inhalation BID RT      dextrose      sodium chloride 25 mL/hr at 10/16/23 1733     bisacodyl, hydrOXYzine pamoate, Benzocaine-Menthol, glucose, dextrose bolus **OR** dextrose bolus, glucagon (rDNA), dextrose, sodium chloride flush, sodium chloride, ondansetron **OR** ondansetron, polyethylene glycol, acetaminophen **OR** acetaminophen, guaiFENesin-dextromethorphan, benzonatate, ipratropium 0.5 mg-albuterol 2.5 mg    Lab Data:  CBC:   Recent Labs     10/16/23  0633   WBC 9.2   HGB 9.9*   HCT 34.3*   MCV 96.9        BMP:   Recent Labs     10/16/23  0633   *   K 5.6*   CL 95*   CO2 27   BUN 20   CREATININE 0.8*     PT/INR: No results for input(s): \"PROTIME\", \"INR\" in the last 72 hours. BNP:    Recent Labs     10/16/23  0633   PROBNP 1,433*     TROPONIN: No results for input(s): \"TROPONINT\" in the last 72 hours. Impression:  Principal Problem:    COPD exacerbation ([address])  Resolved Problems:    * No resolved hospital problems. *       All labs, medications and tests reviewed by myself, continue all other medications of all above medical condition listed as is except for changes mentioned above. Thank you   Please call with questions. Electronically signed by ERNESTO Aguero CNP on 10/17/2023 at 1:33 PM    I have seen ,spoken to  and examined this patient personally, independently of the KRISTY.  I

## 2023-10-17 NOTE — PROGRESS NOTES
Dr Stiven Lang was notified of pt SOB and anxiety. Dr Stiven Lang would like pt to have another breathing treatment now and for pt to keep oxygen between 88 and 92%. Angie RT notified of need for breathing treatment.

## 2023-10-17 NOTE — CARE COORDINATION
Chart reviewed and patient discussed in IDR. Pt on IV steroids and IVAB. EP and endo consults. No new needs identified. CM following for any new needs that may arise. Plan is home.

## 2023-10-17 NOTE — CONSULTS
Subjective:   CHIEF COMPLAINT / HPI:  70year old male admitted with worsening sob and this is accompanied bt wheeze. He has cough with yellow sputum. He is using accessory muswcles of breathing. He denies any fever or chest pain or hemoptysis.       Past Medical History:  Past Medical History:   Diagnosis Date    Acid reflux     Anemia     \"dx age 10    COPD (chronic obstructive pulmonary disease) ([address])     follows with [address] decay     Diabetes mellitus ([address])     \"dx 2015 or so\"    Emphysema of lung ([address])     Enlarged prostate     Stevens catheter in place     \"put in catheter since mid April 2018\"    H/O cardiovascular stress test 05/01/2018    Nml, 62%    Hematuria     dx with admission 4/29/2018    HX OTHER MEDICAL     \"tried 3 times to do sleep study could not tolerate    Hypertension     per pt on 7/11/2018\"my blood pressure has been low so stopped the Amlodopine for now\"    Low back sprain 1983    Megacolon     On home oxygen therapy     \"wear it at night at 3l/nc\"    Wears glasses     to read       Past Surgical History:        Procedure Laterality Date    ABDOMINAL EXPLORATION SURGERY  1974    volvulus\"had colostomy for 2 weeks then did reversal 2 weeks later\"( was in [place])    ABDOMINAL SURGERY      \"had abd surgery in the service one in [place] and one in [address] [place] [address]."    APPENDECTOMY  7years old    [address]      exp lap,colon resection,subtotal colectomy,cysto     OTHER SURGICAL HISTORY  01 07 2013    exp lap,colon resection, subtotal colectomy, cysto    OTHER SURGICAL HISTORY  09/18/2018    TURP    REVISION COLOSTOMY  1974    TUMOR REMOVAL      ABD TUMOR/MASS JAN 2013    TURP N/A 07/12/2018    cystoscopy, bipolar TURP       Current Medications:    Current Facility-Administered Medications: albuterol (PROVENTIL) (2.5 MG/3ML) 0.083% nebulizer solution 2.5 mg, 2.5 mg, Nebulization, Q4H WA RT  ipratropium 0.5 mg-albuterol 2.5 mg (DUONEB) nebulizer solution 1 Dose, (10/15/2023)    Exercise Vital Sign     Days of Exercise per Week: 0 days     Minutes of Exercise per Session: 0 min   Stress: No Stress Concern Present (10/15/2023)    [address]     Feeling of Stress : Not at all   Social Connections: Socially Isolated (10/15/2023)    Social Connection and Isolation Panel [NHANES]     Frequency of Communication with Friends and Family: More than three times a week     Frequency of Social Gatherings with Friends and Family: More than three times a week     Attends [employer] Services: Never     Active Member of Clubs or Organizations: No     Attends Club or Organization Meetings: Never     Marital Status:    Intimate Partner Violence: Not At Risk (10/15/2023)    Humiliation, Afraid, Rape, and Kick questionnaire     Fear of Current or Ex-Partner: No     Emotionally Abused: No     Physically Abused: No     Sexually Abused: No   Housing Stability: Low Risk  (10/15/2023)    Housing Stability Vital Sign     Unable to Pay for Housing in the Last Year: No     Number of Places Lived in the Last Year: 1     Unstable Housing in the Last Year: No       Family History:   Family History   Problem Relation Age of Onset    Coronary Art Dis Mother     Diabetes Mother     Stroke Father     Other Sister         aneurysm    Stroke Brother        Immunization:  Immunization History   Administered Date(s) Administered    Pneumococcal, PCV-13, PREVNAR 15, (age 6w+), IM, 0.5mL 08/11/2017         REVIEW OF SYSTEMS:    CONSTITUTIONAL:  negative for fevers, chills, diaphoresis, activity change, appetite change, fatigue, night sweats and unexpected weight change.    EYES:  negative for blurred vision, eye discharge, visual disturbance and icterus  HEENT:  negative for hearing loss, tinnitus, ear drainage, sinus pressure, nasal congestion, epistaxis and snoring  RESPIRATORY:  See HPI  CARDIOVASCULAR:  negative for chest pain, palpitations,

## 2023-10-17 NOTE — PROCEDURES
Night Shift RN Notes:  2042 This patient is very anxious and asking for anti-anxiety and cephacol Evin Green was informed.    2049  given 4 units as ordered, Evin Green was informed per ordered, ordered Vistaril and Cepacol

## 2023-10-18 LAB
ANION GAP SERPL CALCULATED.3IONS-SCNC: 8 MMOL/L (ref 4–16)
BUN SERPL-MCNC: 32 MG/DL (ref 6–23)
CALCIUM SERPL-MCNC: 9.3 MG/DL (ref 8.3–10.6)
CHLORIDE BLD-SCNC: 97 MMOL/L (ref 99–110)
CO2: 29 MMOL/L (ref 21–32)
CREAT SERPL-MCNC: 1.1 MG/DL (ref 0.9–1.3)
CULTURE: NORMAL
GFR SERPL CREATININE-BSD FRML MDRD: >60 ML/MIN/1.73M2
GLUCOSE BLD-MCNC: 103 MG/DL (ref 70–99)
GLUCOSE BLD-MCNC: 113 MG/DL (ref 70–99)
GLUCOSE BLD-MCNC: 186 MG/DL (ref 70–99)
GLUCOSE BLD-MCNC: 210 MG/DL (ref 70–99)
GLUCOSE BLD-MCNC: 223 MG/DL (ref 70–99)
GLUCOSE SERPL-MCNC: 213 MG/DL (ref 70–99)
GRAM SMEAR: NORMAL
Lab: NORMAL
MAGNESIUM: 1.6 MG/DL (ref 1.8–2.4)
POTASSIUM SERPL-SCNC: 4.7 MMOL/L (ref 3.5–5.1)
SODIUM BLD-SCNC: 134 MMOL/L (ref 135–145)
SPECIMEN: NORMAL

## 2023-10-18 PROCEDURE — 6370000000 HC RX 637 (ALT 250 FOR IP): Performed by: STUDENT IN AN ORGANIZED HEALTH CARE EDUCATION/TRAINING PROGRAM

## 2023-10-18 PROCEDURE — 1200000000 HC SEMI PRIVATE

## 2023-10-18 PROCEDURE — 6360000002 HC RX W HCPCS: Performed by: STUDENT IN AN ORGANIZED HEALTH CARE EDUCATION/TRAINING PROGRAM

## 2023-10-18 PROCEDURE — 2580000003 HC RX 258: Performed by: STUDENT IN AN ORGANIZED HEALTH CARE EDUCATION/TRAINING PROGRAM

## 2023-10-18 PROCEDURE — 99232 SBSQ HOSP IP/OBS MODERATE 35: CPT | Performed by: INTERNAL MEDICINE

## 2023-10-18 PROCEDURE — 99232 SBSQ HOSP IP/OBS MODERATE 35: CPT | Performed by: NURSE PRACTITIONER

## 2023-10-18 PROCEDURE — APPNB60 APP NON BILLABLE TIME 46-60 MINS: Performed by: NURSE PRACTITIONER

## 2023-10-18 PROCEDURE — 94761 N-INVAS EAR/PLS OXIMETRY MLT: CPT

## 2023-10-18 PROCEDURE — 82962 GLUCOSE BLOOD TEST: CPT

## 2023-10-18 PROCEDURE — 80048 BASIC METABOLIC PNL TOTAL CA: CPT

## 2023-10-18 PROCEDURE — 6370000000 HC RX 637 (ALT 250 FOR IP): Performed by: INTERNAL MEDICINE

## 2023-10-18 PROCEDURE — 94640 AIRWAY INHALATION TREATMENT: CPT

## 2023-10-18 PROCEDURE — 6360000002 HC RX W HCPCS: Performed by: INTERNAL MEDICINE

## 2023-10-18 PROCEDURE — 83735 ASSAY OF MAGNESIUM: CPT

## 2023-10-18 PROCEDURE — 36415 COLL VENOUS BLD VENIPUNCTURE: CPT

## 2023-10-18 PROCEDURE — 2700000000 HC OXYGEN THERAPY PER DAY

## 2023-10-18 RX ORDER — PREDNISONE 20 MG/1
40 TABLET ORAL DAILY
Status: COMPLETED | OUTPATIENT
Start: 2023-10-19 | End: 2023-10-22

## 2023-10-18 RX ORDER — METOPROLOL SUCCINATE 50 MG/1
50 TABLET, EXTENDED RELEASE ORAL DAILY
Status: DISCONTINUED | OUTPATIENT
Start: 2023-10-19 | End: 2023-10-25 | Stop reason: HOSPADM

## 2023-10-18 RX ORDER — MAGNESIUM SULFATE IN WATER 40 MG/ML
2000 INJECTION, SOLUTION INTRAVENOUS ONCE
Status: COMPLETED | OUTPATIENT
Start: 2023-10-18 | End: 2023-10-18

## 2023-10-18 RX ADMIN — AMLODIPINE BESYLATE 10 MG: 10 TABLET ORAL at 09:03

## 2023-10-18 RX ADMIN — MONTELUKAST 10 MG: 10 TABLET, FILM COATED ORAL at 20:28

## 2023-10-18 RX ADMIN — INSULIN LISPRO 15 UNITS: 100 INJECTION, SOLUTION INTRAVENOUS; SUBCUTANEOUS at 11:39

## 2023-10-18 RX ADMIN — ENOXAPARIN SODIUM 40 MG: 100 INJECTION SUBCUTANEOUS at 09:03

## 2023-10-18 RX ADMIN — INSULIN LISPRO 15 UNITS: 100 INJECTION, SOLUTION INTRAVENOUS; SUBCUTANEOUS at 09:04

## 2023-10-18 RX ADMIN — METHYLPREDNISOLONE SODIUM SUCCINATE 40 MG: 40 INJECTION, POWDER, FOR SOLUTION INTRAMUSCULAR; INTRAVENOUS at 06:07

## 2023-10-18 RX ADMIN — ATORVASTATIN CALCIUM 10 MG: 10 TABLET, FILM COATED ORAL at 09:03

## 2023-10-18 RX ADMIN — MAGNESIUM SULFATE HEPTAHYDRATE 2000 MG: 40 INJECTION, SOLUTION INTRAVENOUS at 13:06

## 2023-10-18 RX ADMIN — FLUTICASONE PROPIONATE 1 SPRAY: 50 SPRAY, METERED NASAL at 17:12

## 2023-10-18 RX ADMIN — SODIUM CHLORIDE, PRESERVATIVE FREE 10 ML: 5 INJECTION INTRAVENOUS at 09:04

## 2023-10-18 RX ADMIN — PANTOPRAZOLE SODIUM 40 MG: 40 TABLET, DELAYED RELEASE ORAL at 06:06

## 2023-10-18 RX ADMIN — CETIRIZINE HYDROCHLORIDE 5 MG: 5 SOLUTION ORAL at 09:05

## 2023-10-18 RX ADMIN — FLUTICASONE PROPIONATE 1 PUFF: 110 AEROSOL, METERED RESPIRATORY (INHALATION) at 20:47

## 2023-10-18 RX ADMIN — SENNOSIDES AND DOCUSATE SODIUM 2 TABLET: 50; 8.6 TABLET ORAL at 09:03

## 2023-10-18 RX ADMIN — MICONAZOLE NITRATE: 2 POWDER TOPICAL at 20:29

## 2023-10-18 RX ADMIN — IPRATROPIUM BROMIDE AND ALBUTEROL SULFATE 1 DOSE: 2.5; .5 SOLUTION RESPIRATORY (INHALATION) at 15:45

## 2023-10-18 RX ADMIN — LEVOFLOXACIN 500 MG: 5 INJECTION, SOLUTION INTRAVENOUS at 14:56

## 2023-10-18 RX ADMIN — METHYLPREDNISOLONE SODIUM SUCCINATE 40 MG: 40 INJECTION, POWDER, FOR SOLUTION INTRAMUSCULAR; INTRAVENOUS at 00:45

## 2023-10-18 RX ADMIN — IPRATROPIUM BROMIDE AND ALBUTEROL SULFATE 1 DOSE: 2.5; .5 SOLUTION RESPIRATORY (INHALATION) at 20:46

## 2023-10-18 RX ADMIN — FLUTICASONE PROPIONATE 1 SPRAY: 50 SPRAY, METERED NASAL at 09:34

## 2023-10-18 RX ADMIN — METHYLPREDNISOLONE SODIUM SUCCINATE 40 MG: 40 INJECTION, POWDER, FOR SOLUTION INTRAMUSCULAR; INTRAVENOUS at 11:37

## 2023-10-18 RX ADMIN — GUAIFENESIN 600 MG: 600 TABLET, EXTENDED RELEASE ORAL at 20:28

## 2023-10-18 RX ADMIN — IPRATROPIUM BROMIDE AND ALBUTEROL SULFATE 1 DOSE: 2.5; .5 SOLUTION RESPIRATORY (INHALATION) at 04:25

## 2023-10-18 RX ADMIN — IPRATROPIUM BROMIDE AND ALBUTEROL SULFATE 1 DOSE: 2.5; .5 SOLUTION RESPIRATORY (INHALATION) at 08:06

## 2023-10-18 RX ADMIN — SENNOSIDES AND DOCUSATE SODIUM 2 TABLET: 50; 8.6 TABLET ORAL at 20:28

## 2023-10-18 RX ADMIN — GUAIFENESIN 600 MG: 600 TABLET, EXTENDED RELEASE ORAL at 09:03

## 2023-10-18 RX ADMIN — MAGNESIUM OXIDE 400 MG (241.3 MG MAGNESIUM) TABLET 400 MG: TABLET at 09:03

## 2023-10-18 RX ADMIN — IPRATROPIUM BROMIDE AND ALBUTEROL SULFATE 1 DOSE: 2.5; .5 SOLUTION RESPIRATORY (INHALATION) at 11:34

## 2023-10-18 RX ADMIN — GUAIFENESIN SYRUP AND DEXTROMETHORPHAN 5 ML: 100; 10 SYRUP ORAL at 20:28

## 2023-10-18 RX ADMIN — INSULIN LISPRO 2 UNITS: 100 INJECTION, SOLUTION INTRAVENOUS; SUBCUTANEOUS at 02:35

## 2023-10-18 RX ADMIN — GUAIFENESIN SYRUP AND DEXTROMETHORPHAN 5 ML: 100; 10 SYRUP ORAL at 09:03

## 2023-10-18 RX ADMIN — MICONAZOLE NITRATE: 2 POWDER TOPICAL at 09:34

## 2023-10-18 RX ADMIN — FLUTICASONE PROPIONATE 1 PUFF: 110 AEROSOL, METERED RESPIRATORY (INHALATION) at 08:07

## 2023-10-18 ASSESSMENT — ENCOUNTER SYMPTOMS
SORE THROAT: 0
VOMITING: 0
SINUS PRESSURE: 0
SHORTNESS OF BREATH: 1
CONSTIPATION: 0
EYE ITCHING: 0
EYE PAIN: 0
NAUSEA: 0
COUGH: 1
ABDOMINAL PAIN: 0
SINUS PAIN: 0
CHEST TIGHTNESS: 0
BACK PAIN: 0
EYE REDNESS: 0
DIARRHEA: 0
WHEEZING: 0

## 2023-10-18 ASSESSMENT — PAIN SCALES - GENERAL: PAINLEVEL_OUTOF10: 0

## 2023-10-18 NOTE — CARE COORDINATION
This RN case manager to the bedside to discuss discharge plan with patient. If therapy recommends SNF, patient would like [facility] or [facility]. He states is it very hard for him to get around his apartment right now and he would like to go somewhere for therapy to build up his strength. He lives alone. CM will follow for therapy recs and make appropriate referrals.

## 2023-10-18 NOTE — PROGRESS NOTES
Progress Note( Dr. Lianet Day)  10/18/2023  Subjective:   Admit Date: 10/15/2023  PCP: Laney Pisano    Admitted For :Shortness of breath exacerbation of COPD    Consulted For:  Better control of blood glucose    Interval History:still SOB  but some better     Denies any chest pains,   Yes SOB . Denies nausea or vomiting. No new bowel or bladder symptoms. Intake/Output Summary (Last 24 hours) at 10/18/2023 0818  Last data filed at 10/18/2023 0602  Gross per 24 hour   Intake --   Output 650 ml   Net -650 ml       DATA    CBC:   Recent Labs     10/16/23  0633   WBC 9.2   HGB 9.9*       CMP:  Recent Labs     10/16/23  0633   *   K 5.6*   CL 95*   CO2 27   BUN 20   CREATININE 0.8*   CALCIUM 8.8   PROT 6.2*   LABALBU 3.9   BILITOT 0.2   ALKPHOS 133*   AST 18   ALT 7*     Lipids:   Lab Results   Component Value Date/Time    CHOL 122 05/15/2021 04:10 AM    HDL 51 05/15/2021 04:10 AM    TRIG 41 05/15/2021 04:10 AM     Glucose:  Recent Labs     10/17/23  2025 10/18/23  0137 10/18/23  0634   POCGLU 259* 210* 186*     TkgxjhfnalF4V:  Lab Results   Component Value Date/Time    LABA1C 8.9 09/20/2023 10:35 AM     High Sensitivity TSH:   Lab Results   Component Value Date/Time    TSHHS 0.137 09/17/2023 07:58 AM     Free T3:   Lab Results   Component Value Date/Time    FT3 1.0 01/10/2013 10:29 AM     Free T4:  Lab Results   Component Value Date/Time    T4FREE 1.44 09/17/2023 07:58 AM       XR CHEST PORTABLE   Final Result   Chronic pulmonary vascular redistribution. No new abnormality.               Scheduled Medicines   Medications:    ipratropium 0.5 mg-albuterol 2.5 mg  1 Dose Inhalation Q4H WA RT    insulin lispro  0-8 Units SubCUTAneous 2 times per day    insulin glargine  35 Units SubCUTAneous Nightly    insulin lispro  15 Units SubCUTAneous TID WC    insulin lispro  0-4 Units SubCUTAneous 2 times per day    sennosides-docusate sodium  2 tablet Oral BID    guaiFENesin  600 mg Oral BID    miconazole

## 2023-10-18 NOTE — CARE COORDINATION
Chart reviewed and patient discussed in IDR. Patient states he has felt weak. PT/OT ordered. CM following for therapy recs.

## 2023-10-18 NOTE — PLAN OF CARE
Problem: Discharge Planning  Goal: Discharge to home or other facility with appropriate resources  Outcome: Progressing     Problem: Safety - Adult  Goal: Free from fall injury  Outcome: Progressing     Problem: Chronic Conditions and Co-morbidities  Goal: Patient's chronic conditions and co-morbidity symptoms are monitored and maintained or improved  Outcome: Progressing     Problem: Skin/Tissue Integrity  Goal: Absence of new skin breakdown  Outcome: Progressing

## 2023-10-19 ENCOUNTER — APPOINTMENT (OUTPATIENT)
Dept: GENERAL RADIOLOGY | Age: 78
DRG: 190 | End: 2023-10-19
Attending: STUDENT IN AN ORGANIZED HEALTH CARE EDUCATION/TRAINING PROGRAM
Payer: OTHER GOVERNMENT

## 2023-10-19 LAB
GLUCOSE BLD-MCNC: 124 MG/DL (ref 70–99)
GLUCOSE BLD-MCNC: 126 MG/DL (ref 70–99)
GLUCOSE BLD-MCNC: 168 MG/DL (ref 70–99)
GLUCOSE BLD-MCNC: 199 MG/DL (ref 70–99)
GLUCOSE BLD-MCNC: 277 MG/DL (ref 70–99)
GLUCOSE BLD-MCNC: 376 MG/DL (ref 70–99)
GLUCOSE BLD-MCNC: 87 MG/DL (ref 70–99)

## 2023-10-19 PROCEDURE — 99231 SBSQ HOSP IP/OBS SF/LOW 25: CPT | Performed by: NURSE PRACTITIONER

## 2023-10-19 PROCEDURE — 2700000000 HC OXYGEN THERAPY PER DAY

## 2023-10-19 PROCEDURE — 6370000000 HC RX 637 (ALT 250 FOR IP): Performed by: STUDENT IN AN ORGANIZED HEALTH CARE EDUCATION/TRAINING PROGRAM

## 2023-10-19 PROCEDURE — 1200000000 HC SEMI PRIVATE

## 2023-10-19 PROCEDURE — 82962 GLUCOSE BLOOD TEST: CPT

## 2023-10-19 PROCEDURE — 2580000003 HC RX 258: Performed by: STUDENT IN AN ORGANIZED HEALTH CARE EDUCATION/TRAINING PROGRAM

## 2023-10-19 PROCEDURE — APPSS30 APP SPLIT SHARED TIME 16-30 MINUTES

## 2023-10-19 PROCEDURE — 97530 THERAPEUTIC ACTIVITIES: CPT

## 2023-10-19 PROCEDURE — 94640 AIRWAY INHALATION TREATMENT: CPT

## 2023-10-19 PROCEDURE — 97116 GAIT TRAINING THERAPY: CPT

## 2023-10-19 PROCEDURE — 6370000000 HC RX 637 (ALT 250 FOR IP): Performed by: FAMILY MEDICINE

## 2023-10-19 PROCEDURE — 94761 N-INVAS EAR/PLS OXIMETRY MLT: CPT

## 2023-10-19 PROCEDURE — 74018 RADEX ABDOMEN 1 VIEW: CPT

## 2023-10-19 PROCEDURE — 97162 PT EVAL MOD COMPLEX 30 MIN: CPT

## 2023-10-19 PROCEDURE — 6370000000 HC RX 637 (ALT 250 FOR IP): Performed by: HOSPITALIST

## 2023-10-19 PROCEDURE — 6370000000 HC RX 637 (ALT 250 FOR IP): Performed by: INTERNAL MEDICINE

## 2023-10-19 PROCEDURE — 6360000002 HC RX W HCPCS: Performed by: STUDENT IN AN ORGANIZED HEALTH CARE EDUCATION/TRAINING PROGRAM

## 2023-10-19 PROCEDURE — 6370000000 HC RX 637 (ALT 250 FOR IP): Performed by: NURSE PRACTITIONER

## 2023-10-19 PROCEDURE — 6360000002 HC RX W HCPCS: Performed by: INTERNAL MEDICINE

## 2023-10-19 PROCEDURE — 97166 OT EVAL MOD COMPLEX 45 MIN: CPT

## 2023-10-19 PROCEDURE — 99232 SBSQ HOSP IP/OBS MODERATE 35: CPT | Performed by: INTERNAL MEDICINE

## 2023-10-19 RX ORDER — SIMETHICONE 125 MG
125 TABLET,CHEWABLE ORAL EVERY 6 HOURS PRN
Status: DISCONTINUED | OUTPATIENT
Start: 2023-10-19 | End: 2023-10-25 | Stop reason: HOSPADM

## 2023-10-19 RX ORDER — INSULIN LISPRO 100 [IU]/ML
10 INJECTION, SOLUTION INTRAVENOUS; SUBCUTANEOUS
Status: DISCONTINUED | OUTPATIENT
Start: 2023-10-19 | End: 2023-10-25 | Stop reason: HOSPADM

## 2023-10-19 RX ORDER — POTASSIUM CHLORIDE 7.45 MG/ML
10 INJECTION INTRAVENOUS PRN
Status: DISCONTINUED | OUTPATIENT
Start: 2023-10-19 | End: 2023-10-25 | Stop reason: HOSPADM

## 2023-10-19 RX ORDER — MAGNESIUM SULFATE IN WATER 40 MG/ML
2000 INJECTION, SOLUTION INTRAVENOUS PRN
Status: DISCONTINUED | OUTPATIENT
Start: 2023-10-19 | End: 2023-10-25 | Stop reason: HOSPADM

## 2023-10-19 RX ORDER — INSULIN GLARGINE 100 [IU]/ML
28 INJECTION, SOLUTION SUBCUTANEOUS NIGHTLY
Status: DISCONTINUED | OUTPATIENT
Start: 2023-10-19 | End: 2023-10-20

## 2023-10-19 RX ORDER — POTASSIUM CHLORIDE 20 MEQ/1
40 TABLET, EXTENDED RELEASE ORAL PRN
Status: DISCONTINUED | OUTPATIENT
Start: 2023-10-19 | End: 2023-10-25 | Stop reason: HOSPADM

## 2023-10-19 RX ADMIN — SODIUM CHLORIDE, PRESERVATIVE FREE 10 ML: 5 INJECTION INTRAVENOUS at 10:39

## 2023-10-19 RX ADMIN — GUAIFENESIN 600 MG: 600 TABLET, EXTENDED RELEASE ORAL at 21:42

## 2023-10-19 RX ADMIN — FLUTICASONE PROPIONATE 1 SPRAY: 50 SPRAY, METERED NASAL at 10:39

## 2023-10-19 RX ADMIN — SODIUM CHLORIDE: 9 INJECTION, SOLUTION INTRAVENOUS at 13:54

## 2023-10-19 RX ADMIN — PANTOPRAZOLE SODIUM 40 MG: 40 TABLET, DELAYED RELEASE ORAL at 05:49

## 2023-10-19 RX ADMIN — SENNOSIDES AND DOCUSATE SODIUM 2 TABLET: 50; 8.6 TABLET ORAL at 21:42

## 2023-10-19 RX ADMIN — SIMETHICONE 125 MG: 125 TABLET, CHEWABLE ORAL at 13:53

## 2023-10-19 RX ADMIN — FLUTICASONE PROPIONATE 1 PUFF: 110 AEROSOL, METERED RESPIRATORY (INHALATION) at 19:56

## 2023-10-19 RX ADMIN — GUAIFENESIN SYRUP AND DEXTROMETHORPHAN 5 ML: 100; 10 SYRUP ORAL at 17:57

## 2023-10-19 RX ADMIN — LEVOFLOXACIN 500 MG: 5 INJECTION, SOLUTION INTRAVENOUS at 13:54

## 2023-10-19 RX ADMIN — INSULIN GLARGINE 28 UNITS: 100 INJECTION, SOLUTION SUBCUTANEOUS at 21:41

## 2023-10-19 RX ADMIN — IPRATROPIUM BROMIDE AND ALBUTEROL SULFATE 1 DOSE: 2.5; .5 SOLUTION RESPIRATORY (INHALATION) at 19:56

## 2023-10-19 RX ADMIN — ATORVASTATIN CALCIUM 10 MG: 10 TABLET, FILM COATED ORAL at 10:44

## 2023-10-19 RX ADMIN — PREDNISONE 40 MG: 20 TABLET ORAL at 10:44

## 2023-10-19 RX ADMIN — GUAIFENESIN 600 MG: 600 TABLET, EXTENDED RELEASE ORAL at 10:44

## 2023-10-19 RX ADMIN — GUAIFENESIN SYRUP AND DEXTROMETHORPHAN 5 ML: 100; 10 SYRUP ORAL at 21:45

## 2023-10-19 RX ADMIN — INSULIN LISPRO 4 UNITS: 100 INJECTION, SOLUTION INTRAVENOUS; SUBCUTANEOUS at 21:40

## 2023-10-19 RX ADMIN — IPRATROPIUM BROMIDE AND ALBUTEROL SULFATE 1 DOSE: 2.5; .5 SOLUTION RESPIRATORY (INHALATION) at 23:53

## 2023-10-19 RX ADMIN — MICONAZOLE NITRATE: 2 POWDER TOPICAL at 10:39

## 2023-10-19 RX ADMIN — BENZONATATE 100 MG: 100 CAPSULE ORAL at 17:57

## 2023-10-19 RX ADMIN — FLUTICASONE PROPIONATE 1 PUFF: 110 AEROSOL, METERED RESPIRATORY (INHALATION) at 07:05

## 2023-10-19 RX ADMIN — MICONAZOLE NITRATE: 2 POWDER TOPICAL at 21:38

## 2023-10-19 RX ADMIN — METOPROLOL SUCCINATE 50 MG: 50 TABLET, EXTENDED RELEASE ORAL at 10:44

## 2023-10-19 RX ADMIN — GUAIFENESIN SYRUP AND DEXTROMETHORPHAN 5 ML: 100; 10 SYRUP ORAL at 03:28

## 2023-10-19 RX ADMIN — ENOXAPARIN SODIUM 40 MG: 100 INJECTION SUBCUTANEOUS at 10:45

## 2023-10-19 RX ADMIN — IPRATROPIUM BROMIDE AND ALBUTEROL SULFATE 1 DOSE: 2.5; .5 SOLUTION RESPIRATORY (INHALATION) at 11:03

## 2023-10-19 RX ADMIN — INSULIN LISPRO 10 UNITS: 100 INJECTION, SOLUTION INTRAVENOUS; SUBCUTANEOUS at 17:58

## 2023-10-19 RX ADMIN — SENNOSIDES AND DOCUSATE SODIUM 2 TABLET: 50; 8.6 TABLET ORAL at 10:44

## 2023-10-19 RX ADMIN — Medication 1 LOZENGE: at 17:57

## 2023-10-19 RX ADMIN — SODIUM CHLORIDE, PRESERVATIVE FREE 10 ML: 5 INJECTION INTRAVENOUS at 21:39

## 2023-10-19 RX ADMIN — CETIRIZINE HYDROCHLORIDE 5 MG: 5 SOLUTION ORAL at 10:39

## 2023-10-19 RX ADMIN — MAGNESIUM OXIDE 400 MG (241.3 MG MAGNESIUM) TABLET 400 MG: TABLET at 10:44

## 2023-10-19 RX ADMIN — IPRATROPIUM BROMIDE AND ALBUTEROL SULFATE 1 DOSE: 2.5; .5 SOLUTION RESPIRATORY (INHALATION) at 15:46

## 2023-10-19 RX ADMIN — GUAIFENESIN SYRUP AND DEXTROMETHORPHAN 5 ML: 100; 10 SYRUP ORAL at 10:39

## 2023-10-19 RX ADMIN — FLUTICASONE PROPIONATE 1 SPRAY: 50 SPRAY, METERED NASAL at 21:46

## 2023-10-19 RX ADMIN — IPRATROPIUM BROMIDE AND ALBUTEROL SULFATE 1 DOSE: 2.5; .5 SOLUTION RESPIRATORY (INHALATION) at 07:07

## 2023-10-19 RX ADMIN — IPRATROPIUM BROMIDE AND ALBUTEROL SULFATE 1 DOSE: 2.5; .5 SOLUTION RESPIRATORY (INHALATION) at 00:42

## 2023-10-19 RX ADMIN — IPRATROPIUM BROMIDE AND ALBUTEROL SULFATE 1 DOSE: 2.5; .5 SOLUTION RESPIRATORY (INHALATION) at 05:06

## 2023-10-19 RX ADMIN — AMLODIPINE BESYLATE 10 MG: 10 TABLET ORAL at 10:44

## 2023-10-19 RX ADMIN — MONTELUKAST 10 MG: 10 TABLET, FILM COATED ORAL at 21:42

## 2023-10-19 ASSESSMENT — PAIN SCALES - WONG BAKER: WONGBAKER_NUMERICALRESPONSE: 0

## 2023-10-19 ASSESSMENT — PAIN DESCRIPTION - ORIENTATION: ORIENTATION: MID;UPPER

## 2023-10-19 ASSESSMENT — PAIN DESCRIPTION - DESCRIPTORS: DESCRIPTORS: ACHING

## 2023-10-19 ASSESSMENT — PAIN SCALES - GENERAL: PAINLEVEL_OUTOF10: 4

## 2023-10-19 ASSESSMENT — PAIN - FUNCTIONAL ASSESSMENT: PAIN_FUNCTIONAL_ASSESSMENT: ACTIVITIES ARE NOT PREVENTED

## 2023-10-19 ASSESSMENT — PAIN DESCRIPTION - LOCATION: LOCATION: ABDOMEN

## 2023-10-19 NOTE — PROGRESS NOTES
Physical/occupational Therapy  Attempted to see pt this AM but pt eating lunch and politely requesting therapy to return this afternoon. Will continue to attempt as schedule allows.

## 2023-10-19 NOTE — CONSULTS
[address], 1945, 1103/1103-A, 10/19/2023    History  [place]:  There were no encounter diagnoses. Patient  has a past medical history of Acid reflux, Anemia, COPD (chronic obstructive pulmonary disease) ([address]), Dental decay, Diabetes mellitus ([address]), Emphysema of lung ([address]), Enlarged prostate, Stevens catheter in place, H/O cardiovascular stress test, Hematuria, HX OTHER MEDICAL, Hypertension, Low back sprain, Megacolon, On home oxygen therapy, and Wears glasses. Patient  has a past surgical history that includes Revision Colostomy (1974); Abdominal exploration surgery (9421); other surgical history; other surgical history (01 07 2013); tumor removal; Appendectomy (7years old); Abdomen surgery; TURP (N/A, 07/12/2018); Dental surgery; and other surgical history (09/18/2018). Discharge Recommendation: [facility]    Subjective:    Patient states:  \"I'm slow like a sloth but I'll get there. \"      Pain:  denies pain. Communication with other providers:  Handoff to RN, OT    Restrictions: general precautions, fall risk    Home Setup/Prior level of function  Social/Functional History  Lives With: Alone  Type of Home: House  Home Layout: One level  Home Access: Level entry  Home Equipment: Cane (does not use AD at baseline)  ADL Assistance: Independent  Toileting: Independent  Homemaking Assistance: Independent  Homemaking Responsibilities: Yes  Ambulation Assistance: Independent  Transfer Assistance: Independent    Examination of body systems (includes body structures/functions, activity/participation limitations):  Observation:  pt supine in bed with RN present upon arrival and agreeable to therapy  Vision:  [place]  Hearing:  [facility]  Cardiopulmonary:  4L O2, wears at baseline  Cognition: WFL, see OT/SLP note for further evaluation. Musculoskeletal  ROM R/L:  WFL. Strength R/L:  4+/5, minimal impairment in function and endurance. Neuro:   WFL

## 2023-10-19 NOTE — PROGRESS NOTES
Occupational Therapy  [facility] ACUTE CARE OCCUPATIONAL THERAPY EVALUATION  Ramila Cain, 1945, 1103/1103-A, 10/19/2023    Discharge Recommendation: Home Health OT services    History  [place]:  There were no encounter diagnoses. Patient  has a past medical history of Acid reflux, Anemia, COPD (chronic obstructive pulmonary disease) ([address]), Dental decay, Diabetes mellitus ([address]), Emphysema of lung ([address]), Enlarged prostate, Stevens catheter in place, H/O cardiovascular stress test, Hematuria, HX OTHER MEDICAL, Hypertension, Low back sprain, Megacolon, On home oxygen therapy, and Wears glasses. Patient  has a past surgical history that includes Revision Colostomy (1974); Abdominal exploration surgery (2258); other surgical history; other surgical history (01 07 2013); tumor removal; Appendectomy (7years old); Abdomen surgery; TURP (N/A, 07/12/2018); Dental surgery; and other surgical history (09/18/2018). Subjective:  Patient states:  \"I'm like fine wine, well aged\"   Pain:  denies pain. Communication: RN, CM, co-eval with PT Carrie Rao, communicated POC with pt   Restrictions: General Precautions, Fall Risk,    Home Setup/Prior level of function  Social/Functional History  Lives With: Alone  Type of Home: House  Home Layout: One level  Home Access: Level entry  Home Equipment: Cane (does not use AD at baseline)  ADL Assistance: Independent  Toileting: Independent  Homemaking Assistance: Independent  Homemaking Responsibilities: Yes  Ambulation Assistance: Independent  Transfer Assistance: Independent    Examination of body systems (includes body structures/functions, activity/participation limitations):  Observation:  Supine in bed upon arrival, agreeable to therapy   Vision:  [facility] [place]  Hearing:  [facility] [place]  Cardiopulmonary:   On 4 L, tele, O2 dropped to 89% after mobility      Body Systems and functions:  ROM R/L:  [employer] [facility] [place]    Strength R/L:  RUE 5/5,  LUE 5/5,  5/5  Sensation: WFL  Tone: Normal  Coordination: educated on role of therapy, POC, and d/c recommendations     Treatment:  Therapeutic Activity Training:   Therapeutic activity training was instructed today. Cues were given for safety, sequence, UE/LE placement, awareness, and balance. Supine to/from EOB with SBA for balance safety, HOB elevated nad use of bed rails    2 Sit to stand from EOB, 1st with CGA for balance safety, 2nd with SBA for balance safety, min verbal cues for hand placement     100 ft functional mobility using 2ww with CGA for balance safety, 2 standing rest breaks, min verbal cues for deep pursed lip breathing techniques during RB    Donned and doffed sock EOB with CGA for balance safety using figure 4 method    Safety: Patient left in bed with alarm on, call light within reach, RN notified, gait belt used. Assessment:  Pt is a 71 y/o male admitted for There were no encounter diagnoses. . Pt at baseline is independent with ADLs and independent with functional transfers/mobility. Pt currently presents w/ deficits relating to ADLs, IADLs, UE strength/ROM, functional activity tolerance,  safety awareness, and functional mobility. Pt would benefit from continued acute care OT services w/ discharge to home with HHOT    Complexity: Moderate   Prognosis: Good, no significant barriers to participation at this time. Occupational Therapy Plan  Times Per Week: 3-4x wk  Times Per Day:  Once a day  Current Treatment Recommendations: Strengthening, Balance training, Functional mobility training, Endurance training, Neuromuscular re-education, Cognitive reorientation, Pain management, Safety education & training, Patient/Caregiver education & training, Equipment evaluation, education, & procurement, Positioning, Self-Care / ADL, Home management training       Goals:  Goal 1: Pt will perform UE ADLs with mod I using AE PRN by d/c  Goal 2: Pt will perform LE ADLs with mod I using AE PRN by d/c  Goal 3: Pt will perform toileting with mod I using AE PRN

## 2023-10-19 NOTE — PROGRESS NOTES
Progress Note( Dr. Evelyn Gurrola)  10/19/2023  Subjective:   Admit Date: 10/15/2023  PCP: Jeffrey Spann    Admitted For :Shortness of breath exacerbation of COPD    Consulted For:  Better control of blood glucose    Interval History:still SOB  but some better     Denies any chest pains,   Yes SOB . Denies nausea or vomiting. No new bowel or bladder symptoms. Intake/Output Summary (Last 24 hours) at 10/19/2023 0733  Last data filed at 10/18/2023 1637  Gross per 24 hour   Intake --   Output 850 ml   Net -850 ml         DATA    CBC:   No results for input(s): \"WBC\", \"HGB\", \"PLT\" in the last 72 hours. CMP:  Recent Labs     10/18/23  1046   *   K 4.7   CL 97*   CO2 29   BUN 32*   CREATININE 1.1   CALCIUM 9.3       Lipids:   Lab Results   Component Value Date/Time    CHOL 122 05/15/2021 04:10 AM    HDL 51 05/15/2021 04:10 AM    TRIG 41 05/15/2021 04:10 AM     Glucose:  Recent Labs     10/19/23  0221 10/19/23  0331 10/19/23  0649   POCGLU 87 124* 126*       [id number]:  Lab Results   Component Value Date/Time    LABA1C 8.9 09/20/2023 10:35 AM     High Sensitivity TSH:   Lab Results   Component Value Date/Time    TSHHS 0.137 09/17/2023 07:58 AM     Free T3:   Lab Results   Component Value Date/Time    FT3 1.0 01/10/2013 10:29 AM     Free T4:  Lab Results   Component Value Date/Time    T4FREE 1.44 09/17/2023 07:58 AM       XR CHEST PORTABLE   Final Result   Chronic pulmonary vascular redistribution. No new abnormality.               Scheduled Medicines   Medications:    insulin glargine  28 Units SubCUTAneous Nightly    insulin lispro  10 Units SubCUTAneous TID WC    metoprolol succinate  50 mg Oral Daily    predniSONE  40 mg Oral Daily    ipratropium 0.5 mg-albuterol 2.5 mg  1 Dose Inhalation Q4H WA RT    insulin lispro  0-8 Units SubCUTAneous 2 times per day    insulin lispro  0-4 Units SubCUTAneous 2 times per day    sennosides-docusate sodium  2 tablet Oral BID    guaiFENesin  600 mg Oral BID

## 2023-10-19 NOTE — PROGRESS NOTES
V2.0    [facility] Progress Note      Name:  Deshawn Zurita /Age/Sex: 1945  (70 y.o. male)   MRN & CSN:  [id number] & [id number] Encounter Date/Time: 10/19/2023 12:33 PM EDT   Location:  -A PCP: Marlon Ram MD       Hospital Day: 5    Assessment and Recommendations     Deshawn Zurita is a 70 y.o. male with a pmh of very severe COPD with chronic hypoxemic hypercapnic respiratory failure (on 3L NC continuously), HTN, HLD and T2DM  who presents with COPD exacerbation [facility])     Hospital Problems               Last Modified POA     * (Principal) COPD exacerbation ([address]) 10/15/2023 Yes         Plan:  Shortness of Breath, acute COPD Exacerbation, Chronic resp failure on 3-4L/min at baseline   Lower extremity swelling:  - Pt presenting w worsening cough and shortness of breath for 5 days , O2 Sat >90% on RA, ,   -Telemetry   -O2 PRN, keep O2 Sat 88-92% in setting of underlying COPD -BiPAP PRN   -CXR with no acute changes  - s/w Duonebs Q6H and Q4H PRN   - Antitussives   - s/w Solumedrol IV, switch to PO prednisone 40mg PO daily   - on levofloxacin 500mg IV daily   -Patient denies any smoking  Pulmonary consulted, pt would like outpatient pulmonary follow up, pt on 3-4 L NC oxygen at home, reports cannot get to [place] for pulm appt due to transportation issues  Pt a little better  - Viral panel -ve   - Sputum culture pending   -Pulm on board.  -On 4 L nasal cannula. On prednisone. Respiratory culture 10/15: No growth.      Elevated troponin  -Pt denies chest pain  -Trend trops, EKG with normal Sinus rhythm with occasional premature ventricular complexes   Left axis deviation   Incomplete right bundle branch block   ST & T wave abnormality in lateral leads  Likely due to hypoxia, acute COPD exacerbation,  may need outpatient stress test after acute COPD exacerbation resolved, consulted cardio and EP due to EKG changes, SOB, and NSVT on tele, elevated trop  F/U Cardio and EP recs Smear --     RARE  NECROTIC POLYS       Gram Smear --     NO  EPITHELIAL CELLS NOTED       Gram Smear --     RARE  GRAM POSITIVE COCCI       Culture Final Report Normal respiratory hector    Narrative:      SETUP DATE/TIME:  10/16/2023 0859    Legionella antigen, urine [[id number]] Collected: 10/15/23 1716    Order Status: Completed Specimen: Urine Updated: 10/16/23 1039     Legionella Urinary Ag NEGATIVE     Comment: (NOTE)  PRESUMPTIVE NEGATIVE FOR LEGIONELLA PNEUMOPHILIA SEROGROUP 1   ANTIGEN IN URINE SUGGESTING NO RECENT OR CURRENT INFECTION. INFECTION DUE TO LEGIONELLA CANNOT BE RULED OUT SINCE OTHER   SEROGROUPS AND SPECIES MAY CAUSE DISEASE. ANTIGEN MAY NOT BE PRESENT   IN URINE IN EARLY INFECTION, AND THE LEVEL OF ANTIGEN PRESENT IN   URINE MAY BE BELOW THE DETECTION LIMIT OF THE TEST. Culture, Blood 1 [[id number]] Collected: 10/15/23 1242    Order Status: Completed Specimen: Blood Updated: 10/18/23 0851     Specimen BLOOD     Special Requests NONE     Culture NO GROWTH AT 72 HOURS    Narrative:      SETUP DATE/TIME:  10/15/2023 1507    Culture, Blood 2 [[id number]] Collected: 10/15/23 1242    Order Status: Completed Specimen: Blood Updated: 10/18/23 0851     Specimen BLOOD     Special Requests NONE     Culture NO GROWTH AT 72 HOURS    Narrative:      SETUP DATE/TIME:  10/15/2023 1509    Strep Pneumoniae Antigen [[id number]] Collected: 10/15/23 1145    Order Status: Completed Specimen: CSF Updated: 10/16/23 1039     Strep pneumo Ag URINE NEGATIVE     Comment: Presumptive negative for   pneumococcal pneumonia,  suggesting no current or recent  pneumococcal infection.  Infection  due to S pneumonia cannot be ruled  out if the antigen present in the  sample is below the detection  limit of the test.  (NOTE)  REFERENCE RANGE: NEGATIVE                 XR CHEST PORTABLE    Result Date: 10/16/2023  EXAMINATION: ONE XRAY VIEW OF THE CHEST 10/16/2023 3:47 pm COMPARISON: 09/18/2023 HISTORY: ORDERING SYSTEM

## 2023-10-20 LAB
ANION GAP SERPL CALCULATED.3IONS-SCNC: 8 MMOL/L (ref 4–16)
BASOPHILS ABSOLUTE: 0 K/CU MM
BASOPHILS RELATIVE PERCENT: 0.1 % (ref 0–1)
BUN SERPL-MCNC: 30 MG/DL (ref 6–23)
CALCIUM SERPL-MCNC: 8.9 MG/DL (ref 8.3–10.6)
CHLORIDE BLD-SCNC: 102 MMOL/L (ref 99–110)
CO2: 31 MMOL/L (ref 21–32)
CREAT SERPL-MCNC: 1.1 MG/DL (ref 0.9–1.3)
CULTURE: NORMAL
CULTURE: NORMAL
DIFFERENTIAL TYPE: ABNORMAL
EOSINOPHILS ABSOLUTE: 0 K/CU MM
EOSINOPHILS RELATIVE PERCENT: 0 % (ref 0–3)
GFR SERPL CREATININE-BSD FRML MDRD: >60 ML/MIN/1.73M2
GLUCOSE BLD-MCNC: 124 MG/DL (ref 70–99)
GLUCOSE BLD-MCNC: 145 MG/DL (ref 70–99)
GLUCOSE BLD-MCNC: 220 MG/DL (ref 70–99)
GLUCOSE BLD-MCNC: 342 MG/DL (ref 70–99)
GLUCOSE BLD-MCNC: 96 MG/DL (ref 70–99)
GLUCOSE SERPL-MCNC: 210 MG/DL (ref 70–99)
HCT VFR BLD CALC: 39.1 % (ref 42–52)
HEMOGLOBIN: 11.1 GM/DL (ref 13.5–18)
IMMATURE NEUTROPHIL %: 0.5 % (ref 0–0.43)
LACTATE: 0.9 MMOL/L (ref 0.5–1.9)
LYMPHOCYTES ABSOLUTE: 0.3 K/CU MM
LYMPHOCYTES RELATIVE PERCENT: 2.3 % (ref 24–44)
Lab: NORMAL
Lab: NORMAL
MAGNESIUM: 2 MG/DL (ref 1.8–2.4)
MCH RBC QN AUTO: 28.4 PG (ref 27–31)
MCHC RBC AUTO-ENTMCNC: 28.4 % (ref 32–36)
MCV RBC AUTO: 100 FL (ref 78–100)
MONOCYTES ABSOLUTE: 1.5 K/CU MM
MONOCYTES RELATIVE PERCENT: 12.2 % (ref 0–4)
NUCLEATED RBC %: 0 %
PDW BLD-RTO: 15.7 % (ref 11.7–14.9)
PLATELET # BLD: 285 K/CU MM (ref 140–440)
PMV BLD AUTO: 11.3 FL (ref 7.5–11.1)
POTASSIUM SERPL-SCNC: 4.8 MMOL/L (ref 3.5–5.1)
RBC # BLD: 3.91 M/CU MM (ref 4.6–6.2)
SEGMENTED NEUTROPHILS ABSOLUTE COUNT: 10.2 K/CU MM
SEGMENTED NEUTROPHILS RELATIVE PERCENT: 84.9 % (ref 36–66)
SODIUM BLD-SCNC: 141 MMOL/L (ref 135–145)
SPECIMEN: NORMAL
SPECIMEN: NORMAL
TOTAL IMMATURE NEUTOROPHIL: 0.06 K/CU MM
TOTAL NUCLEATED RBC: 0 K/CU MM
WBC # BLD: 12.1 K/CU MM (ref 4–10.5)

## 2023-10-20 PROCEDURE — 6360000002 HC RX W HCPCS: Performed by: INTERNAL MEDICINE

## 2023-10-20 PROCEDURE — 6360000002 HC RX W HCPCS: Performed by: STUDENT IN AN ORGANIZED HEALTH CARE EDUCATION/TRAINING PROGRAM

## 2023-10-20 PROCEDURE — 6370000000 HC RX 637 (ALT 250 FOR IP): Performed by: INTERNAL MEDICINE

## 2023-10-20 PROCEDURE — 2580000003 HC RX 258: Performed by: STUDENT IN AN ORGANIZED HEALTH CARE EDUCATION/TRAINING PROGRAM

## 2023-10-20 PROCEDURE — 36415 COLL VENOUS BLD VENIPUNCTURE: CPT

## 2023-10-20 PROCEDURE — 99222 1ST HOSP IP/OBS MODERATE 55: CPT | Performed by: SURGERY

## 2023-10-20 PROCEDURE — 97530 THERAPEUTIC ACTIVITIES: CPT

## 2023-10-20 PROCEDURE — 82962 GLUCOSE BLOOD TEST: CPT

## 2023-10-20 PROCEDURE — 2700000000 HC OXYGEN THERAPY PER DAY

## 2023-10-20 PROCEDURE — 94640 AIRWAY INHALATION TREATMENT: CPT

## 2023-10-20 PROCEDURE — 6370000000 HC RX 637 (ALT 250 FOR IP): Performed by: STUDENT IN AN ORGANIZED HEALTH CARE EDUCATION/TRAINING PROGRAM

## 2023-10-20 PROCEDURE — 85025 COMPLETE CBC W/AUTO DIFF WBC: CPT

## 2023-10-20 PROCEDURE — 94761 N-INVAS EAR/PLS OXIMETRY MLT: CPT

## 2023-10-20 PROCEDURE — 1200000000 HC SEMI PRIVATE

## 2023-10-20 PROCEDURE — 99232 SBSQ HOSP IP/OBS MODERATE 35: CPT | Performed by: INTERNAL MEDICINE

## 2023-10-20 PROCEDURE — 6370000000 HC RX 637 (ALT 250 FOR IP): Performed by: NURSE PRACTITIONER

## 2023-10-20 PROCEDURE — 83036 HEMOGLOBIN GLYCOSYLATED A1C: CPT

## 2023-10-20 PROCEDURE — 94150 VITAL CAPACITY TEST: CPT

## 2023-10-20 PROCEDURE — 83605 ASSAY OF LACTIC ACID: CPT

## 2023-10-20 PROCEDURE — 83735 ASSAY OF MAGNESIUM: CPT

## 2023-10-20 PROCEDURE — 80048 BASIC METABOLIC PNL TOTAL CA: CPT

## 2023-10-20 RX ORDER — INSULIN GLARGINE 100 [IU]/ML
20 INJECTION, SOLUTION SUBCUTANEOUS NIGHTLY
Status: DISCONTINUED | OUTPATIENT
Start: 2023-10-20 | End: 2023-10-22

## 2023-10-20 RX ADMIN — SENNOSIDES AND DOCUSATE SODIUM 2 TABLET: 50; 8.6 TABLET ORAL at 23:25

## 2023-10-20 RX ADMIN — SODIUM CHLORIDE, PRESERVATIVE FREE 10 ML: 5 INJECTION INTRAVENOUS at 08:50

## 2023-10-20 RX ADMIN — IPRATROPIUM BROMIDE AND ALBUTEROL SULFATE 1 DOSE: 2.5; .5 SOLUTION RESPIRATORY (INHALATION) at 21:07

## 2023-10-20 RX ADMIN — METOPROLOL SUCCINATE 50 MG: 50 TABLET, EXTENDED RELEASE ORAL at 08:56

## 2023-10-20 RX ADMIN — GUAIFENESIN SYRUP AND DEXTROMETHORPHAN 5 ML: 100; 10 SYRUP ORAL at 23:38

## 2023-10-20 RX ADMIN — FLUTICASONE PROPIONATE 1 SPRAY: 50 SPRAY, METERED NASAL at 08:54

## 2023-10-20 RX ADMIN — GUAIFENESIN SYRUP AND DEXTROMETHORPHAN 5 ML: 100; 10 SYRUP ORAL at 18:41

## 2023-10-20 RX ADMIN — BENZONATATE 100 MG: 100 CAPSULE ORAL at 18:41

## 2023-10-20 RX ADMIN — INSULIN GLARGINE 20 UNITS: 100 INJECTION, SOLUTION SUBCUTANEOUS at 23:33

## 2023-10-20 RX ADMIN — LEVOFLOXACIN 500 MG: 5 INJECTION, SOLUTION INTRAVENOUS at 16:27

## 2023-10-20 RX ADMIN — BENZONATATE 100 MG: 100 CAPSULE ORAL at 08:54

## 2023-10-20 RX ADMIN — SENNOSIDES AND DOCUSATE SODIUM 2 TABLET: 50; 8.6 TABLET ORAL at 08:52

## 2023-10-20 RX ADMIN — MONTELUKAST 10 MG: 10 TABLET, FILM COATED ORAL at 23:25

## 2023-10-20 RX ADMIN — FLUTICASONE PROPIONATE 1 SPRAY: 50 SPRAY, METERED NASAL at 18:41

## 2023-10-20 RX ADMIN — IPRATROPIUM BROMIDE AND ALBUTEROL SULFATE 1 DOSE: 2.5; .5 SOLUTION RESPIRATORY (INHALATION) at 10:44

## 2023-10-20 RX ADMIN — GUAIFENESIN 600 MG: 600 TABLET, EXTENDED RELEASE ORAL at 23:25

## 2023-10-20 RX ADMIN — INSULIN LISPRO 2 UNITS: 100 INJECTION, SOLUTION INTRAVENOUS; SUBCUTANEOUS at 23:32

## 2023-10-20 RX ADMIN — ATORVASTATIN CALCIUM 10 MG: 10 TABLET, FILM COATED ORAL at 08:56

## 2023-10-20 RX ADMIN — AMLODIPINE BESYLATE 10 MG: 10 TABLET ORAL at 08:56

## 2023-10-20 RX ADMIN — MICONAZOLE NITRATE: 2 POWDER TOPICAL at 08:59

## 2023-10-20 RX ADMIN — MAGNESIUM OXIDE 400 MG (241.3 MG MAGNESIUM) TABLET 400 MG: TABLET at 08:55

## 2023-10-20 RX ADMIN — IPRATROPIUM BROMIDE AND ALBUTEROL SULFATE 1 DOSE: 2.5; .5 SOLUTION RESPIRATORY (INHALATION) at 14:54

## 2023-10-20 RX ADMIN — FLUTICASONE PROPIONATE 1 PUFF: 110 AEROSOL, METERED RESPIRATORY (INHALATION) at 21:08

## 2023-10-20 RX ADMIN — GUAIFENESIN SYRUP AND DEXTROMETHORPHAN 5 ML: 100; 10 SYRUP ORAL at 08:54

## 2023-10-20 RX ADMIN — IPRATROPIUM BROMIDE AND ALBUTEROL SULFATE 1 DOSE: 2.5; .5 SOLUTION RESPIRATORY (INHALATION) at 04:32

## 2023-10-20 RX ADMIN — PREDNISONE 40 MG: 20 TABLET ORAL at 08:56

## 2023-10-20 RX ADMIN — FLUTICASONE PROPIONATE 1 PUFF: 110 AEROSOL, METERED RESPIRATORY (INHALATION) at 07:10

## 2023-10-20 RX ADMIN — INSULIN LISPRO 10 UNITS: 100 INJECTION, SOLUTION INTRAVENOUS; SUBCUTANEOUS at 16:19

## 2023-10-20 RX ADMIN — CETIRIZINE HYDROCHLORIDE 5 MG: 5 SOLUTION ORAL at 08:53

## 2023-10-20 RX ADMIN — ENOXAPARIN SODIUM 40 MG: 100 INJECTION SUBCUTANEOUS at 08:52

## 2023-10-20 RX ADMIN — SODIUM CHLORIDE, PRESERVATIVE FREE 10 ML: 5 INJECTION INTRAVENOUS at 23:18

## 2023-10-20 RX ADMIN — GUAIFENESIN 600 MG: 600 TABLET, EXTENDED RELEASE ORAL at 08:56

## 2023-10-20 RX ADMIN — MICONAZOLE NITRATE: 2 POWDER TOPICAL at 23:45

## 2023-10-20 RX ADMIN — IPRATROPIUM BROMIDE AND ALBUTEROL SULFATE 1 DOSE: 2.5; .5 SOLUTION RESPIRATORY (INHALATION) at 07:08

## 2023-10-20 ASSESSMENT — PAIN SCALES - WONG BAKER
WONGBAKER_NUMERICALRESPONSE: 0
WONGBAKER_NUMERICALRESPONSE: 0

## 2023-10-20 NOTE — PROGRESS NOTES
V2.0    [facility] Progress Note      Name:  Concha Dinero /Age/Sex: 1945  (70 y.o. male)   MRN & CSN:  [id number] & [id number] Encounter Date/Time: 10/20/2023 12:33 PM EDT   Location:  [facility]A PCP: Anibal Irwin MD       Hospital Day: 6    Assessment and Recommendations     Concha Dinero is a 70 y.o. male with a pmh of very severe COPD with chronic hypoxemic hypercapnic respiratory failure (on 3L NC continuously), HTN, HLD and T2DM  who presents with COPD exacerbation [facility])     Hospital Problems               Last Modified POA     * (Principal) COPD exacerbation ([address]) 10/15/2023 Yes         Plan:  Shortness of Breath, acute COPD Exacerbation, Chronic resp failure on 3-4L/min at baseline   Lower extremity swelling: Improved  - Pt presenting w worsening cough and shortness of breath for 5 days , O2 Sat >90% on RA, ,   -Telemetry   -O2 PRN, keep O2 Sat 88-92% in setting of underlying COPD -BiPAP PRN   -CXR with no acute changes  - s/w Duonebs Q6H and Q4H PRN   - Antitussives   - s/w Solumedrol IV, switch to PO prednisone 40mg PO daily   - on levofloxacin 500mg IV daily   -Patient denies any smoking  Pulmonary consulted, pt would like outpatient pulmonary follow up, pt on 3-4 L NC oxygen at home, reports cannot get to [place] for pulm appt due to transportation issues  Pt a little better  - Viral panel -ve   - Sputum culture pending   -Pulm on board.  -On 4 L nasal cannula. On prednisone. Respiratory culture 10/15: No growth.     Elevated troponin  -Pt denies chest pain  -Trend trops, EKG with normal Sinus rhythm with occasional premature ventricular complexes   Left axis deviation   Incomplete right bundle branch block   ST & T wave abnormality in lateral leads  Likely due to hypoxia, acute COPD exacerbation,  may need outpatient stress test after acute COPD exacerbation resolved, consulted cardio and EP due to EKG changes, SOB, and NSVT on tele, elevated trop  F/U Cardio and Order Status: Completed Specimen: CSF Updated: 10/16/23 1039     Strep pneumo Ag URINE NEGATIVE     Comment: Presumptive negative for   pneumococcal pneumonia,  suggesting no current or recent  pneumococcal infection. Infection  due to S pneumonia cannot be ruled  out if the antigen present in the  sample is below the detection  limit of the test.  (NOTE)  REFERENCE RANGE: NEGATIVE                 XR CHEST PORTABLE    Result Date: 10/16/2023  EXAMINATION: ONE XRAY VIEW OF THE CHEST 10/16/2023 3:47 pm COMPARISON: 09/18/2023 HISTORY: ORDERING SYSTEM PROVIDED HISTORY: SOB TECHNOLOGIST PROVIDED HISTORY: Reason for exam:->SOB Reason for Exam: SOB FINDINGS: Chronic pulmonary vascular redistribution. Heart size is normal. No lung infiltrate or consolidation. No definite pneumothorax or pleural effusion. Chronic pulmonary vascular redistribution. No new abnormality.             Electronically signed by Norm Esparza MD on 10/20/2023 at 9:48 AM

## 2023-10-20 NOTE — PROGRESS NOTES
Occupational Therapy Treatment Note  Name: Ivone Sweeney MRN: [id number] :   1945   Date:  10/20/2023   Admission Date: 10/15/2023 Room:  [address] [place] [address]-A     Primary Problem: There were no encounter diagnoses. Past Medical History:   Diagnosis Date    Acid reflux     Anemia     \"dx age 10    COPD (chronic obstructive pulmonary disease) ([address])     follows with [address] decay     Diabetes mellitus ([address])     \"dx  or so\"    Emphysema of lung ([address])     Enlarged prostate     Stevens catheter in place     \"put in catheter since mid 2018\"    H/O cardiovascular stress test 2018    Nml, 62%    Hematuria     dx with admission 2018    HX OTHER MEDICAL     \"tried 3 times to do sleep study could not tolerate    Hypertension     per pt on 2018\"my blood pressure has been low so stopped the Amlodopine for now\"    Low back sprain 1983    Megacolon     On home oxygen therapy     \"wear it at night at 3l/nc\"    Wears glasses     to read         Communication with other providers:  RT     Subjective:  Patient states:  \"I really want to do more today, but my breathing is so hard and this cough, I just can't\"  Pain: denied   Restrictions: general, fall   No one at bedside    Objective:    Observation:  pt was in bed upon arrival, agreeable to session    Treatment, including education:    Therapeutic Activity Training:   Therapeutic activity training was instructed today. Cues were given for safety, sequence, UE/LE placement, visual cues, and balance. Activities performed today included:    Bed mobility:  Pt completed sup to sit with SBA. Scooting:  SBA to EOB. Seated balance:  James at EOB with good balance. SPT:  Pt completed from EOB and to recliner CGA-Yohana with HHA and no AD.        Education: Role of OT, OT POC, safety, benefits of EOB/OOB activity, rationale for treatment  Safety Measures: Gait belt used for safety of pt and therapist, Left in recliner with RT present for

## 2023-10-20 NOTE — PROGRESS NOTES
Patient had abdominal x-ray done today. Reported-massive dilation of large bowel loops, mild dilation of the small bowel loops, large bowel obstruction is a concern including sigmoid volvulus. Patient reported having 2 bowel movements today, not passing flatus since then. Denying any nausea, vomiting. On examination abdomen looks distended, decreased bowel sounds  Discussed x-ray findings and probably need for an NG tube for decompression. Patient reported that he had bowel obstruction in the past and had NG tube. Patient currently declining NG tube placement reports that he is short of breath and placing an NG tube will cause him more frustration and more difficulty with breathing. He states that probably stool softeners and simethicone will help him. He also states that this has happened in the past and got resolved on its own. Will monitor closely. Consult placed for Dr Danyelle Peña. 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 Patient was admitted 3/2013 for small bowel obstruction and was managed conservatively  Was admitted 6/18/2023-4 suspected sigmoid volvulus. Patient was evaluated by Dr. Danyelle Peña and did not recommend any surgery intervention. Was advised to follow-up outpatient for sigmoidoscopy.

## 2023-10-20 NOTE — CARE COORDINATION
Chart reviewed and patient discussed in IDR. Therapy recs [facility]. Patient active with [facility]. I will contact them and inquire if they offer skilled services. Jonathan Velazquez contact number is [phone].

## 2023-10-20 NOTE — CARE COORDINATION
Therapy recommending home with home care. This RN case manager called and spoke with [facility]. Patient is active with [facility]. They do have services for therapy and nursing. He has nursing at this time. They can provide the OT/PT. Pt will need updated home care orders at [place].

## 2023-10-21 LAB
ANION GAP SERPL CALCULATED.3IONS-SCNC: 6 MMOL/L (ref 4–16)
BUN SERPL-MCNC: 26 MG/DL (ref 6–23)
CALCIUM SERPL-MCNC: 8.7 MG/DL (ref 8.3–10.6)
CHLORIDE BLD-SCNC: 104 MMOL/L (ref 99–110)
CO2: 32 MMOL/L (ref 21–32)
CREAT SERPL-MCNC: 0.9 MG/DL (ref 0.9–1.3)
GFR SERPL CREATININE-BSD FRML MDRD: >60 ML/MIN/1.73M2
GLUCOSE BLD-MCNC: 100 MG/DL (ref 70–99)
GLUCOSE BLD-MCNC: 132 MG/DL (ref 70–99)
GLUCOSE BLD-MCNC: 143 MG/DL (ref 70–99)
GLUCOSE BLD-MCNC: 156 MG/DL (ref 70–99)
GLUCOSE BLD-MCNC: 306 MG/DL (ref 70–99)
GLUCOSE SERPL-MCNC: 166 MG/DL (ref 70–99)
POTASSIUM SERPL-SCNC: 4.7 MMOL/L (ref 3.5–5.1)
SODIUM BLD-SCNC: 142 MMOL/L (ref 135–145)

## 2023-10-21 PROCEDURE — 36415 COLL VENOUS BLD VENIPUNCTURE: CPT

## 2023-10-21 PROCEDURE — 94761 N-INVAS EAR/PLS OXIMETRY MLT: CPT

## 2023-10-21 PROCEDURE — 94640 AIRWAY INHALATION TREATMENT: CPT

## 2023-10-21 PROCEDURE — 6370000000 HC RX 637 (ALT 250 FOR IP): Performed by: STUDENT IN AN ORGANIZED HEALTH CARE EDUCATION/TRAINING PROGRAM

## 2023-10-21 PROCEDURE — 82962 GLUCOSE BLOOD TEST: CPT

## 2023-10-21 PROCEDURE — 6370000000 HC RX 637 (ALT 250 FOR IP): Performed by: INTERNAL MEDICINE

## 2023-10-21 PROCEDURE — 6370000000 HC RX 637 (ALT 250 FOR IP): Performed by: HOSPITALIST

## 2023-10-21 PROCEDURE — 6360000002 HC RX W HCPCS: Performed by: STUDENT IN AN ORGANIZED HEALTH CARE EDUCATION/TRAINING PROGRAM

## 2023-10-21 PROCEDURE — 2700000000 HC OXYGEN THERAPY PER DAY

## 2023-10-21 PROCEDURE — 6370000000 HC RX 637 (ALT 250 FOR IP): Performed by: NURSE PRACTITIONER

## 2023-10-21 PROCEDURE — 2580000003 HC RX 258: Performed by: STUDENT IN AN ORGANIZED HEALTH CARE EDUCATION/TRAINING PROGRAM

## 2023-10-21 PROCEDURE — 1200000000 HC SEMI PRIVATE

## 2023-10-21 PROCEDURE — 80048 BASIC METABOLIC PNL TOTAL CA: CPT

## 2023-10-21 PROCEDURE — 99232 SBSQ HOSP IP/OBS MODERATE 35: CPT | Performed by: INTERNAL MEDICINE

## 2023-10-21 RX ADMIN — AMLODIPINE BESYLATE 10 MG: 10 TABLET ORAL at 10:58

## 2023-10-21 RX ADMIN — FLUTICASONE PROPIONATE 1 SPRAY: 50 SPRAY, METERED NASAL at 18:14

## 2023-10-21 RX ADMIN — SODIUM CHLORIDE, PRESERVATIVE FREE 10 ML: 5 INJECTION INTRAVENOUS at 10:57

## 2023-10-21 RX ADMIN — FLUTICASONE PROPIONATE 1 SPRAY: 50 SPRAY, METERED NASAL at 11:02

## 2023-10-21 RX ADMIN — ACETAMINOPHEN 650 MG: 325 TABLET ORAL at 19:46

## 2023-10-21 RX ADMIN — SIMETHICONE 125 MG: 125 TABLET, CHEWABLE ORAL at 23:27

## 2023-10-21 RX ADMIN — MAGNESIUM OXIDE 400 MG (241.3 MG MAGNESIUM) TABLET 400 MG: TABLET at 10:59

## 2023-10-21 RX ADMIN — BENZONATATE 100 MG: 100 CAPSULE ORAL at 18:20

## 2023-10-21 RX ADMIN — GUAIFENESIN 600 MG: 600 TABLET, EXTENDED RELEASE ORAL at 11:00

## 2023-10-21 RX ADMIN — FLUTICASONE PROPIONATE 1 PUFF: 110 AEROSOL, METERED RESPIRATORY (INHALATION) at 20:06

## 2023-10-21 RX ADMIN — PANTOPRAZOLE SODIUM 40 MG: 40 TABLET, DELAYED RELEASE ORAL at 05:56

## 2023-10-21 RX ADMIN — ATORVASTATIN CALCIUM 10 MG: 10 TABLET, FILM COATED ORAL at 10:58

## 2023-10-21 RX ADMIN — GUAIFENESIN SYRUP AND DEXTROMETHORPHAN 5 ML: 100; 10 SYRUP ORAL at 18:14

## 2023-10-21 RX ADMIN — IPRATROPIUM BROMIDE AND ALBUTEROL SULFATE 1 DOSE: 2.5; .5 SOLUTION RESPIRATORY (INHALATION) at 20:06

## 2023-10-21 RX ADMIN — ENOXAPARIN SODIUM 40 MG: 100 INJECTION SUBCUTANEOUS at 10:59

## 2023-10-21 RX ADMIN — SODIUM CHLORIDE, PRESERVATIVE FREE 10 ML: 5 INJECTION INTRAVENOUS at 20:47

## 2023-10-21 RX ADMIN — SENNOSIDES AND DOCUSATE SODIUM 2 TABLET: 50; 8.6 TABLET ORAL at 20:47

## 2023-10-21 RX ADMIN — SENNOSIDES AND DOCUSATE SODIUM 2 TABLET: 50; 8.6 TABLET ORAL at 10:59

## 2023-10-21 RX ADMIN — MONTELUKAST 10 MG: 10 TABLET, FILM COATED ORAL at 20:47

## 2023-10-21 RX ADMIN — IPRATROPIUM BROMIDE AND ALBUTEROL SULFATE 1 DOSE: 2.5; .5 SOLUTION RESPIRATORY (INHALATION) at 16:03

## 2023-10-21 RX ADMIN — INSULIN GLARGINE 20 UNITS: 100 INJECTION, SOLUTION SUBCUTANEOUS at 20:46

## 2023-10-21 RX ADMIN — IPRATROPIUM BROMIDE AND ALBUTEROL SULFATE 1 DOSE: 2.5; .5 SOLUTION RESPIRATORY (INHALATION) at 08:29

## 2023-10-21 RX ADMIN — ACETAMINOPHEN 650 MG: 325 TABLET ORAL at 11:13

## 2023-10-21 RX ADMIN — FLUTICASONE PROPIONATE 1 PUFF: 110 AEROSOL, METERED RESPIRATORY (INHALATION) at 08:29

## 2023-10-21 RX ADMIN — IPRATROPIUM BROMIDE AND ALBUTEROL SULFATE 1 DOSE: 2.5; .5 SOLUTION RESPIRATORY (INHALATION) at 11:55

## 2023-10-21 RX ADMIN — IPRATROPIUM BROMIDE AND ALBUTEROL SULFATE 1 DOSE: 2.5; .5 SOLUTION RESPIRATORY (INHALATION) at 01:10

## 2023-10-21 RX ADMIN — INSULIN LISPRO 10 UNITS: 100 INJECTION, SOLUTION INTRAVENOUS; SUBCUTANEOUS at 10:56

## 2023-10-21 RX ADMIN — INSULIN LISPRO 6 UNITS: 100 INJECTION, SOLUTION INTRAVENOUS; SUBCUTANEOUS at 20:46

## 2023-10-21 RX ADMIN — MICONAZOLE NITRATE: 2 POWDER TOPICAL at 23:26

## 2023-10-21 RX ADMIN — MICONAZOLE NITRATE: 2 POWDER TOPICAL at 11:02

## 2023-10-21 RX ADMIN — GUAIFENESIN 600 MG: 600 TABLET, EXTENDED RELEASE ORAL at 20:47

## 2023-10-21 RX ADMIN — PREDNISONE 40 MG: 20 TABLET ORAL at 10:59

## 2023-10-21 RX ADMIN — CETIRIZINE HYDROCHLORIDE 5 MG: 5 SOLUTION ORAL at 11:01

## 2023-10-21 RX ADMIN — IPRATROPIUM BROMIDE AND ALBUTEROL SULFATE 1 DOSE: 2.5; .5 SOLUTION RESPIRATORY (INHALATION) at 04:38

## 2023-10-21 RX ADMIN — METOPROLOL SUCCINATE 50 MG: 50 TABLET, EXTENDED RELEASE ORAL at 10:58

## 2023-10-21 ASSESSMENT — PAIN DESCRIPTION - ONSET: ONSET: ON-GOING

## 2023-10-21 ASSESSMENT — PAIN SCALES - GENERAL
PAINLEVEL_OUTOF10: 6
PAINLEVEL_OUTOF10: 0
PAINLEVEL_OUTOF10: 0
PAINLEVEL_OUTOF10: 6
PAINLEVEL_OUTOF10: 5
PAINLEVEL_OUTOF10: 0

## 2023-10-21 ASSESSMENT — PAIN SCALES - WONG BAKER
WONGBAKER_NUMERICALRESPONSE: 0

## 2023-10-21 ASSESSMENT — PAIN DESCRIPTION - DESCRIPTORS
DESCRIPTORS: CRAMPING
DESCRIPTORS: ACHING

## 2023-10-21 ASSESSMENT — PAIN DESCRIPTION - LOCATION
LOCATION: BUTTOCKS
LOCATION: ABDOMEN
LOCATION: ABDOMEN

## 2023-10-21 ASSESSMENT — PAIN - FUNCTIONAL ASSESSMENT
PAIN_FUNCTIONAL_ASSESSMENT: ACTIVITIES ARE NOT PREVENTED
PAIN_FUNCTIONAL_ASSESSMENT: ACTIVITIES ARE NOT PREVENTED

## 2023-10-21 ASSESSMENT — PAIN DESCRIPTION - ORIENTATION
ORIENTATION: RIGHT;LEFT
ORIENTATION: OTHER (COMMENT)

## 2023-10-21 ASSESSMENT — PAIN DESCRIPTION - PAIN TYPE: TYPE: CHRONIC PAIN

## 2023-10-21 ASSESSMENT — PAIN DESCRIPTION - FREQUENCY: FREQUENCY: CONTINUOUS

## 2023-10-21 NOTE — PROGRESS NOTES
Progress Note( Dr. Sylvia Wilson)    Subjective:   Admit Date: 10/15/2023  PCP: Concepcion Zhu    Admitted For :Shortness of breath exacerbation of COPD    Consulted For:  Better control of blood glucose    Interval History:still SOB  but some better     Denies any chest pains,   Yes SOB . Denies nausea or vomiting. No new bowel or bladder symptoms. Intake/Output Summary (Last 24 hours) at 10/21/2023 1102  Last data filed at 10/20/2023 2111  Gross per 24 hour   Intake --   Output 975 ml   Net -975 ml         DATA    CBC:   Recent Labs     10/20/23  0014   WBC 12.1*   HGB 11.1*         CMP:  Recent Labs     10/20/23  0014 10/21/23  0319    142   K 4.8 4.7    104   CO2 31 32   BUN 30* 26*   CREATININE 1.1 0.9   CALCIUM 8.9 8.7       Lipids:   Lab Results   Component Value Date/Time    CHOL 122 05/15/2021 04:10 AM    HDL 51 05/15/2021 04:10 AM    TRIG 41 05/15/2021 04:10 AM     Glucose:  Recent Labs     10/20/23  2109 10/21/23  0322 10/21/23  0632   POCGLU 220* 156* 100*       [id number]:  Lab Results   Component Value Date/Time    LABA1C 8.9 09/20/2023 10:35 AM     High Sensitivity TSH:   Lab Results   Component Value Date/Time    TSHHS 0.137 09/17/2023 07:58 AM     Free T3:   Lab Results   Component Value Date/Time    FT3 1.0 01/10/2013 10:29 AM     Free T4:  Lab Results   Component Value Date/Time    T4FREE 1.44 09/17/2023 07:58 AM       XR ABDOMEN (KUB) (SINGLE AP VIEW)   Final Result   Massive dilatation of the large bowel loops, mild dilatation of the small   bowel loops, large bowel obstruction is a concern, including sigmoid volvulus. XR CHEST PORTABLE   Final Result   Chronic pulmonary vascular redistribution. No new abnormality.               Scheduled Medicines   Medications:    insulin glargine  20 Units SubCUTAneous Nightly    [START ON 10/22/2023] polyethylene glycol  4,000 mL Oral Once    insulin lispro  10 Units SubCUTAneous TID WC    metoprolol succinate  50 mg Oral

## 2023-10-22 LAB
ANION GAP SERPL CALCULATED.3IONS-SCNC: 6 MMOL/L (ref 4–16)
BUN SERPL-MCNC: 24 MG/DL (ref 6–23)
CALCIUM SERPL-MCNC: 8.6 MG/DL (ref 8.3–10.6)
CHLORIDE BLD-SCNC: 102 MMOL/L (ref 99–110)
CO2: 34 MMOL/L (ref 21–32)
CREAT SERPL-MCNC: 0.9 MG/DL (ref 0.9–1.3)
GFR SERPL CREATININE-BSD FRML MDRD: >60 ML/MIN/1.73M2
GLUCOSE BLD-MCNC: 113 MG/DL (ref 70–99)
GLUCOSE BLD-MCNC: 121 MG/DL (ref 70–99)
GLUCOSE BLD-MCNC: 187 MG/DL (ref 70–99)
GLUCOSE BLD-MCNC: 264 MG/DL (ref 70–99)
GLUCOSE BLD-MCNC: 47 MG/DL (ref 70–99)
GLUCOSE BLD-MCNC: 82 MG/DL (ref 70–99)
GLUCOSE SERPL-MCNC: 70 MG/DL (ref 70–99)
POTASSIUM SERPL-SCNC: 4.5 MMOL/L (ref 3.5–5.1)
SODIUM BLD-SCNC: 142 MMOL/L (ref 135–145)

## 2023-10-22 PROCEDURE — 82962 GLUCOSE BLOOD TEST: CPT

## 2023-10-22 PROCEDURE — 6360000002 HC RX W HCPCS: Performed by: STUDENT IN AN ORGANIZED HEALTH CARE EDUCATION/TRAINING PROGRAM

## 2023-10-22 PROCEDURE — 94761 N-INVAS EAR/PLS OXIMETRY MLT: CPT

## 2023-10-22 PROCEDURE — 94640 AIRWAY INHALATION TREATMENT: CPT

## 2023-10-22 PROCEDURE — 6370000000 HC RX 637 (ALT 250 FOR IP): Performed by: NURSE PRACTITIONER

## 2023-10-22 PROCEDURE — 83605 ASSAY OF LACTIC ACID: CPT

## 2023-10-22 PROCEDURE — 80048 BASIC METABOLIC PNL TOTAL CA: CPT

## 2023-10-22 PROCEDURE — 36415 COLL VENOUS BLD VENIPUNCTURE: CPT

## 2023-10-22 PROCEDURE — 6370000000 HC RX 637 (ALT 250 FOR IP): Performed by: INTERNAL MEDICINE

## 2023-10-22 PROCEDURE — 99232 SBSQ HOSP IP/OBS MODERATE 35: CPT | Performed by: INTERNAL MEDICINE

## 2023-10-22 PROCEDURE — 1200000000 HC SEMI PRIVATE

## 2023-10-22 PROCEDURE — 6370000000 HC RX 637 (ALT 250 FOR IP): Performed by: FAMILY MEDICINE

## 2023-10-22 PROCEDURE — 2580000003 HC RX 258: Performed by: STUDENT IN AN ORGANIZED HEALTH CARE EDUCATION/TRAINING PROGRAM

## 2023-10-22 PROCEDURE — 6370000000 HC RX 637 (ALT 250 FOR IP): Performed by: SURGERY

## 2023-10-22 PROCEDURE — 6370000000 HC RX 637 (ALT 250 FOR IP): Performed by: STUDENT IN AN ORGANIZED HEALTH CARE EDUCATION/TRAINING PROGRAM

## 2023-10-22 PROCEDURE — 2700000000 HC OXYGEN THERAPY PER DAY

## 2023-10-22 PROCEDURE — 6370000000 HC RX 637 (ALT 250 FOR IP): Performed by: HOSPITALIST

## 2023-10-22 RX ORDER — INSULIN LISPRO 100 [IU]/ML
0-4 INJECTION, SOLUTION INTRAVENOUS; SUBCUTANEOUS
Status: DISCONTINUED | OUTPATIENT
Start: 2023-10-22 | End: 2023-10-25 | Stop reason: HOSPADM

## 2023-10-22 RX ORDER — INSULIN GLARGINE 100 [IU]/ML
10 INJECTION, SOLUTION SUBCUTANEOUS NIGHTLY
Status: DISCONTINUED | OUTPATIENT
Start: 2023-10-22 | End: 2023-10-25 | Stop reason: HOSPADM

## 2023-10-22 RX ADMIN — GUAIFENESIN 600 MG: 600 TABLET, EXTENDED RELEASE ORAL at 19:58

## 2023-10-22 RX ADMIN — IPRATROPIUM BROMIDE AND ALBUTEROL SULFATE 1 DOSE: 2.5; .5 SOLUTION RESPIRATORY (INHALATION) at 11:00

## 2023-10-22 RX ADMIN — ATORVASTATIN CALCIUM 10 MG: 10 TABLET, FILM COATED ORAL at 10:17

## 2023-10-22 RX ADMIN — FLUTICASONE PROPIONATE 1 PUFF: 110 AEROSOL, METERED RESPIRATORY (INHALATION) at 07:24

## 2023-10-22 RX ADMIN — GUAIFENESIN 600 MG: 600 TABLET, EXTENDED RELEASE ORAL at 10:16

## 2023-10-22 RX ADMIN — FLUTICASONE PROPIONATE 1 SPRAY: 50 SPRAY, METERED NASAL at 10:14

## 2023-10-22 RX ADMIN — SODIUM CHLORIDE, PRESERVATIVE FREE 10 ML: 5 INJECTION INTRAVENOUS at 19:59

## 2023-10-22 RX ADMIN — SIMETHICONE 125 MG: 125 TABLET, CHEWABLE ORAL at 19:58

## 2023-10-22 RX ADMIN — IPRATROPIUM BROMIDE AND ALBUTEROL SULFATE 1 DOSE: 2.5; .5 SOLUTION RESPIRATORY (INHALATION) at 07:23

## 2023-10-22 RX ADMIN — ACETAMINOPHEN 650 MG: 325 TABLET ORAL at 07:14

## 2023-10-22 RX ADMIN — IPRATROPIUM BROMIDE AND ALBUTEROL SULFATE 1 DOSE: 2.5; .5 SOLUTION RESPIRATORY (INHALATION) at 20:10

## 2023-10-22 RX ADMIN — GUAIFENESIN SYRUP AND DEXTROMETHORPHAN 5 ML: 100; 10 SYRUP ORAL at 18:19

## 2023-10-22 RX ADMIN — MICONAZOLE NITRATE: 2 POWDER TOPICAL at 10:20

## 2023-10-22 RX ADMIN — FLUTICASONE PROPIONATE 1 PUFF: 110 AEROSOL, METERED RESPIRATORY (INHALATION) at 20:11

## 2023-10-22 RX ADMIN — SODIUM CHLORIDE, PRESERVATIVE FREE 10 ML: 5 INJECTION INTRAVENOUS at 10:18

## 2023-10-22 RX ADMIN — IPRATROPIUM BROMIDE AND ALBUTEROL SULFATE 1 DOSE: 2.5; .5 SOLUTION RESPIRATORY (INHALATION) at 17:19

## 2023-10-22 RX ADMIN — ENOXAPARIN SODIUM 40 MG: 100 INJECTION SUBCUTANEOUS at 10:17

## 2023-10-22 RX ADMIN — POLYETHYLENE GLYCOL 3350, SODIUM SULFATE ANHYDROUS, SODIUM BICARBONATE, SODIUM CHLORIDE, POTASSIUM CHLORIDE 4000 ML: 236; 22.74; 6.74; 5.86; 2.97 POWDER, FOR SOLUTION ORAL at 12:24

## 2023-10-22 RX ADMIN — FLUTICASONE PROPIONATE 1 SPRAY: 50 SPRAY, METERED NASAL at 17:41

## 2023-10-22 RX ADMIN — SENNOSIDES AND DOCUSATE SODIUM 2 TABLET: 50; 8.6 TABLET ORAL at 10:15

## 2023-10-22 RX ADMIN — BENZONATATE 100 MG: 100 CAPSULE ORAL at 10:17

## 2023-10-22 RX ADMIN — CETIRIZINE HYDROCHLORIDE 5 MG: 5 SOLUTION ORAL at 10:14

## 2023-10-22 RX ADMIN — BENZONATATE 100 MG: 100 CAPSULE ORAL at 18:19

## 2023-10-22 RX ADMIN — GUAIFENESIN SYRUP AND DEXTROMETHORPHAN 5 ML: 100; 10 SYRUP ORAL at 10:16

## 2023-10-22 RX ADMIN — MONTELUKAST 10 MG: 10 TABLET, FILM COATED ORAL at 19:58

## 2023-10-22 RX ADMIN — DEXTROSE MONOHYDRATE 125 ML: 100 INJECTION, SOLUTION INTRAVENOUS at 07:08

## 2023-10-22 RX ADMIN — PREDNISONE 40 MG: 20 TABLET ORAL at 10:15

## 2023-10-22 RX ADMIN — MICONAZOLE NITRATE: 2 POWDER TOPICAL at 19:58

## 2023-10-22 RX ADMIN — AMLODIPINE BESYLATE 10 MG: 10 TABLET ORAL at 10:16

## 2023-10-22 RX ADMIN — METOPROLOL SUCCINATE 50 MG: 50 TABLET, EXTENDED RELEASE ORAL at 10:16

## 2023-10-22 RX ADMIN — Medication 1 LOZENGE: at 21:42

## 2023-10-22 RX ADMIN — PANTOPRAZOLE SODIUM 40 MG: 40 TABLET, DELAYED RELEASE ORAL at 06:20

## 2023-10-22 RX ADMIN — GUAIFENESIN SYRUP AND DEXTROMETHORPHAN 5 ML: 100; 10 SYRUP ORAL at 23:40

## 2023-10-22 RX ADMIN — MAGNESIUM OXIDE 400 MG (241.3 MG MAGNESIUM) TABLET 400 MG: TABLET at 10:17

## 2023-10-22 RX ADMIN — SENNOSIDES AND DOCUSATE SODIUM 2 TABLET: 50; 8.6 TABLET ORAL at 19:58

## 2023-10-22 ASSESSMENT — PAIN SCALES - GENERAL: PAINLEVEL_OUTOF10: 6

## 2023-10-22 ASSESSMENT — PAIN DESCRIPTION - ORIENTATION: ORIENTATION: MID

## 2023-10-22 ASSESSMENT — PAIN DESCRIPTION - LOCATION: LOCATION: THROAT

## 2023-10-22 NOTE — PROGRESS NOTES
V2.0    [facility] Progress Note      Name:  Naomy Jurado /Age/Sex: 1945  (70 y.o. male)   MRN & CSN:  [id number] & [id number] Encounter Date/Time: 10/22/2023 12:33 PM EDT   Location:  -A PCP: Javan Hutchinson MD       Hospital Day: 8    Assessment and Recommendations     Naomy Jurado is a 70 y.o. male with a pmh of very severe COPD with chronic hypoxemic hypercapnic respiratory failure (on 3L NC continuously), HTN, HLD and T2DM  who presents with COPD exacerbation [facility]     Hospital Problems               Last Modified POA     * (Principal) COPD exacerbation ([address]) 10/15/2023 Yes         Plan:  Acute on chronic hypoxic respiratory failure in the setting of COPD exacerbation   Lower extremity swelling: Improved  - Pt presenting w worsening cough and shortness of breath for 5 days , O2 Sat >90% on RA,   -On 3 to 4 L oxygen at baseline at home currently on 4 to 5 L  -Telemetry   -O2 PRN, keep O2 Sat 88-92% in setting of underlying COPD -BiPAP PRN   -CXR with no acute changes  - s/w Duonebs Q6H and Q4H PRN   - Antitussives   - s/w Solumedrol IV, switched to PO prednisone 40mg PO daily   - on levofloxacin 500mg IV daily   -Patient denies any smoking  Pulmonary consulted, pt would like outpatient pulmonary follow up, pt on 3-4 L NC oxygen at home, reports cannot get to [place] for pulm appt due to transportation issues  Pt a little better  - Viral panel -ve   - Sputum culture pending   -Pulm on board.  -On 4 L nasal cannula. On prednisone. Respiratory culture 10/15: No growth. 2. Hypoglycemic episode of unclear etiology  Patient is on prednisone  Endocrinology is on board  On Lantus 20 units nightly and sliding scale insulin 10 units 3 times daily of Humalog scheduled as well  Holding of further insulin regimen other than sliding scale  Patient is eating on a daily basis.   Repeat sugars are okay   We will continue To monitor sugar levels      Elevated troponin  -Pt denies 31 32 34*   BUN 30* 26* 24*   CREATININE 1.1 0.9 0.9   GLUCOSE 210* 166* 70       Hepatic:   No results for input(s): \"AST\", \"ALT\", \"ALB\", \"BILITOT\", \"ALKPHOS\" in the last 72 hours. Lipids:   Lab Results   Component Value Date/Time    CHOL 122 05/15/2021 04:10 AM    HDL 51 05/15/2021 04:10 AM    TRIG 41 05/15/2021 04:10 AM     Hemoglobin A1C:   Lab Results   Component Value Date/Time    LABA1C 8.9 09/20/2023 10:35 AM     TSH: No results found for: \"TSH\"  Troponin:   Lab Results   Component Value Date/Time    TROPONINT 0.028 09/17/2023 04:22 PM    TROPONINT 0.039 09/17/2023 12:45 PM    TROPONINT 0.036 09/17/2023 07:58 AM     Lactic Acid: No results for input(s): \"LACTA\" in the last 72 hours. BNP:   No results for input(s): \"PROBNP\" in the last 72 hours.     UA:  Lab Results   Component Value Date/Time    NITRU POSITIVE 04/29/2018 03:15 PM    COLORU YELLOW 04/29/2018 03:15 PM    WBCUA 948 04/29/2018 03:15 PM    RBCUA 11,278 04/29/2018 03:15 PM    MUCUS RARE 04/06/2018 01:20 AM    TRICHOMONAS NONE SEEN 04/29/2018 03:15 PM    YEAST OCCASIONAL 01/13/2013 02:00 AM    BACTERIA RARE 04/29/2018 03:15 PM    CLARITYU SLIGHTLY CLOUDY 04/29/2018 03:15 PM    SPECGRAV 1.014 04/29/2018 03:15 PM    LEUKOCYTESUR TRACE 04/29/2018 03:15 PM    UROBILINOGEN NORMAL 04/29/2018 03:15 PM    BILIRUBINUR NEGATIVE 04/29/2018 03:15 PM    BLOODU MODERATE 04/29/2018 03:15 PM    KETUA NEGATIVE 04/29/2018 03:15 PM     Results       Procedure Component Value Units Date/Time    Respiratory Panel, Molecular, with COVID-19 (Restricted: peds pts or suitable admitted adults) [[id number]] Collected: 10/16/23 1430    Order Status: Completed Specimen: Nasopharyngeal Updated: 10/16/23 1552     Adenovirus Detection by PCR NOT DETECTED     Coronavirus 229E PCR NOT DETECTED     Coronavirus HKU1 PCR NOT DETECTED     Coronavirus NL63 PCR NOT DETECTED     Coronavirus OC43 PCR NOT DETECTED     SARS-CoV-2 NOT DETECTED     Comment:         Fact sheet for Healthcare

## 2023-10-22 NOTE — PROGRESS NOTES
Progress Note( Dr. Stephy Clifford)  10/22/2023  Subjective:   Admit Date: 10/15/2023  PCP: Nicolasa Burnett    Admitted For :Shortness of breath exacerbation of COPD    Consulted For:  Better control of blood glucose    Interval History:still SOB  but some better   Patient had an episode of hypoglycemia this morning as he received insulin last night even though he is not eating he was placed on clear liquid diet possible colonoscopy    Denies any chest pains,   Yes SOB . Denies nausea or vomiting. No new bowel or bladder symptoms. Intake/Output Summary (Last 24 hours) at 10/22/2023 1009  Last data filed at 10/21/2023 2030  Gross per 24 hour   Intake 120 ml   Output 400 ml   Net -280 ml         DATA    CBC:   Recent Labs     10/20/23  0014   WBC 12.1*   HGB 11.1*         CMP:  Recent Labs     10/20/23  0014 10/21/23  0319 10/22/23  0440    142 142   K 4.8 4.7 4.5    104 102   CO2 31 32 34*   BUN 30* 26* 24*   CREATININE 1.1 0.9 0.9   CALCIUM 8.9 8.7 8.6       Lipids:   Lab Results   Component Value Date/Time    CHOL 122 05/15/2021 04:10 AM    HDL 51 05/15/2021 04:10 AM    TRIG 41 05/15/2021 04:10 AM     Glucose:  Recent Labs     10/22/23  0204 10/22/23  0655 10/22/23  0731   POCGLU 121* 47* 82       XjepnlqmdpI4M:  Lab Results   Component Value Date/Time    LABA1C 8.9 09/20/2023 10:35 AM     High Sensitivity TSH:   Lab Results   Component Value Date/Time    TSHHS 0.137 09/17/2023 07:58 AM     Free T3:   Lab Results   Component Value Date/Time    FT3 1.0 01/10/2013 10:29 AM     Free T4:  Lab Results   Component Value Date/Time    T4FREE 1.44 09/17/2023 07:58 AM       XR ABDOMEN (KUB) (SINGLE AP VIEW)   Final Result   Massive dilatation of the large bowel loops, mild dilatation of the small   bowel loops, large bowel obstruction is a concern, including sigmoid volvulus. XR CHEST PORTABLE   Final Result   Chronic pulmonary vascular redistribution. No new abnormality.               Scheduled

## 2023-10-23 ENCOUNTER — ANESTHESIA EVENT (OUTPATIENT)
Dept: ENDOSCOPY | Age: 78
DRG: 190 | End: 2023-10-23
Payer: OTHER GOVERNMENT

## 2023-10-23 LAB
ANION GAP SERPL CALCULATED.3IONS-SCNC: 9 MMOL/L (ref 4–16)
BUN SERPL-MCNC: 15 MG/DL (ref 6–23)
CALCIUM SERPL-MCNC: 8.4 MG/DL (ref 8.3–10.6)
CHLORIDE BLD-SCNC: 97 MMOL/L (ref 99–110)
CO2: 31 MMOL/L (ref 21–32)
CREAT SERPL-MCNC: 0.9 MG/DL (ref 0.9–1.3)
GFR SERPL CREATININE-BSD FRML MDRD: >60 ML/MIN/1.73M2
GLUCOSE BLD-MCNC: 127 MG/DL (ref 70–99)
GLUCOSE BLD-MCNC: 149 MG/DL (ref 70–99)
GLUCOSE BLD-MCNC: 183 MG/DL (ref 70–99)
GLUCOSE BLD-MCNC: 250 MG/DL (ref 70–99)
GLUCOSE BLD-MCNC: 331 MG/DL (ref 70–99)
GLUCOSE BLD-MCNC: 90 MG/DL (ref 70–99)
GLUCOSE SERPL-MCNC: 337 MG/DL (ref 70–99)
HCT VFR BLD CALC: 36.8 % (ref 42–52)
HEMOGLOBIN: 10.5 GM/DL (ref 13.5–18)
MCH RBC QN AUTO: 27.9 PG (ref 27–31)
MCHC RBC AUTO-ENTMCNC: 28.5 % (ref 32–36)
MCV RBC AUTO: 97.6 FL (ref 78–100)
PDW BLD-RTO: 15.2 % (ref 11.7–14.9)
PLATELET # BLD: 279 K/CU MM (ref 140–440)
PMV BLD AUTO: 10.8 FL (ref 7.5–11.1)
POTASSIUM SERPL-SCNC: 4.1 MMOL/L (ref 3.5–5.1)
RBC # BLD: 3.77 M/CU MM (ref 4.6–6.2)
SODIUM BLD-SCNC: 137 MMOL/L (ref 135–145)
WBC # BLD: 10.4 K/CU MM (ref 4–10.5)

## 2023-10-23 PROCEDURE — 6370000000 HC RX 637 (ALT 250 FOR IP): Performed by: SURGERY

## 2023-10-23 PROCEDURE — 82962 GLUCOSE BLOOD TEST: CPT

## 2023-10-23 PROCEDURE — 6370000000 HC RX 637 (ALT 250 FOR IP): Performed by: NURSE PRACTITIONER

## 2023-10-23 PROCEDURE — 1200000000 HC SEMI PRIVATE

## 2023-10-23 PROCEDURE — 97530 THERAPEUTIC ACTIVITIES: CPT

## 2023-10-23 PROCEDURE — 6370000000 HC RX 637 (ALT 250 FOR IP): Performed by: INTERNAL MEDICINE

## 2023-10-23 PROCEDURE — 97116 GAIT TRAINING THERAPY: CPT

## 2023-10-23 PROCEDURE — 2700000000 HC OXYGEN THERAPY PER DAY

## 2023-10-23 PROCEDURE — 94761 N-INVAS EAR/PLS OXIMETRY MLT: CPT

## 2023-10-23 PROCEDURE — 6370000000 HC RX 637 (ALT 250 FOR IP): Performed by: STUDENT IN AN ORGANIZED HEALTH CARE EDUCATION/TRAINING PROGRAM

## 2023-10-23 PROCEDURE — 2580000003 HC RX 258: Performed by: STUDENT IN AN ORGANIZED HEALTH CARE EDUCATION/TRAINING PROGRAM

## 2023-10-23 PROCEDURE — 85027 COMPLETE CBC AUTOMATED: CPT

## 2023-10-23 PROCEDURE — 36415 COLL VENOUS BLD VENIPUNCTURE: CPT

## 2023-10-23 PROCEDURE — 99231 SBSQ HOSP IP/OBS SF/LOW 25: CPT | Performed by: SURGERY

## 2023-10-23 PROCEDURE — 94640 AIRWAY INHALATION TREATMENT: CPT

## 2023-10-23 PROCEDURE — 80048 BASIC METABOLIC PNL TOTAL CA: CPT

## 2023-10-23 RX ADMIN — SODIUM CHLORIDE, PRESERVATIVE FREE 10 ML: 5 INJECTION INTRAVENOUS at 21:40

## 2023-10-23 RX ADMIN — CETIRIZINE HYDROCHLORIDE 5 MG: 5 SOLUTION ORAL at 09:42

## 2023-10-23 RX ADMIN — FLUTICASONE PROPIONATE 1 PUFF: 110 AEROSOL, METERED RESPIRATORY (INHALATION) at 07:01

## 2023-10-23 RX ADMIN — IPRATROPIUM BROMIDE AND ALBUTEROL SULFATE 1 DOSE: 2.5; .5 SOLUTION RESPIRATORY (INHALATION) at 15:03

## 2023-10-23 RX ADMIN — METOPROLOL SUCCINATE 50 MG: 50 TABLET, EXTENDED RELEASE ORAL at 09:40

## 2023-10-23 RX ADMIN — SENNOSIDES AND DOCUSATE SODIUM 2 TABLET: 50; 8.6 TABLET ORAL at 21:40

## 2023-10-23 RX ADMIN — MAGNESIUM OXIDE 400 MG (241.3 MG MAGNESIUM) TABLET 400 MG: TABLET at 09:39

## 2023-10-23 RX ADMIN — INSULIN LISPRO 3 UNITS: 100 INJECTION, SOLUTION INTRAVENOUS; SUBCUTANEOUS at 16:26

## 2023-10-23 RX ADMIN — SENNOSIDES AND DOCUSATE SODIUM 2 TABLET: 50; 8.6 TABLET ORAL at 09:39

## 2023-10-23 RX ADMIN — MONTELUKAST 10 MG: 10 TABLET, FILM COATED ORAL at 21:40

## 2023-10-23 RX ADMIN — MICONAZOLE NITRATE: 2 POWDER TOPICAL at 21:45

## 2023-10-23 RX ADMIN — MICONAZOLE NITRATE: 2 POWDER TOPICAL at 09:41

## 2023-10-23 RX ADMIN — GUAIFENESIN 600 MG: 600 TABLET, EXTENDED RELEASE ORAL at 09:39

## 2023-10-23 RX ADMIN — IPRATROPIUM BROMIDE AND ALBUTEROL SULFATE 1 DOSE: 2.5; .5 SOLUTION RESPIRATORY (INHALATION) at 11:01

## 2023-10-23 RX ADMIN — GUAIFENESIN SYRUP AND DEXTROMETHORPHAN 5 ML: 100; 10 SYRUP ORAL at 19:05

## 2023-10-23 RX ADMIN — IPRATROPIUM BROMIDE AND ALBUTEROL SULFATE 1 DOSE: 2.5; .5 SOLUTION RESPIRATORY (INHALATION) at 20:53

## 2023-10-23 RX ADMIN — GUAIFENESIN 600 MG: 600 TABLET, EXTENDED RELEASE ORAL at 21:40

## 2023-10-23 RX ADMIN — IPRATROPIUM BROMIDE AND ALBUTEROL SULFATE 1 DOSE: 2.5; .5 SOLUTION RESPIRATORY (INHALATION) at 05:29

## 2023-10-23 RX ADMIN — FLUTICASONE PROPIONATE 1 SPRAY: 50 SPRAY, METERED NASAL at 21:47

## 2023-10-23 RX ADMIN — SODIUM CHLORIDE, PRESERVATIVE FREE 10 ML: 5 INJECTION INTRAVENOUS at 09:40

## 2023-10-23 RX ADMIN — BENZONATATE 100 MG: 100 CAPSULE ORAL at 21:40

## 2023-10-23 RX ADMIN — POLYETHYLENE GLYCOL 3350, SODIUM SULFATE ANHYDROUS, SODIUM BICARBONATE, SODIUM CHLORIDE, POTASSIUM CHLORIDE 2000 ML: 236; 22.74; 6.74; 5.86; 2.97 POWDER, FOR SOLUTION ORAL at 15:00

## 2023-10-23 RX ADMIN — GUAIFENESIN SYRUP AND DEXTROMETHORPHAN 5 ML: 100; 10 SYRUP ORAL at 04:15

## 2023-10-23 RX ADMIN — ATORVASTATIN CALCIUM 10 MG: 10 TABLET, FILM COATED ORAL at 09:40

## 2023-10-23 RX ADMIN — IPRATROPIUM BROMIDE AND ALBUTEROL SULFATE 1 DOSE: 2.5; .5 SOLUTION RESPIRATORY (INHALATION) at 07:03

## 2023-10-23 RX ADMIN — AMLODIPINE BESYLATE 10 MG: 10 TABLET ORAL at 09:39

## 2023-10-23 RX ADMIN — FLUTICASONE PROPIONATE 1 PUFF: 110 AEROSOL, METERED RESPIRATORY (INHALATION) at 20:53

## 2023-10-23 RX ADMIN — BENZONATATE 100 MG: 100 CAPSULE ORAL at 04:15

## 2023-10-23 RX ADMIN — PANTOPRAZOLE SODIUM 40 MG: 40 TABLET, DELAYED RELEASE ORAL at 05:45

## 2023-10-23 RX ADMIN — FLUTICASONE PROPIONATE 1 SPRAY: 50 SPRAY, METERED NASAL at 09:40

## 2023-10-23 ASSESSMENT — PAIN SCALES - WONG BAKER
WONGBAKER_NUMERICALRESPONSE: 0

## 2023-10-23 ASSESSMENT — ENCOUNTER SYMPTOMS: SHORTNESS OF BREATH: 1

## 2023-10-23 NOTE — PROGRESS NOTES
Physical Therapy Treatment Note  Name: Trina Riley MRN: [id number] :   1945   Date:  10/23/2023   Admission Date: 10/15/2023 Room:  [address] [place], [place] [address]   Restrictions/Precautions: General, fall, O2 NC  Communication with other providers:  Pt okay to see for therapy per RN   Subjective:  Patient states: \"My first two wives were from [place]\"   Pain:   Location, Type, Intensity (0/10 to 10/10): Denies   Objective:    Observation:  Pt sitting up in chair upon PTA arrival.  Objective Measures:  Tele, O2 stable in 90s  Treatment, including education/measures:    Therapeutic Activity Training:   Therapeutic activity training was instructed today. Cues were given for safety, sequence, UE/LE placement, awareness, and balance. Activities performed today included STS. Mobility:  STS: Pt performed STS from recliner and from commode with CGA. Pt requires B UE to push up from recliner into standing. Gait:  Pt ambulated ~10ft x 2 and 175ft x 2 with RW and CGA for safety. Pt demos initial instability with gait and able to correct as gait progresses. Pt demos decreased step length, decreased gait speed, intermittent inconsistent foot placement, and mild instability related to postural sway. Pt denies increased fatigue by end of session. Safety:   Pt returned safely to recliner with chair alarm activated, call light in reach, all needs met. Assessment / Impression:    Pt tolerated increased gait distances with maintenance of O2 this session. Patient's tolerance of treatment:  Good   Adverse Reaction: none  Significant change in status and impact:  none  Barriers to improvement:  none  Plan for Next Session:    Plan to continue OOB activities.    Time in:  1137  Time out:  1200  Timed treatment minutes:  23  Total treatment time:  23    Previously filed items:  Social/Functional History  Lives With: Alone  Type of Home: House  Home Layout: One level  Home Access: Level entry  Home Equipment: Cane (does

## 2023-10-23 NOTE — PROGRESS NOTES
Comprehensive Nutrition Assessment    Type and Reason for Visit:  Initial, RD Nutrition Re-Screen/LOS, NPO/Clear Liquid    Nutrition Recommendations/Plan:   Trial clear liquid oral nutrition supplements  Diet advancement per general surgery, monitor Bowel function/results  Consider Carb Controlled diet upon diet advancement   Monitor GI status, weights, fluids, labs, lytes, glucose, and plan of care      Malnutrition Assessment:  Malnutrition Status: At risk for malnutrition (Comment) (10/23/23 1511)    Context:  Acute Illness     Findings of the 6 clinical characteristics of malnutrition:  Energy Intake:  Mild decrease in energy intake (Comment) (Clear liquid/NPO status)   Fluid Accumulation:  Mild Extremities    Nutrition Assessment:    Admitted with COPD excerbation. Hx Anemia, DMII, HTN, WPW, Chronic Bronchitits. Currently on clear liquids day 2, cscope postpone tomorrow, plans for NPO after MN. Prior to NPO/clear status, pt had been eating well within stay. Most recent meal intakes greater than half. Noted pt c/o ab distention, general surgery following. Weight gain likely RT fluids/ab distention. No significant wounds. Will offer supplements and monitor closely as moderate nutrition risk. Nutrition Related Findings:    Glu 250 Wound Type: None       Current Nutrition Intake & Therapies:    Average Meal Intake: %, 51-75% (clears, prior to clears:)  Average Supplements Intake: None Ordered  ADULT DIET; Clear Liquid  Diet NPO Exceptions are: Sips of Water with Meds    Anthropometric Measures:  Height: 172.7 cm (5' 7.99\")  Ideal Body Weight (IBW): 154 lbs (70 kg)       Current Body Weight: 91.6 kg (202 lb), 131.2 % IBW. Weight Source: Bed Scale  Current BMI (kg/m2): 30.7  Usual Body Weight: 77.5 kg (170 lb 14.4 oz) (April 2023 [facility])  % Weight Change (Calculated): 18.2  Weight Adjustment For: No Adjustment                 BMI Categories: Obese Class 1 (BMI 30.0-34. 9)    Estimated Daily Nutrient Needs:  Energy Requirements Based On: Formula  Weight Used for Energy Requirements: Current  Energy (kcal/day): 1858-1752 (MSJ)  Weight Used for Protein Requirements: Ideal  Protein (g/day): 77-91 (1.1-1.3 g/kg)  Method Used for Fluid Requirements: 1 ml/kcal  Fluid (ml/day): 2000    Nutrition Diagnosis:   Predicted inadequate energy intake related to acute injury/trauma, altered GI function, altered GI structure as evidenced by NPO or clear liquid status due to medical condition, GI abnormality    Nutrition Interventions:   Food and/or Nutrient Delivery: Continue Current Diet, Start Oral Nutrition Supplement  Nutrition Education/Counseling: No recommendation at this time  Coordination of Nutrition Care: Coordination of Care, Continue to monitor while inpatient       Goals:     Goals: Meet at least 75% of estimated needs       Nutrition Monitoring and Evaluation:   Behavioral-Environmental Outcomes: None Identified  Food/Nutrient Intake Outcomes: Diet Advancement/Tolerance, Food and Nutrient Intake, IVF Intake, Supplement Intake  Physical Signs/Symptoms Outcomes: Biochemical Data, GI Status, Nausea or Vomiting, Fluid Status or Edema, Meal Time Behavior, Nutrition Focused Physical Findings, Weight    Discharge Planning:     Too soon to determine     Hilario Becerra RD, LD  Contact: [phone]

## 2023-10-23 NOTE — PROGRESS NOTES
Progress Note( Dr. Meghann Durand)  10/23/2023  Subjective:   Admit Date: 10/15/2023  PCP: Libra Gayle    Admitted For :Shortness of breath exacerbation of COPD    Consulted For:  Better control of blood glucose    Interval History:still SOB  but some better   Going for colonoscopy later today    Denies any chest pains,   Yes SOB . Denies nausea or vomiting. Been n.p.o. for possible colonoscopy  No new bowel or bladder symptoms. Intake/Output Summary (Last 24 hours) at 10/23/2023 4899  Last data filed at 10/22/2023 1945  Gross per 24 hour   Intake 120 ml   Output 700 ml   Net -580 ml         DATA    CBC:   No results for input(s): \"WBC\", \"HGB\", \"PLT\" in the last 72 hours. CMP:  Recent Labs     10/21/23  0319 10/22/23  0440    142   K 4.7 4.5    102   CO2 32 34*   BUN 26* 24*   CREATININE 0.9 0.9   CALCIUM 8.7 8.6       Lipids:   Lab Results   Component Value Date/Time    CHOL 122 05/15/2021 04:10 AM    HDL 51 05/15/2021 04:10 AM    TRIG 41 05/15/2021 04:10 AM     Glucose:  Recent Labs     10/23/23  0226 10/23/23  0615 10/23/23  0706   POCGLU 183* 149* 127*       ZjpyktjvmsX5R:  Lab Results   Component Value Date/Time    LABA1C 8.9 09/20/2023 10:35 AM     High Sensitivity TSH:   Lab Results   Component Value Date/Time    TSHHS 0.137 09/17/2023 07:58 AM     Free T3:   Lab Results   Component Value Date/Time    FT3 1.0 01/10/2013 10:29 AM     Free T4:  Lab Results   Component Value Date/Time    T4FREE 1.44 09/17/2023 07:58 AM       XR ABDOMEN (KUB) (SINGLE AP VIEW)   Final Result   Massive dilatation of the large bowel loops, mild dilatation of the small   bowel loops, large bowel obstruction is a concern, including sigmoid volvulus. XR CHEST PORTABLE   Final Result   Chronic pulmonary vascular redistribution. No new abnormality.               Scheduled Medicines   Medications:    polyethylene glycol  2,000 mL Oral Once    [Held by provider] insulin glargine  10 Units SubCUTAneous Nightly bronchitis ([address])     Diabetes mellitus type II, non insulin dependent ([address])     Acute kidney injury ([address])     Hematuria     Bladder outlet obstruction     COPD exacerbation ([address])     Gross hematuria     Dizziness     Diet-controlled diabetes mellitus ([address])     Chronic retention of urine     S/P TURP     Acute left-sided weakness     Abnormal nuclear stress test     Paresthesia     Volvulus (HCC)     Generalized weakness     Dyspnea     WPW (Ion-Parkinson-White syndrome)     Interpolated PVCs     NSVT (nonsustained ventricular tachycardia) ([address])      Plan:     Reviewed POC blood glucose . Labs and X ray results   Reviewed Current Medicines   On meal  (ON Hold) / Correction bolus Humalog/ Basal Lantus   Insulin regime   Monitor Blood glucose frequently   Modified  the dose of Insulin/ other medicines as needed   For colonoscopy later in the day  Will follow     .      Joo Krishnamurthy MD, MD

## 2023-10-23 NOTE — PROGRESS NOTES
BLOOD     Special Requests NONE     Culture NO GROWTH AT 5 DAYS    Narrative:      SETUP DATE/TIME:  10/15/2023 1507    Culture, Blood 2 [[id number]] Collected: 10/15/23 1242    Order Status: Completed Specimen: Blood Updated: 10/20/23 0635     Specimen BLOOD     Special Requests NONE     Culture NO GROWTH AT 5 DAYS    Narrative:      SETUP DATE/TIME:  10/15/2023 1509    Strep Pneumoniae Antigen [[id number]] Collected: 10/15/23 1145    Order Status: Completed Specimen: CSF Updated: 10/16/23 1039     Strep pneumo Ag URINE NEGATIVE     Comment: Presumptive negative for   pneumococcal pneumonia,  suggesting no current or recent  pneumococcal infection. Infection  due to S pneumonia cannot be ruled  out if the antigen present in the  sample is below the detection  limit of the test.  (NOTE)  REFERENCE RANGE: NEGATIVE                 XR CHEST PORTABLE    Result Date: 10/16/2023  EXAMINATION: ONE XRAY VIEW OF THE CHEST 10/16/2023 3:47 pm COMPARISON: 09/18/2023 HISTORY: ORDERING SYSTEM PROVIDED HISTORY: SOB TECHNOLOGIST PROVIDED HISTORY: Reason for exam:->SOB Reason for Exam: SOB FINDINGS: Chronic pulmonary vascular redistribution. Heart size is normal. No lung infiltrate or consolidation. No definite pneumothorax or pleural effusion. Chronic pulmonary vascular redistribution. No new abnormality.             Electronically signed by Jennifer Perez MD on 10/23/2023 at 12:36 PM

## 2023-10-24 ENCOUNTER — ANESTHESIA (OUTPATIENT)
Dept: ENDOSCOPY | Age: 78
DRG: 190 | End: 2023-10-24
Payer: OTHER GOVERNMENT

## 2023-10-24 LAB
ESTIMATED AVERAGE GLUCOSE: 200 MG/DL
GLUCOSE BLD-MCNC: 113 MG/DL (ref 70–99)
GLUCOSE BLD-MCNC: 266 MG/DL (ref 70–99)
GLUCOSE BLD-MCNC: 308 MG/DL (ref 70–99)
GLUCOSE BLD-MCNC: 316 MG/DL (ref 70–99)
GLUCOSE BLD-MCNC: 95 MG/DL (ref 70–99)
HBA1C MFR BLD: 8.6 % (ref 4.2–6.3)

## 2023-10-24 PROCEDURE — 6370000000 HC RX 637 (ALT 250 FOR IP): Performed by: SURGERY

## 2023-10-24 PROCEDURE — 82962 GLUCOSE BLOOD TEST: CPT

## 2023-10-24 PROCEDURE — 2580000003 HC RX 258: Performed by: SURGERY

## 2023-10-24 PROCEDURE — 94640 AIRWAY INHALATION TREATMENT: CPT

## 2023-10-24 PROCEDURE — 3609027000 HC COLONOSCOPY: Performed by: SURGERY

## 2023-10-24 PROCEDURE — 0DJD8ZZ INSPECTION OF LOWER INTESTINAL TRACT, VIA NATURAL OR ARTIFICIAL OPENING ENDOSCOPIC: ICD-10-PCS | Performed by: SURGERY

## 2023-10-24 PROCEDURE — 3700000001 HC ADD 15 MINUTES (ANESTHESIA): Performed by: SURGERY

## 2023-10-24 PROCEDURE — 97530 THERAPEUTIC ACTIVITIES: CPT

## 2023-10-24 PROCEDURE — 2709999900 HC NON-CHARGEABLE SUPPLY: Performed by: SURGERY

## 2023-10-24 PROCEDURE — 94761 N-INVAS EAR/PLS OXIMETRY MLT: CPT

## 2023-10-24 PROCEDURE — 6370000000 HC RX 637 (ALT 250 FOR IP): Performed by: STUDENT IN AN ORGANIZED HEALTH CARE EDUCATION/TRAINING PROGRAM

## 2023-10-24 PROCEDURE — 2700000000 HC OXYGEN THERAPY PER DAY

## 2023-10-24 PROCEDURE — 6360000002 HC RX W HCPCS: Performed by: NURSE ANESTHETIST, CERTIFIED REGISTERED

## 2023-10-24 PROCEDURE — 1200000000 HC SEMI PRIVATE

## 2023-10-24 PROCEDURE — 45378 DIAGNOSTIC COLONOSCOPY: CPT | Performed by: SURGERY

## 2023-10-24 PROCEDURE — 6360000002 HC RX W HCPCS: Performed by: SURGERY

## 2023-10-24 PROCEDURE — 3700000000 HC ANESTHESIA ATTENDED CARE: Performed by: SURGERY

## 2023-10-24 RX ORDER — PROPOFOL 10 MG/ML
INJECTION, EMULSION INTRAVENOUS PRN
Status: DISCONTINUED | OUTPATIENT
Start: 2023-10-24 | End: 2023-10-24 | Stop reason: SDUPTHER

## 2023-10-24 RX ORDER — LIDOCAINE HYDROCHLORIDE 20 MG/ML
INJECTION, SOLUTION INTRAVENOUS PRN
Status: DISCONTINUED | OUTPATIENT
Start: 2023-10-24 | End: 2023-10-24 | Stop reason: SDUPTHER

## 2023-10-24 RX ADMIN — LIDOCAINE HYDROCHLORIDE 100 MG: 20 INJECTION, SOLUTION INTRAVENOUS at 07:39

## 2023-10-24 RX ADMIN — MAGNESIUM OXIDE 400 MG (241.3 MG MAGNESIUM) TABLET 400 MG: TABLET at 09:07

## 2023-10-24 RX ADMIN — MONTELUKAST 10 MG: 10 TABLET, FILM COATED ORAL at 21:26

## 2023-10-24 RX ADMIN — IPRATROPIUM BROMIDE AND ALBUTEROL SULFATE 1 DOSE: 2.5; .5 SOLUTION RESPIRATORY (INHALATION) at 11:26

## 2023-10-24 RX ADMIN — IPRATROPIUM BROMIDE AND ALBUTEROL SULFATE 1 DOSE: 2.5; .5 SOLUTION RESPIRATORY (INHALATION) at 15:38

## 2023-10-24 RX ADMIN — PANTOPRAZOLE SODIUM 40 MG: 40 TABLET, DELAYED RELEASE ORAL at 05:46

## 2023-10-24 RX ADMIN — FLUTICASONE PROPIONATE 1 PUFF: 110 AEROSOL, METERED RESPIRATORY (INHALATION) at 11:26

## 2023-10-24 RX ADMIN — GUAIFENESIN SYRUP AND DEXTROMETHORPHAN 5 ML: 100; 10 SYRUP ORAL at 17:49

## 2023-10-24 RX ADMIN — IPRATROPIUM BROMIDE AND ALBUTEROL SULFATE 1 DOSE: 2.5; .5 SOLUTION RESPIRATORY (INHALATION) at 20:08

## 2023-10-24 RX ADMIN — MICONAZOLE NITRATE: 2 POWDER TOPICAL at 09:09

## 2023-10-24 RX ADMIN — SENNOSIDES AND DOCUSATE SODIUM 2 TABLET: 50; 8.6 TABLET ORAL at 09:06

## 2023-10-24 RX ADMIN — BENZONATATE 100 MG: 100 CAPSULE ORAL at 05:46

## 2023-10-24 RX ADMIN — GUAIFENESIN SYRUP AND DEXTROMETHORPHAN 5 ML: 100; 10 SYRUP ORAL at 05:46

## 2023-10-24 RX ADMIN — ENOXAPARIN SODIUM 40 MG: 100 INJECTION SUBCUTANEOUS at 09:07

## 2023-10-24 RX ADMIN — GUAIFENESIN 600 MG: 600 TABLET, EXTENDED RELEASE ORAL at 21:26

## 2023-10-24 RX ADMIN — PROPOFOL 130 MG: 10 INJECTION, EMULSION INTRAVENOUS at 07:39

## 2023-10-24 RX ADMIN — MICONAZOLE NITRATE: 2 POWDER TOPICAL at 21:28

## 2023-10-24 RX ADMIN — INSULIN LISPRO 3 UNITS: 100 INJECTION, SOLUTION INTRAVENOUS; SUBCUTANEOUS at 17:09

## 2023-10-24 RX ADMIN — IPRATROPIUM BROMIDE AND ALBUTEROL SULFATE 1 DOSE: 2.5; .5 SOLUTION RESPIRATORY (INHALATION) at 00:37

## 2023-10-24 RX ADMIN — FLUTICASONE PROPIONATE 1 SPRAY: 50 SPRAY, METERED NASAL at 09:07

## 2023-10-24 RX ADMIN — METOPROLOL SUCCINATE 50 MG: 50 TABLET, EXTENDED RELEASE ORAL at 09:07

## 2023-10-24 RX ADMIN — AMLODIPINE BESYLATE 10 MG: 10 TABLET ORAL at 09:07

## 2023-10-24 RX ADMIN — GUAIFENESIN 600 MG: 600 TABLET, EXTENDED RELEASE ORAL at 09:07

## 2023-10-24 RX ADMIN — CETIRIZINE HYDROCHLORIDE 5 MG: 5 SOLUTION ORAL at 09:07

## 2023-10-24 RX ADMIN — SENNOSIDES AND DOCUSATE SODIUM 2 TABLET: 50; 8.6 TABLET ORAL at 21:25

## 2023-10-24 RX ADMIN — FLUTICASONE PROPIONATE 1 SPRAY: 50 SPRAY, METERED NASAL at 17:50

## 2023-10-24 RX ADMIN — INSULIN LISPRO 2 UNITS: 100 INJECTION, SOLUTION INTRAVENOUS; SUBCUTANEOUS at 11:48

## 2023-10-24 RX ADMIN — INSULIN LISPRO 4 UNITS: 100 INJECTION, SOLUTION INTRAVENOUS; SUBCUTANEOUS at 21:24

## 2023-10-24 RX ADMIN — SODIUM CHLORIDE, PRESERVATIVE FREE 10 ML: 5 INJECTION INTRAVENOUS at 21:28

## 2023-10-24 RX ADMIN — SODIUM CHLORIDE, PRESERVATIVE FREE 10 ML: 5 INJECTION INTRAVENOUS at 09:08

## 2023-10-24 RX ADMIN — ATORVASTATIN CALCIUM 10 MG: 10 TABLET, FILM COATED ORAL at 09:06

## 2023-10-24 RX ADMIN — FLUTICASONE PROPIONATE 1 PUFF: 110 AEROSOL, METERED RESPIRATORY (INHALATION) at 20:15

## 2023-10-24 ASSESSMENT — PAIN SCALES - WONG BAKER
WONGBAKER_NUMERICALRESPONSE: 0

## 2023-10-24 ASSESSMENT — PAIN - FUNCTIONAL ASSESSMENT: PAIN_FUNCTIONAL_ASSESSMENT: 0-10

## 2023-10-24 ASSESSMENT — PAIN SCALES - GENERAL
PAINLEVEL_OUTOF10: 0
PAINLEVEL_OUTOF10: 0

## 2023-10-24 NOTE — PROGRESS NOTES
V2.0    [facility] [place]      Name:  José Hummel /Age/Sex: 1945  (46 y.o. male)   MRN & CSN:  [id number] & [id number] Encounter Date/Time: 10/24/2023 12:33 PM EDT   Location:  [facility]/1103-A PCP: Sonia Braswell MD       Hospital Day: 10    Assessment and Recommendations     José Hummel is a 70 y.o. male with a pmh of very severe COPD with chronic hypoxemic hypercapnic respiratory failure (on 3L NC continuously), HTN, HLD and T2DM  who presents with COPD exacerbation [facility])     Hospital Problems               Last Modified POA     * (Principal) COPD exacerbation ([address]) 10/15/2023 Yes         Plan:  Sigmoid volvulus status post colonoscopy decompression- able to pass gas and into bowel movement last night General surgery on board continue to monitor. S/p colonoscopy done on 10/20/2023. Tolerated the procedure well. Currently having good bowel movements abdominal still distended if able to have good bowel movements may consider discharge likely tomorrow. Advance diet as tolerated    Acute on chronic hypoxic respiratory failure in the setting of COPD exacerbation   Lower extremity swelling: Improved  - Pt presenting w worsening cough and shortness of breath for 5 days , O2 Sat >90% on RA,   -On 3 to 4 L oxygen at baseline at home currently on 4 to 5 L  -Telemetry   -O2 PRN, keep O2 Sat 88-92% in setting of underlying COPD -BiPAP PRN   -CXR with no acute changes  - s/w Duonebs Q6H and Q4H PRN   - Antitussives   - s/w Solumedrol IV, completed the course of oral prednisone.  -Patient denies any smoking  Pulmonary consulted, pt would like outpatient pulmonary follow up, pt on 3-4 L NC oxygen at home, reports cannot get to [facility] for pulm appt due to transportation issues  -Patient  Pt a little better  - Viral panel -ve   -Pulm on board.  -On 4 L nasal cannula. On prednisone. Respiratory culture 10/15: No growth. -Plan for discharge tomorrow    2.  Hypoglycemic episode of Narrative:      SETUP DATE/TIME:  10/15/2023 1507    Culture, Blood 2 [[id number]] Collected: 10/15/23 1242    Order Status: Completed Specimen: Blood Updated: 10/20/23 0635     Specimen BLOOD     Special Requests NONE     Culture NO GROWTH AT 5 DAYS    Narrative:      SETUP DATE/TIME:  10/15/2023 1509    Strep Pneumoniae Antigen [[id number]] Collected: 10/15/23 1145    Order Status: Completed Specimen: CSF Updated: 10/16/23 1039     Strep pneumo Ag URINE NEGATIVE     Comment: Presumptive negative for   pneumococcal pneumonia,  suggesting no current or recent  pneumococcal infection. Infection  due to S pneumonia cannot be ruled  out if the antigen present in the  sample is below the detection  limit of the test.  (NOTE)  REFERENCE RANGE: NEGATIVE                 XR CHEST PORTABLE    Result Date: 10/16/2023  EXAMINATION: ONE XRAY VIEW OF THE CHEST 10/16/2023 3:47 pm COMPARISON: 09/18/2023 HISTORY: ORDERING SYSTEM PROVIDED HISTORY: SOB TECHNOLOGIST PROVIDED HISTORY: Reason for exam:->SOB Reason for Exam: SOB FINDINGS: Chronic pulmonary vascular redistribution. Heart size is normal. No lung infiltrate or consolidation. No definite pneumothorax or pleural effusion. Chronic pulmonary vascular redistribution. No new abnormality.             Electronically signed by Kali Botello MD on 10/24/2023 at 12:29 PM

## 2023-10-24 NOTE — PLAN OF CARE
Problem: Discharge Planning  Goal: Discharge to home or other facility with appropriate resources  Outcome: Progressing     Problem: Safety - Adult  Goal: Free from fall injury  Outcome: Progressing     Problem: Chronic Conditions and Co-morbidities  Goal: Patient's chronic conditions and co-morbidity symptoms are monitored and maintained or improved  Outcome: Progressing     Problem: Skin/Tissue Integrity  Goal: Absence of new skin breakdown  Description: 1. Monitor for areas of redness and/or skin breakdown  2. Assess vascular access sites hourly  3. Every 4-6 hours minimum:  Change oxygen saturation probe site  4. Every 4-6 hours:  If on nasal continuous positive airway pressure, respiratory therapy assess nares and determine need for appliance change or resting period.   Outcome: Progressing     Problem: Pain  Goal: Verbalizes/displays adequate comfort level or baseline comfort level  Outcome: Progressing

## 2023-10-24 NOTE — ANESTHESIA POSTPROCEDURE EVALUATION
Department of Anesthesiology  Postprocedure Note    Patient: Smita Pittman  MRN: [id number]  YOB: 1945  Date of evaluation: 10/24/2023      Procedure Summary     Date: 10/24/23 Room / Location: 2010 [facility]    Anesthesia Start: 2466 Anesthesia Stop:     Procedure: COLONOSCOPY DIAGNOSTIC Diagnosis:       Colon distention      (Colon distention [K63.89])    Surgeons: Esau Bosworth, MD Responsible Provider: Wolf Massey MD    Anesthesia Type: MAC ASA Status: 3          Anesthesia Type: MAC    Keesha Phase I: Keesha Score: 9    Keesha Phase II:        Anesthesia Post Evaluation    Patient location during evaluation: bedside  Patient participation: complete - patient participated  Level of consciousness: awake  Pain score: 0  Airway patency: patent  Nausea & Vomiting: no nausea and no vomiting  Complications: no  Cardiovascular status: blood pressure returned to baseline  Respiratory status: acceptable  Hydration status: euvolemic  Pain management: adequate

## 2023-10-24 NOTE — PROGRESS NOTES
Received report from [place], [place]. Patient alert and oriented. Verified patient's name, , allergies and procedure. Took metoprolol on 10/23/23 @ 0940, took lovenox on 10/22/23 @ 1017, no implants. H&P on chart. No family/friend at bedside.

## 2023-10-24 NOTE — PROGRESS NOTES
Occupational Therapy    Occupational Therapy Treatment Note    Name: Gianfranco Ward MRN: [id number] :   1945   Date:  10/24/2023   Admission Date: 10/15/2023 Room:  [address] [place] [address]     OT d/c rec: [facility]    Restrictions/Precautions:          General Precautions, Fall Risk    Communication with other providers: RN cleared pt for therapy     Subjective:  Patient states:  \"do you know how I can get more nasal cannulas that are big like this one? It's made my breathing feel so much less painful and I feel more like myself\"  Pain:   Location, Type, Intensity (0/10 to 10/10):  denies pain    Objective:    Observation: pt supine in bed upon arrival, agreeable to therapy    Objective Measures:  tele, 4L O2, O2 remained >92% for entirety of OT treatment     Treatment, including education:  Therapeutic Activity Training:   Therapeutic activity training was instructed today. Cues were given for safety, sequence, UE/LE placement, awareness, and balance. Supine to EOB transfer with SBA for balance safety, HOB elevated and use of bed rails    Sit to stand from EOB x2 with SBA for balance safety    300 ft functional mobility using 2ww with CGA for balance safety, progressing to SBA, 1 standing rest break d/t fatigue/SOB, min verbal cues for 2ww safety and breathing during mobiltiy    Supine to EOB with SBA for balance safety, min A for line management, HOB elevated and use of bed rails, extended time to lift LEs     Educated pt on energy conservation techniques including taking rest breaks (and making multiple rest areas around home), spacing out taxing activities throughout week/day, and sitting during activities in context of ADL/IADL completion once returned home.  Pt verbalized understanding and voiced how he could incorporate techniques in cooking/laundry completion     Assessment / Impression:    Patient's tolerance of treatment: Well  Adverse Reaction: None  Significant change in status and impact: Improved from

## 2023-10-24 NOTE — PROGRESS NOTES
Patient transferred to [address] [place] [address], room 1103 by bed with portable cardiac monitor, oxygen at 4 lpm by high flow nasal cannula and paperwork accompanied by Maria Luz Velasco RN and osvaldo Jennings.

## 2023-10-24 NOTE — PROGRESS NOTES
Progress Note( Dr. Sylvia Wilson)  10/24/2023  Subjective:   Admit Date: 10/15/2023  PCP: Concepcion Zhu    Admitted For :Shortness of breath exacerbation of COPD    Consulted For:  Better control of blood glucose    Interval History:still SOB  but some better   Going for colonoscopy later today  Supposed to have been done yesterday but due to some scheduling problem was postponed for today    Denies any chest pains,   Yes SOB . Denies nausea or vomiting. Been n.p.o. for possible colonoscopy  No new bowel or bladder symptoms. Intake/Output Summary (Last 24 hours) at 10/24/2023 0826  Last data filed at 10/23/2023 2130  Gross per 24 hour   Intake 150 ml   Output --   Net 150 ml         DATA    CBC:   Recent Labs     10/23/23  1449   WBC 10.4   HGB 10.5*         CMP:  Recent Labs     10/22/23  0440 10/23/23  1449    137   K 4.5 4.1    97*   CO2 34* 31   BUN 24* 15   CREATININE 0.9 0.9   CALCIUM 8.6 8.4       Lipids:   Lab Results   Component Value Date/Time    CHOL 122 05/15/2021 04:10 AM    HDL 51 05/15/2021 04:10 AM    TRIG 41 05/15/2021 04:10 AM     Glucose:  Recent Labs     10/23/23  2112 10/24/23  0210 10/24/23  0659   POCGLU 90 95 113*       [id number]:  Lab Results   Component Value Date/Time    LABA1C 8.6 10/20/2023 12:14 AM     High Sensitivity TSH:   Lab Results   Component Value Date/Time    TSHHS 0.137 09/17/2023 07:58 AM     Free T3:   Lab Results   Component Value Date/Time    FT3 1.0 01/10/2013 10:29 AM     Free T4:  Lab Results   Component Value Date/Time    T4FREE 1.44 09/17/2023 07:58 AM       XR ABDOMEN (KUB) (SINGLE AP VIEW)   Final Result   Massive dilatation of the large bowel loops, mild dilatation of the small   bowel loops, large bowel obstruction is a concern, including sigmoid volvulus. XR CHEST PORTABLE   Final Result   Chronic pulmonary vascular redistribution. No new abnormality.               Scheduled Medicines   Medications:    [Held by provider] (chronic obstructive pulmonary disease) (HCC)     Chronic bronchitis (HCC)     Diabetes mellitus type II, non insulin dependent ([address])     Acute kidney injury ([address])     Hematuria     Bladder outlet obstruction     COPD exacerbation ([address])     Gross hematuria     Dizziness     Diet-controlled diabetes mellitus ([address])     Chronic retention of urine     S/P TURP     Acute left-sided weakness     Abnormal nuclear stress test     Paresthesia     Volvulus (HCC)     Generalized weakness     Dyspnea     WPW (Ion-Parkinson-White syndrome)     Interpolated PVCs     NSVT (nonsustained ventricular tachycardia) ([address])      Plan:     Reviewed POC blood glucose . Labs and X ray results   Reviewed Current Medicines   On meal  (ON Hold) / Correction bolus Humalog/ Basal Lantus   Insulin regime   Monitor Blood glucose frequently   Modified  the dose of Insulin/ other medicines as needed   For colonoscopy later in the day, was not done yesterday  Will follow     .      Liz Edwards MD, MD

## 2023-10-25 VITALS
TEMPERATURE: 98.7 F | RESPIRATION RATE: 18 BRPM | HEART RATE: 93 BPM | HEIGHT: 67 IN | OXYGEN SATURATION: 96 % | WEIGHT: 202.2 LBS | BODY MASS INDEX: 31.74 KG/M2 | SYSTOLIC BLOOD PRESSURE: 118 MMHG | DIASTOLIC BLOOD PRESSURE: 61 MMHG

## 2023-10-25 LAB
GLUCOSE BLD-MCNC: 176 MG/DL (ref 70–99)
GLUCOSE BLD-MCNC: 191 MG/DL (ref 70–99)
GLUCOSE BLD-MCNC: 340 MG/DL (ref 70–99)

## 2023-10-25 PROCEDURE — 6370000000 HC RX 637 (ALT 250 FOR IP): Performed by: SURGERY

## 2023-10-25 PROCEDURE — 94761 N-INVAS EAR/PLS OXIMETRY MLT: CPT

## 2023-10-25 PROCEDURE — 6360000002 HC RX W HCPCS: Performed by: SURGERY

## 2023-10-25 PROCEDURE — 94640 AIRWAY INHALATION TREATMENT: CPT

## 2023-10-25 PROCEDURE — 2700000000 HC OXYGEN THERAPY PER DAY

## 2023-10-25 PROCEDURE — 82962 GLUCOSE BLOOD TEST: CPT

## 2023-10-25 PROCEDURE — 97530 THERAPEUTIC ACTIVITIES: CPT

## 2023-10-25 PROCEDURE — 97110 THERAPEUTIC EXERCISES: CPT

## 2023-10-25 PROCEDURE — 97116 GAIT TRAINING THERAPY: CPT

## 2023-10-25 PROCEDURE — 99232 SBSQ HOSP IP/OBS MODERATE 35: CPT | Performed by: SURGERY

## 2023-10-25 RX ORDER — INSULIN LISPRO 100 [IU]/ML
5 INJECTION, SOLUTION INTRAVENOUS; SUBCUTANEOUS
Status: CANCELLED | OUTPATIENT
Start: 2023-10-25

## 2023-10-25 RX ADMIN — FLUTICASONE PROPIONATE 1 SPRAY: 50 SPRAY, METERED NASAL at 09:28

## 2023-10-25 RX ADMIN — IPRATROPIUM BROMIDE AND ALBUTEROL SULFATE 1 DOSE: 2.5; .5 SOLUTION RESPIRATORY (INHALATION) at 11:36

## 2023-10-25 RX ADMIN — IPRATROPIUM BROMIDE AND ALBUTEROL SULFATE 1 DOSE: 2.5; .5 SOLUTION RESPIRATORY (INHALATION) at 07:54

## 2023-10-25 RX ADMIN — GUAIFENESIN 600 MG: 600 TABLET, EXTENDED RELEASE ORAL at 09:28

## 2023-10-25 RX ADMIN — INSULIN LISPRO 3 UNITS: 100 INJECTION, SOLUTION INTRAVENOUS; SUBCUTANEOUS at 11:16

## 2023-10-25 RX ADMIN — ATORVASTATIN CALCIUM 10 MG: 10 TABLET, FILM COATED ORAL at 09:53

## 2023-10-25 RX ADMIN — CETIRIZINE HYDROCHLORIDE 5 MG: 5 SOLUTION ORAL at 09:30

## 2023-10-25 RX ADMIN — IPRATROPIUM BROMIDE AND ALBUTEROL SULFATE 1 DOSE: 2.5; .5 SOLUTION RESPIRATORY (INHALATION) at 01:10

## 2023-10-25 RX ADMIN — INSULIN LISPRO 10 UNITS: 100 INJECTION, SOLUTION INTRAVENOUS; SUBCUTANEOUS at 11:16

## 2023-10-25 RX ADMIN — GUAIFENESIN SYRUP AND DEXTROMETHORPHAN 5 ML: 100; 10 SYRUP ORAL at 02:38

## 2023-10-25 RX ADMIN — PANTOPRAZOLE SODIUM 40 MG: 40 TABLET, DELAYED RELEASE ORAL at 06:21

## 2023-10-25 RX ADMIN — SENNOSIDES AND DOCUSATE SODIUM 2 TABLET: 50; 8.6 TABLET ORAL at 09:29

## 2023-10-25 RX ADMIN — GUAIFENESIN SYRUP AND DEXTROMETHORPHAN 5 ML: 100; 10 SYRUP ORAL at 11:03

## 2023-10-25 RX ADMIN — AMLODIPINE BESYLATE 10 MG: 10 TABLET ORAL at 09:29

## 2023-10-25 RX ADMIN — MICONAZOLE NITRATE: 2 POWDER TOPICAL at 10:02

## 2023-10-25 RX ADMIN — MAGNESIUM OXIDE 400 MG (241.3 MG MAGNESIUM) TABLET 400 MG: TABLET at 09:25

## 2023-10-25 RX ADMIN — FLUTICASONE PROPIONATE 1 PUFF: 110 AEROSOL, METERED RESPIRATORY (INHALATION) at 07:54

## 2023-10-25 RX ADMIN — ENOXAPARIN SODIUM 40 MG: 100 INJECTION SUBCUTANEOUS at 09:26

## 2023-10-25 NOTE — PROGRESS NOTES
Physical Therapy Treatment Note  Name: Kalina Dunn MRN: [id number] :   1945   Date:  10/25/2023   Admission Date: 10/15/2023 Room:  [address] [place] [address]   Restrictions/Precautions:  General, fall, 4L O2 NC  Communication with other providers:  Pt okay to see for therapy per RN   Subjective:  Patient states:  \"I feel al little bit weaker today than yesterday. Pain:   Location, Type, Intensity (0/10 to 10/10): Denies   Objective:    Observation:  Pt supine in bed upon PTA arrival.   Objective Measures:  Tele, stable, glucose 340mg/dL RN aware and pt safe to ambulate per RN   Treatment, including education/measures:    Therapeutic Activity Training:   Therapeutic activity training was instructed today. Cues were given for safety, sequence, UE/LE placement, awareness, and balance. Activities performed today included bed mobility training, sup-sit, sit-stand, SPT. Mobility:  Sup > sit: SBA  Scooting: SBA  STS: CGA for safety, VCs for hand placement on bed instead of walker with poor correction from pt. Gait:  Pt ambulated ~75ft x 2 with RW and CGA. Pt more breathless this date as compared to previous session but O2 maintaining appropriately, decreased step length, decreased gait speed, mouth breathing, poor foot clearance. PTA provided VCs for improved breathing in through nose and out through mouth, and VCs for increased heel strike with fair correction form pt. Pt requires standing rest break following first bout of ambulation dt fatigue. Pt agreeable to sit up in chair at conclusion of therapy. Safety:   Pt returned safely to recliner with chair alarm activated, call light in reach, all needs met. Assessment / Impression:    Pt continues to tolerate gait well, therapy progression limited dt fluctuation in pt status and fatigue.    Patient's tolerance of treatment:  Good   Adverse Reaction: none  Significant change in status and impact:  none  Barriers to improvement:  none  Plan for Next

## 2023-10-25 NOTE — DISCHARGE INSTR - DIET

## 2023-10-25 NOTE — PROGRESS NOTES
PIV was leaking upon assessment, PS provider to see if new site is needed as patient has no current IV meds, per provider ok to leave out.

## 2023-10-25 NOTE — PROGRESS NOTES
Progress Note( Dr. Janice Stout)  10/25/2023  Subjective:   Admit Date: 10/15/2023  PCP: K[facility] Smalls    Admitted For :Shortness of breath exacerbation of COPD    Consulted For:  Better control of blood glucose    Interval History:still SOB  but some better   Had colonoscopy 1-0/24/2023  Supposed to have been done yesterday but due to some scheduling problem was postponed for today    Denies any chest pains,   Yes SOB . Denies nausea or vomiting. Been n.p.o. for possible colonoscopy  No new bowel or bladder symptoms. Intake/Output Summary (Last 24 hours) at 10/25/2023 0833  Last data filed at 10/25/2023 0738  Gross per 24 hour   Intake 240 ml   Output 945 ml   Net -705 ml         DATA    CBC:   Recent Labs     10/23/23  1449   WBC 10.4   HGB 10.5*         CMP:  Recent Labs     10/23/23  1449      K 4.1   CL 97*   CO2 31   BUN 15   CREATININE 0.9   CALCIUM 8.4       Lipids:   Lab Results   Component Value Date/Time    CHOL 122 05/15/2021 04:10 AM    HDL 51 05/15/2021 04:10 AM    TRIG 41 05/15/2021 04:10 AM     Glucose:  Recent Labs     10/24/23  2109 10/25/23  0124 10/25/23  0645   POCGLU 308* 191* 176*       [id number]:  Lab Results   Component Value Date/Time    LABA1C 8.6 10/20/2023 12:14 AM     High Sensitivity TSH:   Lab Results   Component Value Date/Time    TSHHS 0.137 09/17/2023 07:58 AM     Free T3:   Lab Results   Component Value Date/Time    FT3 1.0 01/10/2013 10:29 AM     Free T4:  Lab Results   Component Value Date/Time    T4FREE 1.44 09/17/2023 07:58 AM       XR ABDOMEN (KUB) (SINGLE AP VIEW)   Final Result   Massive dilatation of the large bowel loops, mild dilatation of the small   bowel loops, large bowel obstruction is a concern, including sigmoid volvulus. XR CHEST PORTABLE   Final Result   Chronic pulmonary vascular redistribution. No new abnormality.               Scheduled Medicines   Medications:    [Held by provider] insulin glargine  10 Units SubCUTAneous

## 2023-10-25 NOTE — DISCHARGE SUMMARY
V2.0  Discharge Summary    Name:  Triston Tang /Age/Sex: 1945 (11 y.o. male)   Admit Date: 10/15/2023  Discharge Date: 10/25/23    MRN & CSN:  [id number] & [id number] Encounter Date and Time 10/25/23 12:24 PM EDT    Attending:  Jocelyn Clark MD Discharging Provider: Jocelyn Clark MD       Hospital Course:     Brief HPI: Triston Tang is a 70 y.o. male who presented with acute on chronic hypoxic respiratory failure in the setting of COPD exacerbation    Brief Problem Based Course:   Acute on chronic hypoxic respiratory failure in the setting of COPD exacerbation: Uses 4 L oxygen at home. Requiring 6 L ultimately brought back to the normal level. Pulmonology was consulted. Feels better. Stable for discharge respiratory cultures no growth to date completed the course of steroids and inhalers. Sigmoid volvulus status post colonoscopy decompression: Able to pass gas. Surgery has cleared the patient for discharge with recommendation to follow-up with outpatient general surgery. Able to advance diet. All questions and concerns addressed. Do stool softeners and make sure you have 1-2 bowel movements every day or every other day. Avoid constipation  Hypoglycemic event: 1 episode of hypoglycemic event. Patient was not having any issues or symptoms. No other documented evidence of hypoglycemia events. Endocrinology was on board. Home regimen has been resumed now at the time of discharge. Eating well on a daily basis. Was on steroids as well interestingly at the time of this episode. But doing well now. Could be lab related added? No symptoms  Elevated troponin likely in the setting of demand ischemia from underlying hypoxia from COPD exacerbation. Discharge plan outpatient  Hyperkalemia and hypomagnesemia. Replaced with IV MgSO4 potassium is better now  Normo/Macrocytic anemia: Hb at baseline 11, monitor Hb. Folate and b12 normal.   Constipation.  Improved  Essential hypertension-continue

## 2024-03-01 ENCOUNTER — HOSPITAL ENCOUNTER (INPATIENT)
Age: 79
LOS: 7 days | Discharge: HOME | End: 2024-03-08
Attending: INTERNAL MEDICINE | Admitting: INTERNAL MEDICINE
Payer: OTHER GOVERNMENT

## 2024-03-01 DIAGNOSIS — J96.22 ACUTE ON CHRONIC RESPIRATORY FAILURE WITH HYPOXIA AND HYPERCAPNIA (HCC): Primary | ICD-10-CM

## 2024-03-01 DIAGNOSIS — J96.21 ACUTE ON CHRONIC RESPIRATORY FAILURE WITH HYPOXIA AND HYPERCAPNIA (HCC): Primary | ICD-10-CM

## 2024-03-01 LAB
GLUCOSE BLD-MCNC: 248 MG/DL (ref 70–99)
GLUCOSE BLD-MCNC: 262 MG/DL (ref 70–99)
GLUCOSE BLD-MCNC: 295 MG/DL (ref 70–99)

## 2024-03-01 PROCEDURE — 94010 BREATHING CAPACITY TEST: CPT

## 2024-03-01 PROCEDURE — 2700000000 HC OXYGEN THERAPY PER DAY

## 2024-03-01 PROCEDURE — 6360000002 HC RX W HCPCS

## 2024-03-01 PROCEDURE — 94664 DEMO&/EVAL PT USE INHALER: CPT

## 2024-03-01 PROCEDURE — 94640 AIRWAY INHALATION TREATMENT: CPT

## 2024-03-01 PROCEDURE — 6370000000 HC RX 637 (ALT 250 FOR IP)

## 2024-03-01 PROCEDURE — 82962 GLUCOSE BLOOD TEST: CPT

## 2024-03-01 PROCEDURE — 94761 N-INVAS EAR/PLS OXIMETRY MLT: CPT

## 2024-03-01 PROCEDURE — 1200000000 HC SEMI PRIVATE

## 2024-03-01 PROCEDURE — 2580000003 HC RX 258

## 2024-03-01 RX ORDER — GUAIFENESIN 600 MG/1
600 TABLET, EXTENDED RELEASE ORAL 2 TIMES DAILY
Status: DISCONTINUED | OUTPATIENT
Start: 2024-03-01 | End: 2024-03-01

## 2024-03-01 RX ORDER — PREDNISONE 20 MG/1
40 TABLET ORAL DAILY
Status: DISCONTINUED | OUTPATIENT
Start: 2024-03-01 | End: 2024-03-02

## 2024-03-01 RX ORDER — POLYETHYLENE GLYCOL 3350 17 G/17G
17 POWDER, FOR SOLUTION ORAL DAILY PRN
Status: DISCONTINUED | OUTPATIENT
Start: 2024-03-01 | End: 2024-03-08 | Stop reason: HOSPADM

## 2024-03-01 RX ORDER — VENLAFAXINE 25 MG/1
25 TABLET ORAL DAILY
COMMUNITY

## 2024-03-01 RX ORDER — ONDANSETRON 4 MG/1
4 TABLET, ORALLY DISINTEGRATING ORAL EVERY 8 HOURS PRN
Status: DISCONTINUED | OUTPATIENT
Start: 2024-03-01 | End: 2024-03-08 | Stop reason: HOSPADM

## 2024-03-01 RX ORDER — SODIUM CHLORIDE 9 MG/ML
INJECTION, SOLUTION INTRAVENOUS PRN
Status: DISCONTINUED | OUTPATIENT
Start: 2024-03-01 | End: 2024-03-08 | Stop reason: HOSPADM

## 2024-03-01 RX ORDER — ONDANSETRON 2 MG/ML
4 INJECTION INTRAMUSCULAR; INTRAVENOUS EVERY 6 HOURS PRN
Status: DISCONTINUED | OUTPATIENT
Start: 2024-03-01 | End: 2024-03-08 | Stop reason: HOSPADM

## 2024-03-01 RX ORDER — FLUTICASONE PROPIONATE 50 MCG
1 SPRAY, SUSPENSION (ML) NASAL 2 TIMES DAILY
Status: DISCONTINUED | OUTPATIENT
Start: 2024-03-01 | End: 2024-03-08 | Stop reason: HOSPADM

## 2024-03-01 RX ORDER — PANTOPRAZOLE SODIUM 40 MG/1
40 TABLET, DELAYED RELEASE ORAL
Status: DISCONTINUED | OUTPATIENT
Start: 2024-03-02 | End: 2024-03-08 | Stop reason: HOSPADM

## 2024-03-01 RX ORDER — SODIUM CHLORIDE 0.9 % (FLUSH) 0.9 %
5-40 SYRINGE (ML) INJECTION PRN
Status: DISCONTINUED | OUTPATIENT
Start: 2024-03-01 | End: 2024-03-08 | Stop reason: HOSPADM

## 2024-03-01 RX ORDER — INSULIN LISPRO 100 [IU]/ML
0-4 INJECTION, SOLUTION INTRAVENOUS; SUBCUTANEOUS
Status: DISCONTINUED | OUTPATIENT
Start: 2024-03-01 | End: 2024-03-05

## 2024-03-01 RX ORDER — SODIUM CHLORIDE 0.9 % (FLUSH) 0.9 %
5-40 SYRINGE (ML) INJECTION EVERY 12 HOURS SCHEDULED
Status: DISCONTINUED | OUTPATIENT
Start: 2024-03-01 | End: 2024-03-08 | Stop reason: HOSPADM

## 2024-03-01 RX ORDER — ENOXAPARIN SODIUM 100 MG/ML
40 INJECTION SUBCUTANEOUS DAILY
Status: DISCONTINUED | OUTPATIENT
Start: 2024-03-01 | End: 2024-03-08 | Stop reason: HOSPADM

## 2024-03-01 RX ORDER — DEXTROSE MONOHYDRATE 100 MG/ML
INJECTION, SOLUTION INTRAVENOUS CONTINUOUS PRN
Status: DISCONTINUED | OUTPATIENT
Start: 2024-03-01 | End: 2024-03-08 | Stop reason: HOSPADM

## 2024-03-01 RX ORDER — IPRATROPIUM BROMIDE AND ALBUTEROL SULFATE 2.5; .5 MG/3ML; MG/3ML
1 SOLUTION RESPIRATORY (INHALATION)
Status: DISCONTINUED | OUTPATIENT
Start: 2024-03-01 | End: 2024-03-02

## 2024-03-01 RX ORDER — GUAIFENESIN/DEXTROMETHORPHAN 100-10MG/5
5 SYRUP ORAL EVERY 4 HOURS PRN
Status: DISCONTINUED | OUTPATIENT
Start: 2024-03-01 | End: 2024-03-08 | Stop reason: HOSPADM

## 2024-03-01 RX ORDER — BUDESONIDE AND FORMOTEROL FUMARATE DIHYDRATE 160; 4.5 UG/1; UG/1
2 AEROSOL RESPIRATORY (INHALATION) 2 TIMES DAILY
Status: DISCONTINUED | OUTPATIENT
Start: 2024-03-01 | End: 2024-03-08 | Stop reason: HOSPADM

## 2024-03-01 RX ORDER — AMLODIPINE BESYLATE 10 MG/1
10 TABLET ORAL DAILY
Status: DISCONTINUED | OUTPATIENT
Start: 2024-03-01 | End: 2024-03-07

## 2024-03-01 RX ORDER — CETIRIZINE HYDROCHLORIDE 10 MG/1
5 TABLET ORAL DAILY
Status: DISCONTINUED | OUTPATIENT
Start: 2024-03-01 | End: 2024-03-08 | Stop reason: HOSPADM

## 2024-03-01 RX ORDER — ATORVASTATIN CALCIUM 10 MG/1
10 TABLET, FILM COATED ORAL DAILY
Status: DISCONTINUED | OUTPATIENT
Start: 2024-03-01 | End: 2024-03-08 | Stop reason: HOSPADM

## 2024-03-01 RX ORDER — VENLAFAXINE 25 MG/1
25 TABLET ORAL DAILY
Status: DISCONTINUED | OUTPATIENT
Start: 2024-03-01 | End: 2024-03-01

## 2024-03-01 RX ORDER — ACETAMINOPHEN 325 MG/1
650 TABLET ORAL EVERY 6 HOURS PRN
Status: DISCONTINUED | OUTPATIENT
Start: 2024-03-01 | End: 2024-03-08 | Stop reason: HOSPADM

## 2024-03-01 RX ORDER — INSULIN LISPRO 100 [IU]/ML
0-4 INJECTION, SOLUTION INTRAVENOUS; SUBCUTANEOUS NIGHTLY
Status: DISCONTINUED | OUTPATIENT
Start: 2024-03-01 | End: 2024-03-05

## 2024-03-01 RX ORDER — ACETAMINOPHEN 650 MG/1
650 SUPPOSITORY RECTAL EVERY 6 HOURS PRN
Status: DISCONTINUED | OUTPATIENT
Start: 2024-03-01 | End: 2024-03-08 | Stop reason: HOSPADM

## 2024-03-01 RX ORDER — GLUCAGON 1 MG/ML
1 KIT INJECTION PRN
Status: DISCONTINUED | OUTPATIENT
Start: 2024-03-01 | End: 2024-03-08 | Stop reason: HOSPADM

## 2024-03-01 RX ORDER — VENLAFAXINE 25 MG/1
25 TABLET ORAL DAILY
Status: DISCONTINUED | OUTPATIENT
Start: 2024-03-01 | End: 2024-03-08 | Stop reason: HOSPADM

## 2024-03-01 RX ADMIN — SODIUM CHLORIDE, PRESERVATIVE FREE 10 ML: 5 INJECTION INTRAVENOUS at 20:58

## 2024-03-01 RX ADMIN — VENLAFAXINE 25 MG: 25 TABLET ORAL at 14:28

## 2024-03-01 RX ADMIN — AMLODIPINE BESYLATE 10 MG: 10 TABLET ORAL at 13:10

## 2024-03-01 RX ADMIN — ENOXAPARIN SODIUM 40 MG: 100 INJECTION SUBCUTANEOUS at 20:57

## 2024-03-01 RX ADMIN — SODIUM CHLORIDE, PRESERVATIVE FREE 10 ML: 5 INJECTION INTRAVENOUS at 11:48

## 2024-03-01 RX ADMIN — BUDESONIDE AND FORMOTEROL FUMARATE DIHYDRATE 2 PUFF: 160; 4.5 AEROSOL RESPIRATORY (INHALATION) at 21:41

## 2024-03-01 RX ADMIN — IPRATROPIUM BROMIDE AND ALBUTEROL SULFATE 1 DOSE: 2.5; .5 SOLUTION RESPIRATORY (INHALATION) at 11:26

## 2024-03-01 RX ADMIN — POLYETHYLENE GLYCOL 3350 17 G: 17 POWDER, FOR SOLUTION ORAL at 17:58

## 2024-03-01 RX ADMIN — CETIRIZINE HYDROCHLORIDE 5 MG: 10 TABLET, FILM COATED ORAL at 13:10

## 2024-03-01 RX ADMIN — GUAIFENESIN SYRUP AND DEXTROMETHORPHAN 5 ML: 100; 10 SYRUP ORAL at 13:11

## 2024-03-01 RX ADMIN — INSULIN LISPRO 2 UNITS: 100 INJECTION, SOLUTION INTRAVENOUS; SUBCUTANEOUS at 17:58

## 2024-03-01 RX ADMIN — GUAIFENESIN 600 MG: 600 TABLET, EXTENDED RELEASE ORAL at 11:48

## 2024-03-01 RX ADMIN — PREDNISONE 40 MG: 20 TABLET ORAL at 11:48

## 2024-03-01 RX ADMIN — ATORVASTATIN CALCIUM 10 MG: 10 TABLET, FILM COATED ORAL at 13:11

## 2024-03-01 RX ADMIN — IPRATROPIUM BROMIDE AND ALBUTEROL SULFATE 1 DOSE: 2.5; .5 SOLUTION RESPIRATORY (INHALATION) at 16:54

## 2024-03-01 RX ADMIN — IPRATROPIUM BROMIDE AND ALBUTEROL SULFATE 1 DOSE: 2.5; .5 SOLUTION RESPIRATORY (INHALATION) at 21:41

## 2024-03-01 RX ADMIN — GUAIFENESIN SYRUP AND DEXTROMETHORPHAN 5 ML: 100; 10 SYRUP ORAL at 17:58

## 2024-03-01 ASSESSMENT — PULMONARY FUNCTION TESTS
PIF_VALUE: 120
POST BRONCHODILATOR FEV1/FVC: 35
FEV1 (%PREDICTED): 24

## 2024-03-01 ASSESSMENT — COPD QUESTIONNAIRES
QUESTION1_COUGHFREQUENCY: 4
QUESTION3_CHESTTIGHTNESS: 3
GOLD_GROUP: GROUP E
QUESTION5_HOMEACTIVITIES: 2
QUESTION6_LEAVINGHOUSE: 2
TOTAL_EXACERBATIONS_PASTYEAR: 4
QUESTION7_SLEEPQUALITY: 3
GOLD_GRADE: 4
QUESTION2_CHESTPHLEGM: 4
QUESTION8_ENERGYLEVEL: 2
CAT_TOTALSCORE: 24
QUESTION4_WALKINCLINE: 4

## 2024-03-01 NOTE — H&P
V2.0  History and Physical      Name:  Billy Ozuna /Age/Sex: 1945  (78 y.o. male)   MRN & CSN:  [id number] & [id number] Encounter Date/Time: 3/1/2024 10:35 AM EST   Location:  [address] [place] [address]-A PCP: Kera Monique (Inactive)       Hospital Day: 1    Assessment and Plan:   Billy Ozuna is a 78 y.o. male with a pmh of anemia, COPD, DM, emphysema, enlarged prostate, HTN who presents with Acute on chronic respiratory failure with hypoxia and hypercapnia (HCC)    Hospital Problems             Last Modified POA    * (Principal) Acute on chronic respiratory failure with hypoxia and hypercapnia (HCC) 3/1/2024 Yes       COPD  Acute respiratory hypoxia  From OSH  Received Med neb, continue  Received IV Solumedrol  On 4L/ NC as per baseline  Per chart review of OSH records, patient was at home with O2 sats in the 80's, and administered a breathing treatment at home  - Continue Symbicort  --P.O. Steroids 40mg x 5 days  --guaifenesin as needed     T2DM   --POCT ACHS  --Low dose SSI  --Hypoglycemia protocol    Hypertension  - Continue home dose Norvasc PO    Hyperlipidemia  - Continue home dose Lipitor PO      Disposition:   Current Living situation: Home  Expected Disposition: Home  Estimated D/C: 1-2 days    Diet ADULT DIET; Regular; 3 carb choices (45 gm/meal)   DVT Prophylaxis [x] Lovenox, []  Heparin, [] SCDs, [] Ambulation,  [] Eliquis, [] Xarelto   Code Status Full Code   Surrogate Decision Maker/ POA Manjeet Tovar (child)     History from:     patient, electronic medical record    History of Present Illness:     Chief Complaint:Shortness of breath  Billy Ozuna is a 78 y.o. male presents from OSH with COPD exacerbation. On exam patient is sitting up in bed in no acute distress on his home O2 baseline of 4 L NC. Patient denies any shortness of breath, N/V/CP. He is asking for something to eat.    Review of Systems: Need 10 Elements   Pertinent positive/negative as discussed in HPI      Objective:   No intake  or output data in the 24 hours ending 03/01/24 1635   Vitals:   Vitals:    03/01/24 0930 03/01/24 1115   BP: (!) 122/105    Pulse: 99    Resp: 22    Temp: 98 °F (36.7 °C)    TempSrc: Oral    SpO2: 92%    Weight:  73.1 kg (161 lb 2.5 oz)   Height:  1.702 m (5' 7\")       Medications Prior to Admission     Prior to Admission medications    Medication Sig Start Date End Date Taking? Authorizing Provider   venlafaxine (EFFEXOR) 25 MG tablet Take 1 tablet by mouth daily   Yes Yohan Montana MD   glipiZIDE (GLUCOTROL) 5 MG tablet Take 1 tablet by mouth 2 times daily (before meals)    ProviderYohan MD   Magnesium Oxide 400 MG CAPS Take 400 mg by mouth daily  Patient not taking: Reported on 3/1/2024 9/20/23   Abram Medina MD   insulin glargine (LANTUS SOLOSTAR) 100 UNIT/ML injection pen Inject 20 Units into the skin nightly  Patient not taking: Reported on 3/1/2024 9/20/23   Abram Medina MD   insulin lispro, 1 Unit Dial, (HUMALOG KWIKPEN) 100 UNIT/ML SOPN Inject 5 Units into the skin 3 times daily (before meals)  Patient not taking: Reported on 3/1/2024 9/20/23   Abram Medina MD   metFORMIN (GLUCOPHAGE-XR) 500 MG extended release tablet Take 1 tablet by mouth daily (with breakfast) 9/20/23   Abram Medina MD   amLODIPine (NORVASC) 10 MG tablet Take 1 tablet by mouth daily 9/20/23   Abram Medina MD   guaiFENesin 400 MG tablet Take 1 tablet by mouth in the morning and 1 tablet in the evening. 9/20/23   Abram Medina MD   tiotropium (SPIRIVA) 18 MCG inhalation capsule Inhale 1 capsule into the lungs daily 9/20/23   Abram Medina MD   budesonide-formoterol (SYMBICORT) 160-4.5 MCG/ACT AERO Inhale 2 puffs into the lungs 2 times daily 9/20/23   Abram Medina MD   ipratropium 0.5 mg-albuterol 2.5 mg (DUONEB) 0.5-2.5 (3) MG/3ML SOLN nebulizer solution Inhale 3 mLs into the lungs every 4 hours as needed for Shortness of Breath 9/20/23 10/20/23

## 2024-03-01 NOTE — CARE COORDINATION
Spoke with pt at bedside. From home alone. Dgt is POA. Normally independent of his ADLs but he has recently been having many anxiety and panic attacks that are impeding his ability to perform his ADLs. He is currently on anxiety medication from his [facility] that he states is helping. Reports no previous hx of MH, but many of his relatives are on anxiety medication. Pt lives alone in a 1 story house level entry. Has a walk in shower with a shower chair and has a raised toilet seat. Has all needed O2 equipment provided by [employer] in contract with [facility]. Active with [facility] for RN aides 2x a week. Would like help from OT to rebuild confidence in his ADLs ability. Pt is still able to drive himself, but for appointments the [facility] will send transportation. Pt's dgt lives in [place] and is unable to help with daily needs.    Pt has VA and Human Medicare insurance. Insurance card is scanned in.    CM will need to call [facility] during office hours and confirm pt is active with them. Will need an inpt HC order for PT/OT/Skilled RN/aides at discharge. Discharge plan home with [facility] aides.      03/01/24 1763   Service Assessment   Patient Orientation Alert and Oriented   Cognition Alert   History Provided By Patient;Medical Record   Primary Caregiver Self   Support Systems Children;Home Care Staff   Patient's Healthcare Decision Maker is: Legal Next of Kin   PCP Verified by CM Yes   Prior Functional Level Independent in ADLs/IADLs   Current Functional Level Independent in ADLs/IADLs   Can patient return to prior living arrangement Yes   Ability to make needs known: Good   Family able to assist with home care needs: No   Would you like for me to discuss the discharge plan with any other family members/significant others, and if so, who? No   Financial Resources Medicare;[place] ([facility])   Community Resources ECF/Home Care   Social/Functional History   Lives With Alone   Type of Home House   Home Layout One level    Home Access Level entry   Bathroom Shower/Tub Walk-in shower;Tub/Shower unit   Bathroom Equipment Grab bars around toilet;Toilet raiser;Shower chair   Condition of Participation: Discharge Planning   The Plan for Transition of Care is related to the following treatment goals: Home with [facility]   The Patient and/or Patient Representative was provided with a Choice of Provider? Patient   The Patient and/Or Patient Representative agree with the Discharge Plan? Yes

## 2024-03-01 NOTE — PROGRESS NOTES
Current GOLD classification for Billy Ozuna      GOLD Stage:    Group: Group E  Recorded domestic exacerbations past 12 months: 4  Current recorded COPD Assessment Tool (CAT) score of 24  Current eosinophil count:       Inhaler Device   Acceptable for Use   Respimat  Not Breath Actuated Yes   MDI  Not Breath Actuated Yes           DPI  Observed PIF   using  In-Check Meter   Optimal PIF   Acceptable for Use   HANDIHALER 120 >30 yes   Pressair 120 >45 yes   NEOHALER 120 >50 yes   Diskus 120 >60 yes   ELLIPTA 120 >60 yes     Records show Billy Ozuna was using Symbicort and spiriva maintenance therapy prior to admission.   LABA/ICS/LAMA            LONG-ACTING (LABA)   Arformoterol (Brovana) NEBULIZER   Indacaterol (Arcapta) NEOHALER   Olodaterol (Striverdi) Respimat   Salmeterol (Serevent) MDI, DISKUS   LONG-ACTING (LAMA)   Aclidinium bromide (Tudorza) PRESSAIR   Glycopyrronium bromide (Seebri) NEOHALER   Tiotropium (Spiriva) Respimat, HANDIHALER   Umeclidinium (Incruse) ELLIPTA   (LABA/LAMA)   Formoterol/glycopyrronium (Bevespi) MDI   Indacaterol/glycopyrronium (Utibron) NEOHALER   Vilanterol/umeclidinium (Anoro) ELLIPTA   Olodaterol/tiotropium (Stiolto) Respimat   (LABA/ICS)   Formoterol/budesomide (Symbicort) MDI   Formoterol/mometasone (Dulera) MDI   Salmeterol/fluticasone (Advair) MDI, DISKUS   Vilanterol/fluticasone (Breo) ELLIPTA   (LABA/LAMA/ICS)   Fluticasone/umeclidinium/vilanterol (Trelegy) ELLIPTA   Budesonide/glycopyrrolate/formoterol fumarate (Beztri) aerosphere      03/01/24 1340   Spirometry Assessment   FEV1 (%PRED) 24   Post Bronchodilator FEV/FVC 35   COPD Exacerbations in last year 4    L/min   COPD Assessment (CAT Score)   Cough Assessment 4   Phlegm Assessment 4   Chest tightness 3   Walking on an incline 4   Home Activities 2   Confident Leaving The Home 2   Sleeping Soundly 3   Have Energy 2   Assessment Score 24   $RT COPD Assessment Yes   GOLD Staging   Gold Grade 4   Group

## 2024-03-01 NOTE — PROGRESS NOTES
4 Eyes Skin Assessment     NAME:  Billy Ozuna  YOB: 1945  MEDICAL RECORD NUMBER:  [id number]    The patient is being assessed for  Admission    I agree that at least one RN has performed a thorough Head to Toe Skin Assessment on the patient. ALL assessment sites listed below have been assessed.      Areas assessed by both nurses:    Head, Face, Ears, Shoulders, Back, Chest, Arms, Elbows, Hands, Sacrum. Buttock, Coccyx, Ischium, Legs. Feet and Heels, and Under Medical Devices         Does the Patient have a Wound? No noted wound(s)       [place] Prevention initiated by RN: No  Wound Care Orders initiated by RN: No    Pressure Injury (Stage 3,4, Unstageable, DTI, NWPT, and Complex wounds) if present, place Wound referral order by RN under : No    New Ostomies, if present place, Ostomy referral order under : No     Nurse 1 eSignature: Electronically signed by WILIAN FUENTES RN on 3/1/24 at 11:29 AM EST    **SHARE this note so that the co-signing nurse can place an eSignature**    Nurse 2 eSignature: {Esignature:[id number]}

## 2024-03-02 LAB
ANION GAP SERPL CALCULATED.3IONS-SCNC: 13 MMOL/L (ref 7–16)
B PARAP IS1001 DNA NPH QL NAA+NON-PROBE: NOT DETECTED
B PERT.PT PRMT NPH QL NAA+NON-PROBE: NOT DETECTED
BASE EXCESS MIXED: 4.8 (ref 0–3)
BASOPHILS ABSOLUTE: 0 K/CU MM
BASOPHILS RELATIVE PERCENT: 0.1 % (ref 0–1)
BUN SERPL-MCNC: 24 MG/DL (ref 6–23)
C PNEUM DNA NPH QL NAA+NON-PROBE: NOT DETECTED
CALCIUM SERPL-MCNC: 8.9 MG/DL (ref 8.3–10.6)
CHLORIDE BLD-SCNC: 96 MMOL/L (ref 99–110)
CO2: 27 MMOL/L (ref 21–32)
COMMENT: ABNORMAL
CREAT SERPL-MCNC: 1 MG/DL (ref 0.9–1.3)
DIFFERENTIAL TYPE: ABNORMAL
EOSINOPHILS ABSOLUTE: 0 K/CU MM
EOSINOPHILS RELATIVE PERCENT: 0 % (ref 0–3)
FLUAV H1 2009 PAN RNA NPH NAA+NON-PROBE: NOT DETECTED
FLUAV H1 RNA NPH QL NAA+NON-PROBE: NOT DETECTED
FLUAV H3 RNA NPH QL NAA+NON-PROBE: NOT DETECTED
FLUAV RNA NPH QL NAA+NON-PROBE: NOT DETECTED
FLUBV RNA NPH QL NAA+NON-PROBE: NOT DETECTED
GFR SERPL CREATININE-BSD FRML MDRD: >60 ML/MIN/1.73M2
GLUCOSE BLD-MCNC: 173 MG/DL (ref 70–99)
GLUCOSE BLD-MCNC: 196 MG/DL (ref 70–99)
GLUCOSE BLD-MCNC: 217 MG/DL (ref 70–99)
GLUCOSE BLD-MCNC: 274 MG/DL (ref 70–99)
GLUCOSE SERPL-MCNC: 258 MG/DL (ref 70–99)
HADV DNA NPH QL NAA+NON-PROBE: NOT DETECTED
HCO3 VENOUS: 33.2 MMOL/L (ref 22–29)
HCOV 229E RNA NPH QL NAA+NON-PROBE: NOT DETECTED
HCOV HKU1 RNA NPH QL NAA+NON-PROBE: NOT DETECTED
HCOV NL63 RNA NPH QL NAA+NON-PROBE: NOT DETECTED
HCOV OC43 RNA NPH QL NAA+NON-PROBE: NOT DETECTED
HCT VFR BLD CALC: 34.6 % (ref 42–52)
HEMOGLOBIN: 10.2 GM/DL (ref 13.5–18)
HMPV RNA NPH QL NAA+NON-PROBE: NOT DETECTED
HPIV1 RNA NPH QL NAA+NON-PROBE: NOT DETECTED
HPIV2 RNA NPH QL NAA+NON-PROBE: NOT DETECTED
HPIV3 RNA NPH QL NAA+NON-PROBE: NOT DETECTED
HPIV4 RNA NPH QL NAA+NON-PROBE: NOT DETECTED
IMMATURE NEUTROPHIL %: 0.4 % (ref 0–0.43)
LYMPHOCYTES ABSOLUTE: 0.3 K/CU MM
LYMPHOCYTES RELATIVE PERCENT: 4 % (ref 24–44)
M PNEUMO DNA NPH QL NAA+NON-PROBE: NOT DETECTED
MCH RBC QN AUTO: 27.9 PG (ref 27–31)
MCHC RBC AUTO-ENTMCNC: 29.5 % (ref 32–36)
MCV RBC AUTO: 94.5 FL (ref 78–100)
MONOCYTES ABSOLUTE: 0.5 K/CU MM
MONOCYTES RELATIVE PERCENT: 6.2 % (ref 0–4)
NUCLEATED RBC %: 0 %
O2 SAT, VEN: 91.4 % (ref 50–70)
PCO2, VEN: 66 MMHG (ref 41–51)
PDW BLD-RTO: 15.9 % (ref 11.7–14.9)
PH VENOUS: 7.31 (ref 7.32–7.43)
PLATELET # BLD: 354 K/CU MM (ref 140–440)
PMV BLD AUTO: 10.8 FL (ref 7.5–11.1)
PO2, VEN: 72 MMHG (ref 28–48)
POTASSIUM SERPL-SCNC: 4.6 MMOL/L (ref 3.5–5.1)
RBC # BLD: 3.66 M/CU MM (ref 4.6–6.2)
RSV RNA NPH QL NAA+NON-PROBE: NOT DETECTED
RV+EV RNA NPH QL NAA+NON-PROBE: NOT DETECTED
SARS-COV-2 RNA NPH QL NAA+NON-PROBE: ABNORMAL
SEGMENTED NEUTROPHILS ABSOLUTE COUNT: 7.3 K/CU MM
SEGMENTED NEUTROPHILS RELATIVE PERCENT: 89.3 % (ref 36–66)
SODIUM BLD-SCNC: 136 MMOL/L (ref 135–145)
TOTAL IMMATURE NEUTOROPHIL: 0.03 K/CU MM
TOTAL NUCLEATED RBC: 0 K/CU MM
TROPONIN, HIGH SENSITIVITY: 57 NG/L (ref 0–22)
WBC # BLD: 8.2 K/CU MM (ref 4–10.5)

## 2024-03-02 PROCEDURE — 94761 N-INVAS EAR/PLS OXIMETRY MLT: CPT

## 2024-03-02 PROCEDURE — 1200000000 HC SEMI PRIVATE

## 2024-03-02 PROCEDURE — 94640 AIRWAY INHALATION TREATMENT: CPT

## 2024-03-02 PROCEDURE — 6370000000 HC RX 637 (ALT 250 FOR IP)

## 2024-03-02 PROCEDURE — 0202U NFCT DS 22 TRGT SARS-COV-2: CPT

## 2024-03-02 PROCEDURE — 6360000002 HC RX W HCPCS

## 2024-03-02 PROCEDURE — 84484 ASSAY OF TROPONIN QUANT: CPT

## 2024-03-02 PROCEDURE — 36415 COLL VENOUS BLD VENIPUNCTURE: CPT

## 2024-03-02 PROCEDURE — 80048 BASIC METABOLIC PNL TOTAL CA: CPT

## 2024-03-02 PROCEDURE — 82805 BLOOD GASES W/O2 SATURATION: CPT

## 2024-03-02 PROCEDURE — 85025 COMPLETE CBC W/AUTO DIFF WBC: CPT

## 2024-03-02 PROCEDURE — 2700000000 HC OXYGEN THERAPY PER DAY

## 2024-03-02 PROCEDURE — 82962 GLUCOSE BLOOD TEST: CPT

## 2024-03-02 PROCEDURE — 2580000003 HC RX 258

## 2024-03-02 RX ORDER — 0.9 % SODIUM CHLORIDE 0.9 %
500 INTRAVENOUS SOLUTION INTRAVENOUS ONCE
Status: COMPLETED | OUTPATIENT
Start: 2024-03-02 | End: 2024-03-02

## 2024-03-02 RX ORDER — IPRATROPIUM BROMIDE AND ALBUTEROL SULFATE 2.5; .5 MG/3ML; MG/3ML
1 SOLUTION RESPIRATORY (INHALATION) EVERY 4 HOURS
Status: DISCONTINUED | OUTPATIENT
Start: 2024-03-02 | End: 2024-03-03

## 2024-03-02 RX ORDER — PREDNISONE 20 MG/1
40 TABLET ORAL DAILY
Status: DISCONTINUED | OUTPATIENT
Start: 2024-03-03 | End: 2024-03-03

## 2024-03-02 RX ADMIN — ATORVASTATIN CALCIUM 10 MG: 10 TABLET, FILM COATED ORAL at 09:46

## 2024-03-02 RX ADMIN — IPRATROPIUM BROMIDE AND ALBUTEROL SULFATE 1 DOSE: 2.5; .5 SOLUTION RESPIRATORY (INHALATION) at 08:29

## 2024-03-02 RX ADMIN — ENOXAPARIN SODIUM 40 MG: 100 INJECTION SUBCUTANEOUS at 09:47

## 2024-03-02 RX ADMIN — GUAIFENESIN SYRUP AND DEXTROMETHORPHAN 5 ML: 100; 10 SYRUP ORAL at 09:46

## 2024-03-02 RX ADMIN — ACETAMINOPHEN 650 MG: 325 TABLET ORAL at 23:50

## 2024-03-02 RX ADMIN — AMLODIPINE BESYLATE 10 MG: 10 TABLET ORAL at 09:46

## 2024-03-02 RX ADMIN — IPRATROPIUM BROMIDE AND ALBUTEROL SULFATE 1 DOSE: 2.5; .5 SOLUTION RESPIRATORY (INHALATION) at 18:53

## 2024-03-02 RX ADMIN — FLUTICASONE PROPIONATE 1 SPRAY: 50 SPRAY, METERED NASAL at 20:27

## 2024-03-02 RX ADMIN — SODIUM CHLORIDE, PRESERVATIVE FREE 10 ML: 5 INJECTION INTRAVENOUS at 09:47

## 2024-03-02 RX ADMIN — IPRATROPIUM BROMIDE AND ALBUTEROL SULFATE 1 DOSE: 2.5; .5 SOLUTION RESPIRATORY (INHALATION) at 11:29

## 2024-03-02 RX ADMIN — GUAIFENESIN SYRUP AND DEXTROMETHORPHAN 5 ML: 100; 10 SYRUP ORAL at 01:42

## 2024-03-02 RX ADMIN — PANTOPRAZOLE SODIUM 40 MG: 40 TABLET, DELAYED RELEASE ORAL at 05:09

## 2024-03-02 RX ADMIN — INSULIN LISPRO 2 UNITS: 100 INJECTION, SOLUTION INTRAVENOUS; SUBCUTANEOUS at 13:21

## 2024-03-02 RX ADMIN — IPRATROPIUM BROMIDE AND ALBUTEROL SULFATE 1 DOSE: 2.5; .5 SOLUTION RESPIRATORY (INHALATION) at 00:53

## 2024-03-02 RX ADMIN — BUDESONIDE AND FORMOTEROL FUMARATE DIHYDRATE 2 PUFF: 160; 4.5 AEROSOL RESPIRATORY (INHALATION) at 18:54

## 2024-03-02 RX ADMIN — SODIUM CHLORIDE, PRESERVATIVE FREE 10 ML: 5 INJECTION INTRAVENOUS at 20:22

## 2024-03-02 RX ADMIN — SODIUM CHLORIDE 500 ML: 9 INJECTION, SOLUTION INTRAVENOUS at 15:57

## 2024-03-02 RX ADMIN — GUAIFENESIN SYRUP AND DEXTROMETHORPHAN 5 ML: 100; 10 SYRUP ORAL at 13:25

## 2024-03-02 RX ADMIN — VENLAFAXINE 25 MG: 25 TABLET ORAL at 20:21

## 2024-03-02 RX ADMIN — BUDESONIDE AND FORMOTEROL FUMARATE DIHYDRATE 2 PUFF: 160; 4.5 AEROSOL RESPIRATORY (INHALATION) at 08:27

## 2024-03-02 RX ADMIN — CETIRIZINE HYDROCHLORIDE 5 MG: 10 TABLET, FILM COATED ORAL at 09:46

## 2024-03-02 RX ADMIN — IPRATROPIUM BROMIDE AND ALBUTEROL SULFATE 1 DOSE: 2.5; .5 SOLUTION RESPIRATORY (INHALATION) at 15:27

## 2024-03-02 RX ADMIN — WATER 40 MG: 1 INJECTION INTRAMUSCULAR; INTRAVENOUS; SUBCUTANEOUS at 20:46

## 2024-03-02 RX ADMIN — AZITHROMYCIN DIHYDRATE 500 MG: 500 INJECTION, POWDER, LYOPHILIZED, FOR SOLUTION INTRAVENOUS at 13:20

## 2024-03-02 RX ADMIN — POLYETHYLENE GLYCOL 3350 17 G: 17 POWDER, FOR SOLUTION ORAL at 20:21

## 2024-03-02 RX ADMIN — WATER 40 MG: 1 INJECTION INTRAMUSCULAR; INTRAVENOUS; SUBCUTANEOUS at 09:47

## 2024-03-02 RX ADMIN — GUAIFENESIN SYRUP AND DEXTROMETHORPHAN 5 ML: 100; 10 SYRUP ORAL at 20:21

## 2024-03-02 ASSESSMENT — PAIN SCALES - GENERAL: PAINLEVEL_OUTOF10: 0

## 2024-03-02 NOTE — PROGRESS NOTES
spray  1 spray Nasal BID OnAida talbota JENNIFER, APRN - CNP        cetirizine (ZYRTEC) tablet 5 mg  5 mg Oral Daily Onmarkusisabelleu Fiona JENNIFER, APRN - CNP   5 mg at 03/02/24 0946    pantoprazole (PROTONIX) tablet 40 mg  40 mg Oral QAM AC Onkanaadrianu Fiona JENNIFER, APRN - CNP   40 mg at 03/02/24 0509    venlafaxine (EFFEXOR) tablet 25 mg  25 mg Oral Daily Onkanaadrianu, Fiona JENNIFER, APRN - CNP   25 mg at 03/01/24 1428    insulin lispro (HUMALOG) injection vial 0-4 Units  0-4 Units SubCUTAneous TID WC Onkanacinthya Fiona D, APRN - CNP   2 Units at 03/01/24 1758    insulin lispro (HUMALOG) injection vial 0-4 Units  0-4 Units SubCUTAneous Nightly Ongaysantyu Fiona RODRIGUEZ, APRN - CNP            Labs      CBC:   Recent Labs     03/02/24  0044   WBC 8.2   HGB 10.2*        BMP:    Recent Labs     03/02/24  0044      K 4.6   CL 96*   CO2 27   BUN 24*   CREATININE 1.0   GLUCOSE 258*     Hepatic: No results for input(s): \"AST\", \"ALT\", \"ALB\", \"BILITOT\", \"ALKPHOS\" in the last 72 hours.  Lipids:   Lab Results   Component Value Date/Time    CHOL 122 05/15/2021 04:10 AM    HDL 51 05/15/2021 04:10 AM    TRIG 41 05/15/2021 04:10 AM     Hemoglobin A1C:   Lab Results   Component Value Date/Time    LABA1C 8.6 10/20/2023 12:14 AM     TSH: No results found for: \"TSH\"  Troponin:   Lab Results   Component Value Date/Time    TROPONINT 0.028 09/17/2023 04:22 PM    TROPONINT 0.039 09/17/2023 12:45 PM    TROPONINT 0.036 09/17/2023 07:58 AM     Lactic Acid: No results for input(s): \"LACTA\" in the last 72 hours.  BNP: No results for input(s): \"PROBNP\" in the last 72 hours.  UA:  Lab Results   Component Value Date/Time    NITRU POSITIVE 04/29/2018 03:15 PM    COLORU YELLOW 04/29/2018 03:15 PM    WBCUA 948 04/29/2018 03:15 PM    RBCUA 11,278 04/29/2018 03:15 PM    MUCUS RARE 04/06/2018 01:20 AM    TRICHOMONAS NONE SEEN 04/29/2018 03:15 PM    YEAST OCCASIONAL 01/13/2013 02:00 AM    BACTERIA RARE 04/29/2018 03:15 PM    CLARITYU SLIGHTLY CLOUDY 04/29/2018 03:15 PM     SPECGRAV 1.014 04/29/2018 03:15 PM    LEUKOCYTESUR TRACE 04/29/2018 03:15 PM    UROBILINOGEN NORMAL 04/29/2018 03:15 PM    BILIRUBINUR NEGATIVE 04/29/2018 03:15 PM    BLOODU MODERATE 04/29/2018 03:15 PM    KETUA NEGATIVE 04/29/2018 03:15 PM     Urine Cultures: No results found for: \"LABURIN\"  Blood Cultures: No results found for: \"BC\"  No results found for: \"BLOODCULT2\"  Organism:   Lab Results   Component Value Date/Time    ORG ENTBC 09/07/2018 08:45 AM       Imaging/Diagnostics Last 24 Hours   No results found.    Electronically signed by ERNESTO Powell CNP on 3/2/2024 at 8:10 AM

## 2024-03-03 ENCOUNTER — APPOINTMENT (OUTPATIENT)
Dept: GENERAL RADIOLOGY | Age: 79
End: 2024-03-03
Attending: INTERNAL MEDICINE
Payer: OTHER GOVERNMENT

## 2024-03-03 LAB
ALBUMIN SERPL-MCNC: 3.9 GM/DL (ref 3.4–5)
ALP BLD-CCNC: 120 IU/L (ref 40–129)
ALT SERPL-CCNC: 11 U/L (ref 10–40)
ANION GAP SERPL CALCULATED.3IONS-SCNC: 8 MMOL/L (ref 7–16)
AST SERPL-CCNC: 18 IU/L (ref 15–37)
BASE EXCESS MIXED: 3.6 (ref 0–3)
BASE EXCESS MIXED: 6.1 (ref 0–3)
BASE EXCESS MIXED: 7.8 (ref 0–3)
BASOPHILS ABSOLUTE: 0 K/CU MM
BASOPHILS RELATIVE PERCENT: 0.1 % (ref 0–1)
BILIRUB SERPL-MCNC: 0.2 MG/DL (ref 0–1)
BUN SERPL-MCNC: 24 MG/DL (ref 6–23)
CALCIUM SERPL-MCNC: 9 MG/DL (ref 8.3–10.6)
CARBON MONOXIDE, BLOOD: 2 % (ref 0–5)
CARBON MONOXIDE, BLOOD: 2.2 % (ref 0–5)
CHLORIDE BLD-SCNC: 99 MMOL/L (ref 99–110)
CO2 CONTENT: 36.4 MMOL/L (ref 21–32)
CO2 CONTENT: 38.8 MMOL/L (ref 21–32)
CO2: 30 MMOL/L (ref 21–32)
COMMENT: ABNORMAL
COMMENT: ABNORMAL
CREAT SERPL-MCNC: 0.9 MG/DL (ref 0.9–1.3)
D DIMER: 0.31 UG/ML (FEU)
DIFFERENTIAL TYPE: ABNORMAL
EOSINOPHILS ABSOLUTE: 0 K/CU MM
EOSINOPHILS RELATIVE PERCENT: 0 % (ref 0–3)
GFR SERPL CREATININE-BSD FRML MDRD: >60 ML/MIN/1.73M2
GLUCOSE BLD-MCNC: 149 MG/DL (ref 70–99)
GLUCOSE BLD-MCNC: 239 MG/DL (ref 70–99)
GLUCOSE BLD-MCNC: 256 MG/DL (ref 70–99)
GLUCOSE BLD-MCNC: 307 MG/DL (ref 70–99)
GLUCOSE BLD-MCNC: 314 MG/DL (ref 70–99)
GLUCOSE SERPL-MCNC: 198 MG/DL (ref 70–99)
HCO3 ARTERIAL: 33.9 MMOL/L (ref 21–28)
HCO3 ARTERIAL: 36.6 MMOL/L (ref 21–28)
HCO3 VENOUS: 34 MMOL/L (ref 22–29)
HCT VFR BLD CALC: 33.6 % (ref 42–52)
HEMOGLOBIN: 10.1 GM/DL (ref 13.5–18)
IMMATURE NEUTROPHIL %: 0.5 % (ref 0–0.43)
LYMPHOCYTES ABSOLUTE: 0.4 K/CU MM
LYMPHOCYTES RELATIVE PERCENT: 2.8 % (ref 24–44)
MCH RBC QN AUTO: 28.2 PG (ref 27–31)
MCHC RBC AUTO-ENTMCNC: 30.1 % (ref 32–36)
MCV RBC AUTO: 93.9 FL (ref 78–100)
METHEMOGLOBIN ARTERIAL: 1.2 %
METHEMOGLOBIN ARTERIAL: 1.3 %
MONOCYTES ABSOLUTE: 1 K/CU MM
MONOCYTES RELATIVE PERCENT: 6.7 % (ref 0–4)
NUCLEATED RBC %: 0 %
O2 SAT, VEN: 94.4 % (ref 50–70)
O2 SATURATION: 94.5 % (ref 94–98)
O2 SATURATION: 97 % (ref 94–98)
PCO2 ARTERIAL: 71 MMHG (ref 35–48)
PCO2 ARTERIAL: 81 MMHG (ref 35–48)
PCO2, VEN: 63 MMHG (ref 41–51)
PDW BLD-RTO: 16 % (ref 11.7–14.9)
PH BLOOD: 7.23 (ref 7.35–7.45)
PH BLOOD: 7.32 (ref 7.35–7.45)
PH VENOUS: 7.34 (ref 7.32–7.43)
PLATELET # BLD: 360 K/CU MM (ref 140–440)
PMV BLD AUTO: 10.6 FL (ref 7.5–11.1)
PO2 ARTERIAL: 184 MMHG (ref 83–108)
PO2 ARTERIAL: 83 MMHG (ref 83–108)
PO2, VEN: 104 MMHG (ref 28–48)
POTASSIUM SERPL-SCNC: 5 MMOL/L (ref 3.5–5.1)
PRO-BNP: 5273 PG/ML
PROCALCITONIN SERPL-MCNC: 0.21 NG/ML
RBC # BLD: 3.58 M/CU MM (ref 4.6–6.2)
SEGMENTED NEUTROPHILS ABSOLUTE COUNT: 13.7 K/CU MM
SEGMENTED NEUTROPHILS RELATIVE PERCENT: 89.9 % (ref 36–66)
SODIUM BLD-SCNC: 137 MMOL/L (ref 135–145)
TOTAL IMMATURE NEUTOROPHIL: 0.08 K/CU MM
TOTAL NUCLEATED RBC: 0 K/CU MM
TOTAL PROTEIN: 6.5 GM/DL (ref 6.4–8.2)
TROPONIN, HIGH SENSITIVITY: 51 NG/L (ref 0–22)
WBC # BLD: 15.2 K/CU MM (ref 4–10.5)

## 2024-03-03 PROCEDURE — 82728 ASSAY OF FERRITIN: CPT

## 2024-03-03 PROCEDURE — 6360000002 HC RX W HCPCS

## 2024-03-03 PROCEDURE — 2140000000 HC CCU INTERMEDIATE R&B

## 2024-03-03 PROCEDURE — 94761 N-INVAS EAR/PLS OXIMETRY MLT: CPT

## 2024-03-03 PROCEDURE — 94660 CPAP INITIATION&MGMT: CPT

## 2024-03-03 PROCEDURE — 84145 PROCALCITONIN (PCT): CPT

## 2024-03-03 PROCEDURE — 2700000000 HC OXYGEN THERAPY PER DAY

## 2024-03-03 PROCEDURE — 82803 BLOOD GASES ANY COMBINATION: CPT

## 2024-03-03 PROCEDURE — 36600 WITHDRAWAL OF ARTERIAL BLOOD: CPT

## 2024-03-03 PROCEDURE — 99254 IP/OBS CNSLTJ NEW/EST MOD 60: CPT | Performed by: INTERNAL MEDICINE

## 2024-03-03 PROCEDURE — 80053 COMPREHEN METABOLIC PANEL: CPT

## 2024-03-03 PROCEDURE — 83880 ASSAY OF NATRIURETIC PEPTIDE: CPT

## 2024-03-03 PROCEDURE — 84484 ASSAY OF TROPONIN QUANT: CPT

## 2024-03-03 PROCEDURE — 6360000002 HC RX W HCPCS: Performed by: INTERNAL MEDICINE

## 2024-03-03 PROCEDURE — XW0DXM6 INTRODUCTION OF BARICITINIB INTO MOUTH AND PHARYNX, EXTERNAL APPROACH, NEW TECHNOLOGY GROUP 6: ICD-10-PCS | Performed by: FAMILY MEDICINE

## 2024-03-03 PROCEDURE — 2580000003 HC RX 258

## 2024-03-03 PROCEDURE — 6370000000 HC RX 637 (ALT 250 FOR IP)

## 2024-03-03 PROCEDURE — 82805 BLOOD GASES W/O2 SATURATION: CPT

## 2024-03-03 PROCEDURE — 85025 COMPLETE CBC W/AUTO DIFF WBC: CPT

## 2024-03-03 PROCEDURE — 82962 GLUCOSE BLOOD TEST: CPT

## 2024-03-03 PROCEDURE — 71045 X-RAY EXAM CHEST 1 VIEW: CPT

## 2024-03-03 PROCEDURE — 5A09357 ASSISTANCE WITH RESPIRATORY VENTILATION, LESS THAN 24 CONSECUTIVE HOURS, CONTINUOUS POSITIVE AIRWAY PRESSURE: ICD-10-PCS | Performed by: FAMILY MEDICINE

## 2024-03-03 PROCEDURE — 86141 C-REACTIVE PROTEIN HS: CPT

## 2024-03-03 PROCEDURE — 2580000003 HC RX 258: Performed by: INTERNAL MEDICINE

## 2024-03-03 PROCEDURE — 6370000000 HC RX 637 (ALT 250 FOR IP): Performed by: INTERNAL MEDICINE

## 2024-03-03 PROCEDURE — 83615 LACTATE (LD) (LDH) ENZYME: CPT

## 2024-03-03 PROCEDURE — 94640 AIRWAY INHALATION TREATMENT: CPT

## 2024-03-03 PROCEDURE — 36415 COLL VENOUS BLD VENIPUNCTURE: CPT

## 2024-03-03 PROCEDURE — 85379 FIBRIN DEGRADATION QUANT: CPT

## 2024-03-03 RX ORDER — ALBUTEROL SULFATE 90 UG/1
2 AEROSOL, METERED RESPIRATORY (INHALATION) EVERY 6 HOURS PRN
Status: DISCONTINUED | OUTPATIENT
Start: 2024-03-03 | End: 2024-03-03

## 2024-03-03 RX ORDER — MORPHINE SULFATE 2 MG/ML
0.5 INJECTION, SOLUTION INTRAMUSCULAR; INTRAVENOUS ONCE
Status: DISCONTINUED | OUTPATIENT
Start: 2024-03-03 | End: 2024-03-03

## 2024-03-03 RX ORDER — IPRATROPIUM BROMIDE AND ALBUTEROL SULFATE 2.5; .5 MG/3ML; MG/3ML
1 SOLUTION RESPIRATORY (INHALATION) EVERY 4 HOURS
Status: DISCONTINUED | OUTPATIENT
Start: 2024-03-03 | End: 2024-03-03

## 2024-03-03 RX ORDER — IPRATROPIUM BROMIDE AND ALBUTEROL SULFATE 2.5; .5 MG/3ML; MG/3ML
1 SOLUTION RESPIRATORY (INHALATION)
Status: DISCONTINUED | OUTPATIENT
Start: 2024-03-03 | End: 2024-03-03

## 2024-03-03 RX ORDER — IPRATROPIUM BROMIDE AND ALBUTEROL SULFATE 2.5; .5 MG/3ML; MG/3ML
1 SOLUTION RESPIRATORY (INHALATION) EVERY 4 HOURS PRN
Status: DISCONTINUED | OUTPATIENT
Start: 2024-03-03 | End: 2024-03-03

## 2024-03-03 RX ORDER — IPRATROPIUM BROMIDE AND ALBUTEROL SULFATE 2.5; .5 MG/3ML; MG/3ML
1 SOLUTION RESPIRATORY (INHALATION) ONCE
Status: COMPLETED | OUTPATIENT
Start: 2024-03-03 | End: 2024-03-03

## 2024-03-03 RX ORDER — IPRATROPIUM BROMIDE AND ALBUTEROL SULFATE 2.5; .5 MG/3ML; MG/3ML
1 SOLUTION RESPIRATORY (INHALATION) EVERY 4 HOURS PRN
Status: DISCONTINUED | OUTPATIENT
Start: 2024-03-03 | End: 2024-03-08 | Stop reason: HOSPADM

## 2024-03-03 RX ORDER — ALBUTEROL SULFATE 90 UG/1
2 AEROSOL, METERED RESPIRATORY (INHALATION) EVERY 4 HOURS
Status: DISCONTINUED | OUTPATIENT
Start: 2024-03-03 | End: 2024-03-08 | Stop reason: HOSPADM

## 2024-03-03 RX ORDER — FUROSEMIDE 10 MG/ML
20 INJECTION INTRAMUSCULAR; INTRAVENOUS ONCE
Status: COMPLETED | OUTPATIENT
Start: 2024-03-03 | End: 2024-03-03

## 2024-03-03 RX ADMIN — ALBUTEROL SULFATE 2 PUFF: 90 AEROSOL, METERED RESPIRATORY (INHALATION) at 15:18

## 2024-03-03 RX ADMIN — ALBUTEROL SULFATE 2 PUFF: 90 AEROSOL, METERED RESPIRATORY (INHALATION) at 20:44

## 2024-03-03 RX ADMIN — CEFTRIAXONE 1000 MG: 1 INJECTION, POWDER, FOR SOLUTION INTRAMUSCULAR; INTRAVENOUS at 09:58

## 2024-03-03 RX ADMIN — ALBUTEROL SULFATE 2 PUFF: 90 AEROSOL, METERED RESPIRATORY (INHALATION) at 11:16

## 2024-03-03 RX ADMIN — SODIUM CHLORIDE, PRESERVATIVE FREE 10 ML: 5 INJECTION INTRAVENOUS at 20:09

## 2024-03-03 RX ADMIN — INSULIN LISPRO 3 UNITS: 100 INJECTION, SOLUTION INTRAVENOUS; SUBCUTANEOUS at 11:43

## 2024-03-03 RX ADMIN — SODIUM CHLORIDE, PRESERVATIVE FREE 10 ML: 5 INJECTION INTRAVENOUS at 09:48

## 2024-03-03 RX ADMIN — ENOXAPARIN SODIUM 40 MG: 100 INJECTION SUBCUTANEOUS at 09:49

## 2024-03-03 RX ADMIN — WATER 40 MG: 1 INJECTION INTRAMUSCULAR; INTRAVENOUS; SUBCUTANEOUS at 09:47

## 2024-03-03 RX ADMIN — WATER 40 MG: 1 INJECTION INTRAMUSCULAR; INTRAVENOUS; SUBCUTANEOUS at 20:09

## 2024-03-03 RX ADMIN — ALBUTEROL SULFATE 2 PUFF: 90 AEROSOL, METERED RESPIRATORY (INHALATION) at 00:56

## 2024-03-03 RX ADMIN — BARICITINIB 4 MG: 2 TABLET, FILM COATED ORAL at 18:01

## 2024-03-03 RX ADMIN — AZITHROMYCIN DIHYDRATE 500 MG: 500 INJECTION, POWDER, LYOPHILIZED, FOR SOLUTION INTRAVENOUS at 11:46

## 2024-03-03 RX ADMIN — IPRATROPIUM BROMIDE 2 PUFF: 17 AEROSOL, METERED RESPIRATORY (INHALATION) at 20:46

## 2024-03-03 RX ADMIN — ATORVASTATIN CALCIUM 10 MG: 10 TABLET, FILM COATED ORAL at 09:49

## 2024-03-03 RX ADMIN — INSULIN LISPRO 4 UNITS: 100 INJECTION, SOLUTION INTRAVENOUS; SUBCUTANEOUS at 20:10

## 2024-03-03 RX ADMIN — FUROSEMIDE 20 MG: 10 INJECTION, SOLUTION INTRAMUSCULAR; INTRAVENOUS at 09:47

## 2024-03-03 RX ADMIN — IPRATROPIUM BROMIDE AND ALBUTEROL SULFATE 1 DOSE: 2.5; .5 SOLUTION RESPIRATORY (INHALATION) at 07:23

## 2024-03-03 RX ADMIN — IPRATROPIUM BROMIDE 2 PUFF: 17 AEROSOL, METERED RESPIRATORY (INHALATION) at 15:20

## 2024-03-03 RX ADMIN — SODIUM CHLORIDE 25 ML: 9 INJECTION, SOLUTION INTRAVENOUS at 09:57

## 2024-03-03 RX ADMIN — PANTOPRAZOLE SODIUM 40 MG: 40 TABLET, DELAYED RELEASE ORAL at 06:30

## 2024-03-03 RX ADMIN — IPRATROPIUM BROMIDE 2 PUFF: 17 AEROSOL, METERED RESPIRATORY (INHALATION) at 11:18

## 2024-03-03 RX ADMIN — CETIRIZINE HYDROCHLORIDE 5 MG: 10 TABLET, FILM COATED ORAL at 09:48

## 2024-03-03 RX ADMIN — VENLAFAXINE 25 MG: 25 TABLET ORAL at 21:50

## 2024-03-03 RX ADMIN — GUAIFENESIN SYRUP AND DEXTROMETHORPHAN 5 ML: 100; 10 SYRUP ORAL at 03:00

## 2024-03-03 RX ADMIN — AMLODIPINE BESYLATE 10 MG: 10 TABLET ORAL at 09:48

## 2024-03-03 ASSESSMENT — PAIN SCALES - WONG BAKER: WONGBAKER_NUMERICALRESPONSE: 0

## 2024-03-03 ASSESSMENT — PAIN SCALES - GENERAL: PAINLEVEL_OUTOF10: 0

## 2024-03-03 NOTE — CONSULTS
Subjective:   CHIEF COMPLAINT / HPI:  78 year old male admitted with incrfeasing sob and this is associated with wheeze. He is also found to be hypoxic.he has cough. No fever no hemoptysis.      Past Medical History:  Past Medical History:   Diagnosis Date    Acid reflux     Anemia     \"dx age 12    COPD (chronic obstructive pulmonary disease) (HCC)     follows with [facility]    Dental decay     Diabetes mellitus (HCC)     \"dx 2015 or so\"    Emphysema of lung (HCC)     Enlarged prostate     Stevens catheter in place     \"put in catheter since mid April 2018\"    H/O cardiovascular stress test 05/01/2018    Nml, 62%    Hematuria     dx with admission 4/29/2018    HX OTHER MEDICAL     \"tried 3 times to do sleep study could not tolerate    Hypertension     per pt on 7/11/2018\"my blood pressure has been low so stopped the Amlodopine for now\"    Low back sprain 1983    Megacolon     On home oxygen therapy     \"wear it at night at 3l/nc\"    Wears glasses     to read       Past Surgical History:        Procedure Laterality Date    ABDOMEN SURGERY      \"had abd surgery in the service one in [facility] and one in [facility]\"    ABDOMINAL EXPLORATION SURGERY  1974    volvulus\"had colostomy for 2 weeks then did reversal 2 weeks later\"( was in [place])    APPENDECTOMY  11 years old    COLONOSCOPY N/A 10/24/2023    COLONOSCOPY DIAGNOSTIC performed by Jose Patel MD at [facility] ENDOSCOPY    DENTAL SURGERY      OTHER SURGICAL HISTORY      exp lap,colon resection,subtotal colectomy,cysto     OTHER SURGICAL HISTORY  01 07 2013    exp lap,colon resection, subtotal colectomy, cysto    OTHER SURGICAL HISTORY  09/18/2018    TURP    REVISION COLOSTOMY  1974    TUMOR REMOVAL      ABD TUMOR/MASS JAN 2013    TURP N/A 07/12/2018    cystoscopy, bipolar TURP       Current Medications:    Current Facility-Administered Medications: albuterol sulfate HFA (PROVENTIL;VENTOLIN;PROAIR) 108 (90 Base) MCG/ACT inhaler 2 puff, 2 puff, Inhalation,

## 2024-03-03 NOTE — PROGRESS NOTES
I was notified of the events this morning by both ICU and rapid response physician when I arrived this morning.  Patient was seen at bedside shortly after.  On my exam he was still in mild distress but vitals were stable on 6L NC (4L baseline), tight breath sounds and wheezing bilaterally.  Patient states that overnight and early this morning he couldn't breath and I was told patient didn't receive any treatments overnight.  Per documentation oxygen saturation was down to the 50's on the floor.    Labs from rapid reviewed and patient is in acute hypercapnic/hypoxemic respiratory failure so Bipap orders placed.  Noted COVID testing positive overnight, rapid was negative at [place] so steroids adjusted and pharmacy was consulted for Baricitinib.  Greatly appreciate Respiratory assistance and will be given nebs today and I agree as patient with his severe COPD on 4L at baseline is neb dependent.  Will order q3 duonebs scheduled today, repeat gas one hour after Bipap is initiated.  Was on Azithromycin and Rocephin added by rapid physician and I agree.  1 time dose of lasix also ordered.  Pulmonology consulted and recs appreciated.  Patient was transferred to step down unit and will monitor closely today.    Re-rounded on patient late this morning, Bipap started and patient appears much more comfortable and states that he is hungry.  Did look over MAR and neb was not given as scheduled will ask Charge RT as was agreeable this AM given patient is neb dependent at home.  Rechecking VBG an hour after on Bipap, to be drawn soon.  Will monitor closely.

## 2024-03-03 NOTE — PROGRESS NOTES
4 Eyes Skin Assessment     NAME:  Billy Ozuna  YOB: 1945  MEDICAL RECORD NUMBER:  [id number]    The patient is being assessed for  Transfer to New Unit    I agree that at least one RN has performed a thorough Head to Toe Skin Assessment on the patient. ALL assessment sites listed below have been assessed.      Areas assessed by both nurses:    Head, Face, Ears, Shoulders, Back, Chest, Arms, Elbows, Hands, Sacrum. Buttock, Coccyx, Ischium, and Legs. Feet and Heels        Does the Patient have a Wound? No noted wound(s)       [place] Prevention initiated by RN: No  Wound Care Orders initiated by RN: No    Pressure Injury (Stage 3,4, Unstageable, DTI, NWPT, and Complex wounds) if present, place Wound referral order by RN under : No    New Ostomies, if present place, Ostomy referral order under : No     Nurse 1 eSignature: Electronically signed by Angie Gtz RN on 3/3/24 at 9:00 AM EST    **SHARE this note so that the co-signing nurse can place an eSignature**    Nurse 2 eSignature: Electronically signed by Natty Zhu RN on 3/3/24 at 1:18 PM EST

## 2024-03-03 NOTE — PLAN OF CARE
Problem: Discharge Planning  Goal: Discharge to home or other facility with appropriate resources  3/3/2024 1310 by Angie Gtz RN  Outcome: Progressing  3/3/2024 0302 by Ashlyn Castillo LPN  Outcome: Progressing     Problem: ABCDS Injury Assessment  Goal: Absence of physical injury  3/3/2024 1310 by Angie Gtz RN  Outcome: Progressing  3/3/2024 0302 by Ashlyn Castillo LPN  Outcome: Progressing     Problem: Safety - Adult  Goal: Free from fall injury  3/3/2024 1310 by Angie Gtz RN  Outcome: Progressing  3/3/2024 0302 by Ashlyn Castillo LPN  Outcome: Progressing

## 2024-03-03 NOTE — PROGRESS NOTES
Rapid response called on patient due to respiratory distress.  Upon this RT entering the room, patients O2 saturation was 78% on a NRB.  Patient unable to take in full sentences and is tripoding.  Patient is on albuterol inhaler and getting no improvement. This RT made the decision to nebulize patient with duonebs x 2 per verbal orders of Dr. Coto.  This RT was wearing PPE and made it known to others to wear PPE as well per patients COVID status.  Patient made significant improvement post aerosol breathing treatments.  This RT was able to take patient off NRB and place on a 7 LHFNC and saturation did not drop below 95%.  Patient wears 4L continuous at home.  Patient to be moved to a step down unit and orders placed for vapotherm.

## 2024-03-03 NOTE — PROGRESS NOTES
V2.0  [facility] [place] Progress Note      Name:  Billy Ozuna /Age/Sex: 1945  (78 y.o. male)   MRN & CSN:  [id number] & [id number] Encounter Date/Time: 3/3/2024 10:35 AM EST   Location:  [address] [place] [address]-A PCP: Kera Monique (Inactive)       Hospital Day: 3    Assessment and Plan:   Billy Ozuna is a 78 y.o. male with a pmh of anemia, COPD, DM, emphysema, enlarged prostate, HTN who presents with Acute on chronic respiratory failure with hypoxia and hypercapnia (HCC)    Hospital Problems             Last Modified POA    * (Principal) Acute on chronic respiratory failure with hypoxia and hypercapnia (HCC) 3/1/2024 Yes   COPD on home O2, baseline 4 L NC  Acute respiratory hypoxia  COVID  On admission, From freestanding  OSH  Med Neb Q 4hours  Will continue IV solumedrol for now, due to acute respiratory change  Per chart review of OSH records, patient was at home with O2 sats in the 80's, and administered a breathing treatment at home  --Troponin 62, 65, 57, 51 no chest pain.  --Lactic acid 2.1, 1.1  --Procal, inflammatory markers,   - Rapid Covid negative, Flu negative, UA negative, VBG showed pH 7.27, pCO2 65, HCO3 29.4 , repeat VBG showed pH 7.31, pCO2 66, HCO3 33.2. VBG was again repeated this am and showed an improvement in pH, CO2 stable. Respiratory PCR last night  positive for Covid. Droplet isolation initiated. DuoNebs every 4 hours   --Per chart review, patient noted to be hypoxic O2 sats in the 70's after ambulation in which a RR was called.  See RR note for further details.    - Continue Symbicort  --guaifenesin as needed   - Started on Zithromax IV yesterday, Rocephin IV started after RR  - pBNP 5,273 Lasix IV X 1 dose ordered  - Will consult pharmacy to see if he is a candidate for Baricitnib  - Will consult pulmonology, may consider vapotherm/bipap pending further blood gases    T2DM   --POCT ACHS  --Low dose SSI  --Hypoglycemia protocol    Hypertension  - Continue home dose Norvasc  Unable to Pay for Housing in the Last Year: No     Number of Places Lived in the Last Year: 1     Unstable Housing in the Last Year: No       Medications:     Current Facility-Administered Medications   Medication Dose Route Frequency Provider Last Rate Last Admin    albuterol sulfate HFA (PROVENTIL;VENTOLIN;PROAIR) 108 (90 Base) MCG/ACT inhaler 2 puff  2 puff Inhalation Q4H Stephan Lion MD   2 puff at 03/03/24 0056    cefTRIAXone (ROCEPHIN) 1,000 mg in sodium chloride 0.9 % 50 mL IVPB (mini-bag)  1,000 mg IntraVENous Q24H Gina Coto MD        furosemide (LASIX) injection 20 mg  20 mg IntraVENous Once Gina Coto MD        methylPREDNISolone sodium succ (SOLU-MEDROL) 40 mg in sterile water 1 mL injection  40 mg IntraVENous Q12H Fiona Chan APRN - IAN        ipratropium 0.5 mg-albuterol 2.5 mg (DUONEB) nebulizer solution 1 Dose  1 Dose Inhalation Q4H Fiona Chan APRN - IAN        azithromycin (ZITHROMAX) 500 mg in sodium chloride 0.9 % 250 mL IVPB ([id number])  500 mg IntraVENous Q24H Fiona Chan APRN - CNP   Stopped at 03/02/24 1420    glucose chewable tablet 16 g  4 tablet Oral PRN Fiona Chan APRN - CNP        dextrose bolus 10% 125 mL  125 mL IntraVENous PRN Fiona Chan APRN - IAN        Or    dextrose bolus 10% 250 mL  250 mL IntraVENous PRN OnFiona talbot, APRN - CNP        glucagon injection 1 mg  1 mg SubCUTAneous PRN OnFiona talbot APRN - CNP        dextrose 10 % infusion   IntraVENous Continuous PRN Fiona Chan APRN - CNP        sodium chloride flush 0.9 % injection 5-40 mL  5-40 mL IntraVENous 2 times per day Fiona Chan APRN - CNP   10 mL at 03/02/24 2022    sodium chloride flush 0.9 % injection 5-40 mL  5-40 mL IntraVENous PRN Ongaymelowu, Fiona D, APRN - CNP        0.9 % sodium chloride infusion   IntraVENous PRN Fiona Chan, APRN - CNP        ondansetron (ZOFRAN-ODT) disintegrating tablet 4 mg  4 mg Oral Q8H

## 2024-03-03 NOTE — FLOWSHEET NOTE
Spoke with Fiona NP she stated pt is agreeable to try Bipap. Resp. Called and aware.  VSS.  PT +Covid in Isolation and proper PPE being worn.

## 2024-03-03 NOTE — PROGRESS NOTES
Pharmacy Consult for Baricitinib Initiation per Dr. Chan     Criteria per [facility] P&T Committee  **All criteria need to be met to receive   Baricitinib for COVID-19 patients**   Age    >9 years old   Yes   Laboratory Results    Confirmed positive COVID-19     PLUS    ANY elevation in biomarkers   (CRP, D-dimer, LDH, ferritin)       Yes     Concomitant Therapy    Receiving systemic steroids for treatment of COVID 19     Yes   Oxygen Status        Requiring supplemental oxygen   (>1 L NC, HFNC, Heated Vapotherm, biPAP, mechanical ventilation)        Yes   Special Considerations    Pregnancy  Severe Hepatic Impairment  Chronic/Recurrent Infections   Hemoglobin <8         N/a     Contraindications    1. Invasive active mycobacterial or fungal infection  2. Lymphocyte count <200  3. Neutrophil count, ANC <500   4. Significant immunosuppression      None     Dosing Recommendations:    Baricitinib 4 mg PO daily x 14 days (or until hospital discharge)    Renal dose adjustment:  -eGFR > 60: no adjustment necessary       BARICITINIB MONITORING  Day 1 of 14    RENAL DOSE ADJUSTMENTS:    -eGFR > 60: no adjustment necessary     DILI SCREENING PARAMETERS:    ALT>=200 and ALP>=258 or ALT>=120 and BILITOTAL>=2.0    Recent Labs     03/03/24  0553   PROCAL 0.212     Recent Labs     03/02/24  0044 03/03/24  0553   CREATININE 1.0 0.9   LABGLOM >60 >60   AST  --  18   ALT  --  11   ALKPHOS  --  120   BILITOT  --  0.2    360   SEGSABS 7.3 13.7   WBC 8.2 15.2*   LYMPHSABS 0.3 0.4   HGB 10.2* 10.1*         Thank you for the consult,  Penelope Campo, RachelD, [facility]  03/03/24  2:48 PM

## 2024-03-03 NOTE — CONSULTS
(PROTONIX) tablet 40 mg, QAM AC  venlafaxine (EFFEXOR) tablet 25 mg, Daily  insulin lispro (HUMALOG) injection vial 0-4 Units, TID WC  insulin lispro (HUMALOG) injection vial 0-4 Units, Nightly      Current Facility-Administered Medications   Medication Dose Route Frequency Provider Last Rate Last Admin    albuterol sulfate HFA (PROVENTIL;VENTOLIN;PROAIR) 108 (90 Base) MCG/ACT inhaler 2 puff  2 puff Inhalation Q4H Stephan Lion MD   2 puff at 03/03/24 0056    cefTRIAXone (ROCEPHIN) 1,000 mg in sodium chloride 0.9 % 50 mL IVPB (mini-bag)  1,000 mg IntraVENous Q24H Gina Coto  mL/hr at 03/03/24 0958 1,000 mg at 03/03/24 0958    methylPREDNISolone sodium succ (SOLU-MEDROL) 40 mg in sterile water 1 mL injection  40 mg IntraVENous Q12H Fiona Chan APRN - CNP   40 mg at 03/03/24 0947    ipratropium (ATROVENT HFA) 17 MCG/ACT inhaler 2 puff  2 puff Inhalation Q4H RT Stephan Lion MD        ipratropium 0.5 mg-albuterol 2.5 mg (DUONEB) nebulizer solution 1 Dose  1 Dose Inhalation Q4H PRN Stephan Lion MD        azithromycin (ZITHROMAX) 500 mg in sodium chloride 0.9 % 250 mL IVPB ([id number])  500 mg IntraVENous Q24H Onantione Fiona JENNIFER, APRN - CNP   Stopped at 03/02/24 1420    glucose chewable tablet 16 g  4 tablet Oral PRN Ongaymekteri Fiona JENNIFER, APRN - CNP        dextrose bolus 10% 125 mL  125 mL IntraVENous PRN Onyedumekwu Fiona JENNIFER, APRN - CNP        Or    dextrose bolus 10% 250 mL  250 mL IntraVENous PRN Onyedumekwu Fiona JENNIFER, APRN - CNP        glucagon injection 1 mg  1 mg SubCUTAneous PRN Onhermelindadumekwelias Fiona JENNIFER, APRN - CNP        dextrose 10 % infusion   IntraVENous Continuous PRN Onhermelindadumekwu Fiona RODRIGUEZ, APRN - CNP        sodium chloride flush 0.9 % injection 5-40 mL  5-40 mL IntraVENous 2 times per day OnyeduFiona parmar, APRN - CNP   10 mL at 03/03/24 0948    sodium chloride flush 0.9 % injection 5-40 mL  5-40 mL IntraVENous PRN OnFiona talbot, APRN - CNP        0.9 % sodium chloride infusion    (HUMALOG) injection vial 0-4 Units  0-4 Units SubCUTAneous Nightly Fiona Chan, APRN - CNP         Review of Systems:  All 14 systems reviewed, all negative except for shortness of breath    Physical Examination:    /88   Pulse 99   Temp 98.3 °F (36.8 °C) (Oral)   Resp 27   Ht 1.702 m (5' 7\")   Wt 73.1 kg (161 lb 2.5 oz)   SpO2 98%   BMI 25.24 kg/m²      Wt Readings from Last 3 Encounters:   03/01/24 73.1 kg (161 lb 2.5 oz)   10/25/23 91.7 kg (202 lb 3.2 oz)   09/19/23 84.4 kg (186 lb 1.1 oz)     Body mass index is 25.24 kg/m².      General Appearance: Poor  Head: normocephalic     Eyes: normal, noninjected conjunctiva    ENT: normal mucosa, noninjected throat, normal     NECK: No JVP  No thyromegaly        Cardiovascular: No thrills palpated   Auscultation: Normal S1 and S2, no murmur   carotid bruit no   Abdominal Aorta no bruit    Respiratory:    Breath sounds diminished bilaterally    Extremities: Trace edema clubbing ,   no cyanosis    SKIN: Warm and well perfused, no pallor or cyanosis    Vascular exam:  Pedal Pulses: Palp bilaterally        Abdomen:  No masses or tenderness. No organomegaly noted.    Neurological:  Oriented to time, place, and person   No focal neurological deficit noted.  Psychiatric:normal mood, no anxiety    Lab Review   Recent Results (from the past 24 hour(s))   POCT Glucose    Collection Time: 03/02/24 12:49 PM   Result Value Ref Range    POC Glucose 274 (H) 70 - 99 MG/DL   POCT Glucose    Collection Time: 03/02/24  4:28 PM   Result Value Ref Range    POC Glucose 173 (H) 70 - 99 MG/DL   Blood Gas, Venous    Collection Time: 03/02/24  5:49 PM   Result Value Ref Range    pH, Lauro 7.31 (L) 7.32 - 7.43    pCO2, Lauro 66 (H) 41 - 51 mmHG    pO2, Lauro 72 (H) 28 - 48 mmHG    Base Exc, Mixed 4.8 (H) 0 - 3.0    HCO3, Venous 33.2 (H) 22 - 29 MMOL/L    O2 Sat, Lauro 91.4 (H) 50 - 70 %    Comment VBG    POCT Glucose    Collection Time: 03/02/24  8:11 PM   Result Value Ref Range

## 2024-03-03 NOTE — PROGRESS NOTES
Patient assisted to bedside commode and 02 sat dropped down to 57% on high flow 6L - pt refused to put on nonrebreather d/t anxiety and being claustrophobic. Rapid response called. New labs and chest Xray ordered. Transfer order placed, waiting for a bed at this time.

## 2024-03-03 NOTE — PROGRESS NOTES
03/03/24 1018   NIV Type   $NIV $Daily Charge   NIV Started/Stopped On   Equipment Type v60   Mode Bilevel   Mask Type Full face mask   Mask Size Large   Settings/Measurements   PIP Observed 12 cm H20   IPAP 12 cmH20   CPAP/EPAP 5 cmH2O   Vt (Measured) 604 mL   Rate Ordered 12   Insp Rise Time (%) 2 %   FiO2  40 %   I Time/ I Time % 0.8 s   Minute Volume (L/min) 13.3 Liters   Mask Leak (lpm) 44 lpm   Patient's Home Machine No   Alarm Settings   Alarms On Y   Low Pressure (cmH2O) 2 cmH2O   High Pressure (cmH2O) 25 cmH2O   Apnea (secs) 20 secs   RR Low (bpm) 12   RR High (bpm) 50 br/min

## 2024-03-03 NOTE — SIGNIFICANT EVENT
RRT called at 7:04 am for hypoxia.    Patient dropped to the 50's. Hypertensive on exam.   Patient with increased work of breathing in tripod position.   On exam, awake and alert, on non rebreather, saturating in the 90's however easily dropped down to the high 80's on nonrebreather. Clear to auscultation on the left, decreased breath sounds on the R.     CBC and BMP already ordered from AM labs, were reviewed. Procal and pro-BNP ordered.   2 duoneb treatments given and patient relaxed.  CXR on my read showed RML consolidation and some interstitial edema.    Patient started on ceftriaxone (he was already on azithro) to cover CAP and lasix 20 mg IV ordered.     Critical care time: 27 minutes, which includes chart review, documentation, examining patient and making treatment plan.

## 2024-03-03 NOTE — PROGRESS NOTES
pulmonary      SUBJECTIVE:  morning events noted. Pt on bipap and breathing is fair     OBJECTIVE    VITALS:  /75   Pulse 87   Temp 98.3 °F (36.8 °C) (Oral)   Resp 14   Ht 1.702 m (5' 7\")   Wt 73.1 kg (161 lb 2.5 oz)   SpO2 98%   BMI 25.24 kg/m²   HEAD AND FACE EXAM:  No throat injection, no active exudate,no thrush  NECK EXAM;No JVD, no masses, symmetrical  CHEST EXAM; Expansion equal and symmetrical, no masses  LUNG EXAM; Good breath sounds bilaterally. There are expiratory wheezes both lungs, there are crackles at both lung bases  CARDIOVASCULAR EXAM: Positive S1 and S2, no S3 or S4, no clicks ,no murmurs  RIGHT AND LEFT LOWER EXTRIMITY EXAM: No edema, no swelling, no           LABS   Lab Results   Component Value Date    WBC 15.2 (H) 03/03/2024    HGB 10.1 (L) 03/03/2024    HCT 33.6 (L) 03/03/2024    MCV 93.9 03/03/2024     03/03/2024     Lab Results   Component Value Date    CREATININE 0.9 03/03/2024    BUN 24 (H) 03/03/2024     03/03/2024    K 5.0 03/03/2024    CL 99 03/03/2024    CO2 30 03/03/2024     Lab Results   Component Value Date    INR 1.2 09/15/2023    PROTIME 15.7 (H) 09/15/2023          Lab Results   Component Value Date/Time    PHOS 3.0 09/20/2023 10:35 AM    PHOS 3.4 01/19/2013 05:20 AM    PHOS 3.2 01/17/2013 05:30 AM        Recent Labs     03/03/24  0715 03/03/24  1130   PH 7.23* 7.32*   PO2ART 184* 83   [id number] 81.0* 71.0*   O2SAT 97.0 94.5         Wt Readings from Last 3 Encounters:   03/01/24 73.1 kg (161 lb 2.5 oz)   10/25/23 91.7 kg (202 lb 3.2 oz)   09/19/23 84.4 kg (186 lb 1.1 oz)               ASSESMENT  Ac on ch resp failure  Ac copd  pneumonia        PLAN  Cpm  Abg shows improvement  Cont sol;umedrol and duoneb  On rocephin and zithromax    3/3/2024  Justin Buck MD, MKEIKO.

## 2024-03-04 ENCOUNTER — APPOINTMENT (OUTPATIENT)
Dept: NON INVASIVE DIAGNOSTICS | Age: 79
End: 2024-03-04
Attending: INTERNAL MEDICINE
Payer: OTHER GOVERNMENT

## 2024-03-04 LAB
ALBUMIN SERPL-MCNC: 3.6 GM/DL (ref 3.4–5)
ALP BLD-CCNC: 123 IU/L (ref 40–128)
ALT SERPL-CCNC: 12 U/L (ref 10–40)
ANION GAP SERPL CALCULATED.3IONS-SCNC: 7 MMOL/L (ref 7–16)
AST SERPL-CCNC: 16 IU/L (ref 15–37)
BASOPHILS ABSOLUTE: 0 K/CU MM
BASOPHILS RELATIVE PERCENT: 0.1 % (ref 0–1)
BILIRUB SERPL-MCNC: 0.2 MG/DL (ref 0–1)
BUN SERPL-MCNC: 25 MG/DL (ref 6–23)
CALCIUM SERPL-MCNC: 8.9 MG/DL (ref 8.3–10.6)
CHLORIDE BLD-SCNC: 99 MMOL/L (ref 99–110)
CO2: 33 MMOL/L (ref 21–32)
CREAT SERPL-MCNC: 0.9 MG/DL (ref 0.9–1.3)
DIFFERENTIAL TYPE: ABNORMAL
ECHO BSA: 1.86 M2
EOSINOPHILS ABSOLUTE: 0 K/CU MM
EOSINOPHILS RELATIVE PERCENT: 0 % (ref 0–3)
GFR SERPL CREATININE-BSD FRML MDRD: >60 ML/MIN/1.73M2
GLUCOSE BLD-MCNC: 205 MG/DL (ref 70–99)
GLUCOSE BLD-MCNC: 215 MG/DL (ref 70–99)
GLUCOSE BLD-MCNC: 241 MG/DL (ref 70–99)
GLUCOSE BLD-MCNC: 295 MG/DL (ref 70–99)
GLUCOSE BLD-MCNC: 299 MG/DL (ref 70–99)
GLUCOSE SERPL-MCNC: 268 MG/DL (ref 70–99)
HCT VFR BLD CALC: 33.3 % (ref 42–52)
HEMOGLOBIN: 9.9 GM/DL (ref 13.5–18)
IMMATURE NEUTROPHIL %: 0.5 % (ref 0–0.43)
LACTATE DEHYDROGENASE: 214 IU/L (ref 120–246)
LACTIC ACID, SEPSIS: 1.2 MMOL/L (ref 0.4–2)
LYMPHOCYTES ABSOLUTE: 0.4 K/CU MM
LYMPHOCYTES RELATIVE PERCENT: 4.7 % (ref 24–44)
MCH RBC QN AUTO: 28.2 PG (ref 27–31)
MCHC RBC AUTO-ENTMCNC: 29.7 % (ref 32–36)
MCV RBC AUTO: 94.9 FL (ref 78–100)
MONOCYTES ABSOLUTE: 0.7 K/CU MM
MONOCYTES RELATIVE PERCENT: 7.6 % (ref 0–4)
NUCLEATED RBC %: 0 %
PDW BLD-RTO: 15.9 % (ref 11.7–14.9)
PLATELET # BLD: 332 K/CU MM (ref 140–440)
PMV BLD AUTO: 11.1 FL (ref 7.5–11.1)
POTASSIUM SERPL-SCNC: 4.7 MMOL/L (ref 3.5–5.1)
RBC # BLD: 3.51 M/CU MM (ref 4.6–6.2)
SEGMENTED NEUTROPHILS ABSOLUTE COUNT: 7.7 K/CU MM
SEGMENTED NEUTROPHILS RELATIVE PERCENT: 87.1 % (ref 36–66)
SODIUM BLD-SCNC: 139 MMOL/L (ref 135–145)
TOTAL IMMATURE NEUTOROPHIL: 0.04 K/CU MM
TOTAL NUCLEATED RBC: 0 K/CU MM
TOTAL PROTEIN: 5.9 GM/DL (ref 6.4–8.2)
TROPONIN, HIGH SENSITIVITY: 52 NG/L (ref 0–22)
WBC # BLD: 8.8 K/CU MM (ref 4–10.5)

## 2024-03-04 PROCEDURE — 87040 BLOOD CULTURE FOR BACTERIA: CPT

## 2024-03-04 PROCEDURE — 94660 CPAP INITIATION&MGMT: CPT

## 2024-03-04 PROCEDURE — 2580000003 HC RX 258: Performed by: INTERNAL MEDICINE

## 2024-03-04 PROCEDURE — 6370000000 HC RX 637 (ALT 250 FOR IP): Performed by: INTERNAL MEDICINE

## 2024-03-04 PROCEDURE — 99232 SBSQ HOSP IP/OBS MODERATE 35: CPT | Performed by: INTERNAL MEDICINE

## 2024-03-04 PROCEDURE — 94150 VITAL CAPACITY TEST: CPT

## 2024-03-04 PROCEDURE — 80053 COMPREHEN METABOLIC PANEL: CPT

## 2024-03-04 PROCEDURE — 94640 AIRWAY INHALATION TREATMENT: CPT

## 2024-03-04 PROCEDURE — 6370000000 HC RX 637 (ALT 250 FOR IP)

## 2024-03-04 PROCEDURE — 6360000002 HC RX W HCPCS

## 2024-03-04 PROCEDURE — 82962 GLUCOSE BLOOD TEST: CPT

## 2024-03-04 PROCEDURE — 2580000003 HC RX 258

## 2024-03-04 PROCEDURE — 2140000000 HC CCU INTERMEDIATE R&B

## 2024-03-04 PROCEDURE — 36415 COLL VENOUS BLD VENIPUNCTURE: CPT

## 2024-03-04 PROCEDURE — 93306 TTE W/DOPPLER COMPLETE: CPT

## 2024-03-04 PROCEDURE — 2700000000 HC OXYGEN THERAPY PER DAY

## 2024-03-04 PROCEDURE — 85025 COMPLETE CBC W/AUTO DIFF WBC: CPT

## 2024-03-04 PROCEDURE — 6360000004 HC RX CONTRAST MEDICATION

## 2024-03-04 PROCEDURE — 94669 MECHANICAL CHEST WALL OSCILL: CPT

## 2024-03-04 PROCEDURE — APPNB15 APP NON BILLABLE TIME 0-15 MINS

## 2024-03-04 PROCEDURE — 94761 N-INVAS EAR/PLS OXIMETRY MLT: CPT

## 2024-03-04 PROCEDURE — 6360000002 HC RX W HCPCS: Performed by: INTERNAL MEDICINE

## 2024-03-04 PROCEDURE — 83605 ASSAY OF LACTIC ACID: CPT

## 2024-03-04 RX ORDER — LOSARTAN POTASSIUM 25 MG/1
25 TABLET ORAL DAILY
Status: DISCONTINUED | OUTPATIENT
Start: 2024-03-04 | End: 2024-03-08 | Stop reason: HOSPADM

## 2024-03-04 RX ORDER — METOPROLOL SUCCINATE 25 MG/1
25 TABLET, EXTENDED RELEASE ORAL DAILY
Status: DISCONTINUED | OUTPATIENT
Start: 2024-03-04 | End: 2024-03-06

## 2024-03-04 RX ADMIN — CETIRIZINE HYDROCHLORIDE 5 MG: 10 TABLET, FILM COATED ORAL at 07:58

## 2024-03-04 RX ADMIN — ALBUTEROL SULFATE 2 PUFF: 90 AEROSOL, METERED RESPIRATORY (INHALATION) at 21:04

## 2024-03-04 RX ADMIN — AMLODIPINE BESYLATE 10 MG: 10 TABLET ORAL at 07:58

## 2024-03-04 RX ADMIN — SODIUM CHLORIDE, PRESERVATIVE FREE 10 ML: 5 INJECTION INTRAVENOUS at 07:59

## 2024-03-04 RX ADMIN — BUDESONIDE AND FORMOTEROL FUMARATE DIHYDRATE 2 PUFF: 160; 4.5 AEROSOL RESPIRATORY (INHALATION) at 21:04

## 2024-03-04 RX ADMIN — INSULIN LISPRO 2 UNITS: 100 INJECTION, SOLUTION INTRAVENOUS; SUBCUTANEOUS at 07:59

## 2024-03-04 RX ADMIN — IPRATROPIUM BROMIDE 2 PUFF: 17 AEROSOL, METERED RESPIRATORY (INHALATION) at 16:46

## 2024-03-04 RX ADMIN — WATER 40 MG: 1 INJECTION INTRAMUSCULAR; INTRAVENOUS; SUBCUTANEOUS at 22:33

## 2024-03-04 RX ADMIN — LOSARTAN POTASSIUM 25 MG: 25 TABLET, FILM COATED ORAL at 17:15

## 2024-03-04 RX ADMIN — IPRATROPIUM BROMIDE 2 PUFF: 17 AEROSOL, METERED RESPIRATORY (INHALATION) at 13:26

## 2024-03-04 RX ADMIN — POLYETHYLENE GLYCOL 3350 17 G: 17 POWDER, FOR SOLUTION ORAL at 17:15

## 2024-03-04 RX ADMIN — CEFTRIAXONE 1000 MG: 1 INJECTION, POWDER, FOR SOLUTION INTRAMUSCULAR; INTRAVENOUS at 08:07

## 2024-03-04 RX ADMIN — INSULIN LISPRO 1 UNITS: 100 INJECTION, SOLUTION INTRAVENOUS; SUBCUTANEOUS at 12:35

## 2024-03-04 RX ADMIN — BARICITINIB 4 MG: 2 TABLET, FILM COATED ORAL at 08:11

## 2024-03-04 RX ADMIN — BUDESONIDE AND FORMOTEROL FUMARATE DIHYDRATE 2 PUFF: 160; 4.5 AEROSOL RESPIRATORY (INHALATION) at 13:26

## 2024-03-04 RX ADMIN — ATORVASTATIN CALCIUM 10 MG: 10 TABLET, FILM COATED ORAL at 07:58

## 2024-03-04 RX ADMIN — PERFLUTREN 2 ML: 6.52 INJECTION, SUSPENSION INTRAVENOUS at 10:46

## 2024-03-04 RX ADMIN — SODIUM CHLORIDE, PRESERVATIVE FREE 10 ML: 5 INJECTION INTRAVENOUS at 22:34

## 2024-03-04 RX ADMIN — ALBUTEROL SULFATE 2 PUFF: 90 AEROSOL, METERED RESPIRATORY (INHALATION) at 08:49

## 2024-03-04 RX ADMIN — PANTOPRAZOLE SODIUM 40 MG: 40 TABLET, DELAYED RELEASE ORAL at 07:58

## 2024-03-04 RX ADMIN — IPRATROPIUM BROMIDE AND ALBUTEROL SULFATE 1 DOSE: 2.5; .5 SOLUTION RESPIRATORY (INHALATION) at 05:26

## 2024-03-04 RX ADMIN — WATER 40 MG: 1 INJECTION INTRAMUSCULAR; INTRAVENOUS; SUBCUTANEOUS at 07:58

## 2024-03-04 RX ADMIN — VENLAFAXINE 25 MG: 25 TABLET ORAL at 22:34

## 2024-03-04 RX ADMIN — METOPROLOL SUCCINATE 25 MG: 25 TABLET, EXTENDED RELEASE ORAL at 12:35

## 2024-03-04 RX ADMIN — ALBUTEROL SULFATE 2 PUFF: 90 AEROSOL, METERED RESPIRATORY (INHALATION) at 13:26

## 2024-03-04 RX ADMIN — ENOXAPARIN SODIUM 40 MG: 100 INJECTION SUBCUTANEOUS at 08:02

## 2024-03-04 RX ADMIN — INSULIN LISPRO 1 UNITS: 100 INJECTION, SOLUTION INTRAVENOUS; SUBCUTANEOUS at 17:15

## 2024-03-04 RX ADMIN — ALBUTEROL SULFATE 2 PUFF: 90 AEROSOL, METERED RESPIRATORY (INHALATION) at 16:47

## 2024-03-04 RX ADMIN — AZITHROMYCIN DIHYDRATE 500 MG: 500 INJECTION, POWDER, LYOPHILIZED, FOR SOLUTION INTRAVENOUS at 12:37

## 2024-03-04 RX ADMIN — IPRATROPIUM BROMIDE 2 PUFF: 17 AEROSOL, METERED RESPIRATORY (INHALATION) at 21:04

## 2024-03-04 RX ADMIN — ALBUTEROL SULFATE 2 PUFF: 90 AEROSOL, METERED RESPIRATORY (INHALATION) at 05:30

## 2024-03-04 RX ADMIN — IPRATROPIUM BROMIDE 2 PUFF: 17 AEROSOL, METERED RESPIRATORY (INHALATION) at 08:50

## 2024-03-04 RX ADMIN — FLUTICASONE PROPIONATE 1 SPRAY: 50 SPRAY, METERED NASAL at 22:34

## 2024-03-04 NOTE — PROGRESS NOTES
pulmonary      SUBJECTIVE:  he remains on o2 and breathing is fair     OBJECTIVE    VITALS:  BP (!) 117/93   Pulse 97   Temp 97.9 °F (36.6 °C) (Oral)   Resp 21   Ht 1.702 m (5' 7\")   Wt 76 kg (167 lb 8.8 oz)   SpO2 97%   BMI 26.24 kg/m²   HEAD AND FACE EXAM:  No throat injection, no active exudate,no thrush  NECK EXAM;No JVD, no masses, symmetrical  CHEST EXAM; Expansion equal and symmetrical, no masses  LUNG EXAM; Good breath sounds bilaterally. There are expiratory wheezes both lungs, there are crackles at both lung bases  CARDIOVASCULAR EXAM: Positive S1 and S2, no S3 or S4, no clicks ,no murmurs  RIGHT AND LEFT LOWER EXTRIMITY EXAM: No edema, no swelling,           LABS   Lab Results   Component Value Date    WBC 8.8 03/04/2024    HGB 9.9 (L) 03/04/2024    HCT 33.3 (L) 03/04/2024    MCV 94.9 03/04/2024     03/04/2024     Lab Results   Component Value Date    CREATININE 0.9 03/03/2024    BUN 24 (H) 03/03/2024     03/03/2024    K 5.0 03/03/2024    CL 99 03/03/2024    CO2 30 03/03/2024     Lab Results   Component Value Date    INR 1.2 09/15/2023    PROTIME 15.7 (H) 09/15/2023          Lab Results   Component Value Date/Time    PHOS 3.0 09/20/2023 10:35 AM    PHOS 3.4 01/19/2013 05:20 AM    PHOS 3.2 01/17/2013 05:30 AM        Recent Labs     03/03/24  0715 03/03/24  1130   PH 7.23* 7.32*   PO2ART 184* 83   [id number] 81.0* 71.0*   O2SAT 97.0 94.5         Wt Readings from Last 3 Encounters:   03/04/24 76 kg (167 lb 8.8 oz)   10/25/23 91.7 kg (202 lb 3.2 oz)   09/19/23 84.4 kg (186 lb 1.1 oz)               ASSESMENT  Ac on ch resp failure  Ac copd  pneumonia        PLAN  Cont duoneb  Steroids  On antibx  Cont o2    3/4/2024  Justin Buck MD, M.JENNIFER.

## 2024-03-04 NOTE — PROGRESS NOTES
V2.0  [facility] [place] Progress Note      Name:  Billy Ozuna /Age/Sex: 1945  (78 y.o. male)   MRN & CSN:  [id number] & [id number] Encounter Date/Time: 3/4/2024 10:35 AM EST   Location:  1815/3794-A PCP: Kera Monique (Inactive)       Hospital Day: 4    Assessment and Plan:   Billy Ozuna is a 78 y.o. male with a pmh of anemia, COPD, DM, emphysema, enlarged prostate, HTN who presents with Acute on chronic respiratory failure with hypoxia and hypercapnia (HCC)    Hospital Problems             Last Modified POA    * (Principal) Acute on chronic respiratory failure with hypoxia and hypercapnia (HCC) 3/1/2024 Yes   COPD on home O2, baseline 4 L NC  Acute respiratory hypoxia  COVID  On admission, From freestanding  OSH  --Per chart review of OSH records, patient was at home with O2 sats in the 80's, and administered a breathing treatment at home  -Continue IV Solu-Medrol   - Continue Symbicort  --guaifenesin as needed   --Troponin 62, 65, 57, 51 no chest pain.--Cardiology following, echocardiogram ordered  --Lactic acid 2.1, 1.1, 1.2  --Procal: 0.212-()  - inflammatory markers: ordered  - Rapid Covid negative, Flu negative, UA negative, VBG showed pH 7.27, pCO2 65, HCO3 29.4 from OSH,   --- repeat VBG showed pH 7.31, pCO2 66, HCO3 33.2.   --Respiratory PCR (3/2/2024) positive for Covid. -Pharmacy consulted and approved Baricitnib 4mg PO daily for 14 days or until discharge  Droplet isolation initiated. DuoNebs every 4 hours   Would prefer med Neb Q 4hours, as patient is dependent at baseline outpatient, however per protocol patient will be on inhalers every 4 hours.   3/3/2024 , patient noted to be hypoxic O2 sats in the 70's after ambulation in which a RR was called.  See RR note for further details.    - Started on Zithromax IV 3/2/2024, Rocephin IV started after RR on 3/3/2024  - pBNP 5,273 Lasix IV X 1 given-cardiology following  - ABG on 15 L nonrebreather shows pH 7.23, pCO2 81, pO2 184, HCO3  33.9, O2 sat 97%   Hypercapnia per last ABG, warrants start on BiPAP.  patient tolerating well. --Pulmonary following, appreciate recs    T2DM   --POCT ACHS  --Low dose SSI  --Hypoglycemia protocol     Hypertension  - Continue home dose Norvasc PO    Hyperlipidemia  - Continue home dose Lipitor PO      Disposition:   Current Living situation: Home  Expected Disposition: Home  Estimated D/C: 2-3 days    Diet ADULT DIET; Regular; 5 carb choices (75 gm/meal)   DVT Prophylaxis [x] Lovenox, []  Heparin, [] SCDs, [] Ambulation,  [] Eliquis, [] Xarelto   Code Status Full Code   Surrogate Decision Maker/ POA Manjeet Tovar (child)         History of Present Illness:     Chief Complaint:Shortness of breath  Patient seen and examined at bedside this am on 3 L NC, off for meals, however upon exam, patient was found with increased anxiety, work of breathing, using accessory muscles, with decreased air movement to auscultation.  He is asking for his inhalers, respiratory to bedside as requested, patient less anxious and improved air movement bilaterally noted s/p inhalers.  Plans to place patient back on BiPAP at this time, also denying any N/V/CP.      Review of Systems: Need 10 Elements   Pertinent positive/negative as discussed in HPI      Objective:     Intake/Output Summary (Last 24 hours) at 3/4/2024 1028  Last data filed at 3/4/2024 0000  Gross per 24 hour   Intake 10 ml   Output 1175 ml   Net -1165 ml      Vitals:   Vitals:    03/04/24 0500 03/04/24 0600 03/04/24 0700 03/04/24 0853   BP: 111/85 (!) 112/93 (!) 117/93    Pulse: 94 87 97    Resp: 21 (!) 7 21    Temp:   97.9 °F (36.6 °C)    TempSrc:   Oral    SpO2: 99% 99% (!) 88% 97%   Weight:       Height:                Physical Exam: Need 8 Elements   Physical Exam  Constitutional:       General: He is not in acute distress.  HENT:      Head: Normocephalic.      Nose: Nose normal.   Cardiovascular:      Rate and Rhythm: Normal rate.      Pulses: Normal pulses.      Heart

## 2024-03-04 NOTE — PROGRESS NOTES
[facility] [place] [address]  Phone: [phone]    Fax [phone]                  Prachi Valente MD, [facility]       Yony Blackwell MD, [facility]  Rosalva Nova MD, [facility]    MD Scar Paez MD Tariq Rizvi, MD Bilal Alam, MD Dr. Waseem Sajjad MD Melissa Kellis, APRN      Ana Myles, APRN  Piedad Trinidad, APRN    Fazal Velasquez, APRN  Vimal Carvajal PA-C    CARDIOLOGY  NOTE      Name:  Billy Ozuna /Age/Sex: 1945  (78 y.o. male)   MRN & CSN:  [id number] & [id number] Admission Date/Time: 3/1/2024  9:43 AM   [facility] PCP: Kera Monique (Inactive)       Hospital Day: 4    - Cardiology consult is for: Elevated troponin      ASSESSMENT/ PLAN:  Elevated troponin  History of nonischemic cardiomyopathy with reduced ejection fraction.  Nonsustained ventricular tachycardia  -Troponin non-ACS trend.  -Does not appear to be in acute congestive heart failure at this time.  -Episodes of NSVT around 8 beats.  Asymptomatic  -Echo today concerning for EF once again being reduced at 20-25% with wall motion abnormalities however poor imaging due to lung interface.  -Patient will need outpatient MUGA scan  -Reduced EF possibly secondary to COVID  -Left heart catheterization in  negative for significant coronary artery disease.  -GDMT: Initiate on Toprol-XL 25 mg daily and losartan 25 mg daily  Hypertension  -Well-controlled at this time.  -Hold Norvasc in order to make room for guideline directed medical therapy  Hyperlipidemia:   -Continue Lipitor 10 mg daily  Obstructive sleep apnea: Noncompliant with CPAP  COVID-19  COPD on 4 L supplemental oxygen chronically  Type 2 diabetes mellitus        Echo 3/4/2024    Image quality is poor. Contrast used: Definity.    Left Ventricle: Endocardial borders not well visualized; Left ventricular systolic function appears reduced with a visually

## 2024-03-05 LAB
ANION GAP SERPL CALCULATED.3IONS-SCNC: 9 MMOL/L (ref 7–16)
BASOPHILS ABSOLUTE: 0 K/CU MM
BASOPHILS RELATIVE PERCENT: 0.1 % (ref 0–1)
BUN SERPL-MCNC: 28 MG/DL (ref 6–23)
CALCIUM SERPL-MCNC: 8.9 MG/DL (ref 8.3–10.6)
CHLORIDE BLD-SCNC: 97 MMOL/L (ref 99–110)
CO2: 32 MMOL/L (ref 21–32)
CREAT SERPL-MCNC: 0.9 MG/DL (ref 0.9–1.3)
DIFFERENTIAL TYPE: ABNORMAL
EOSINOPHILS ABSOLUTE: 0 K/CU MM
EOSINOPHILS RELATIVE PERCENT: 0 % (ref 0–3)
FERRITIN: 49 NG/ML (ref 30–400)
GFR SERPL CREATININE-BSD FRML MDRD: >60 ML/MIN/1.73M2
GLUCOSE BLD-MCNC: 205 MG/DL (ref 70–99)
GLUCOSE BLD-MCNC: 267 MG/DL (ref 70–99)
GLUCOSE BLD-MCNC: 359 MG/DL (ref 70–99)
GLUCOSE BLD-MCNC: 82 MG/DL (ref 70–99)
GLUCOSE BLD-MCNC: 99 MG/DL (ref 70–99)
GLUCOSE SERPL-MCNC: 267 MG/DL (ref 70–99)
HCT VFR BLD CALC: 34.6 % (ref 42–52)
HEMOGLOBIN: 10.1 GM/DL (ref 13.5–18)
HIGH SENSITIVE C-REACTIVE PROTEIN: 3.8 MG/L (ref 0–5)
IMMATURE NEUTROPHIL %: 0.4 % (ref 0–0.43)
LYMPHOCYTES ABSOLUTE: 0.3 K/CU MM
LYMPHOCYTES RELATIVE PERCENT: 2.5 % (ref 24–44)
MCH RBC QN AUTO: 28.1 PG (ref 27–31)
MCHC RBC AUTO-ENTMCNC: 29.2 % (ref 32–36)
MCV RBC AUTO: 96.1 FL (ref 78–100)
MONOCYTES ABSOLUTE: 0.4 K/CU MM
MONOCYTES RELATIVE PERCENT: 3.5 % (ref 0–4)
NUCLEATED RBC %: 0 %
PDW BLD-RTO: 16 % (ref 11.7–14.9)
PLATELET # BLD: 326 K/CU MM (ref 140–440)
PMV BLD AUTO: 11.3 FL (ref 7.5–11.1)
POTASSIUM SERPL-SCNC: 5.4 MMOL/L (ref 3.5–5.1)
RBC # BLD: 3.6 M/CU MM (ref 4.6–6.2)
SEGMENTED NEUTROPHILS ABSOLUTE COUNT: 9.7 K/CU MM
SEGMENTED NEUTROPHILS RELATIVE PERCENT: 93.5 % (ref 36–66)
SODIUM BLD-SCNC: 138 MMOL/L (ref 135–145)
TOTAL IMMATURE NEUTOROPHIL: 0.04 K/CU MM
TOTAL NUCLEATED RBC: 0 K/CU MM
WBC # BLD: 10.4 K/CU MM (ref 4–10.5)

## 2024-03-05 PROCEDURE — 2140000000 HC CCU INTERMEDIATE R&B

## 2024-03-05 PROCEDURE — 80048 BASIC METABOLIC PNL TOTAL CA: CPT

## 2024-03-05 PROCEDURE — APPNB15 APP NON BILLABLE TIME 0-15 MINS

## 2024-03-05 PROCEDURE — 2580000003 HC RX 258: Performed by: INTERNAL MEDICINE

## 2024-03-05 PROCEDURE — 94761 N-INVAS EAR/PLS OXIMETRY MLT: CPT

## 2024-03-05 PROCEDURE — 86141 C-REACTIVE PROTEIN HS: CPT

## 2024-03-05 PROCEDURE — 6360000002 HC RX W HCPCS: Performed by: INTERNAL MEDICINE

## 2024-03-05 PROCEDURE — 94640 AIRWAY INHALATION TREATMENT: CPT

## 2024-03-05 PROCEDURE — 82962 GLUCOSE BLOOD TEST: CPT

## 2024-03-05 PROCEDURE — 6370000000 HC RX 637 (ALT 250 FOR IP)

## 2024-03-05 PROCEDURE — 36415 COLL VENOUS BLD VENIPUNCTURE: CPT

## 2024-03-05 PROCEDURE — 6370000000 HC RX 637 (ALT 250 FOR IP): Performed by: NURSE PRACTITIONER

## 2024-03-05 PROCEDURE — 2700000000 HC OXYGEN THERAPY PER DAY

## 2024-03-05 PROCEDURE — 94150 VITAL CAPACITY TEST: CPT

## 2024-03-05 PROCEDURE — 99233 SBSQ HOSP IP/OBS HIGH 50: CPT | Performed by: INTERNAL MEDICINE

## 2024-03-05 PROCEDURE — 6360000002 HC RX W HCPCS

## 2024-03-05 PROCEDURE — 2580000003 HC RX 258

## 2024-03-05 PROCEDURE — 82728 ASSAY OF FERRITIN: CPT

## 2024-03-05 PROCEDURE — 85025 COMPLETE CBC W/AUTO DIFF WBC: CPT

## 2024-03-05 PROCEDURE — 99223 1ST HOSP IP/OBS HIGH 75: CPT | Performed by: INTERNAL MEDICINE

## 2024-03-05 RX ORDER — INSULIN GLARGINE 100 [IU]/ML
10 INJECTION, SOLUTION SUBCUTANEOUS NIGHTLY
Status: DISCONTINUED | OUTPATIENT
Start: 2024-03-05 | End: 2024-03-08 | Stop reason: HOSPADM

## 2024-03-05 RX ORDER — INSULIN LISPRO 100 [IU]/ML
0-4 INJECTION, SOLUTION INTRAVENOUS; SUBCUTANEOUS NIGHTLY
Status: DISCONTINUED | OUTPATIENT
Start: 2024-03-05 | End: 2024-03-08 | Stop reason: HOSPADM

## 2024-03-05 RX ORDER — INSULIN LISPRO 100 [IU]/ML
5 INJECTION, SOLUTION INTRAVENOUS; SUBCUTANEOUS
Status: DISCONTINUED | OUTPATIENT
Start: 2024-03-05 | End: 2024-03-08 | Stop reason: HOSPADM

## 2024-03-05 RX ORDER — INSULIN LISPRO 100 [IU]/ML
0-8 INJECTION, SOLUTION INTRAVENOUS; SUBCUTANEOUS
Status: DISCONTINUED | OUTPATIENT
Start: 2024-03-05 | End: 2024-03-08 | Stop reason: HOSPADM

## 2024-03-05 RX ADMIN — IPRATROPIUM BROMIDE 2 PUFF: 17 AEROSOL, METERED RESPIRATORY (INHALATION) at 15:56

## 2024-03-05 RX ADMIN — CETIRIZINE HYDROCHLORIDE 5 MG: 10 TABLET, FILM COATED ORAL at 09:00

## 2024-03-05 RX ADMIN — VENLAFAXINE 25 MG: 25 TABLET ORAL at 21:58

## 2024-03-05 RX ADMIN — INSULIN LISPRO 4 UNITS: 100 INJECTION, SOLUTION INTRAVENOUS; SUBCUTANEOUS at 13:41

## 2024-03-05 RX ADMIN — IPRATROPIUM BROMIDE 2 PUFF: 17 AEROSOL, METERED RESPIRATORY (INHALATION) at 07:29

## 2024-03-05 RX ADMIN — ALBUTEROL SULFATE 2 PUFF: 90 AEROSOL, METERED RESPIRATORY (INHALATION) at 20:13

## 2024-03-05 RX ADMIN — INSULIN LISPRO 4 UNITS: 100 INJECTION, SOLUTION INTRAVENOUS; SUBCUTANEOUS at 09:16

## 2024-03-05 RX ADMIN — FLUTICASONE PROPIONATE 1 SPRAY: 50 SPRAY, METERED NASAL at 18:06

## 2024-03-05 RX ADMIN — SODIUM ZIRCONIUM CYCLOSILICATE 10 G: 10 POWDER, FOR SUSPENSION ORAL at 20:47

## 2024-03-05 RX ADMIN — SODIUM CHLORIDE, PRESERVATIVE FREE 10 ML: 5 INJECTION INTRAVENOUS at 09:03

## 2024-03-05 RX ADMIN — INSULIN LISPRO 5 UNITS: 100 INJECTION, SOLUTION INTRAVENOUS; SUBCUTANEOUS at 13:41

## 2024-03-05 RX ADMIN — LOSARTAN POTASSIUM 25 MG: 25 TABLET, FILM COATED ORAL at 09:01

## 2024-03-05 RX ADMIN — INSULIN LISPRO 2 UNITS: 100 INJECTION, SOLUTION INTRAVENOUS; SUBCUTANEOUS at 18:06

## 2024-03-05 RX ADMIN — BUDESONIDE AND FORMOTEROL FUMARATE DIHYDRATE 2 PUFF: 160; 4.5 AEROSOL RESPIRATORY (INHALATION) at 20:14

## 2024-03-05 RX ADMIN — PANTOPRAZOLE SODIUM 40 MG: 40 TABLET, DELAYED RELEASE ORAL at 05:28

## 2024-03-05 RX ADMIN — ENOXAPARIN SODIUM 40 MG: 100 INJECTION SUBCUTANEOUS at 08:59

## 2024-03-05 RX ADMIN — ALBUTEROL SULFATE 2 PUFF: 90 AEROSOL, METERED RESPIRATORY (INHALATION) at 11:41

## 2024-03-05 RX ADMIN — ATORVASTATIN CALCIUM 10 MG: 10 TABLET, FILM COATED ORAL at 08:59

## 2024-03-05 RX ADMIN — BARICITINIB 4 MG: 2 TABLET, FILM COATED ORAL at 09:02

## 2024-03-05 RX ADMIN — FLUTICASONE PROPIONATE 1 SPRAY: 50 SPRAY, METERED NASAL at 09:02

## 2024-03-05 RX ADMIN — WATER 40 MG: 1 INJECTION INTRAMUSCULAR; INTRAVENOUS; SUBCUTANEOUS at 09:01

## 2024-03-05 RX ADMIN — SODIUM ZIRCONIUM CYCLOSILICATE 10 G: 10 POWDER, FOR SUSPENSION ORAL at 09:18

## 2024-03-05 RX ADMIN — WATER 40 MG: 1 INJECTION INTRAMUSCULAR; INTRAVENOUS; SUBCUTANEOUS at 20:48

## 2024-03-05 RX ADMIN — BUDESONIDE AND FORMOTEROL FUMARATE DIHYDRATE 2 PUFF: 160; 4.5 AEROSOL RESPIRATORY (INHALATION) at 07:30

## 2024-03-05 RX ADMIN — ALBUTEROL SULFATE 2 PUFF: 90 AEROSOL, METERED RESPIRATORY (INHALATION) at 15:57

## 2024-03-05 RX ADMIN — IPRATROPIUM BROMIDE 2 PUFF: 17 AEROSOL, METERED RESPIRATORY (INHALATION) at 11:42

## 2024-03-05 RX ADMIN — IPRATROPIUM BROMIDE 2 PUFF: 17 AEROSOL, METERED RESPIRATORY (INHALATION) at 20:13

## 2024-03-05 RX ADMIN — ALBUTEROL SULFATE 2 PUFF: 90 AEROSOL, METERED RESPIRATORY (INHALATION) at 04:14

## 2024-03-05 RX ADMIN — METOPROLOL SUCCINATE 25 MG: 25 TABLET, EXTENDED RELEASE ORAL at 09:01

## 2024-03-05 RX ADMIN — SODIUM CHLORIDE, PRESERVATIVE FREE 10 ML: 5 INJECTION INTRAVENOUS at 20:51

## 2024-03-05 RX ADMIN — CEFTRIAXONE 1000 MG: 1 INJECTION, POWDER, FOR SOLUTION INTRAMUSCULAR; INTRAVENOUS at 09:07

## 2024-03-05 RX ADMIN — ALBUTEROL SULFATE 2 PUFF: 90 AEROSOL, METERED RESPIRATORY (INHALATION) at 07:28

## 2024-03-05 RX ADMIN — EMPAGLIFLOZIN 10 MG: 10 TABLET, FILM COATED ORAL at 13:41

## 2024-03-05 RX ADMIN — IPRATROPIUM BROMIDE 2 PUFF: 17 AEROSOL, METERED RESPIRATORY (INHALATION) at 04:14

## 2024-03-05 RX ADMIN — INSULIN LISPRO 5 UNITS: 100 INJECTION, SOLUTION INTRAVENOUS; SUBCUTANEOUS at 18:06

## 2024-03-05 ASSESSMENT — PAIN SCALES - WONG BAKER
WONGBAKER_NUMERICALRESPONSE: 0
WONGBAKER_NUMERICALRESPONSE: 0

## 2024-03-05 ASSESSMENT — PAIN SCALES - GENERAL
PAINLEVEL_OUTOF10: 0
PAINLEVEL_OUTOF10: 0

## 2024-03-05 NOTE — PROGRESS NOTES
systolic function appears reduced with a visually estimated EF of 20 - 25%. Septal wall appears akinetic; all other wall segments were not visualized.    Valves were not visualized due to lung interface.    Pericardium: Not well visualized.    Schedule muga     Poor visualization due to lung interface. Definity was used to attempt to visualize endocardial borders.    Subjective:  Billy is a 78 y.o.year old     Spoke to patient that results of his echocardiogram.  Informed him of his potentially reduced EF and treatment strategy moving forward.    Spoke with patient about requiring outpatient MUGA scan in order to further eval EF.  Also spoke with him about COVID possibly causing reduced EF at this time    Does not appear to be in acute congestive heart failure.  No crackles noted or lower extremity edema.  Decreased breath sounds bilaterally      Objective: Temperature:  Current - Temp: 98.2 °F (36.8 °C); Max - Temp  Av.4 °F (36.3 °C)  Min: 96.8 °F (36 °C)  Max: 98.2 °F (36.8 °C)    Respiratory Rate : Resp  Av.7  Min: 14  Max: 38    Pulse Range: Pulse  Av  Min: 83  Max: 98    Blood Presuure Range:  Systolic (24hrs), Av , Min:105 , Max:198   ; Diastolic (24hrs), Av, Min:65, Max:149      Pulse ox Range: SpO2  Av.2 %  Min: 96 %  Max: 100 %    24hr I & O:    Intake/Output Summary (Last 24 hours) at 3/5/2024 1255  Last data filed at 3/5/2024 0903  Gross per 24 hour   Intake 120 ml   Output --   Net 120 ml           BP (!) 125/90   Pulse 90   Temp 98.2 °F (36.8 °C) (Oral)   Resp 23   Ht 1.702 m (5' 7\")   Wt 76 kg (167 lb 8.8 oz)   SpO2 99%   BMI 26.24 kg/m²         TELEMETRY: Sinus     has a past medical history of Acid reflux, Anemia, COPD (chronic obstructive pulmonary disease) ([facility]), Dental decay, Diabetes mellitus (HCC), Emphysema of lung (HCC), Enlarged prostate, Stevens catheter in place, H/O cardiovascular stress test, Hematuria, HX OTHER MEDICAL, Hypertension, Low back sprain,  BID    cetirizine  5 mg Oral Daily    pantoprazole  40 mg Oral QAM AC    venlafaxine  25 mg Oral Daily      dextrose      sodium chloride 25 mL (24 0957)     ipratropium 0.5 mg-albuterol 2.5 mg, glucose, dextrose bolus **OR** dextrose bolus, glucagon (rDNA), dextrose, sodium chloride flush, sodium chloride, ondansetron **OR** ondansetron, polyethylene glycol, acetaminophen **OR** acetaminophen, guaiFENesin-dextromethorphan    Lab Data:  CBC:   Recent Labs     24  0553 24  0820 24  0357   WBC 15.2* 8.8 10.4   HGB 10.1* 9.9* 10.1*   HCT 33.6* 33.3* 34.6*   MCV 93.9 94.9 96.1    332 326       BMP:   Recent Labs     24  0553 24  0820 24  0357    139 138   K 5.0 4.7 5.4*   CL 99 99 97*   CO2 30 33* 32   BUN 24* 25* 28*   CREATININE 0.9 0.9 0.9       LIVER PROFILE:   Recent Labs     2453 24  0820   AST 18 16   ALT 11 12   BILITOT 0.2 0.2   ALKPHOS 120 123       PT/INR: No results for input(s): \"PROTIME\", \"INR\" in the last 72 hours.  APTT: No results for input(s): \"APTT\" in the last 72 hours.  BNP:  No results for input(s): \"BNP\" in the last 72 hours.  TROPONIN: No results for input(s): \"TROPONINT\" in the last 72 hours.  Labs, consult, tests reviewed          Vimal Carvajal PA-C, 3/5/2024 12:55 PM     I have seen ,spoken to  and examined this patient personally, independently of the KRISTY. I have reviewed the hospital care given to date and reviewed all pertinent labs and imaging.I have spoken with patient, nursing staff and provided written and verbal instructions .The above note has been reviewed     CARDIOLOGY ATTENDING ADDENDUM    HPI:  I have reviewed the above HPI  And agree with above     Pulse Range: Pulse  Av.3  Min: 83  Max: 95    Blood Presuure Range:  Systolic (24hrs), Av , Min:105 , Max:198   ; Diastolic (24hrs), Av, Min:80, Max:149      Pulse ox Range: SpO2  Av.2 %  Min: 96 %  Max: 100 %    24hr I & O:

## 2024-03-05 NOTE — CARE COORDINATION
CM in to see Pt to follow up on discharge planning.  Plan remains home with [facility] home care.  Pt denies any needs at this time.  CM following

## 2024-03-05 NOTE — PROGRESS NOTES
EF 20-25%, septal wall akinetic, recommend MUGA scan outpatient   -Cardiology Consult: Dr. Valente, started Toprol xl 25 mg and losartan 25 mg QD (HOLD Norvasc)     Hyperkalemia  -K+ 5.4 3/5, after starting losartan 25 mg 3/4  -Lokelma 10 mg x 2 doses today, recheck BMP daily     T2DM   --POCT ACHS  --Low dose SSI  --Hypoglycemia protocol     Hypertension  - Continue home dose Norvasc PO    Hyperlipidemia  - Continue home dose Lipitor PO      Disposition:   Current Living situation: Home  Expected Disposition: Home  Estimated D/C: 2-3 days  Ongoing Hospitalization: due to persistent Covid-19 sxs of SOB and fatigue, needs breathing treatments and baricitinib, along with IV antibiotics for secondary pneumonia- condition not improved yet     Diet ADULT DIET; Regular; 5 carb choices (75 gm/meal)   DVT Prophylaxis [x] Lovenox, []  Heparin, [] SCDs, [] Ambulation,  [] Eliquis, [] Xarelto   Code Status Full Code   Surrogate Decision Maker/ POA Manjeet Tovar (child)     O    History of Present Illness:     Chief Complaint:Shortness of breath  Patient seen and examined at bedside this am on 3 L NC, off for meals, however upon exam, patient was found with increased anxiety, work of breathing, using accessory muscles, with decreased air movement to auscultation.  He is asking for his inhalers, respiratory to bedside as requested, patient less anxious and improved air movement bilaterally noted s/p inhalers.  Plans to place patient back on BiPAP at this time, also denying any N/V/CP.      His breathing is about the same, no better, and is very fatigued.     Review of Systems: Need 10 Elements   Pertinent positive/negative as discussed in HPI      Objective:     Intake/Output Summary (Last 24 hours) at 3/5/2024 0858  Last data filed at 3/4/2024 2100  Gross per 24 hour   Intake 110 ml   Output --   Net 110 ml        Vitals:   Vitals:    03/05/24 0000 03/05/24 0415 03/05/24 0418 03/05/24 0500   BP: 105/80   115/82   Pulse: 93 95 85    BILITOT 0.2 0.2   ALKPHOS 120 123       Lipids:   Lab Results   Component Value Date/Time    CHOL 122 05/15/2021 04:10 AM    HDL 51 05/15/2021 04:10 AM    TRIG 41 05/15/2021 04:10 AM     Hemoglobin A1C:   Lab Results   Component Value Date/Time    LABA1C 8.6 10/20/2023 12:14 AM     TSH: No results found for: \"TSH\"  Troponin:   Lab Results   Component Value Date/Time    TROPONINT 0.028 09/17/2023 04:22 PM    TROPONINT 0.039 09/17/2023 12:45 PM    TROPONINT 0.036 09/17/2023 07:58 AM     Lactic Acid: No results for input(s): \"LACTA\" in the last 72 hours.  BNP:   Recent Labs     03/03/24  0553   PROBNP 5,273*       UA:  Lab Results   Component Value Date/Time    NITRU POSITIVE 04/29/2018 03:15 PM    COLORU YELLOW 04/29/2018 03:15 PM    WBCUA 948 04/29/2018 03:15 PM    RBCUA 11,278 04/29/2018 03:15 PM    MUCUS RARE 04/06/2018 01:20 AM    TRICHOMONAS NONE SEEN 04/29/2018 03:15 PM    YEAST OCCASIONAL 01/13/2013 02:00 AM    BACTERIA RARE 04/29/2018 03:15 PM    CLARITYU SLIGHTLY CLOUDY 04/29/2018 03:15 PM    SPECGRAV 1.014 04/29/2018 03:15 PM    LEUKOCYTESUR TRACE 04/29/2018 03:15 PM    UROBILINOGEN NORMAL 04/29/2018 03:15 PM    BILIRUBINUR NEGATIVE 04/29/2018 03:15 PM    BLOODU MODERATE 04/29/2018 03:15 PM    KETUA NEGATIVE 04/29/2018 03:15 PM     Urine Cultures: No results found for: \"LABURIN\"  Blood Cultures: No results found for: \"BC\"  No results found for: \"BLOODCULT2\"  Organism:   Lab Results   Component Value Date/Time    ORG ENTBC 09/07/2018 08:45 AM       Imaging/Diagnostics Last 24 Hours   No results found.    Electronically signed by ERNESTO MONTGOMERY CNP on 3/5/2024 at 8:58 AM

## 2024-03-05 NOTE — CONSULTS
months and if he still continues to have low LVEF then consider ICD     Uptitrate B-blocker  Please keep potassium between 4 to 4.5 and Magnesium between 2 to 2.2   Arb on hold for hyperkalemia for now  If BP tolerates can start low dose isosorbide and hydralazine     Will follow along          Thanks again for allowing me to participate in care of this patient. Please call me if you have any questions.    With best regards.      Sav Mirza MD, 3/5/2024 4:08 PM     Please note this report has been partially produced using speech recognition software and may contain errors related to that system including errors in grammar, punctuation, and spelling, as well as words and phrases that may be inappropriate. If there are any questions or concerns please feel free to contact the dictating provider for clarification.

## 2024-03-05 NOTE — PROGRESS NOTES
pulmonary      SUBJECTIVE:  feels fair     OBJECTIVE    VITALS:  /82   Pulse 89   Temp 97.1 °F (36.2 °C) (Oral)   Resp 24   Ht 1.702 m (5' 7\")   Wt 76 kg (167 lb 8.8 oz)   SpO2 100%   BMI 26.24 kg/m²   HEAD AND FACE EXAM:  No throat injection, no active exudate,no thrush  NECK EXAM;No JVD, no masses, symmetrical  CHEST EXAM; Expansion equal and symmetrical, no masses  LUNG EXAM; Good breath sounds bilaterally. There are expiratory wheezes both lungs, there are crackles at both lung bases  CARDIOVASCULAR EXAM: Positive S1 and S2, no S3 or S4, no clicks ,no murmurs  RIGHT AND LEFT LOWER EXTRIMITY EXAM: No edema, no swelling, no inflamation  CNS EXAM: Alert and oriented X3          LABS   Lab Results   Component Value Date    WBC 10.4 03/05/2024    HGB 10.1 (L) 03/05/2024    HCT 34.6 (L) 03/05/2024    MCV 96.1 03/05/2024     03/05/2024     Lab Results   Component Value Date    CREATININE 0.9 03/05/2024    BUN 28 (H) 03/05/2024     03/05/2024    K 5.4 (H) 03/05/2024    CL 97 (L) 03/05/2024    CO2 32 03/05/2024     Lab Results   Component Value Date    INR 1.2 09/15/2023    PROTIME 15.7 (H) 09/15/2023          Lab Results   Component Value Date/Time    PHOS 3.0 09/20/2023 10:35 AM    PHOS 3.4 01/19/2013 05:20 AM    PHOS 3.2 01/17/2013 05:30 AM        Recent Labs     03/03/24  0715 03/03/24  1130   PH 7.23* 7.32*   PO2ART 184* 83   [id number] 81.0* 71.0*   O2SAT 97.0 94.5         Wt Readings from Last 3 Encounters:   03/04/24 76 kg (167 lb 8.8 oz)   10/25/23 91.7 kg (202 lb 3.2 oz)   09/19/23 84.4 kg (186 lb 1.1 oz)               ASSESMENT  Ac on ch resp failure  Ac copd  pneumonia        PLAN  Bd rx  Steroids  On antibx  Not using bipap. D/w pt in detail and now agreeable    3/5/2024  Justin Buck MD, M.D.

## 2024-03-06 LAB
ANION GAP SERPL CALCULATED.3IONS-SCNC: 6 MMOL/L (ref 7–16)
BASOPHILS ABSOLUTE: 0 K/CU MM
BASOPHILS RELATIVE PERCENT: 0 % (ref 0–1)
BUN SERPL-MCNC: 35 MG/DL (ref 6–23)
CALCIUM SERPL-MCNC: 9 MG/DL (ref 8.3–10.6)
CHLORIDE BLD-SCNC: 99 MMOL/L (ref 99–110)
CO2: 33 MMOL/L (ref 21–32)
CREAT SERPL-MCNC: 0.9 MG/DL (ref 0.9–1.3)
DIFFERENTIAL TYPE: ABNORMAL
EOSINOPHILS ABSOLUTE: 0 K/CU MM
EOSINOPHILS RELATIVE PERCENT: 0 % (ref 0–3)
GFR SERPL CREATININE-BSD FRML MDRD: >60 ML/MIN/1.73M2
GLUCOSE BLD-MCNC: 157 MG/DL (ref 70–99)
GLUCOSE BLD-MCNC: 160 MG/DL (ref 70–99)
GLUCOSE BLD-MCNC: 162 MG/DL (ref 70–99)
GLUCOSE BLD-MCNC: 239 MG/DL (ref 70–99)
GLUCOSE BLD-MCNC: 94 MG/DL (ref 70–99)
GLUCOSE SERPL-MCNC: 233 MG/DL (ref 70–99)
HCT VFR BLD CALC: 36 % (ref 42–52)
HEMOGLOBIN: 10.7 GM/DL (ref 13.5–18)
IMMATURE NEUTROPHIL %: 0.5 % (ref 0–0.43)
LYMPHOCYTES ABSOLUTE: 0.3 K/CU MM
LYMPHOCYTES RELATIVE PERCENT: 2.8 % (ref 24–44)
MAGNESIUM: 1.8 MG/DL (ref 1.8–2.4)
MCH RBC QN AUTO: 28.5 PG (ref 27–31)
MCHC RBC AUTO-ENTMCNC: 29.7 % (ref 32–36)
MCV RBC AUTO: 95.7 FL (ref 78–100)
MONOCYTES ABSOLUTE: 0.3 K/CU MM
MONOCYTES RELATIVE PERCENT: 2.8 % (ref 0–4)
NUCLEATED RBC %: 0 %
PDW BLD-RTO: 15.9 % (ref 11.7–14.9)
PLATELET # BLD: 340 K/CU MM (ref 140–440)
PMV BLD AUTO: 11.2 FL (ref 7.5–11.1)
POTASSIUM SERPL-SCNC: 5.1 MMOL/L (ref 3.5–5.1)
PRO-BNP: 4318 PG/ML
RBC # BLD: 3.76 M/CU MM (ref 4.6–6.2)
SEGMENTED NEUTROPHILS ABSOLUTE COUNT: 10.6 K/CU MM
SEGMENTED NEUTROPHILS RELATIVE PERCENT: 93.9 % (ref 36–66)
SODIUM BLD-SCNC: 138 MMOL/L (ref 135–145)
TOTAL IMMATURE NEUTOROPHIL: 0.06 K/CU MM
TOTAL NUCLEATED RBC: 0 K/CU MM
WBC # BLD: 11.2 K/CU MM (ref 4–10.5)

## 2024-03-06 PROCEDURE — 6370000000 HC RX 637 (ALT 250 FOR IP)

## 2024-03-06 PROCEDURE — 85025 COMPLETE CBC W/AUTO DIFF WBC: CPT

## 2024-03-06 PROCEDURE — 2580000003 HC RX 258: Performed by: INTERNAL MEDICINE

## 2024-03-06 PROCEDURE — 2580000003 HC RX 258

## 2024-03-06 PROCEDURE — 83735 ASSAY OF MAGNESIUM: CPT

## 2024-03-06 PROCEDURE — 6360000002 HC RX W HCPCS: Performed by: INTERNAL MEDICINE

## 2024-03-06 PROCEDURE — 94640 AIRWAY INHALATION TREATMENT: CPT

## 2024-03-06 PROCEDURE — 6360000002 HC RX W HCPCS

## 2024-03-06 PROCEDURE — 36415 COLL VENOUS BLD VENIPUNCTURE: CPT

## 2024-03-06 PROCEDURE — 6370000000 HC RX 637 (ALT 250 FOR IP): Performed by: NURSE PRACTITIONER

## 2024-03-06 PROCEDURE — 2700000000 HC OXYGEN THERAPY PER DAY

## 2024-03-06 PROCEDURE — 82962 GLUCOSE BLOOD TEST: CPT

## 2024-03-06 PROCEDURE — 94660 CPAP INITIATION&MGMT: CPT

## 2024-03-06 PROCEDURE — 80048 BASIC METABOLIC PNL TOTAL CA: CPT

## 2024-03-06 PROCEDURE — APPNB30 APP NON BILLABLE TIME 0-30 MINS

## 2024-03-06 PROCEDURE — 94761 N-INVAS EAR/PLS OXIMETRY MLT: CPT

## 2024-03-06 PROCEDURE — 83880 ASSAY OF NATRIURETIC PEPTIDE: CPT

## 2024-03-06 PROCEDURE — 2140000000 HC CCU INTERMEDIATE R&B

## 2024-03-06 PROCEDURE — 99232 SBSQ HOSP IP/OBS MODERATE 35: CPT | Performed by: INTERNAL MEDICINE

## 2024-03-06 RX ORDER — METOPROLOL SUCCINATE 50 MG/1
50 TABLET, EXTENDED RELEASE ORAL DAILY
Status: DISCONTINUED | OUTPATIENT
Start: 2024-03-07 | End: 2024-03-08 | Stop reason: HOSPADM

## 2024-03-06 RX ORDER — METOPROLOL SUCCINATE 25 MG/1
25 TABLET, EXTENDED RELEASE ORAL ONCE
Status: COMPLETED | OUTPATIENT
Start: 2024-03-06 | End: 2024-03-06

## 2024-03-06 RX ADMIN — IPRATROPIUM BROMIDE 2 PUFF: 17 AEROSOL, METERED RESPIRATORY (INHALATION) at 11:53

## 2024-03-06 RX ADMIN — INSULIN LISPRO 5 UNITS: 100 INJECTION, SOLUTION INTRAVENOUS; SUBCUTANEOUS at 13:06

## 2024-03-06 RX ADMIN — IPRATROPIUM BROMIDE 2 PUFF: 17 AEROSOL, METERED RESPIRATORY (INHALATION) at 01:05

## 2024-03-06 RX ADMIN — FLUTICASONE PROPIONATE 1 SPRAY: 50 SPRAY, METERED NASAL at 18:34

## 2024-03-06 RX ADMIN — BUDESONIDE AND FORMOTEROL FUMARATE DIHYDRATE 2 PUFF: 160; 4.5 AEROSOL RESPIRATORY (INHALATION) at 09:19

## 2024-03-06 RX ADMIN — BARICITINIB 4 MG: 2 TABLET, FILM COATED ORAL at 08:32

## 2024-03-06 RX ADMIN — SODIUM CHLORIDE, PRESERVATIVE FREE 10 ML: 5 INJECTION INTRAVENOUS at 08:31

## 2024-03-06 RX ADMIN — EMPAGLIFLOZIN 10 MG: 10 TABLET, FILM COATED ORAL at 08:30

## 2024-03-06 RX ADMIN — CEFTRIAXONE 1000 MG: 1 INJECTION, POWDER, FOR SOLUTION INTRAMUSCULAR; INTRAVENOUS at 08:41

## 2024-03-06 RX ADMIN — ALBUTEROL SULFATE 2 PUFF: 90 AEROSOL, METERED RESPIRATORY (INHALATION) at 09:17

## 2024-03-06 RX ADMIN — IPRATROPIUM BROMIDE 2 PUFF: 17 AEROSOL, METERED RESPIRATORY (INHALATION) at 04:38

## 2024-03-06 RX ADMIN — METOPROLOL SUCCINATE 25 MG: 25 TABLET, EXTENDED RELEASE ORAL at 08:30

## 2024-03-06 RX ADMIN — IPRATROPIUM BROMIDE 2 PUFF: 17 AEROSOL, METERED RESPIRATORY (INHALATION) at 09:18

## 2024-03-06 RX ADMIN — INSULIN GLARGINE 10 UNITS: 100 INJECTION, SOLUTION SUBCUTANEOUS at 20:32

## 2024-03-06 RX ADMIN — CETIRIZINE HYDROCHLORIDE 5 MG: 10 TABLET, FILM COATED ORAL at 08:30

## 2024-03-06 RX ADMIN — METOPROLOL SUCCINATE 25 MG: 25 TABLET, EXTENDED RELEASE ORAL at 13:06

## 2024-03-06 RX ADMIN — INSULIN LISPRO 5 UNITS: 100 INJECTION, SOLUTION INTRAVENOUS; SUBCUTANEOUS at 08:30

## 2024-03-06 RX ADMIN — WATER 40 MG: 1 INJECTION INTRAMUSCULAR; INTRAVENOUS; SUBCUTANEOUS at 08:31

## 2024-03-06 RX ADMIN — ENOXAPARIN SODIUM 40 MG: 100 INJECTION SUBCUTANEOUS at 08:30

## 2024-03-06 RX ADMIN — VENLAFAXINE 25 MG: 25 TABLET ORAL at 22:05

## 2024-03-06 RX ADMIN — SODIUM CHLORIDE, PRESERVATIVE FREE 10 ML: 5 INJECTION INTRAVENOUS at 20:32

## 2024-03-06 RX ADMIN — PANTOPRAZOLE SODIUM 40 MG: 40 TABLET, DELAYED RELEASE ORAL at 06:30

## 2024-03-06 RX ADMIN — ATORVASTATIN CALCIUM 10 MG: 10 TABLET, FILM COATED ORAL at 08:29

## 2024-03-06 RX ADMIN — IPRATROPIUM BROMIDE 2 PUFF: 17 AEROSOL, METERED RESPIRATORY (INHALATION) at 15:36

## 2024-03-06 RX ADMIN — ALBUTEROL SULFATE 2 PUFF: 90 AEROSOL, METERED RESPIRATORY (INHALATION) at 20:50

## 2024-03-06 RX ADMIN — ALBUTEROL SULFATE 2 PUFF: 90 AEROSOL, METERED RESPIRATORY (INHALATION) at 01:05

## 2024-03-06 RX ADMIN — IPRATROPIUM BROMIDE 2 PUFF: 17 AEROSOL, METERED RESPIRATORY (INHALATION) at 20:51

## 2024-03-06 RX ADMIN — WATER 40 MG: 1 INJECTION INTRAMUSCULAR; INTRAVENOUS; SUBCUTANEOUS at 20:32

## 2024-03-06 RX ADMIN — ALBUTEROL SULFATE 2 PUFF: 90 AEROSOL, METERED RESPIRATORY (INHALATION) at 04:38

## 2024-03-06 RX ADMIN — ALBUTEROL SULFATE 2 PUFF: 90 AEROSOL, METERED RESPIRATORY (INHALATION) at 11:53

## 2024-03-06 RX ADMIN — FLUTICASONE PROPIONATE 1 SPRAY: 50 SPRAY, METERED NASAL at 08:32

## 2024-03-06 RX ADMIN — ALBUTEROL SULFATE 2 PUFF: 90 AEROSOL, METERED RESPIRATORY (INHALATION) at 15:37

## 2024-03-06 RX ADMIN — BUDESONIDE AND FORMOTEROL FUMARATE DIHYDRATE 2 PUFF: 160; 4.5 AEROSOL RESPIRATORY (INHALATION) at 20:52

## 2024-03-06 ASSESSMENT — PAIN SCALES - GENERAL
PAINLEVEL_OUTOF10: 0

## 2024-03-06 ASSESSMENT — PAIN SCALES - WONG BAKER
WONGBAKER_NUMERICALRESPONSE: 0

## 2024-03-06 NOTE — PROGRESS NOTES
NSVT  SVT with aberrancy  Non ischemic cardiomyopathy  COVID 19  COPD  QUE  HTN  HLD  DM-2    Patient with NSVT episode once and also short SVT with a berrancy  Patient has non ischemic cardiomyopathy  Patient though with COVID right now and could be infection related cardiomyopathy  Patient will need Goal directed medical therapy for 3 months and if he still continues to have low LVEF then consider ICD    Uptitrate B-blocker  Please keep potassium between 4 to 4.5 and Magnesium between 2 to 2.2   Arb on hold for hyperkalemia for now  If BP tolerates can start low dose isosorbide and hydralazine    Will follow along  Full note to follow

## 2024-03-06 NOTE — PROGRESS NOTES
[facility] [place] [address]  Phone: [phone]    Fax [phone]                  Prachi Valente MD, [facility]       Yony Blackwell MD, [facility]  Rosalva Nova MD, [facility]    MD Scar Paez MD Tariq Rizvi, MD Bilal Alam, MD Dr. Waseem Sajjad MD Melissa Kellis, APRN      Ana Myles, APRBITA Trinidad, APRBITA Velasquez, APRN  Vimal Carvajal PA-C    CARDIOLOGY  NOTE      Name:  Billy Ozuna /Age/Sex: 1945  (78 y.o. male)   MRN & CSN:  [id number] & [id number] Admission Date/Time: 3/1/2024  9:43 AM   [facility]  [facility] PCP: Kera Monique (Inactive)       Hospital Day: 6    - Cardiology consult is for: Elevated troponin      ASSESSMENT/ PLAN:  Elevated troponin  History of nonischemic cardiomyopathy with reduced ejection fraction.  Nonsustained ventricular tachycardia  -Troponin non-ACS trend.  -Does not appear to be in acute congestive heart failure at this time.  -No further episodes of ventricular tachycardia noted overnight  -Echo concerning for EF once again being reduced at 20-25% with wall motion abnormalities however poor imaging due to lung interface.  -Patient will need outpatient MUGA scan  -Reduced EF possibly secondary to COVID  -Left heart catheterization in  negative for significant coronary artery disease.  -GDMT: Increase Toprol-XL to 50 mg daily and continue Jardiance 10 mg daily.  Hold losartan due to hyperkalemia.   Hypertension  -Well-controlled at this time.  -Hold Norvasc in order to make room for guideline directed medical therapy  Hyperlipidemia:   -Continue Lipitor 10 mg daily  Obstructive sleep apnea: Noncompliant with CPAP  COVID-19  COPD on 4 L supplemental oxygen chronically  Type 2 diabetes mellitus      Cardiology will sign off, please call with any questions.  Patient to follow-up in clinic .       Echo 3/4/2024    Image quality is poor.  °F (36.2 °C) (Oral)   Resp 27   Ht 1.702 m (5' 7\")   Wt 76 kg (167 lb 8.8 oz)   SpO2 97%   BMI 26.24 kg/m²       Physical Exam:  General:  Awake, alert, NAD  Head:normal  Eye:normal  Neck:  No JVD   Chest:  Clear to auscultation, respiration easy  Cardiovascular:  RRR S1S2  Abdomen:   nontender  Extremities:  tr edema  Pulses; palpable  Neuro: grossly normal      MEDICAL DECISION MAKING;    Pt assessed , chart reviewed, patient examined examined , all available data was reviewed, following is the plan which was discussed with KRISTY as well:    -Elevated troponin probably type II elevation from his primary process which is pulmonary and COVID  -Cath in May 2021 was unremarkable and his EF was low however had no CAD suggestive of nonischemic cardiomyopathy discussion of ICD as outpatient  -COVID being managed by primary team  -Diabetes on glipizide and insulin coverage which will be continued  -Hypertension on Norvasc 10 mg p.o. daily which will be continued and monitored  -EKG is nonspecific changes troponin is elevated however it flat-BNP is elevated as well  -osap on cpap  - copd on 4 l  -hfref on 20-25%  has no cad    Will sign off for now reconsult as needed          BONNIE TAYLOR MD Merged with [facility]

## 2024-03-06 NOTE — PLAN OF CARE
Problem: Chronic Conditions and Co-morbidities  Goal: Patient's chronic conditions and co-morbidity symptoms are monitored and maintained or improved  3/5/2024 2336 by Ezra Linares RN  Outcome: Progressing  3/5/2024 1428 by Yvan Acevedo RN  Outcome: Progressing     Problem: Pain  Goal: Verbalizes/displays adequate comfort level or baseline comfort level  Outcome: Progressing     Problem: Discharge Planning  Goal: Discharge to home or other facility with appropriate resources  3/5/2024 2336 by Ezra Linares RN  Outcome: Progressing  3/5/2024 1428 by Yvan Acevedo RN  Outcome: Progressing     Problem: ABCDS Injury Assessment  Goal: Absence of physical injury  3/5/2024 2336 by Ezra Linares RN  Outcome: Progressing  3/5/2024 1428 by Yvan Acevedo RN  Outcome: Progressing

## 2024-03-06 NOTE — PROGRESS NOTES
V2.0  [facility] Progress Note      Name:  Billy Ozuna /Age/Sex: 1945  (78 y.o. male)   MRN & CSN:  [id number] & [id number] Encounter Date/Time: 3/6/2024 10:35 AM EST   [facility]  [facility]-A PCP: Kera Monique (Inactive)       Hospital Day: 6    Assessment and Plan:   Billy Ozuna is a 78 y.o. male with a pmh of anemia, COPD, DM, emphysema, enlarged prostate, HTN who presents with Acute on chronic respiratory failure with hypoxia and hypercapnia (HCC)    Hospital Problems             Last Modified POA    * (Principal) Acute on chronic respiratory failure with hypoxia and hypercapnia (HCC) 3/1/2024 Yes     COPD on home O2, baseline 4 L NC  Acute respiratory hypoxia  COVID-19   On admission, From freestanding  OSH  --Per chart review of OSH records, patient was at home with O2 sats in the 80's, and administered a breathing treatment at home  -Continue IV Solu-Medrol, Symbicort, guaifenesin as needed, DuoNebs Q4 hrs, Droplet Precautions  --Troponin 62, 65, 57, 51 no chest pain.--Cardiology following, echocardiogram ordered  --Lactic acid 2.1, 1.1, 1.2  --Procal: 0.212-()  - Rapid Covid negative, Flu negative, UA negative, VBG showed pH 7.27, pCO2 65, HCO3 29.4 from OSH,   --- repeat VBG showed pH 7.31, pCO2 66, HCO3 33.2.   --Respiratory PCR (3/2/2024) positive for Covid. -Pharmacy consulted and approved Baricitnib 4mg PO daily for 14 days or until discharge  -  3/3/2024 , patient noted to be hypoxic O2 sats in the 70's after ambulation in which a RR was called.  See RR note for further details.    - Started on Zithromax IV 3/2/2024, Rocephin IV started after RR on 3/3/2024  - pBNP 5,273 Lasix IV X 1 given-cardiology following  - ABG on 15 L nonrebreather shows pH 7.23, pCO2 81, pO2 184, HCO3 33.9, O2 sat 97%   Hypercapnia per last ABG, warrants start on BiPAP.  patient tolerating well. --Pulmonary following, appreciate recs  -ECHO:  reduced LV function, EF 20-25%, septal wall akinetic,

## 2024-03-06 NOTE — PROGRESS NOTES
pulmonary      SUBJECTIVE:  seen early am and sleeping     OBJECTIVE    VITALS:  BP (!) 122/95   Pulse 85   Temp 97.7 °F (36.5 °C) (Oral)   Resp 16   Ht 1.702 m (5' 7\")   Wt 76 kg (167 lb 8.8 oz)   SpO2 96%   BMI 26.24 kg/m²   HEAD AND FACE EXAM:  No throat injection, no active exudate,no thrush  NECK EXAM;No JVD, no masses, symmetrical  CHEST EXAM; Expansion equal and symmetrical, no masses  LUNG EXAM; Good breath sounds bilaterally. There are expiratory wheezes both lungs, there are crackles at both lung bases  CARDIOVASCULAR EXAM: Positive S1 and S2, no S3 or S4, no clicks ,no murmurs  RIGHT AND LEFT LOWER EXTRIMITY EXAM: No edema, no swelling,           LABS   Lab Results   Component Value Date    WBC 11.2 (H) 03/06/2024    HGB 10.7 (L) 03/06/2024    HCT 36.0 (L) 03/06/2024    MCV 95.7 03/06/2024     03/06/2024     Lab Results   Component Value Date    CREATININE 0.9 03/06/2024    BUN 35 (H) 03/06/2024     03/06/2024    K 5.1 03/06/2024    CL 99 03/06/2024    CO2 33 (H) 03/06/2024     Lab Results   Component Value Date    INR 1.2 09/15/2023    PROTIME 15.7 (H) 09/15/2023          Lab Results   Component Value Date/Time    PHOS 3.0 09/20/2023 10:35 AM    PHOS 3.4 01/19/2013 05:20 AM    PHOS 3.2 01/17/2013 05:30 AM        Recent Labs     03/03/24  1130   PH 7.32*   PO2ART 83   [id number] 71.0*   O2SAT 94.5         Wt Readings from Last 3 Encounters:   03/04/24 76 kg (167 lb 8.8 oz)   10/25/23 91.7 kg (202 lb 3.2 oz)   09/19/23 84.4 kg (186 lb 1.1 oz)               ASSESMENT  Ac on ch resp failure  Ac copd  pneumonia        PLAN  Antibx  Steroids  duoneb    3/6/2024  Justin Buck MD, MKEIKO.

## 2024-03-06 NOTE — PROGRESS NOTES
Physician Progress Note      PATIENT:               MERCEDEZ LENNON  CSN #:                  [id number]  :                       1945  ADMIT DATE:       3/1/2024 9:43 AM  DISCH DATE:  RESPONDING  PROVIDER #:        Prachi Valente MD          QUERY TEXT:    Patient admitted with chest pain.  If possible, please document in the   progress notes and discharge summary if you are evaluating and/or treating any   of the following:    The medical record reflects the following:  Risk Factors: Cardiomyopathy, COVID, NSVT  Clinical Indicators: Cardiology note on 3/5 \"Elevated troponin History of   nonischemic cardiomyopathy with reduced ejection fraction. Nonsustained   ventricular tachycardia...-Echo concerning for EF once again being reduced at   20-25% with wall motion abnormalities however poor imaging due to lung   interface....Reduced EF possibly secondary to COVID.  Treatment: Cardiology consult, echo, troponins, EKG.      Thank you,  Karen PARIKH, RN, [facility] [phone]  Options provided:  -- Type 2 MI  -- Demand Ischemia only, no MI  -- Elevated troponin without myocardial injury  -- Other - I will add my own diagnosis  -- Disagree - Not applicable / Not valid  -- Disagree - Clinically unable to determine / Unknown  -- Refer to Clinical Documentation Reviewer    PROVIDER RESPONSE TEXT:    This patient has a Type 2 MI.    Query created by: Karen Beckett on 3/6/2024 11:38 AM      Electronically signed by:  Prachi Valente MD 3/6/2024 12:02 PM

## 2024-03-06 NOTE — PLAN OF CARE
Problem: Discharge Planning  Goal: Discharge to home or other facility with appropriate resources  Outcome: Progressing     Problem: ABCDS Injury Assessment  Goal: Absence of physical injury  Outcome: Progressing     Problem: Safety - Adult  Goal: Free from fall injury  Outcome: Progressing     Problem: Chronic Conditions and Co-morbidities  Goal: Patient's chronic conditions and co-morbidity symptoms are monitored and maintained or improved  Outcome: Progressing     Problem: Pain  Goal: Verbalizes/displays adequate comfort level or baseline comfort level  Outcome: Progressing

## 2024-03-07 LAB
ALBUMIN SERPL-MCNC: 3.8 GM/DL (ref 3.4–5)
ALP BLD-CCNC: 142 IU/L (ref 40–129)
ALT SERPL-CCNC: 21 U/L (ref 10–40)
ANION GAP SERPL CALCULATED.3IONS-SCNC: 8 MMOL/L (ref 7–16)
AST SERPL-CCNC: 19 IU/L (ref 15–37)
BASOPHILS ABSOLUTE: 0 K/CU MM
BASOPHILS RELATIVE PERCENT: 0 % (ref 0–1)
BILIRUB SERPL-MCNC: 0.3 MG/DL (ref 0–1)
BILIRUBIN DIRECT: 0.2 MG/DL (ref 0–0.3)
BILIRUBIN, INDIRECT: 0.1 MG/DL (ref 0–0.7)
BUN SERPL-MCNC: 32 MG/DL (ref 6–23)
CALCIUM SERPL-MCNC: 8.7 MG/DL (ref 8.3–10.6)
CHLORIDE BLD-SCNC: 102 MMOL/L (ref 99–110)
CO2: 33 MMOL/L (ref 21–32)
CREAT SERPL-MCNC: 0.9 MG/DL (ref 0.9–1.3)
DIFFERENTIAL TYPE: ABNORMAL
EOSINOPHILS ABSOLUTE: 0 K/CU MM
EOSINOPHILS RELATIVE PERCENT: 0 % (ref 0–3)
GFR SERPL CREATININE-BSD FRML MDRD: >60 ML/MIN/1.73M2
GLUCOSE BLD-MCNC: 138 MG/DL (ref 70–99)
GLUCOSE BLD-MCNC: 154 MG/DL (ref 70–99)
GLUCOSE BLD-MCNC: 163 MG/DL (ref 70–99)
GLUCOSE BLD-MCNC: 171 MG/DL (ref 70–99)
GLUCOSE SERPL-MCNC: 119 MG/DL (ref 70–99)
HCT VFR BLD CALC: 38.5 % (ref 42–52)
HEMOGLOBIN: 11.4 GM/DL (ref 13.5–18)
IMMATURE NEUTROPHIL %: 0.5 % (ref 0–0.43)
LYMPHOCYTES ABSOLUTE: 0.5 K/CU MM
LYMPHOCYTES RELATIVE PERCENT: 4.2 % (ref 24–44)
MCH RBC QN AUTO: 28.4 PG (ref 27–31)
MCHC RBC AUTO-ENTMCNC: 29.6 % (ref 32–36)
MCV RBC AUTO: 95.8 FL (ref 78–100)
MONOCYTES ABSOLUTE: 0.7 K/CU MM
MONOCYTES RELATIVE PERCENT: 6.1 % (ref 0–4)
NUCLEATED RBC %: 0 %
PDW BLD-RTO: 15.9 % (ref 11.7–14.9)
PLATELET # BLD: 356 K/CU MM (ref 140–440)
PMV BLD AUTO: 11 FL (ref 7.5–11.1)
POTASSIUM SERPL-SCNC: 4.9 MMOL/L (ref 3.5–5.1)
RBC # BLD: 4.02 M/CU MM (ref 4.6–6.2)
SEGMENTED NEUTROPHILS ABSOLUTE COUNT: 10.3 K/CU MM
SEGMENTED NEUTROPHILS RELATIVE PERCENT: 89.2 % (ref 36–66)
SODIUM BLD-SCNC: 143 MMOL/L (ref 135–145)
TOTAL IMMATURE NEUTOROPHIL: 0.06 K/CU MM
TOTAL NUCLEATED RBC: 0 K/CU MM
TOTAL PROTEIN: 6.2 GM/DL (ref 6.4–8.2)
WBC # BLD: 11.6 K/CU MM (ref 4–10.5)

## 2024-03-07 PROCEDURE — 2580000003 HC RX 258: Performed by: INTERNAL MEDICINE

## 2024-03-07 PROCEDURE — 82248 BILIRUBIN DIRECT: CPT

## 2024-03-07 PROCEDURE — APPNB60 APP NON BILLABLE TIME 46-60 MINS: Performed by: NURSE PRACTITIONER

## 2024-03-07 PROCEDURE — 82962 GLUCOSE BLOOD TEST: CPT

## 2024-03-07 PROCEDURE — 36415 COLL VENOUS BLD VENIPUNCTURE: CPT

## 2024-03-07 PROCEDURE — 6360000002 HC RX W HCPCS: Performed by: INTERNAL MEDICINE

## 2024-03-07 PROCEDURE — 6370000000 HC RX 637 (ALT 250 FOR IP)

## 2024-03-07 PROCEDURE — 85025 COMPLETE CBC W/AUTO DIFF WBC: CPT

## 2024-03-07 PROCEDURE — 2140000000 HC CCU INTERMEDIATE R&B

## 2024-03-07 PROCEDURE — 94150 VITAL CAPACITY TEST: CPT

## 2024-03-07 PROCEDURE — 94660 CPAP INITIATION&MGMT: CPT

## 2024-03-07 PROCEDURE — 2700000000 HC OXYGEN THERAPY PER DAY

## 2024-03-07 PROCEDURE — 94761 N-INVAS EAR/PLS OXIMETRY MLT: CPT

## 2024-03-07 PROCEDURE — 94669 MECHANICAL CHEST WALL OSCILL: CPT

## 2024-03-07 PROCEDURE — 6360000002 HC RX W HCPCS

## 2024-03-07 PROCEDURE — 6370000000 HC RX 637 (ALT 250 FOR IP): Performed by: NURSE PRACTITIONER

## 2024-03-07 PROCEDURE — 94640 AIRWAY INHALATION TREATMENT: CPT

## 2024-03-07 PROCEDURE — 2580000003 HC RX 258

## 2024-03-07 PROCEDURE — 80053 COMPREHEN METABOLIC PANEL: CPT

## 2024-03-07 RX ORDER — METOPROLOL SUCCINATE 50 MG/1
50 TABLET, EXTENDED RELEASE ORAL DAILY
Qty: 90 TABLET | Refills: 3 | Status: SHIPPED | OUTPATIENT
Start: 2024-03-07

## 2024-03-07 RX ORDER — LOSARTAN POTASSIUM 25 MG/1
25 TABLET ORAL DAILY
Qty: 90 TABLET | Refills: 3 | Status: SHIPPED | OUTPATIENT
Start: 2024-03-07

## 2024-03-07 RX ORDER — LOSARTAN POTASSIUM 25 MG/1
25 TABLET ORAL DAILY
Qty: 30 TABLET | Refills: 3 | Status: SHIPPED | OUTPATIENT
Start: 2024-03-08

## 2024-03-07 RX ORDER — METOPROLOL SUCCINATE 50 MG/1
50 TABLET, EXTENDED RELEASE ORAL DAILY
Qty: 30 TABLET | Refills: 3 | Status: SHIPPED | OUTPATIENT
Start: 2024-03-08

## 2024-03-07 RX ADMIN — ALBUTEROL SULFATE 2 PUFF: 90 AEROSOL, METERED RESPIRATORY (INHALATION) at 12:07

## 2024-03-07 RX ADMIN — BUDESONIDE AND FORMOTEROL FUMARATE DIHYDRATE 2 PUFF: 160; 4.5 AEROSOL RESPIRATORY (INHALATION) at 08:49

## 2024-03-07 RX ADMIN — IPRATROPIUM BROMIDE 2 PUFF: 17 AEROSOL, METERED RESPIRATORY (INHALATION) at 16:20

## 2024-03-07 RX ADMIN — ATORVASTATIN CALCIUM 10 MG: 10 TABLET, FILM COATED ORAL at 09:21

## 2024-03-07 RX ADMIN — SODIUM CHLORIDE, PRESERVATIVE FREE 10 ML: 5 INJECTION INTRAVENOUS at 20:42

## 2024-03-07 RX ADMIN — SODIUM CHLORIDE, PRESERVATIVE FREE 10 ML: 5 INJECTION INTRAVENOUS at 09:22

## 2024-03-07 RX ADMIN — ALBUTEROL SULFATE 2 PUFF: 90 AEROSOL, METERED RESPIRATORY (INHALATION) at 08:49

## 2024-03-07 RX ADMIN — ALBUTEROL SULFATE 2 PUFF: 90 AEROSOL, METERED RESPIRATORY (INHALATION) at 20:10

## 2024-03-07 RX ADMIN — FLUTICASONE PROPIONATE 1 SPRAY: 50 SPRAY, METERED NASAL at 18:30

## 2024-03-07 RX ADMIN — METOPROLOL SUCCINATE 50 MG: 50 TABLET, EXTENDED RELEASE ORAL at 09:21

## 2024-03-07 RX ADMIN — IPRATROPIUM BROMIDE 2 PUFF: 17 AEROSOL, METERED RESPIRATORY (INHALATION) at 08:49

## 2024-03-07 RX ADMIN — EMPAGLIFLOZIN 10 MG: 10 TABLET, FILM COATED ORAL at 09:21

## 2024-03-07 RX ADMIN — INSULIN LISPRO 5 UNITS: 100 INJECTION, SOLUTION INTRAVENOUS; SUBCUTANEOUS at 09:20

## 2024-03-07 RX ADMIN — INSULIN LISPRO 5 UNITS: 100 INJECTION, SOLUTION INTRAVENOUS; SUBCUTANEOUS at 12:23

## 2024-03-07 RX ADMIN — CETIRIZINE HYDROCHLORIDE 5 MG: 10 TABLET, FILM COATED ORAL at 09:21

## 2024-03-07 RX ADMIN — INSULIN GLARGINE 10 UNITS: 100 INJECTION, SOLUTION SUBCUTANEOUS at 21:10

## 2024-03-07 RX ADMIN — IPRATROPIUM BROMIDE 2 PUFF: 17 AEROSOL, METERED RESPIRATORY (INHALATION) at 20:11

## 2024-03-07 RX ADMIN — IPRATROPIUM BROMIDE 2 PUFF: 17 AEROSOL, METERED RESPIRATORY (INHALATION) at 12:07

## 2024-03-07 RX ADMIN — LOSARTAN POTASSIUM 25 MG: 25 TABLET, FILM COATED ORAL at 09:21

## 2024-03-07 RX ADMIN — PANTOPRAZOLE SODIUM 40 MG: 40 TABLET, DELAYED RELEASE ORAL at 06:55

## 2024-03-07 RX ADMIN — BARICITINIB 4 MG: 2 TABLET, FILM COATED ORAL at 09:21

## 2024-03-07 RX ADMIN — ENOXAPARIN SODIUM 40 MG: 100 INJECTION SUBCUTANEOUS at 09:20

## 2024-03-07 RX ADMIN — ALBUTEROL SULFATE 2 PUFF: 90 AEROSOL, METERED RESPIRATORY (INHALATION) at 16:21

## 2024-03-07 RX ADMIN — INSULIN LISPRO 5 UNITS: 100 INJECTION, SOLUTION INTRAVENOUS; SUBCUTANEOUS at 17:19

## 2024-03-07 RX ADMIN — CEFTRIAXONE 1000 MG: 1 INJECTION, POWDER, FOR SOLUTION INTRAMUSCULAR; INTRAVENOUS at 09:28

## 2024-03-07 RX ADMIN — BUDESONIDE AND FORMOTEROL FUMARATE DIHYDRATE 2 PUFF: 160; 4.5 AEROSOL RESPIRATORY (INHALATION) at 20:11

## 2024-03-07 ASSESSMENT — ENCOUNTER SYMPTOMS
COUGH: 0
SHORTNESS OF BREATH: 1
COLOR CHANGE: 0
PHOTOPHOBIA: 0
BACK PAIN: 0
SINUS PAIN: 0
SINUS PRESSURE: 0
ABDOMINAL PAIN: 0
ABDOMINAL DISTENTION: 0

## 2024-03-07 ASSESSMENT — PAIN SCALES - GENERAL
PAINLEVEL_OUTOF10: 0

## 2024-03-07 ASSESSMENT — PAIN SCALES - WONG BAKER
WONGBAKER_NUMERICALRESPONSE: 0

## 2024-03-07 NOTE — CARE COORDINATION
Pt is active with [facility].  If pt is discharge after hours or over the weekend please call [facility]  [phone]  and fax HC order, H&P and AVS to the number they give you.

## 2024-03-07 NOTE — PROGRESS NOTES
V2.0  [facility] Progress Note      Name:  Billy Ozuna /Age/Sex: 1945  (78 y.o. male)   MRN & CSN:  [id number] & [id number] Encounter Date/Time: 3/7/2024 10:35 AM EST   Location:  [facility] PCP: Kera Monique (Inactive)       Hospital Day: 7    Assessment and Plan:   Billy Ozuna is a 78 y.o. male with a pmh of anemia, COPD, DM, emphysema, enlarged prostate, HTN who presents with Acute on chronic respiratory failure with hypoxia and hypercapnia (HCC)    Hospital Problems             Last Modified POA    * (Principal) Acute on chronic respiratory failure with hypoxia and hypercapnia (HCC) 3/1/2024 Yes     COPD on home O2, baseline 4 L NC  Acute respiratory hypoxia  COVID-19   On admission, From freestanding  OSH  --Per chart review of OSH records, patient was at home with O2 sats in the 80's, and administered a breathing treatment at home  -Continue IV Solu-Medrol, Symbicort, guaifenesin as needed, DuoNebs Q4 hrs, Droplet Precautions  --Troponin 62, 65, 57, 51 no chest pain.--Cardiology following, echocardiogram ordered  --Lactic acid 2.1, 1.1, 1.2  --Procal: 0.212-()  - Rapid Covid negative, Flu negative, UA negative, VBG showed pH 7.27, pCO2 65, HCO3 29.4 from OSH,   --- repeat VBG showed pH 7.31, pCO2 66, HCO3 33.2.   --Respiratory PCR (3/2/2024) positive for Covid. -Pharmacy consulted and approved Baricitnib 4mg PO daily for 14 days or until discharge  -  3/3/2024 , patient noted to be hypoxic O2 sats in the 70's after ambulation in which a RR was called.  See RR note for further details.    - Started on Zithromax IV 3/2/2024, Rocephin IV started after RR on 3/3/2024  - pBNP 5,273 Lasix IV X 1 given-cardiology following  - ABG on 15 L nonrebreather shows pH 7.23, pCO2 81, pO2 184, HCO3 33.9, O2 sat 97%   Hypercapnia per last ABG, warrants start on BiPAP.  patient tolerating well. --Pulmonary following, appreciate recs  -Blood CX: NEG at 48 hrs   -plan to DC home tomorrow with [facility]  5.1 4.9   CL 97* 99 102   CO2 32 33* 33*   BUN 28* 35* 32*   CREATININE 0.9 0.9 0.9   GLUCOSE 267* 233* 119*     Hepatic:   Recent Labs     03/07/24  0736   AST 19   ALT 21   BILITOT 0.3   ALKPHOS 142*     Lipids:   Lab Results   Component Value Date/Time    CHOL 122 05/15/2021 04:10 AM    HDL 51 05/15/2021 04:10 AM    TRIG 41 05/15/2021 04:10 AM     Hemoglobin A1C:   Lab Results   Component Value Date/Time    LABA1C 8.6 10/20/2023 12:14 AM     TSH: No results found for: \"TSH\"  Troponin:   Lab Results   Component Value Date/Time    TROPONINT 0.028 09/17/2023 04:22 PM    TROPONINT 0.039 09/17/2023 12:45 PM    TROPONINT 0.036 09/17/2023 07:58 AM     Lactic Acid: No results for input(s): \"LACTA\" in the last 72 hours.  BNP:   Recent Labs     03/06/24  0236   PROBNP 4,318*     UA:  Lab Results   Component Value Date/Time    NITRU POSITIVE 04/29/2018 03:15 PM    COLORU YELLOW 04/29/2018 03:15 PM    WBCUA 948 04/29/2018 03:15 PM    RBCUA 11,278 04/29/2018 03:15 PM    MUCUS RARE 04/06/2018 01:20 AM    TRICHOMONAS NONE SEEN 04/29/2018 03:15 PM    YEAST OCCASIONAL 01/13/2013 02:00 AM    BACTERIA RARE 04/29/2018 03:15 PM    CLARITYU SLIGHTLY CLOUDY 04/29/2018 03:15 PM    SPECGRAV 1.014 04/29/2018 03:15 PM    LEUKOCYTESUR TRACE 04/29/2018 03:15 PM    UROBILINOGEN NORMAL 04/29/2018 03:15 PM    BILIRUBINUR NEGATIVE 04/29/2018 03:15 PM    BLOODU MODERATE 04/29/2018 03:15 PM    KETUA NEGATIVE 04/29/2018 03:15 PM     Urine Cultures: No results found for: \"LABURIN\"  Blood Cultures: No results found for: \"BC\"  No results found for: \"BLOODCULT2\"  Organism:   Lab Results   Component Value Date/Time    ORG ENTBC 09/07/2018 08:45 AM       Imaging/Diagnostics Last 24 Hours   No results found.    Electronically signed by ERNESTO MONTGOMERY CNP on 3/7/2024 at 1:59 PM

## 2024-03-07 NOTE — DISCHARGE SUMMARY
V2.0  Discharge Summary    Name:  Billy Ozuna /Age/Sex: 1945 (78 y.o. male)   Admit Date: 3/1/2024  Discharge Date: 3/7/24    MRN & CSN:  [id number] & [id number] Encounter Date and Time 3/8/24 8:35 AM EST    Attending:  Abdiel Jacobsen MD Discharging Provider: ERNESTO MONTGOMERY [facility] Course:     Brief HPI: Billy Ozuna is a 78 y.o. male who presented with Shortness of Breath. Was Covid-19 positive and had Acute on Chronic Respiratory Failure due to his COPD and Community Acquired Pneumonia. Was given baricitinib, IV antibiotics, breathing treatments and coughing treatments. His symptoms have improved significantly and he is medically stable for discharge home today. He was followed by Pulmonology and Cardiology during his hospital stay and will see them outpatient.     Brief Problem Based Course:     COPD on home O2, baseline 4 L NC  Acute respiratory hypoxia  COVID-19   Community Acquired Pneumonia   On admission, From freestanding  OSH  --Per chart review of OSH records, patient was at home with O2 sats in the 80's, and administered a breathing treatment at home  -Continue IV Solu-Medrol, Symbicort, guaifenesin as needed, DuoNebs Q4 hrs, Droplet Precautions  --Troponin 62, 65, 57, 51 no chest pain.--Cardiology following, echocardiogram ordered  --Lactic acid 2.1, 1.1, 1.2  --Procal: 0.212-()  - Rapid Covid negative, Flu negative, UA negative, VBG showed pH 7.27, pCO2 65, HCO3 29.4 from OSH,   --- repeat VBG showed pH 7.31, pCO2 66, HCO3 33.2.   --Respiratory PCR (3/2/2024) positive for Covid. -Pharmacy consulted and approved Baricitnib 4mg PO daily for 14 days or until discharge  -  3/3/2024 , patient noted to be hypoxic O2 sats in the 70's after ambulation in which a RR was called.  See RR note for further details.    - Started on Zithromax IV 3/2/2024, Rocephin IV started after RR on 3/3/2024  - pBNP 5,273 Lasix IV X 1 given-cardiology following  - ABG    K 5.4* 5.1 4.9   CL 97* 99 102   CO2 32 33* 33*   BUN 28* 35* 32*   CREATININE 0.9 0.9 0.9   GLUCOSE 267* 233* 119*     Hepatic:   Recent Labs     03/07/24  0736   AST 19   ALT 21   BILITOT 0.3   ALKPHOS 142*     Lipids:   Lab Results   Component Value Date/Time    CHOL 122 05/15/2021 04:10 AM    HDL 51 05/15/2021 04:10 AM    TRIG 41 05/15/2021 04:10 AM     Hemoglobin A1C:   Lab Results   Component Value Date/Time    LABA1C 8.6 10/20/2023 12:14 AM     TSH: No results found for: \"TSH\"  Troponin:   Lab Results   Component Value Date/Time    TROPONINT 0.028 09/17/2023 04:22 PM    TROPONINT 0.039 09/17/2023 12:45 PM    TROPONINT 0.036 09/17/2023 07:58 AM     Lactic Acid: No results for input(s): \"LACTA\" in the last 72 hours.  BNP:   Recent Labs     03/06/24  0236   PROBNP 4,318*     UA:  Lab Results   Component Value Date/Time    NITRU POSITIVE 04/29/2018 03:15 PM    COLORU YELLOW 04/29/2018 03:15 PM    WBCUA 948 04/29/2018 03:15 PM    RBCUA 11,278 04/29/2018 03:15 PM    MUCUS RARE 04/06/2018 01:20 AM    TRICHOMONAS NONE SEEN 04/29/2018 03:15 PM    YEAST OCCASIONAL 01/13/2013 02:00 AM    BACTERIA RARE 04/29/2018 03:15 PM    CLARITYU SLIGHTLY CLOUDY 04/29/2018 03:15 PM    SPECGRAV 1.014 04/29/2018 03:15 PM    LEUKOCYTESUR TRACE 04/29/2018 03:15 PM    UROBILINOGEN NORMAL 04/29/2018 03:15 PM    BILIRUBINUR NEGATIVE 04/29/2018 03:15 PM    BLOODU MODERATE 04/29/2018 03:15 PM    KETUA NEGATIVE 04/29/2018 03:15 PM     Urine Cultures: No results found for: \"LABURIN\"  Blood Cultures: No results found for: \"BC\"  No results found for: \"BLOODCULT2\"  Organism:   Lab Results   Component Value Date/Time    ORG ENTBC 09/07/2018 08:45 AM       Time Spent Discharging patient 38 minutes    Electronically signed by ERNESTO MONTGOMERY CNP on 3/7/2024 at 4:03 PM

## 2024-03-07 NOTE — CARE COORDINATION
CM in to see Pt to follow up on discharge planning.  Plan remains home with [facility] home care.  Pt denies any needs at this time.  CM following

## 2024-03-07 NOTE — CARE COORDINATION
NP notified CM that pt wants to appeal his discharge.  Detailed Notice of Discharge and copy of signed IMM letter with th [employer] number provided.  CM informed pt of need to call the highlighted number for [employer] [phone] asa in order to appeal his d/c.  Pt became very agitated and states they told me I was going home tomorrow. Pt  states \"I don't have any one to take care of me tonight.\"  Pt would not listen to what this CM was trying to explain to him that he will not have to leave tonight, but will need to call the number for [employer].  This CM asked TOMY/Makenzie to come to pt's room with this CM to see if she could maybe explain the appeal process better to pt.  This CM and Macy in room with pt and daughter on the phone. The appeal process was explained to pt's daughter.  She voiced understanding and then told pt to call the number to appeal his discharge. Pt agreeable to call [employer] after daughter calmed him down.  Daughter requested that pt's nurse call her for medical questions.  CM notified pt's nurse to call daughter.  TE

## 2024-03-07 NOTE — CONSULTS
Adjustable Dose Pre-filled Pen Syringe 0    insulin lispro, 1 Unit Dial, (HUMALOG KWIKPEN) 100 UNIT/ML SOPN Inject 5 Units into the skin 3 times daily (before meals) (Patient not taking: Reported on 3/1/2024) 5 Adjustable Dose Pre-filled Pen Syringe 0    metFORMIN (GLUCOPHAGE-XR) 500 MG extended release tablet Take 1 tablet by mouth daily (with breakfast) 30 tablet 0    guaiFENesin 400 MG tablet Take 1 tablet by mouth in the morning and 1 tablet in the evening. 56 tablet 0    tiotropium (SPIRIVA) 18 MCG inhalation capsule Inhale 1 capsule into the lungs daily 30 capsule 0    budesonide-formoterol (SYMBICORT) 160-4.5 MCG/ACT AERO Inhale 2 puffs into the lungs 2 times daily 10.2 g 0    ipratropium 0.5 mg-albuterol 2.5 mg (DUONEB) 0.5-2.5 (3) MG/3ML SOLN nebulizer solution Inhale 3 mLs into the lungs every 4 hours as needed for Shortness of Breath 360 mL 0    albuterol (ACCUNEB) 0.63 MG/3ML nebulizer solution Take 3 mLs by nebulization every 6 hours as needed for Wheezing 270 mL 0    atorvastatin (LIPITOR) 20 MG tablet Take 0.5 tablets by mouth daily      fluticasone (FLONASE) 50 MCG/ACT nasal spray 1 spray by Nasal route in the morning and 1 spray in the evening.      loratadine (CLARITIN) 10 MG capsule Take 1 capsule by mouth daily      omeprazole (PRILOSEC) 20 MG delayed release capsule Take 1 capsule by mouth daily      cyanocobalamin 1000 MCG tablet Take 1 tablet by mouth daily         Review of Systems:   Review of Systems   Constitutional:  Negative for activity change, appetite change and fatigue.   HENT:  Negative for congestion, sinus pressure and sinus pain.    Eyes:  Negative for photophobia and visual disturbance.   Respiratory:  Positive for shortness of breath. Negative for cough.    Cardiovascular:  Negative for chest pain, palpitations and leg swelling.   Gastrointestinal:  Negative for abdominal distention and abdominal pain.   Genitourinary:  Negative for difficulty urinating and dysuria.  05/15/2021     PT/INR:   Lab Results   Component Value Date/Time    INR 1.2 09/15/2023 05:31 AM        BMP:    Lab Results   Component Value Date     03/07/2024    K 4.9 03/07/2024     03/07/2024    CO2 33 (H) 03/07/2024    BUN 32 (H) 03/07/2024     CMP:   Lab Results   Component Value Date    AST 19 03/07/2024    ALT 21 03/07/2024    PROT 6.2 (L) 03/07/2024    BILITOT 0.3 03/07/2024    ALKPHOS 142 (H) 03/07/2024     TSH:  No results found for: \"TSH\", \"T4\"    EKG Interpretation:        IMPRESSION / RECOMMENDATIONS:         NSVT  SVT with aberrancy  Non ischemic cardiomyopathy  COVID 19  COPD  QUE  HTN  HLD  DM-2     Patient with NSVT episode once and also short SVT with a berrancy  Patient has non ischemic cardiomyopathy  Patient though with COVID right now and could be infection related cardiomyopathy  Patient will need Goal directed medical therapy for 3 months and if he still continues to have low LVEF then consider ICD     Uptitrate B-blocker  Please keep potassium between 4 to 4.5 and Magnesium between 2 to 2.2   Arb on hold for hyperkalemia for now  If BP tolerates can start low dose isosorbide and hydralazine                    Thanks again for allowing me to participate in care of thispatient. Please call me if you have any questions.    With best regards.        Piedad Trinidad, APRN-CNP

## 2024-03-07 NOTE — PROGRESS NOTES
pulmonary      SUBJECTIVE:  seen early am and sleeping and no sob     OBJECTIVE    VITALS:  /84   Pulse 84   Temp 98 °F (36.7 °C) (Oral)   Resp 22   Ht 1.702 m (5' 7\")   Wt 76 kg (167 lb 8.8 oz)   SpO2 97%   BMI 26.24 kg/m²   HEAD AND FACE EXAM:  No throat injection, no active exudate,no thrush  NECK EXAM;No JVD, no masses, symmetrical  CHEST EXAM; Expansion equal and symmetrical, no masses  LUNG EXAM; Good breath sounds bilaterally. There are expiratory wheezes both lungs, there are crackles at both lung bases  CARDIOVASCULAR EXAM: Positive S1 and S2, no S3 or S4, no clicks ,no murmurs  RIGHT AND LEFT LOWER EXTRIMITY EXAM: No edema, no swelling,           LABS   Lab Results   Component Value Date    WBC 11.6 (H) 03/07/2024    HGB 11.4 (L) 03/07/2024    HCT 38.5 (L) 03/07/2024    MCV 95.8 03/07/2024     03/07/2024     Lab Results   Component Value Date    CREATININE 0.9 03/07/2024    BUN 32 (H) 03/07/2024     03/07/2024    K 4.9 03/07/2024     03/07/2024    CO2 33 (H) 03/07/2024     Lab Results   Component Value Date    INR 1.2 09/15/2023    PROTIME 15.7 (H) 09/15/2023          Lab Results   Component Value Date/Time    PHOS 3.0 09/20/2023 10:35 AM    PHOS 3.4 01/19/2013 05:20 AM    PHOS 3.2 01/17/2013 05:30 AM      No results for input(s): \"PH\", \"PO2ART\", \"CBT9QJV\", \"HCO3\", \"BEART\", \"O2SAT\" in the last 72 hours.      Wt Readings from Last 3 Encounters:   03/04/24 76 kg (167 lb 8.8 oz)   10/25/23 91.7 kg (202 lb 3.2 oz)   09/19/23 84.4 kg (186 lb 1.1 oz)               ASSESMENT  Ac on ch resp failure  Ac copd  pneumonia        PLAN  Bd rx  Finished antibx  O2 adm    3/7/2024  Justin Buck MD, M.D.

## 2024-03-08 VITALS
OXYGEN SATURATION: 97 % | WEIGHT: 177.47 LBS | TEMPERATURE: 98 F | SYSTOLIC BLOOD PRESSURE: 102 MMHG | HEIGHT: 67 IN | DIASTOLIC BLOOD PRESSURE: 74 MMHG | BODY MASS INDEX: 27.85 KG/M2 | RESPIRATION RATE: 17 BRPM | HEART RATE: 85 BPM

## 2024-03-08 LAB
ALBUMIN SERPL-MCNC: 3.5 GM/DL (ref 3.4–5)
ALP BLD-CCNC: 141 IU/L (ref 40–129)
ALT SERPL-CCNC: 21 U/L (ref 10–40)
AST SERPL-CCNC: 20 IU/L (ref 15–37)
BASOPHILS ABSOLUTE: 0 K/CU MM
BASOPHILS RELATIVE PERCENT: 0.1 % (ref 0–1)
BILIRUB SERPL-MCNC: 0.2 MG/DL (ref 0–1)
BILIRUBIN DIRECT: 0.2 MG/DL (ref 0–0.3)
BILIRUBIN, INDIRECT: 0 MG/DL (ref 0–0.7)
BUN SERPL-MCNC: 34 MG/DL (ref 6–23)
CALCIUM SERPL-MCNC: 8.8 MG/DL (ref 8.3–10.6)
CHLORIDE BLD-SCNC: 102 MMOL/L (ref 99–110)
CO2: 32 MMOL/L (ref 21–32)
CREAT SERPL-MCNC: 1 MG/DL (ref 0.9–1.3)
DIFFERENTIAL TYPE: ABNORMAL
EOSINOPHILS ABSOLUTE: 0.1 K/CU MM
EOSINOPHILS RELATIVE PERCENT: 1.1 % (ref 0–3)
GFR SERPL CREATININE-BSD FRML MDRD: >60 ML/MIN/1.73M2
GLUCOSE BLD-MCNC: 135 MG/DL (ref 70–99)
GLUCOSE SERPL-MCNC: 189 MG/DL (ref 70–99)
HCT VFR BLD CALC: 38.1 % (ref 42–52)
HEMOGLOBIN: 11.1 GM/DL (ref 13.5–18)
IMMATURE NEUTROPHIL %: 0.6 % (ref 0–0.43)
LYMPHOCYTES ABSOLUTE: 1.3 K/CU MM
LYMPHOCYTES RELATIVE PERCENT: 12.2 % (ref 24–44)
MCH RBC QN AUTO: 28.2 PG (ref 27–31)
MCHC RBC AUTO-ENTMCNC: 29.1 % (ref 32–36)
MCV RBC AUTO: 96.9 FL (ref 78–100)
MONOCYTES ABSOLUTE: 0.9 K/CU MM
MONOCYTES RELATIVE PERCENT: 8.2 % (ref 0–4)
NUCLEATED RBC %: 0 %
PDW BLD-RTO: 15.9 % (ref 11.7–14.9)
PLATELET # BLD: 330 K/CU MM (ref 140–440)
PMV BLD AUTO: 11.3 FL (ref 7.5–11.1)
POTASSIUM SERPL-SCNC: 4.6 MMOL/L (ref 3.5–5.1)
RBC # BLD: 3.93 M/CU MM (ref 4.6–6.2)
SEGMENTED NEUTROPHILS ABSOLUTE COUNT: 8.2 K/CU MM
SEGMENTED NEUTROPHILS RELATIVE PERCENT: 77.8 % (ref 36–66)
SODIUM BLD-SCNC: 143 MMOL/L (ref 135–145)
TOTAL IMMATURE NEUTOROPHIL: 0.06 K/CU MM
TOTAL NUCLEATED RBC: 0 K/CU MM
WBC # BLD: 10.5 K/CU MM (ref 4–10.5)

## 2024-03-08 PROCEDURE — 82248 BILIRUBIN DIRECT: CPT

## 2024-03-08 PROCEDURE — 94640 AIRWAY INHALATION TREATMENT: CPT

## 2024-03-08 PROCEDURE — 82962 GLUCOSE BLOOD TEST: CPT

## 2024-03-08 PROCEDURE — 94761 N-INVAS EAR/PLS OXIMETRY MLT: CPT

## 2024-03-08 PROCEDURE — 6370000000 HC RX 637 (ALT 250 FOR IP)

## 2024-03-08 PROCEDURE — 36415 COLL VENOUS BLD VENIPUNCTURE: CPT

## 2024-03-08 PROCEDURE — 2700000000 HC OXYGEN THERAPY PER DAY

## 2024-03-08 PROCEDURE — 94660 CPAP INITIATION&MGMT: CPT

## 2024-03-08 PROCEDURE — 80053 COMPREHEN METABOLIC PANEL: CPT

## 2024-03-08 PROCEDURE — 85025 COMPLETE CBC W/AUTO DIFF WBC: CPT

## 2024-03-08 PROCEDURE — 2580000003 HC RX 258

## 2024-03-08 RX ADMIN — METOPROLOL SUCCINATE 50 MG: 50 TABLET, EXTENDED RELEASE ORAL at 10:05

## 2024-03-08 RX ADMIN — SODIUM CHLORIDE, PRESERVATIVE FREE 10 ML: 5 INJECTION INTRAVENOUS at 10:04

## 2024-03-08 RX ADMIN — ALBUTEROL SULFATE 2 PUFF: 90 AEROSOL, METERED RESPIRATORY (INHALATION) at 04:21

## 2024-03-08 RX ADMIN — VENLAFAXINE 25 MG: 25 TABLET ORAL at 00:09

## 2024-03-08 RX ADMIN — IPRATROPIUM BROMIDE 2 PUFF: 17 AEROSOL, METERED RESPIRATORY (INHALATION) at 08:44

## 2024-03-08 RX ADMIN — CETIRIZINE HYDROCHLORIDE 5 MG: 10 TABLET, FILM COATED ORAL at 10:05

## 2024-03-08 RX ADMIN — EMPAGLIFLOZIN 10 MG: 10 TABLET, FILM COATED ORAL at 10:05

## 2024-03-08 RX ADMIN — ALBUTEROL SULFATE 2 PUFF: 90 AEROSOL, METERED RESPIRATORY (INHALATION) at 08:43

## 2024-03-08 RX ADMIN — BUDESONIDE AND FORMOTEROL FUMARATE DIHYDRATE 2 PUFF: 160; 4.5 AEROSOL RESPIRATORY (INHALATION) at 08:43

## 2024-03-08 RX ADMIN — LOSARTAN POTASSIUM 25 MG: 25 TABLET, FILM COATED ORAL at 10:05

## 2024-03-08 RX ADMIN — IPRATROPIUM BROMIDE 2 PUFF: 17 AEROSOL, METERED RESPIRATORY (INHALATION) at 04:22

## 2024-03-08 RX ADMIN — BARICITINIB 4 MG: 2 TABLET, FILM COATED ORAL at 10:05

## 2024-03-08 RX ADMIN — ATORVASTATIN CALCIUM 10 MG: 10 TABLET, FILM COATED ORAL at 10:05

## 2024-03-08 RX ADMIN — PANTOPRAZOLE SODIUM 40 MG: 40 TABLET, DELAYED RELEASE ORAL at 06:40

## 2024-03-08 ASSESSMENT — ENCOUNTER SYMPTOMS
NAUSEA: 0
BACK PAIN: 0
BLOOD IN STOOL: 0
PHOTOPHOBIA: 0
CHEST TIGHTNESS: 0
VOMITING: 0
CONSTIPATION: 0
COLOR CHANGE: 0
COUGH: 0
DIARRHEA: 0
ABDOMINAL PAIN: 0
EYE PAIN: 0
SHORTNESS OF BREATH: 1
WHEEZING: 0

## 2024-03-08 NOTE — CARE COORDINATION
Spoke with pt and dgt in room. Dgt will go to pt's home to get the pt's portable oxygen tank. Dgt will transport pt home. Has reassessment meeting at 1p with [facility]. Pt called [facility] last night to set up meeting.

## 2024-03-08 NOTE — PROGRESS NOTES
pulmonary      SUBJECTIVE:  doing well     OBJECTIVE    VITALS:  /74   Pulse 85   Temp 98 °F (36.7 °C) (Oral)   Resp 17   Ht 1.702 m (5' 7\")   Wt 80.5 kg (177 lb 7.5 oz)   SpO2 97%   BMI 27.80 kg/m²   HEAD AND FACE EXAM:  No throat injection, no active exudate,no thrush  NECK EXAM;No JVD, no masses, symmetrical  CHEST EXAM; Expansion equal and symmetrical, no masses  LUNG EXAM; Good breath sounds bilaterally. There are expiratory wheezes both lungs, there are crackles at both lung bases  CARDIOVASCULAR EXAM: Positive S1 and S2, no S3 or S4, no clicks ,no murmurs  RIGHT AND LEFT LOWER EXTRIMITY EXAM: No edema, no swelling, no inflamation  CNS EXAM: Alert and oriented X3          LABS   Lab Results   Component Value Date    WBC 10.5 03/08/2024    HGB 11.1 (L) 03/08/2024    HCT 38.1 (L) 03/08/2024    MCV 96.9 03/08/2024     03/08/2024     Lab Results   Component Value Date    CREATININE 1.0 03/08/2024    BUN 34 (H) 03/08/2024     03/08/2024    K 4.6 03/08/2024     03/08/2024    CO2 32 03/08/2024     Lab Results   Component Value Date    INR 1.2 09/15/2023    PROTIME 15.7 (H) 09/15/2023          Lab Results   Component Value Date/Time    PHOS 3.0 09/20/2023 10:35 AM    PHOS 3.4 01/19/2013 05:20 AM    PHOS 3.2 01/17/2013 05:30 AM      No results for input(s): \"PH\", \"PO2ART\", \"CGK9SRC\", \"HCO3\", \"BEART\", \"O2SAT\" in the last 72 hours.      Wt Readings from Last 3 Encounters:   03/08/24 80.5 kg (177 lb 7.5 oz)   10/25/23 91.7 kg (202 lb 3.2 oz)   09/19/23 84.4 kg (186 lb 1.1 oz)               ASSESMENT  Ac copd        PLAN  Cpm  Agree with dc  Fu as outpt    3/8/2024  Justin Buck MD, M.D.

## 2024-03-08 NOTE — CARE COORDINATION
CM called [facility]. Spoke with Dorothy. Intake Fax number is [phone]. CM faxed HC information for restarting services at this time.

## 2024-03-08 NOTE — DISCHARGE SUMMARY
V2.0  Discharge Summary- refused DC 3/7, will DC home today 3/8     Name:  Billy Ozuna /Age/Sex: 1945 (78 y.o. male)   Admit Date: 3/1/2024  Discharge Date: 3/7/24    MRN & CSN:  [id number] & [id number] Encounter Date and Time 3/8/24 8:35 AM EST    Attending:  Abdiel Jacobsen MD Discharging Provider: ERNESTO MONTGOMERY [facility] [[email or web address]] Course:     Brief HPI: Billy Ozuna is a 78 y.o. male who presented with Shortness of Breath. Was Covid-19 positive and had Acute on Chronic Respiratory Failure due to his COPD and Community Acquired Pneumonia. Was given baricitinib, IV antibiotics, breathing treatments and coughing treatments. His symptoms have improved significantly and he is medically stable for discharge home today. He was followed by Pulmonology and Cardiology during his hospital stay and will see them outpatient.     Brief Problem Based Course:     COPD on home O2, baseline 4 L NC  Acute respiratory hypoxia  COVID-19   Community Acquired Pneumonia   On admission, From freestanding  OSH  --Per chart review of OSH records, patient was at home with O2 sats in the 80's, and administered a breathing treatment at home  -Continue IV Solu-Medrol, Symbicort, guaifenesin as needed, DuoNebs Q4 hrs, Droplet Precautions  --Troponin 62, 65, 57, 51 no chest pain.--Cardiology following, echocardiogram ordered  --Lactic acid 2.1, 1.1, 1.2  --Procal: 0.212-()  - Rapid Covid negative, Flu negative, UA negative, VBG showed pH 7.27, pCO2 65, HCO3 29.4 from OSH,   --- repeat VBG showed pH 7.31, pCO2 66, HCO3 33.2.   --Respiratory PCR (3/2/2024) positive for Covid. -Pharmacy consulted and approved Baricitnib 4mg PO daily for 14 days or until discharge  -  3/3/2024 , patient noted to be hypoxic O2 sats in the 70's after ambulation in which a RR was called.  See RR note for further details.    - Started on Zithromax IV 3/2/2024, Rocephin IV started after RR on 3/3/2024  - pBNP 5,273 Lasix IV X 1  declines

## 2024-03-09 LAB
CULTURE: NORMAL
CULTURE: NORMAL
Lab: NORMAL
Lab: NORMAL
SPECIMEN: NORMAL
SPECIMEN: NORMAL

## 2024-06-03 ENCOUNTER — APPOINTMENT (OUTPATIENT)
Dept: GENERAL RADIOLOGY | Age: 79
DRG: 280 | End: 2024-06-03
Payer: OTHER GOVERNMENT

## 2024-06-03 ENCOUNTER — HOSPITAL ENCOUNTER (INPATIENT)
Age: 79
LOS: 3 days | Discharge: HOME HEALTH | DRG: 280 | End: 2024-06-07
Attending: EMERGENCY MEDICINE | Admitting: STUDENT IN AN ORGANIZED HEALTH CARE EDUCATION/TRAINING PROGRAM
Payer: OTHER GOVERNMENT

## 2024-06-03 DIAGNOSIS — R07.9 CHEST PAIN, UNSPECIFIED TYPE: Primary | ICD-10-CM

## 2024-06-03 DIAGNOSIS — J44.9 CHRONIC OBSTRUCTIVE PULMONARY DISEASE, UNSPECIFIED COPD TYPE (HCC): ICD-10-CM

## 2024-06-03 DIAGNOSIS — R79.89 ELEVATED TROPONIN: ICD-10-CM

## 2024-06-03 DIAGNOSIS — I42.9 CARDIOMYOPATHY, UNSPECIFIED TYPE (HCC): ICD-10-CM

## 2024-06-03 LAB
ALBUMIN SERPL-MCNC: 3.9 GM/DL (ref 3.4–5)
ALP BLD-CCNC: 147 IU/L (ref 40–128)
ALT SERPL-CCNC: 11 U/L (ref 10–40)
ANION GAP SERPL CALCULATED.3IONS-SCNC: 11 MMOL/L (ref 7–16)
AST SERPL-CCNC: 17 IU/L (ref 15–37)
BASOPHILS ABSOLUTE: 0.1 K/CU MM
BASOPHILS RELATIVE PERCENT: 0.4 % (ref 0–1)
BILIRUB SERPL-MCNC: 0.4 MG/DL (ref 0–1)
BUN SERPL-MCNC: 15 MG/DL (ref 6–23)
CALCIUM SERPL-MCNC: 8.4 MG/DL (ref 8.3–10.6)
CHLORIDE BLD-SCNC: 100 MMOL/L (ref 99–110)
CO2: 28 MMOL/L (ref 21–32)
CREAT SERPL-MCNC: 0.9 MG/DL (ref 0.9–1.3)
DIFFERENTIAL TYPE: ABNORMAL
EOSINOPHILS ABSOLUTE: 0 K/CU MM
EOSINOPHILS RELATIVE PERCENT: 0.1 % (ref 0–3)
GFR, ESTIMATED: 88 ML/MIN/1.73M2
GLUCOSE BLD-MCNC: 156 MG/DL (ref 70–99)
GLUCOSE SERPL-MCNC: 168 MG/DL (ref 70–99)
HCT VFR BLD CALC: 33 % (ref 42–52)
HEMOGLOBIN: 9.7 GM/DL (ref 13.5–18)
IMMATURE NEUTROPHIL %: 0.2 % (ref 0–0.43)
LIPASE: 14 IU/L (ref 13–60)
LYMPHOCYTES ABSOLUTE: 0.4 K/CU MM
LYMPHOCYTES RELATIVE PERCENT: 3.4 % (ref 24–44)
MCH RBC QN AUTO: 28 PG (ref 27–31)
MCHC RBC AUTO-ENTMCNC: 29.4 % (ref 32–36)
MCV RBC AUTO: 95.4 FL (ref 78–100)
MONOCYTES ABSOLUTE: 1.8 K/CU MM
MONOCYTES RELATIVE PERCENT: 14.5 % (ref 0–4)
NEUTROPHILS ABSOLUTE: 10.2 K/CU MM
NEUTROPHILS RELATIVE PERCENT: 81.4 % (ref 36–66)
NUCLEATED RBC %: 0 %
PDW BLD-RTO: 16.5 % (ref 11.7–14.9)
PLATELET # BLD: 261 K/CU MM (ref 140–440)
PMV BLD AUTO: 11.9 FL (ref 7.5–11.1)
POTASSIUM SERPL-SCNC: 4.2 MMOL/L (ref 3.5–5.1)
PRO-BNP: 3237 PG/ML
RBC # BLD: 3.46 M/CU MM (ref 4.6–6.2)
SODIUM BLD-SCNC: 139 MMOL/L (ref 135–145)
TOTAL IMMATURE NEUTOROPHIL: 0.03 K/CU MM
TOTAL NUCLEATED RBC: 0 K/CU MM
TOTAL PROTEIN: 6.8 GM/DL (ref 6.4–8.2)
TROPONIN, HIGH SENSITIVITY: 67 NG/L (ref 0–22)
TROPONIN, HIGH SENSITIVITY: 73 NG/L (ref 0–22)
WBC # BLD: 12.5 K/CU MM (ref 4–10.5)

## 2024-06-03 PROCEDURE — 83690 ASSAY OF LIPASE: CPT

## 2024-06-03 PROCEDURE — 36415 COLL VENOUS BLD VENIPUNCTURE: CPT

## 2024-06-03 PROCEDURE — 85025 COMPLETE CBC W/AUTO DIFF WBC: CPT

## 2024-06-03 PROCEDURE — 83880 ASSAY OF NATRIURETIC PEPTIDE: CPT

## 2024-06-03 PROCEDURE — 80053 COMPREHEN METABOLIC PANEL: CPT

## 2024-06-03 PROCEDURE — 6370000000 HC RX 637 (ALT 250 FOR IP): Performed by: EMERGENCY MEDICINE

## 2024-06-03 PROCEDURE — 71045 X-RAY EXAM CHEST 1 VIEW: CPT

## 2024-06-03 PROCEDURE — 6370000000 HC RX 637 (ALT 250 FOR IP)

## 2024-06-03 PROCEDURE — 94640 AIRWAY INHALATION TREATMENT: CPT

## 2024-06-03 PROCEDURE — 99285 EMERGENCY DEPT VISIT HI MDM: CPT

## 2024-06-03 PROCEDURE — 84484 ASSAY OF TROPONIN QUANT: CPT

## 2024-06-03 PROCEDURE — 82962 GLUCOSE BLOOD TEST: CPT

## 2024-06-03 PROCEDURE — G0378 HOSPITAL OBSERVATION PER HR: HCPCS

## 2024-06-03 PROCEDURE — 6370000000 HC RX 637 (ALT 250 FOR IP): Performed by: STUDENT IN AN ORGANIZED HEALTH CARE EDUCATION/TRAINING PROGRAM

## 2024-06-03 PROCEDURE — 6360000002 HC RX W HCPCS: Performed by: STUDENT IN AN ORGANIZED HEALTH CARE EDUCATION/TRAINING PROGRAM

## 2024-06-03 PROCEDURE — 93005 ELECTROCARDIOGRAM TRACING: CPT | Performed by: EMERGENCY MEDICINE

## 2024-06-03 PROCEDURE — 96372 THER/PROPH/DIAG INJ SC/IM: CPT

## 2024-06-03 RX ORDER — NITROGLYCERIN 0.4 MG/1
0.4 TABLET SUBLINGUAL EVERY 5 MIN PRN
Status: DISCONTINUED | OUTPATIENT
Start: 2024-06-03 | End: 2024-06-07 | Stop reason: HOSPADM

## 2024-06-03 RX ORDER — ATORVASTATIN CALCIUM 40 MG/1
40 TABLET, FILM COATED ORAL NIGHTLY
Status: DISCONTINUED | OUTPATIENT
Start: 2024-06-03 | End: 2024-06-07 | Stop reason: HOSPADM

## 2024-06-03 RX ORDER — ENOXAPARIN SODIUM 100 MG/ML
40 INJECTION SUBCUTANEOUS DAILY
Status: DISCONTINUED | OUTPATIENT
Start: 2024-06-03 | End: 2024-06-04

## 2024-06-03 RX ORDER — FUROSEMIDE 20 MG/1
20 TABLET ORAL DAILY
Status: DISCONTINUED | OUTPATIENT
Start: 2024-06-04 | End: 2024-06-04

## 2024-06-03 RX ORDER — ACETAMINOPHEN 325 MG/1
650 TABLET ORAL EVERY 6 HOURS PRN
Status: DISCONTINUED | OUTPATIENT
Start: 2024-06-03 | End: 2024-06-07 | Stop reason: HOSPADM

## 2024-06-03 RX ORDER — ACETAMINOPHEN 650 MG/1
650 SUPPOSITORY RECTAL EVERY 6 HOURS PRN
Status: DISCONTINUED | OUTPATIENT
Start: 2024-06-03 | End: 2024-06-07 | Stop reason: HOSPADM

## 2024-06-03 RX ORDER — IPRATROPIUM BROMIDE AND ALBUTEROL SULFATE 2.5; .5 MG/3ML; MG/3ML
1 SOLUTION RESPIRATORY (INHALATION)
Status: DISCONTINUED | OUTPATIENT
Start: 2024-06-03 | End: 2024-06-07 | Stop reason: HOSPADM

## 2024-06-03 RX ORDER — IPRATROPIUM BROMIDE AND ALBUTEROL SULFATE 2.5; .5 MG/3ML; MG/3ML
1 SOLUTION RESPIRATORY (INHALATION) EVERY 4 HOURS PRN
Status: DISCONTINUED | OUTPATIENT
Start: 2024-06-03 | End: 2024-06-03

## 2024-06-03 RX ORDER — INSULIN LISPRO 100 [IU]/ML
0-4 INJECTION, SOLUTION INTRAVENOUS; SUBCUTANEOUS NIGHTLY
Status: DISCONTINUED | OUTPATIENT
Start: 2024-06-03 | End: 2024-06-07 | Stop reason: HOSPADM

## 2024-06-03 RX ORDER — ACETAMINOPHEN 325 MG/1
650 TABLET ORAL ONCE
Status: COMPLETED | OUTPATIENT
Start: 2024-06-03 | End: 2024-06-03

## 2024-06-03 RX ORDER — INSULIN LISPRO 100 [IU]/ML
0-8 INJECTION, SOLUTION INTRAVENOUS; SUBCUTANEOUS
Status: DISCONTINUED | OUTPATIENT
Start: 2024-06-04 | End: 2024-06-07 | Stop reason: HOSPADM

## 2024-06-03 RX ORDER — VENLAFAXINE 25 MG/1
25 TABLET ORAL EVERY EVENING
Status: DISCONTINUED | OUTPATIENT
Start: 2024-06-03 | End: 2024-06-07 | Stop reason: HOSPADM

## 2024-06-03 RX ORDER — DEXTROSE MONOHYDRATE 100 MG/ML
INJECTION, SOLUTION INTRAVENOUS CONTINUOUS PRN
Status: DISCONTINUED | OUTPATIENT
Start: 2024-06-03 | End: 2024-06-07 | Stop reason: HOSPADM

## 2024-06-03 RX ORDER — GUAIFENESIN 600 MG/1
1200 TABLET, EXTENDED RELEASE ORAL ONCE
Status: COMPLETED | OUTPATIENT
Start: 2024-06-03 | End: 2024-06-03

## 2024-06-03 RX ORDER — ONDANSETRON 4 MG/1
4 TABLET, ORALLY DISINTEGRATING ORAL EVERY 8 HOURS PRN
Status: DISCONTINUED | OUTPATIENT
Start: 2024-06-03 | End: 2024-06-07 | Stop reason: HOSPADM

## 2024-06-03 RX ORDER — ATORVASTATIN CALCIUM 10 MG/1
10 TABLET, FILM COATED ORAL DAILY
Status: DISCONTINUED | OUTPATIENT
Start: 2024-06-03 | End: 2024-06-03

## 2024-06-03 RX ORDER — METOPROLOL SUCCINATE 50 MG/1
50 TABLET, EXTENDED RELEASE ORAL DAILY
Status: DISCONTINUED | OUTPATIENT
Start: 2024-06-03 | End: 2024-06-07 | Stop reason: HOSPADM

## 2024-06-03 RX ORDER — BUDESONIDE AND FORMOTEROL FUMARATE DIHYDRATE 160; 4.5 UG/1; UG/1
2 AEROSOL RESPIRATORY (INHALATION) 2 TIMES DAILY
Status: DISCONTINUED | OUTPATIENT
Start: 2024-06-03 | End: 2024-06-07 | Stop reason: HOSPADM

## 2024-06-03 RX ORDER — LOSARTAN POTASSIUM 25 MG/1
25 TABLET ORAL DAILY
Status: DISCONTINUED | OUTPATIENT
Start: 2024-06-04 | End: 2024-06-07 | Stop reason: HOSPADM

## 2024-06-03 RX ORDER — ASPIRIN 81 MG/1
TABLET, CHEWABLE ORAL
Status: COMPLETED
Start: 2024-06-03 | End: 2024-06-03

## 2024-06-03 RX ORDER — IPRATROPIUM BROMIDE AND ALBUTEROL SULFATE 2.5; .5 MG/3ML; MG/3ML
1 SOLUTION RESPIRATORY (INHALATION)
Status: DISCONTINUED | OUTPATIENT
Start: 2024-06-03 | End: 2024-06-03

## 2024-06-03 RX ORDER — ASPIRIN 81 MG/1
81 TABLET, CHEWABLE ORAL DAILY
Status: DISCONTINUED | OUTPATIENT
Start: 2024-06-04 | End: 2024-06-07 | Stop reason: HOSPADM

## 2024-06-03 RX ORDER — ONDANSETRON 2 MG/ML
4 INJECTION INTRAMUSCULAR; INTRAVENOUS EVERY 6 HOURS PRN
Status: DISCONTINUED | OUTPATIENT
Start: 2024-06-03 | End: 2024-06-07 | Stop reason: HOSPADM

## 2024-06-03 RX ORDER — GLUCAGON 1 MG/ML
1 KIT INJECTION PRN
Status: DISCONTINUED | OUTPATIENT
Start: 2024-06-03 | End: 2024-06-07 | Stop reason: HOSPADM

## 2024-06-03 RX ORDER — PANTOPRAZOLE SODIUM 20 MG/1
20 TABLET, DELAYED RELEASE ORAL
Status: DISCONTINUED | OUTPATIENT
Start: 2024-06-04 | End: 2024-06-07 | Stop reason: HOSPADM

## 2024-06-03 RX ADMIN — ATORVASTATIN CALCIUM 40 MG: 40 TABLET, FILM COATED ORAL at 22:05

## 2024-06-03 RX ADMIN — VENLAFAXINE 25 MG: 25 TABLET ORAL at 22:05

## 2024-06-03 RX ADMIN — IPRATROPIUM BROMIDE AND ALBUTEROL SULFATE 1 DOSE: 2.5; .5 SOLUTION RESPIRATORY (INHALATION) at 20:10

## 2024-06-03 RX ADMIN — NITROGLYCERIN 0.4 MG: 0.4 TABLET, ORALLY DISINTEGRATING SUBLINGUAL at 18:12

## 2024-06-03 RX ADMIN — ASPIRIN: 81 TABLET, CHEWABLE ORAL at 16:15

## 2024-06-03 RX ADMIN — METOPROLOL SUCCINATE 50 MG: 50 TABLET, EXTENDED RELEASE ORAL at 22:05

## 2024-06-03 RX ADMIN — ACETAMINOPHEN 650 MG: 325 TABLET ORAL at 18:13

## 2024-06-03 RX ADMIN — ENOXAPARIN SODIUM 40 MG: 100 INJECTION SUBCUTANEOUS at 22:06

## 2024-06-03 RX ADMIN — GUAIFENESIN 1200 MG: 600 TABLET, EXTENDED RELEASE ORAL at 18:13

## 2024-06-03 RX ADMIN — BUDESONIDE AND FORMOTEROL FUMARATE DIHYDRATE 2 PUFF: 160; 4.5 AEROSOL RESPIRATORY (INHALATION) at 20:11

## 2024-06-03 ASSESSMENT — PAIN DESCRIPTION - LOCATION
LOCATION: HEAD
LOCATION: HEAD
LOCATION: CHEST
LOCATION: CHEST

## 2024-06-03 ASSESSMENT — PAIN DESCRIPTION - ORIENTATION
ORIENTATION: RIGHT;LEFT
ORIENTATION: LEFT

## 2024-06-03 ASSESSMENT — PAIN DESCRIPTION - DESCRIPTORS
DESCRIPTORS: CRUSHING
DESCRIPTORS: ACHING
DESCRIPTORS: HEAVINESS

## 2024-06-03 ASSESSMENT — PAIN SCALES - GENERAL
PAINLEVEL_OUTOF10: 4
PAINLEVEL_OUTOF10: 8
PAINLEVEL_OUTOF10: 4
PAINLEVEL_OUTOF10: 2

## 2024-06-03 ASSESSMENT — LIFESTYLE VARIABLES
HOW MANY STANDARD DRINKS CONTAINING ALCOHOL DO YOU HAVE ON A TYPICAL DAY: PATIENT DOES NOT DRINK
HOW OFTEN DO YOU HAVE A DRINK CONTAINING ALCOHOL: NEVER

## 2024-06-03 ASSESSMENT — PAIN DESCRIPTION - PAIN TYPE: TYPE: ACUTE PAIN

## 2024-06-03 ASSESSMENT — PAIN - FUNCTIONAL ASSESSMENT
PAIN_FUNCTIONAL_ASSESSMENT: ACTIVITIES ARE NOT PREVENTED
PAIN_FUNCTIONAL_ASSESSMENT: 0-10

## 2024-06-03 NOTE — ED PROVIDER NOTES
Given 6/3/24 2205)   ipratropium 0.5 mg-albuterol 2.5 mg (DUONEB) nebulizer solution 1 Dose (1 Dose Inhalation Given 6/4/24 1149)   budesonide-formoterol (SYMBICORT) 160-4.5 MCG/ACT inhaler 2 puff (2 puffs Inhalation Given 6/4/24 0759)   empagliflozin (JARDIANCE) tablet 20 mg (20 mg Oral Given 6/4/24 0944)   furosemide (LASIX) tablet 20 mg (20 mg Oral Given 6/4/24 0945)   insulin lispro (HUMALOG,ADMELOG) injection vial 0-8 Units ( SubCUTAneous Not Given 6/4/24 0849)   insulin lispro (HUMALOG,ADMELOG) injection vial 0-4 Units ( SubCUTAneous Not Given 6/3/24 2020)   losartan (COZAAR) tablet 25 mg (25 mg Oral Given 6/4/24 0944)   metoprolol succinate (TOPROL XL) extended release tablet 50 mg (50 mg Oral Given 6/4/24 0944)   pantoprazole (PROTONIX) tablet 20 mg (20 mg Oral Given 6/4/24 0634)   venlafaxine (EFFEXOR) tablet 25 mg (25 mg Oral Given 6/3/24 2205)   glucose chewable tablet 16 g (has no administration in time range)   dextrose bolus 10% 125 mL (has no administration in time range)     Or   dextrose bolus 10% 250 mL (has no administration in time range)   glucagon injection 1 mg (has no administration in time range)   dextrose 10 % infusion (has no administration in time range)   heparin (porcine) injection 4,000 Units (has no administration in time range)   heparin (porcine) injection 2,000 Units (has no administration in time range)   heparin 25,000 unit in sodium chloride 0.45% 250 mL (premix) infusion (14 Units/kg/hr × 76.2 kg IntraVENous Handoff 6/4/24 1124)   aspirin 81 MG chewable tablet (  Given 6/3/24 1615)   acetaminophen (TYLENOL) tablet 650 mg (650 mg Oral Given 6/3/24 1813)   guaiFENesin (MUCINEX) extended release tablet 1,200 mg (1,200 mg Oral Given 6/3/24 1813)   heparin (porcine) injection 4,000 Units (4,000 Units IntraVENous Given 6/4/24 0219)       EKG (if obtained): (All EKG's are interpreted by myself in the absence of a cardiologist)   EKG Interpretation    Interpreted by emergency  department physician    Rhythm: normal sinus   Rate: normal  Axis: left  Ectopy: premature ventricular contractions (unifocal)  Conduction: normal  ST Segments: no acute change  T Waves: no acute change  Q Waves: nonspecific    Clinical Impression: no acute changes, similar twave changes to 10/15/2023    Norma Becerra DO      Chronic conditions affecting care: COPD on 4 LNC, HLD, QUE, DMII, HTN    Disposition Considerations (tests considered but not done, Shared Decision Making, Pt Expectation of Test or Tx.):     ED Course as of 06/04/24 1233   Mon Jun 03, 2024   1751 Trop 67 [CB]      ED Course User Index  [CB] Norma Becerra DO       I am the Primary Clinician of Record.    Is this patient to be included in the SEP-1 Core Measure due to severe sepsis or septic shock?   No   Exclusion criteria - the patient is NOT to be included for SEP-1 Core Measure due to:  Infection is not suspected    Clinical Impression:  1. Chest pain, unspecified type    2. Elevated troponin    3. Chronic obstructive pulmonary disease, unspecified COPD type (HCC)    4. Cardiomyopathy, unspecified type (HCC)      Disposition referral (if applicable):  No follow-up provider specified.  Disposition medications (if applicable):  Current Discharge Medication List        ED Provider Disposition Time  DISPOSITION Admitted 06/03/2024 06:31:16 PM      Comment: Please note this report has been produced using speech recognition software and may contain errors related to that system including errors in grammar, punctuation, and spelling, as well as words and phrases that may be inappropriate.  Efforts were made to edit the dictations.        Norma Becerra DO  06/04/24 1233

## 2024-06-03 NOTE — H&P
History and Physical      Name:  Billy Ozuna /Age/Sex: 1945  (79 y.o. male)   MRN & CSN:  [id number] & [id number] Encounter Date/Time:6/3/2024 6:31 PM EDT   Location:  [address] [place] [address] PCP: Kera Monique (Inactive)       Assessment and Plan:   Billy Ozuna is a 79 y.o. male with COPD/chronic respiratory failure on 4 L NC O2, nonischemic cardiomyopathy/chronic systolic heart failure, T2DM, hypertension, hyperlipidemia, depression presented from home with chest pain.    NSTEMI  Troponin 67> 73> 101 EKG interpreted personally and results NSR 86/min, PACs, new TWI in V2-V3, similar appearing TWI in V4-V6.  Chest x-ray reviewed cardiomegaly, mild pulmonary vascular congestion improved from prior exam.  IV heparin infusion  Aspirin and Lipitor, n.p.o. for cardiology evaluation in a.m.    COPD/chronic respiratory failure at baseline home O2 of 4 L  No evidence of exacerbation  Continue home Symbicort and scheduled DuoNeb    Nonischemic cardiomyopathy/chronic systolic heart failure  Most recent EF of 20 to 25%, appears clinically euvolemic.  Continue home losartan Toprol-XL and Jardiance.  Consider adding spironolactone upon discharge.  Continue home Lasix.    T2DM, sliding scale insulin with hypoglycemia protocol    GERD, continue home Protonix    Depression, continue home venlafaxine    Observation MedSurg telemetry  Full code    Disposition:     Current Living situation: Home  Expected Disposition: Home  Estimated D/C: 1-2 days    Diet Diet NPO   DVT Prophylaxis [] Lovenox, [x]  Heparin, [] SCDs, [] Ambulation,  [] Eliquis, [] Xarelto, [] Coumadin   Code Status Full Code   Surrogate Decision Maker/ POVISH GE     Personally reviewed Lab Studies and Imaging   Discussed management of the case with ER provider who recommended admission due to chest pain  Drugs that require monitoring for toxicity include IV heparin infusion and the method of monitoring was antifactor Xa level    History from:     Patient  Stress Questionnaire     Feeling of Stress : Not at all   Social Connections: Socially Isolated (10/15/2023)    Social Connection and Isolation Panel [NHANES]     Frequency of Communication with Friends and Family: More than three times a week     Frequency of Social Gatherings with Friends and Family: More than three times a week     Attends [employer] Services: Never     Active Member of Clubs or Organizations: No     Attends Club or Organization Meetings: Never     Marital Status:    Intimate Partner Violence: Not At Risk (10/15/2023)    Humiliation, Afraid, Rape, and Kick questionnaire     Fear of Current or Ex-Partner: No     Emotionally Abused: No     Physically Abused: No     Sexually Abused: No   Housing Stability: Low Risk  (6/3/2024)    Housing Stability Vital Sign     Unable to Pay for Housing in the Last Year: No     Number of Places Lived in the Last Year: 1     Unstable Housing in the Last Year: No       Medications:   Medications:    aspirin  81 mg Oral Daily    atorvastatin  40 mg Oral Nightly    ipratropium 0.5 mg-albuterol 2.5 mg  1 Dose Inhalation 4x Daily RT    budesonide-formoterol  2 puff Inhalation BID    empagliflozin  20 mg Oral Daily    furosemide  20 mg Oral Daily    insulin lispro  0-8 Units SubCUTAneous TID WC    insulin lispro  0-4 Units SubCUTAneous Nightly    losartan  25 mg Oral Daily    metoprolol succinate  50 mg Oral Daily    pantoprazole  20 mg Oral QAM AC    venlafaxine  25 mg Oral QPM      Infusions:    heparin (PORCINE) Infusion 12 Units/kg/hr (06/04/24 0221)    dextrose       PRN Meds: heparin (porcine), 4,000 Units, PRN  heparin (porcine), 2,000 Units, PRN  nitroGLYCERIN, 0.4 mg, Q5 Min PRN  ondansetron, 4 mg, Q8H PRN   Or  ondansetron, 4 mg, Q6H PRN  acetaminophen, 650 mg, Q6H PRN   Or  acetaminophen, 650 mg, Q6H PRN  glucose, 4 tablet, PRN  dextrose bolus, 125 mL, PRN   Or  dextrose bolus, 250 mL, PRN  glucagon (rDNA), 1 mg, PRN  dextrose, , Continuous

## 2024-06-03 NOTE — ED NOTES
ED TO INPATIENT SBAR HANDOFF    Patient Name: Catrina Ozuna   :  1945  79 y.o.   Preferred Name  catrina  Family/Caregiver Present no   Restraints no   C-SSRS: Risk of Suicide: No Risk  Sitter no   Sepsis Risk Score Sepsis Risk Score: 1.95      Situation  Chief Complaint   Patient presents with    Chest Pain     Started at 0700. 324 given by ems     Dizziness     Started at 0600      Brief Description of Patient's Condition: Pt is a/o from home presenting with chest pain and dizziness. Started this morning. After aspiring and nitro, he did have some relief. He has a chronic cough from his COPD.   Mental Status: alert  Arrived from: home    Imaging:   XR CHEST PORTABLE    (Results Pending)     Abnormal labs:   Abnormal Labs Reviewed   CBC WITH AUTO DIFFERENTIAL - Abnormal; Notable for the following components:       Result Value    WBC 12.5 (*)     RBC 3.46 (*)     Hemoglobin 9.7 (*)     Hematocrit 33.0 (*)     MCHC 29.4 (*)     RDW 16.5 (*)     MPV 11.9 (*)     Neutrophils % 81.4 (*)     Lymphocytes % 3.4 (*)     Monocytes % 14.5 (*)     All other components within normal limits   COMPREHENSIVE METABOLIC PANEL - Abnormal; Notable for the following components:    Glucose 168 (*)     Alkaline Phosphatase 147 (*)     All other components within normal limits   TROPONIN - Abnormal; Notable for the following components:    Troponin, High Sensitivity 67 (*)     All other components within normal limits        Background  History:   Past Medical History:   Diagnosis Date    Acid reflux     Anemia     \"dx age 12    COPD (chronic obstructive pulmonary disease) (HCC)     follows with [facility]    Dental decay     Diabetes mellitus (HCC)     \"dx  or so\"    Emphysema of lung (HCC)     Enlarged prostate     Stevens catheter in place     \"put in catheter since mid 2018\"    H/O cardiovascular stress test 2018    Nml, 62%    Hematuria     dx with admission 2018    HX OTHER MEDICAL     \"tried 3 times to

## 2024-06-04 ENCOUNTER — APPOINTMENT (OUTPATIENT)
Dept: NON INVASIVE DIAGNOSTICS | Age: 79
DRG: 280 | End: 2024-06-04
Attending: INTERNAL MEDICINE
Payer: OTHER GOVERNMENT

## 2024-06-04 PROBLEM — R07.9 CHEST PAIN: Status: ACTIVE | Noted: 2024-06-04

## 2024-06-04 PROBLEM — I50.43 ACUTE ON CHRONIC COMBINED SYSTOLIC (CONGESTIVE) AND DIASTOLIC (CONGESTIVE) HEART FAILURE (HCC): Status: ACTIVE | Noted: 2024-06-04

## 2024-06-04 LAB
ANION GAP SERPL CALCULATED.3IONS-SCNC: 10 MMOL/L (ref 7–16)
ANTI-XA UNFRAC HEPARIN: 0.26 IU/ML
APTT: 39.6 SECONDS (ref 25.1–37.1)
APTT: 54.7 SECONDS (ref 25.1–37.1)
APTT: 68.4 SECONDS (ref 25.1–37.1)
BUN SERPL-MCNC: 21 MG/DL (ref 6–23)
CALCIUM SERPL-MCNC: 8.3 MG/DL (ref 8.3–10.6)
CHLORIDE BLD-SCNC: 101 MMOL/L (ref 99–110)
CHOLEST SERPL-MCNC: 137 MG/DL
CO2: 29 MMOL/L (ref 21–32)
CREAT SERPL-MCNC: 1.1 MG/DL (ref 0.9–1.3)
ECHO BSA: 1.95 M2
ESTIMATED AVERAGE GLUCOSE: 148 MG/DL
GFR, ESTIMATED: 69 ML/MIN/1.73M2
GLUCOSE BLD-MCNC: 132 MG/DL (ref 70–99)
GLUCOSE BLD-MCNC: 149 MG/DL (ref 70–99)
GLUCOSE BLD-MCNC: 207 MG/DL (ref 70–99)
GLUCOSE BLD-MCNC: 98 MG/DL (ref 70–99)
GLUCOSE SERPL-MCNC: 148 MG/DL (ref 70–99)
HBA1C MFR BLD: 6.8 % (ref 4.2–6.3)
HCT VFR BLD CALC: 31.3 % (ref 42–52)
HDLC SERPL-MCNC: 63 MG/DL
HEMOGLOBIN: 9.3 GM/DL (ref 13.5–18)
INR BLD: 1.2 INDEX
LDLC SERPL CALC-MCNC: 65 MG/DL
MCH RBC QN AUTO: 28.4 PG (ref 27–31)
MCHC RBC AUTO-ENTMCNC: 29.7 % (ref 32–36)
MCV RBC AUTO: 95.4 FL (ref 78–100)
PDW BLD-RTO: 16.4 % (ref 11.7–14.9)
PLATELET # BLD: 239 K/CU MM (ref 140–440)
PMV BLD AUTO: 10.9 FL (ref 7.5–11.1)
POTASSIUM SERPL-SCNC: 4.7 MMOL/L (ref 3.5–5.1)
PROTHROMBIN TIME: 16.1 SECONDS (ref 11.7–14.5)
RBC # BLD: 3.28 M/CU MM (ref 4.6–6.2)
SODIUM BLD-SCNC: 140 MMOL/L (ref 135–145)
TRIGL SERPL-MCNC: 44 MG/DL
TROPONIN, HIGH SENSITIVITY: 101 NG/L (ref 0–22)
TROPONIN, HIGH SENSITIVITY: 98 NG/L (ref 0–22)
WBC # BLD: 10 K/CU MM (ref 4–10.5)

## 2024-06-04 PROCEDURE — 80061 LIPID PANEL: CPT

## 2024-06-04 PROCEDURE — 6370000000 HC RX 637 (ALT 250 FOR IP): Performed by: STUDENT IN AN ORGANIZED HEALTH CARE EDUCATION/TRAINING PROGRAM

## 2024-06-04 PROCEDURE — 80048 BASIC METABOLIC PNL TOTAL CA: CPT

## 2024-06-04 PROCEDURE — 2500000003 HC RX 250 WO HCPCS: Performed by: STUDENT IN AN ORGANIZED HEALTH CARE EDUCATION/TRAINING PROGRAM

## 2024-06-04 PROCEDURE — 83036 HEMOGLOBIN GLYCOSYLATED A1C: CPT

## 2024-06-04 PROCEDURE — G0378 HOSPITAL OBSERVATION PER HR: HCPCS

## 2024-06-04 PROCEDURE — 85520 HEPARIN ASSAY: CPT

## 2024-06-04 PROCEDURE — C8924 2D TTE W OR W/O FOL W/CON,FU: HCPCS

## 2024-06-04 PROCEDURE — 6360000002 HC RX W HCPCS: Performed by: STUDENT IN AN ORGANIZED HEALTH CARE EDUCATION/TRAINING PROGRAM

## 2024-06-04 PROCEDURE — APPNB60 APP NON BILLABLE TIME 46-60 MINS: Performed by: NURSE PRACTITIONER

## 2024-06-04 PROCEDURE — 1200000000 HC SEMI PRIVATE

## 2024-06-04 PROCEDURE — 82962 GLUCOSE BLOOD TEST: CPT

## 2024-06-04 PROCEDURE — 6370000000 HC RX 637 (ALT 250 FOR IP): Performed by: NURSE PRACTITIONER

## 2024-06-04 PROCEDURE — 94761 N-INVAS EAR/PLS OXIMETRY MLT: CPT

## 2024-06-04 PROCEDURE — 6370000000 HC RX 637 (ALT 250 FOR IP)

## 2024-06-04 PROCEDURE — 2700000000 HC OXYGEN THERAPY PER DAY

## 2024-06-04 PROCEDURE — 94640 AIRWAY INHALATION TREATMENT: CPT

## 2024-06-04 PROCEDURE — 85610 PROTHROMBIN TIME: CPT

## 2024-06-04 PROCEDURE — 96374 THER/PROPH/DIAG INJ IV PUSH: CPT

## 2024-06-04 PROCEDURE — 36415 COLL VENOUS BLD VENIPUNCTURE: CPT

## 2024-06-04 PROCEDURE — 6360000004 HC RX CONTRAST MEDICATION

## 2024-06-04 PROCEDURE — 94664 DEMO&/EVAL PT USE INHALER: CPT

## 2024-06-04 PROCEDURE — 85027 COMPLETE CBC AUTOMATED: CPT

## 2024-06-04 PROCEDURE — 85730 THROMBOPLASTIN TIME PARTIAL: CPT

## 2024-06-04 PROCEDURE — 84484 ASSAY OF TROPONIN QUANT: CPT

## 2024-06-04 RX ORDER — FUROSEMIDE 20 MG/1
20 TABLET ORAL DAILY
Status: ON HOLD | COMMUNITY
End: 2024-06-06 | Stop reason: HOSPADM

## 2024-06-04 RX ORDER — FLUTICASONE PROPIONATE AND SALMETEROL 250; 50 UG/1; UG/1
1 POWDER RESPIRATORY (INHALATION) EVERY 12 HOURS
Status: ON HOLD | COMMUNITY

## 2024-06-04 RX ORDER — IPRATROPIUM BROMIDE AND ALBUTEROL SULFATE 2.5; .5 MG/3ML; MG/3ML
1 SOLUTION RESPIRATORY (INHALATION) EVERY 4 HOURS
Status: ON HOLD | COMMUNITY

## 2024-06-04 RX ORDER — MONTELUKAST SODIUM 10 MG/1
10 TABLET ORAL NIGHTLY
Status: ON HOLD | COMMUNITY

## 2024-06-04 RX ORDER — LANOLIN ALCOHOL/MO/W.PET/CERES
400 CREAM (GRAM) TOPICAL DAILY
Status: DISCONTINUED | OUTPATIENT
Start: 2024-06-04 | End: 2024-06-07 | Stop reason: HOSPADM

## 2024-06-04 RX ORDER — HEPARIN SODIUM 1000 [USP'U]/ML
4000 INJECTION, SOLUTION INTRAVENOUS; SUBCUTANEOUS ONCE
Status: COMPLETED | OUTPATIENT
Start: 2024-06-04 | End: 2024-06-04

## 2024-06-04 RX ORDER — GUAIFENESIN/DEXTROMETHORPHAN 100-10MG/5
5 SYRUP ORAL EVERY 4 HOURS PRN
Status: DISCONTINUED | OUTPATIENT
Start: 2024-06-04 | End: 2024-06-07 | Stop reason: HOSPADM

## 2024-06-04 RX ORDER — HEPARIN SODIUM 10000 [USP'U]/100ML
5-30 INJECTION, SOLUTION INTRAVENOUS CONTINUOUS
Status: DISCONTINUED | OUTPATIENT
Start: 2024-06-04 | End: 2024-06-05

## 2024-06-04 RX ORDER — IPRATROPIUM BROMIDE 42 UG/1
2 SPRAY, METERED NASAL 2 TIMES DAILY
Status: ON HOLD | COMMUNITY

## 2024-06-04 RX ORDER — HEPARIN SODIUM 1000 [USP'U]/ML
4000 INJECTION, SOLUTION INTRAVENOUS; SUBCUTANEOUS PRN
Status: DISCONTINUED | OUTPATIENT
Start: 2024-06-04 | End: 2024-06-05

## 2024-06-04 RX ORDER — HEPARIN SODIUM 1000 [USP'U]/ML
2000 INJECTION, SOLUTION INTRAVENOUS; SUBCUTANEOUS PRN
Status: DISCONTINUED | OUTPATIENT
Start: 2024-06-04 | End: 2024-06-05

## 2024-06-04 RX ORDER — POTASSIUM CHLORIDE 1.5 G/1.58G
20 POWDER, FOR SOLUTION ORAL 2 TIMES DAILY
Status: ON HOLD | COMMUNITY

## 2024-06-04 RX ORDER — FUROSEMIDE 10 MG/ML
40 INJECTION INTRAMUSCULAR; INTRAVENOUS 2 TIMES DAILY
Status: DISCONTINUED | OUTPATIENT
Start: 2024-06-04 | End: 2024-06-06

## 2024-06-04 RX ORDER — GLIPIZIDE 5 MG/1
5 TABLET ORAL DAILY
Status: ON HOLD | COMMUNITY

## 2024-06-04 RX ADMIN — IPRATROPIUM BROMIDE AND ALBUTEROL SULFATE 1 DOSE: 2.5; .5 SOLUTION RESPIRATORY (INHALATION) at 07:56

## 2024-06-04 RX ADMIN — EMPAGLIFLOZIN 20 MG: 10 TABLET, FILM COATED ORAL at 09:44

## 2024-06-04 RX ADMIN — BUDESONIDE AND FORMOTEROL FUMARATE DIHYDRATE 2 PUFF: 160; 4.5 AEROSOL RESPIRATORY (INHALATION) at 07:59

## 2024-06-04 RX ADMIN — HEPARIN SODIUM 12 UNITS/KG/HR: 10000 INJECTION, SOLUTION INTRAVENOUS at 02:21

## 2024-06-04 RX ADMIN — BUDESONIDE AND FORMOTEROL FUMARATE DIHYDRATE 2 PUFF: 160; 4.5 AEROSOL RESPIRATORY (INHALATION) at 21:08

## 2024-06-04 RX ADMIN — MAGNESIUM OXIDE 400 MG (241.3 MG MAGNESIUM) TABLET 400 MG: TABLET at 18:28

## 2024-06-04 RX ADMIN — IPRATROPIUM BROMIDE AND ALBUTEROL SULFATE 1 DOSE: 2.5; .5 SOLUTION RESPIRATORY (INHALATION) at 11:49

## 2024-06-04 RX ADMIN — HEPARIN SODIUM 4000 UNITS: 1000 INJECTION INTRAVENOUS; SUBCUTANEOUS at 02:19

## 2024-06-04 RX ADMIN — PERFLUTREN 2 ML: 6.52 INJECTION, SUSPENSION INTRAVENOUS at 14:20

## 2024-06-04 RX ADMIN — INSULIN LISPRO 2 UNITS: 100 INJECTION, SOLUTION INTRAVENOUS; SUBCUTANEOUS at 18:27

## 2024-06-04 RX ADMIN — ATORVASTATIN CALCIUM 40 MG: 40 TABLET, FILM COATED ORAL at 21:13

## 2024-06-04 RX ADMIN — FUROSEMIDE 40 MG: 10 INJECTION, SOLUTION INTRAMUSCULAR; INTRAVENOUS at 18:27

## 2024-06-04 RX ADMIN — IPRATROPIUM BROMIDE AND ALBUTEROL SULFATE 1 DOSE: 2.5; .5 SOLUTION RESPIRATORY (INHALATION) at 15:07

## 2024-06-04 RX ADMIN — GUAIFENESIN SYRUP AND DEXTROMETHORPHAN 5 ML: 100; 10 SYRUP ORAL at 21:13

## 2024-06-04 RX ADMIN — LOSARTAN POTASSIUM 25 MG: 25 TABLET, FILM COATED ORAL at 09:44

## 2024-06-04 RX ADMIN — IPRATROPIUM BROMIDE AND ALBUTEROL SULFATE 1 DOSE: 2.5; .5 SOLUTION RESPIRATORY (INHALATION) at 20:57

## 2024-06-04 RX ADMIN — IPRATROPIUM BROMIDE AND ALBUTEROL SULFATE 1 DOSE: 2.5; .5 SOLUTION RESPIRATORY (INHALATION) at 04:32

## 2024-06-04 RX ADMIN — VENLAFAXINE 25 MG: 25 TABLET ORAL at 18:29

## 2024-06-04 RX ADMIN — FUROSEMIDE 20 MG: 20 TABLET ORAL at 09:45

## 2024-06-04 RX ADMIN — PANTOPRAZOLE SODIUM 20 MG: 20 TABLET, DELAYED RELEASE ORAL at 06:34

## 2024-06-04 RX ADMIN — IPRATROPIUM BROMIDE AND ALBUTEROL SULFATE 1 DOSE: 2.5; .5 SOLUTION RESPIRATORY (INHALATION) at 00:37

## 2024-06-04 RX ADMIN — ASPIRIN 81 MG: 81 TABLET, CHEWABLE ORAL at 09:46

## 2024-06-04 RX ADMIN — METOPROLOL SUCCINATE 50 MG: 50 TABLET, EXTENDED RELEASE ORAL at 09:44

## 2024-06-04 ASSESSMENT — PAIN SCALES - GENERAL
PAINLEVEL_OUTOF10: 0

## 2024-06-04 ASSESSMENT — HEART SCORE: ECG: NON-SPECIFC REPOLARIZATION DISTURBANCE/LBTB/PM

## 2024-06-04 ASSESSMENT — ENCOUNTER SYMPTOMS
DIARRHEA: 0
CHEST TIGHTNESS: 0
COLOR CHANGE: 0
VOICE CHANGE: 0
ABDOMINAL PAIN: 0
TROUBLE SWALLOWING: 0
NAUSEA: 0
COUGH: 0
SHORTNESS OF BREATH: 1
SORE THROAT: 0
CONSTIPATION: 0
EYE PAIN: 0
SINUS PAIN: 0
BLOOD IN STOOL: 0
BACK PAIN: 0
SINUS PRESSURE: 0
VOMITING: 0
WHEEZING: 0
PHOTOPHOBIA: 0

## 2024-06-04 ASSESSMENT — PAIN SCALES - WONG BAKER
WONGBAKER_NUMERICALRESPONSE: NO HURT

## 2024-06-04 NOTE — PROGRESS NOTES
Ptt not done yet, was ordered at 1630. Called phlebot, she said that the other phlebot has the ordered locked in order for her to do, but she will call her and let her know it is a time bloodwork. Will continue to monitor.

## 2024-06-04 NOTE — PROGRESS NOTES
Paged Dr Syed- Dr. Lopez states pt can eat, he has requested a diet, and no left heart cath or stress test at this time.

## 2024-06-04 NOTE — PROGRESS NOTES
V2.0  [facility] [place]      Name:  Billy Ozuna /Age/Sex: 1945  (79 y.o. male)   MRN & CSN:  [id number] & [id number] Encounter Date/Time: 2024 12:38 PM EDT    Location:  [address] [place] [address] PCP: Kera Monique (Inactive)       Hospital Day: 2    Assessment and Plan:   Billy Ozuna is a 79 y.o. male  who presents with Chest pain at rest      Plan:    NSTEMI  Chest pain at rest  Troponin 67> 73> 101 EKG interpreted personally and results NSR 86/min, PACs, new TWI in V2-V3, similar appearing TWI in V4-V6.  Chest x-ray reviewed cardiomegaly, mild pulmonary vascular congestion improved from prior exam.  IV heparin infusion  Aspirin and Lipitor    Acute systolic CHF  Patient with decreased EF of 20 to 25%.  Continue Toprol-XL and Jardiance  IV Lasix twice daily  Continue ARB  Repeat echo    Type 2 diabetes  Sliding scale insulin  Hypoglycemia protocol  Point-of-care glucose checks    GERD  Protonix      Diet ADULT DIET; Regular; 4 carb choices (60 gm/meal); Low Sodium (2 gm)   DVT Prophylaxis [] Lovenox, [x]  Heparin, [] SCDs, [] Ambulation,    [] Eliquis, [] Xarelto  [] Coumadin [] other   Code Status Full Code   Disposition From: Home Home  Expected Disposition: Home  Estimated Date of Discharge: 2 3 days  Patient requires continued admission due to IV Lasix and echo pending   Surrogate Decision Maker/ POA      Personally reviewed Lab Studies and Imaging     Discussed management of the case with cardiology who recommended echo and IV Lasix    EKG interpreted personally and results no acute ST changes    Imaging that was interpreted personally includes chest x-ray and results vascular congestion    Drugs that require monitoring for toxicity include IV Lasix and the method of monitoring was BMP to monitor kidney function electrolytes    Subjective:     Chief Complaint: Chest Pain (Started at 0700. 324 given by ems ) and Dizziness (Started at 0600 )       Patient no longer with chest pain.   sounds are normal.      Palpations: Abdomen is soft.   Musculoskeletal:         General: Normal range of motion.      Cervical back: Normal range of motion and neck supple.   Skin:     General: Skin is warm and dry.   Neurological:      General: No focal deficit present.      Mental Status: He is alert and oriented to person, place, and time. Mental status is at baseline.   Psychiatric:         Mood and Affect: Mood normal.         Behavior: Behavior normal.         Thought Content: Thought content normal.         Medications:   Medications:    aspirin  81 mg Oral Daily    atorvastatin  40 mg Oral Nightly    ipratropium 0.5 mg-albuterol 2.5 mg  1 Dose Inhalation 4x Daily RT    budesonide-formoterol  2 puff Inhalation BID    empagliflozin  20 mg Oral Daily    furosemide  20 mg Oral Daily    insulin lispro  0-8 Units SubCUTAneous TID WC    insulin lispro  0-4 Units SubCUTAneous Nightly    losartan  25 mg Oral Daily    metoprolol succinate  50 mg Oral Daily    pantoprazole  20 mg Oral QAM AC    venlafaxine  25 mg Oral QPM      Infusions:    heparin (PORCINE) Infusion 14 Units/kg/hr (06/04/24 1046)    dextrose       PRN Meds: heparin (porcine), 4,000 Units, PRN  heparin (porcine), 2,000 Units, PRN  nitroGLYCERIN, 0.4 mg, Q5 Min PRN  ondansetron, 4 mg, Q8H PRN   Or  ondansetron, 4 mg, Q6H PRN  acetaminophen, 650 mg, Q6H PRN   Or  acetaminophen, 650 mg, Q6H PRN  glucose, 4 tablet, PRN  dextrose bolus, 125 mL, PRN   Or  dextrose bolus, 250 mL, PRN  glucagon (rDNA), 1 mg, PRN  dextrose, , Continuous PRN        Labs      Recent Results (from the past 24 hour(s))   Brain Natriuretic Peptide    Collection Time: 06/03/24  4:48 PM   Result Value Ref Range    Pro-BNP 3,237 (H) <300 PG/ML   EKG 12 Lead    Collection Time: 06/03/24  4:54 PM   Result Value Ref Range    Ventricular Rate 86 BPM    Atrial Rate 86 BPM    P-R Interval 168 ms    QRS Duration 128 ms    Q-T Interval 362 ms    QTc Calculation (Bazett) 433 ms    P Axis 21

## 2024-06-04 NOTE — PROGRESS NOTES
Called and spoke with ese from phlebotomy.  I asked her to draw lab coag studies so I could titrate hep gtt.

## 2024-06-04 NOTE — CONSULTS
CARDIOLOGY CONSULT NOTE   Reason for consultation:      Referring physician:  Hu Harrington MD     Primary care physician: Kera Monique (Inactive)      Dear   Thanks for the consult.      History of present illness:Billy is a 79 y.o.year old who  presents with chest pain for last few years, happening daily, intermittent for 15 to 20 mins and aggravated with activity substernal also,reproducible with palpation, radiated to shoulder, 6/10, tender to touch,associated with shortness of breath, + sweating, nausea, patient has history of NICM AND had abnromal stress test suggesting large inferior wall perfusion defect and subsequent LHC showed normal RCA and mild distal LAD stenosis and LVEF was 25% on ECHO and 40% on LHC, HIS cr is 1,1 AND HS trop was 98, patietn had flash pulmonary edema on his presentation and it improved  Chief Complaint   Patient presents with    Chest Pain     Started at 0700. 324 given by ems     Dizziness     Started at 0600      Blood pressure, cholesterol, blood glucose and weight are well controlled.    Past medical history:    has a past medical history of Acid reflux, Anemia, COPD (chronic obstructive pulmonary disease) (HCC), Dental decay, Diabetes mellitus (HCC), Emphysema of lung (HCC), Enlarged prostate, Stevens catheter in place, H/O cardiovascular stress test, Hematuria, HX OTHER MEDICAL, Hypertension, Low back sprain, Megacolon, On home oxygen therapy, and Wears glasses.  Past surgical history:   has a past surgical history that includes Revision Colostomy (1974); Abdominal exploration surgery (1974); other surgical history; other surgical history (01 07 2013); tumor removal; Appendectomy (11 years old); Abdomen surgery; TURP (N/A, 07/12/2018); Dental surgery; other surgical history (09/18/2018); and Colonoscopy (N/A, 10/24/2023).  Social History:   reports that he quit smoking about 11 years ago. His smoking use included cigarettes. He started smoking about 41 years ago. He 
Interpretation:        Echo  6/4/2024      Left Ventricle Reduced left ventricular systolic function with a visually estimated EF of 10 -15%. No evidece of thrombus with definity contrast.   Right Ventricle Not well visualized.   Right Atrium Not well visualized.   Aortic Valve Valve structure is normal.   Mitral Valve Not well visualized. Color doppler suggest mitral regurgitation is present however poor image quality limits grading severity   Tricuspid Valve Not well visualized.   Pulmonic Valve The pulmonic valve was not well visualized.   Pericardium No pericardial effusion.       Cath 5/2021    LMCA: Normal.     LAD: Single stenosis.40 to 50 % distal narrowing.  High diagonal with mild Luminal irregularities.     LCx: Mild Lumen Irregularity.     RCA: Normal.     Ramus: Mild Lumen Irregularity.        Vitals:    06/04/24 1440 06/04/24 1507 06/04/24 1554 06/04/24 1600   BP: (!) 123/103  97/76 95/60   Pulse: 74 74 77 75   Resp:  18 20 15   Temp:   98.7 °F (37.1 °C)    TempSrc:       SpO2:  96% 97% 97%   Weight: 75.8 kg (167 lb)      Height: 1.803 m (5' 11\")           IMPRESSION / RECOMMENDATIONS:     Non ischemic cardiomyopathy  Acute on chronic combined left ventricular systolic dysfunction  Chest pain followed by interventional cardiology  COPD  DM-2  Non compliance    Patient with severe LV systolic dysfunction  Patient is non compliant  Patient needs to have optimization of medical therapy for 3 months and if LVEF does not improve then consider ICD. He is supposed to be on metoprolol and losartan and patient admits that he was not compliant as he did not think there was any thing wrong with his heart.  Start magnesium oxide  Patient has NSVT also - may consider Lifevest at discharge once HF is resolved if patient agrees for primary prevention till medical therapy is optimized.     Will follow along        Thanks again for allowing me to participate in care of this patient. Please call me if you have any

## 2024-06-04 NOTE — PROGRESS NOTES
06/04/24 1222   Encounter Summary   Encounter Overview/Reason Initial Encounter;Spiritual/Emotional Needs;Grief, Loss, and Adjustments   Service Provided For Patient   Referral/Consult From Rounding;Nurse   Support System Family members   Last Encounter  06/04/24  (PT shared a lot about his life, his concern was to express how he finds meaning in life, share his regrets, he expressed loneliness, he has rima in God, he finds meaning in family, he reflected on his legacy, he wanted prayer, high risk readmit)   Complexity of Encounter Moderate   Begin Time 1149   End Time  1225   Total Time Calculated 36 min   Spiritual/Emotional needs   Type Spiritual Support;Emotional Distress   Grief, Loss, and Adjustments   Type Grief and loss;Adjustment to illness;Life Adjustments   Assessment/Intervention/Outcome   Assessment Anxious;Concerns with suffering;Coping;Despair;Loneliness;Stress overload   Intervention Active listening;Discussed belief system/Denominational practices/rima;Discussed illness injury and it’s impact;Discussed meaning/purpose;Discussed relationship with God;Discussed death, afterlife;Explored/Affirmed feelings, thoughts, concerns;Explored Coping Skills/Resources;Grief Care;Nurtured Hope;Life review/Legacy;Prayer (assurance of)/[place];Sustaining Presence/Ministry of presence   Outcome Comfort;Connection/Belonging;Coping;Encouraged;Engaged in conversation;Expressed feelings, needs, and concerns;Expressed Gratitude;Expressed regrets;Grieving;Less anxious, Less agitated;Peace;Restored Hope;Venting emotion   Plan and Referrals   Plan/Referrals Continue Support (comment)

## 2024-06-04 NOTE — CARE COORDINATION
Chart reviewed; spoke with pt in room. From home alone. Dgt lives in [place]. Has [facility] for aides 2x/week (4hrs one day and 5 hrs the other). Would like to continue. Would like skilled RN/PT/OT HC as well. Would like new evaluation for HC at discharge. [facility]. Gets groceries delivered. States he will need transport at discharge due to his dgt being in [place].   Plan home with HC and transportation needs. Denies further needs.     06/04/24 2776   Service Assessment   Patient Orientation Alert and Oriented   Cognition Alert   History Provided By Patient;Medical Record   Primary Caregiver Self   Support Systems Children;Home Care Staff;Meals on Wheels   Patient's Healthcare Decision Maker is: Legal Next of Kin   PCP Verified by CM Yes   Prior Functional Level Assistance with the following:   Current Functional Level Assistance with the following:   Can patient return to prior living arrangement Yes   Ability to make needs known: Good   Family able to assist with home care needs: No   Would you like for me to discuss the discharge plan with any other family members/significant others, and if so, who? No   Financial Resources  ([facility])   Community Resources ECF/Home Care   Social/Functional History   Lives With Alone   Condition of Participation: Discharge Planning   The Plan for Transition of Care is related to the following treatment goals: home; continue with [facility]   The Patient and/or Patient Representative was provided with a Choice of Provider? Patient   The Patient and/Or Patient Representative agree with the Discharge Plan? Yes

## 2024-06-04 NOTE — PROGRESS NOTES
4 Eyes Skin Assessment     NAME:  Billy Ozuna  YOB: 1945  MEDICAL RECORD NUMBER:  [id number]    The patient is being assessed for  Admission    I agree that at least one RN has performed a thorough Head to Toe Skin Assessment on the patient. ALL assessment sites listed below have been assessed.      Areas assessed by both nurses:    Head, Face, Ears, Shoulders, Back, Chest, Arms, Elbows, Hands, Sacrum. Buttock, Coccyx, Ischium, and Legs. Feet and Heels        Does the Patient have a Wound? No noted wound(s)       [place] Prevention initiated by RN: Yes  Wound Care Orders initiated by RN: No    Pressure Injury (Stage 3,4, Unstageable, DTI, NWPT, and Complex wounds) if present, place Wound referral order by RN under : No    New Ostomies, if present place, Ostomy referral order under : No     Nurse 1 eSignature: Electronically signed by Bonny Ortega RN on 6/4/24 at 12:15 AM EDT    **SHARE this note so that the co-signing nurse can place an eSignature**    Nurse 2 eSignature: Electronically signed by Eddy Rocha RN on 6/4/24 at 2:49 AM EDT

## 2024-06-04 NOTE — PLAN OF CARE
Problem: Discharge Planning  Goal: Discharge to home or other facility with appropriate resources  Outcome: Progressing  Flowsheets (Taken 6/4/2024 1130)  Discharge to home or other facility with appropriate resources:   Identify barriers to discharge with patient and caregiver   Arrange for needed discharge resources and transportation as appropriate   Identify discharge learning needs (meds, wound care, etc)   Arrange for interpreters to assist at discharge as needed   Refer to discharge planning if patient needs post-hospital services based on physician order or complex needs related to functional status, cognitive ability or social support system     Problem: Safety - Adult  Goal: Free from fall injury  Outcome: Progressing     Problem: Pain  Goal: Verbalizes/displays adequate comfort level or baseline comfort level  Outcome: Progressing

## 2024-06-04 NOTE — PROGRESS NOTES
Saw a phlebot on the floor, asked her if she was going to draw this pt, she said he is next on his list to draw. Will continue to monitor

## 2024-06-04 NOTE — PLAN OF CARE
Problem: Discharge Planning  Goal: Discharge to home or other facility with appropriate resources  6/3/2024 2307 by Eddy Rocha, RN  Outcome: Progressing  6/3/2024 2307 by Eddy Rocha, RN  Outcome: Progressing  Flowsheets (Taken 6/3/2024 2047)  Discharge to home or other facility with appropriate resources:   Identify barriers to discharge with patient and caregiver   Identify discharge learning needs (meds, wound care, etc)   Refer to discharge planning if patient needs post-hospital services based on physician order or complex needs related to functional status, cognitive ability or social support system     Problem: Safety - Adult  Goal: Free from fall injury  Outcome: Progressing     Problem: Pain  Goal: Verbalizes/displays adequate comfort level or baseline comfort level  Outcome: Progressing

## 2024-06-05 LAB
ANION GAP SERPL CALCULATED.3IONS-SCNC: 10 MMOL/L (ref 7–16)
ANTI-XA UNFRAC HEPARIN: 0.24 IU/ML
APTT: 35.8 SECONDS (ref 25.1–37.1)
APTT: 37.5 SECONDS (ref 25.1–37.1)
APTT: 47.9 SECONDS (ref 25.1–37.1)
APTT: 59.3 SECONDS (ref 25.1–37.1)
BUN SERPL-MCNC: 26 MG/DL (ref 6–23)
CALCIUM SERPL-MCNC: 8.3 MG/DL (ref 8.3–10.6)
CHLORIDE BLD-SCNC: 99 MMOL/L (ref 99–110)
CO2: 30 MMOL/L (ref 21–32)
CREAT SERPL-MCNC: 1.2 MG/DL (ref 0.9–1.3)
GFR, ESTIMATED: 62 ML/MIN/1.73M2
GLUCOSE BLD-MCNC: 125 MG/DL (ref 70–99)
GLUCOSE BLD-MCNC: 138 MG/DL (ref 70–99)
GLUCOSE BLD-MCNC: 193 MG/DL (ref 70–99)
GLUCOSE BLD-MCNC: 215 MG/DL (ref 70–99)
GLUCOSE SERPL-MCNC: 180 MG/DL (ref 70–99)
HCT VFR BLD CALC: 32.6 % (ref 42–52)
HEMOGLOBIN: 9.7 GM/DL (ref 13.5–18)
MCH RBC QN AUTO: 28.5 PG (ref 27–31)
MCHC RBC AUTO-ENTMCNC: 29.8 % (ref 32–36)
MCV RBC AUTO: 95.9 FL (ref 78–100)
PDW BLD-RTO: 16.4 % (ref 11.7–14.9)
PLATELET # BLD: 238 K/CU MM (ref 140–440)
PMV BLD AUTO: 11.2 FL (ref 7.5–11.1)
POTASSIUM SERPL-SCNC: 4.2 MMOL/L (ref 3.5–5.1)
RBC # BLD: 3.4 M/CU MM (ref 4.6–6.2)
SODIUM BLD-SCNC: 139 MMOL/L (ref 135–145)
WBC # BLD: 8.3 K/CU MM (ref 4–10.5)

## 2024-06-05 PROCEDURE — 94761 N-INVAS EAR/PLS OXIMETRY MLT: CPT

## 2024-06-05 PROCEDURE — 2500000003 HC RX 250 WO HCPCS: Performed by: STUDENT IN AN ORGANIZED HEALTH CARE EDUCATION/TRAINING PROGRAM

## 2024-06-05 PROCEDURE — 36415 COLL VENOUS BLD VENIPUNCTURE: CPT

## 2024-06-05 PROCEDURE — 6370000000 HC RX 637 (ALT 250 FOR IP): Performed by: STUDENT IN AN ORGANIZED HEALTH CARE EDUCATION/TRAINING PROGRAM

## 2024-06-05 PROCEDURE — 97166 OT EVAL MOD COMPLEX 45 MIN: CPT

## 2024-06-05 PROCEDURE — 6360000002 HC RX W HCPCS: Performed by: STUDENT IN AN ORGANIZED HEALTH CARE EDUCATION/TRAINING PROGRAM

## 2024-06-05 PROCEDURE — 80048 BASIC METABOLIC PNL TOTAL CA: CPT

## 2024-06-05 PROCEDURE — 82962 GLUCOSE BLOOD TEST: CPT

## 2024-06-05 PROCEDURE — 85520 HEPARIN ASSAY: CPT

## 2024-06-05 PROCEDURE — 85027 COMPLETE CBC AUTOMATED: CPT

## 2024-06-05 PROCEDURE — 97530 THERAPEUTIC ACTIVITIES: CPT

## 2024-06-05 PROCEDURE — 2700000000 HC OXYGEN THERAPY PER DAY

## 2024-06-05 PROCEDURE — 6370000000 HC RX 637 (ALT 250 FOR IP): Performed by: NURSE PRACTITIONER

## 2024-06-05 PROCEDURE — 1200000000 HC SEMI PRIVATE

## 2024-06-05 PROCEDURE — 6360000002 HC RX W HCPCS

## 2024-06-05 PROCEDURE — 85730 THROMBOPLASTIN TIME PARTIAL: CPT

## 2024-06-05 PROCEDURE — 94640 AIRWAY INHALATION TREATMENT: CPT

## 2024-06-05 PROCEDURE — 6370000000 HC RX 637 (ALT 250 FOR IP)

## 2024-06-05 RX ORDER — ENOXAPARIN SODIUM 100 MG/ML
30 INJECTION SUBCUTANEOUS DAILY
Status: DISCONTINUED | OUTPATIENT
Start: 2024-06-05 | End: 2024-06-05 | Stop reason: DRUGHIGH

## 2024-06-05 RX ORDER — ENOXAPARIN SODIUM 100 MG/ML
40 INJECTION SUBCUTANEOUS DAILY
Status: DISCONTINUED | OUTPATIENT
Start: 2024-06-05 | End: 2024-06-07 | Stop reason: HOSPADM

## 2024-06-05 RX ORDER — IPRATROPIUM BROMIDE AND ALBUTEROL SULFATE 2.5; .5 MG/3ML; MG/3ML
1 SOLUTION RESPIRATORY (INHALATION) EVERY 4 HOURS PRN
Status: DISCONTINUED | OUTPATIENT
Start: 2024-06-05 | End: 2024-06-07 | Stop reason: HOSPADM

## 2024-06-05 RX ADMIN — ASPIRIN 81 MG: 81 TABLET, CHEWABLE ORAL at 09:28

## 2024-06-05 RX ADMIN — ATORVASTATIN CALCIUM 40 MG: 40 TABLET, FILM COATED ORAL at 21:44

## 2024-06-05 RX ADMIN — GUAIFENESIN SYRUP AND DEXTROMETHORPHAN 5 ML: 100; 10 SYRUP ORAL at 17:29

## 2024-06-05 RX ADMIN — ENOXAPARIN SODIUM 40 MG: 100 INJECTION SUBCUTANEOUS at 09:30

## 2024-06-05 RX ADMIN — VENLAFAXINE 25 MG: 25 TABLET ORAL at 18:46

## 2024-06-05 RX ADMIN — INSULIN LISPRO 2 UNITS: 100 INJECTION, SOLUTION INTRAVENOUS; SUBCUTANEOUS at 12:21

## 2024-06-05 RX ADMIN — FUROSEMIDE 40 MG: 10 INJECTION, SOLUTION INTRAMUSCULAR; INTRAVENOUS at 17:29

## 2024-06-05 RX ADMIN — EMPAGLIFLOZIN 20 MG: 10 TABLET, FILM COATED ORAL at 09:28

## 2024-06-05 RX ADMIN — BUDESONIDE AND FORMOTEROL FUMARATE DIHYDRATE 2 PUFF: 160; 4.5 AEROSOL RESPIRATORY (INHALATION) at 21:37

## 2024-06-05 RX ADMIN — LOSARTAN POTASSIUM 25 MG: 25 TABLET, FILM COATED ORAL at 08:50

## 2024-06-05 RX ADMIN — IPRATROPIUM BROMIDE AND ALBUTEROL SULFATE 1 DOSE: 2.5; .5 SOLUTION RESPIRATORY (INHALATION) at 04:20

## 2024-06-05 RX ADMIN — METOPROLOL SUCCINATE 50 MG: 50 TABLET, EXTENDED RELEASE ORAL at 09:28

## 2024-06-05 RX ADMIN — IPRATROPIUM BROMIDE AND ALBUTEROL SULFATE 1 DOSE: 2.5; .5 SOLUTION RESPIRATORY (INHALATION) at 15:11

## 2024-06-05 RX ADMIN — FUROSEMIDE 40 MG: 10 INJECTION, SOLUTION INTRAMUSCULAR; INTRAVENOUS at 09:31

## 2024-06-05 RX ADMIN — IPRATROPIUM BROMIDE AND ALBUTEROL SULFATE 1 DOSE: 2.5; .5 SOLUTION RESPIRATORY (INHALATION) at 21:37

## 2024-06-05 RX ADMIN — HEPARIN SODIUM 14 UNITS/KG/HR: 10000 INJECTION, SOLUTION INTRAVENOUS at 02:09

## 2024-06-05 RX ADMIN — PANTOPRAZOLE SODIUM 20 MG: 20 TABLET, DELAYED RELEASE ORAL at 07:09

## 2024-06-05 RX ADMIN — BUDESONIDE AND FORMOTEROL FUMARATE DIHYDRATE 2 PUFF: 160; 4.5 AEROSOL RESPIRATORY (INHALATION) at 07:27

## 2024-06-05 RX ADMIN — IPRATROPIUM BROMIDE AND ALBUTEROL SULFATE 1 DOSE: 2.5; .5 SOLUTION RESPIRATORY (INHALATION) at 11:31

## 2024-06-05 RX ADMIN — MAGNESIUM OXIDE 400 MG (241.3 MG MAGNESIUM) TABLET 400 MG: TABLET at 08:50

## 2024-06-05 RX ADMIN — IPRATROPIUM BROMIDE AND ALBUTEROL SULFATE 1 DOSE: 2.5; .5 SOLUTION RESPIRATORY (INHALATION) at 07:27

## 2024-06-05 ASSESSMENT — ENCOUNTER SYMPTOMS
SINUS PAIN: 0
SINUS PRESSURE: 0
CONSTIPATION: 0
SHORTNESS OF BREATH: 1
VOMITING: 0
DIARRHEA: 0
COUGH: 0
SORE THROAT: 0
CHEST TIGHTNESS: 0
COLOR CHANGE: 0
NAUSEA: 0
TROUBLE SWALLOWING: 0
VOICE CHANGE: 0
ABDOMINAL PAIN: 0
WHEEZING: 0
BACK PAIN: 0

## 2024-06-05 ASSESSMENT — PAIN SCALES - GENERAL
PAINLEVEL_OUTOF10: 0
PAINLEVEL_OUTOF10: 0

## 2024-06-05 NOTE — PROGRESS NOTES
Admit Date:  6/3/2024    Admission diagnosis / Complaint :   Chest pain  dizziness    Subjective:  Mr. Ozuna denies cardiac complaints    Objective:   /79   Pulse 76   Temp 98.4 °F (36.9 °C) (Oral)   Resp 15   Ht 1.803 m (5' 11\")   Wt 75.8 kg (167 lb)   SpO2 97%   BMI 23.29 kg/m²     Intake/Output Summary (Last 24 hours) at 6/5/2024 1026  Last data filed at 6/4/2024 1755  Gross per 24 hour   Intake 780 ml   Output --   Net 780 ml       TELEMETRY: Sinus    has a past medical history of Acid reflux, Anemia, COPD (chronic obstructive pulmonary disease) (HCC), Dental decay, Diabetes mellitus (HCC), Emphysema of lung (HCC), Enlarged prostate, Stevens catheter in place, H/O cardiovascular stress test, Hematuria, HX OTHER MEDICAL, Hypertension, Low back sprain, Megacolon, On home oxygen therapy, and Wears glasses.   has a past surgical history that includes Revision Colostomy (1974); Abdominal exploration surgery (1974); other surgical history; other surgical history (01 07 2013); tumor removal; Appendectomy (11 years old); Abdomen surgery; TURP (N/A, 07/12/2018); Dental surgery; other surgical history (09/18/2018); and Colonoscopy (N/A, 10/24/2023).  Physical Exam:  General:  Awake, alert, NAD  Skin:  Warm and dry  Neck:  JVD not appreciated  Chest:  respiration easy  Cardiovascular:  RRR S1S2, grade 2/6 systolic murmur  Abdomen:  Soft nontender  Extremities:  no edema    Medications:    enoxaparin  40 mg SubCUTAneous Daily    furosemide  40 mg IntraVENous BID    magnesium oxide  400 mg Oral Daily    aspirin  81 mg Oral Daily    atorvastatin  40 mg Oral Nightly    ipratropium 0.5 mg-albuterol 2.5 mg  1 Dose Inhalation 4x Daily RT    budesonide-formoterol  2 puff Inhalation BID    empagliflozin  20 mg Oral Daily    insulin lispro  0-8 Units SubCUTAneous TID WC    insulin lispro  0-4 Units SubCUTAneous Nightly    losartan  25 mg Oral Daily    metoprolol succinate  50 mg Oral Daily    pantoprazole  20 mg Oral QAM

## 2024-06-05 NOTE — PROGRESS NOTES
V2.0  [facility] Hospitalist Progress Note      Name:  Billy Ozuna /Age/Sex: 1945  (79 y.o. male)   MRN & CSN:  [id number] & [id number] Encounter Date/Time: 2024 12:38 PM EDT    Location:  [address] [place] [address] PCP: Kera Monique (Inactive)       Hospital Day: 3    Assessment and Plan:   Billy Ozuna is a 79 y.o. male  who presents with Chest pain at rest      Plan:    NSTEMI  Chest pain at rest: Likely from edema  Troponin 67> 73> 101 EKG interpreted personally and results NSR 86/min, PACs, new TWI in V2-V3, similar appearing TWI in V4-V6.  Chest x-ray reviewed cardiomegaly, mild pulmonary vascular congestion improved from prior exam.  IV heparin infusion: Now  Aspirin and Lipitor    Acute systolic CHF  Patient with decreased EF of 20 to 25%.  Echo this admission does show further reduced EF of 10 to 15%  Continue Toprol-XL, losartan and Jardiance  IV Lasix twice daily  EP consulted for possible device in the future.    Type 2 diabetes  Sliding scale insulin  Hypoglycemia protocol  Point-of-care glucose checks    GERD  Protonix      Diet ADULT DIET; Regular; 4 carb choices (60 gm/meal); Low Sodium (2 gm)   DVT Prophylaxis [] Lovenox, [x]  Heparin, [] SCDs, [] Ambulation,    [] Eliquis, [] Xarelto  [] Coumadin [] other   Code Status Full Code   Disposition From: Home Home  Expected Disposition: Home  Estimated Date of Discharge: 2 3 days  Patient requires continued admission due to IV Lasix and echo pending   Surrogate Decision Maker/ POA      Personally reviewed Lab Studies and Imaging     Discussed management of the case with cardiology who recommended echo and IV Lasix    EKG interpreted personally and results no acute ST changes    Imaging that was interpreted personally includes chest x-ray and results vascular congestion    Drugs that require monitoring for toxicity include IV Lasix and the method of monitoring was BMP to monitor kidney function electrolytes    Subjective:     Chief Complaint: Chest

## 2024-06-05 NOTE — PROGRESS NOTES
Pharmacist Review and Automatic Dose Adjustment of Prophylactic Enoxaparin    Reviewed reason(s) for admission/hospital problem list    The reviewing pharmacist has made an adjustment to the ordered enoxaparin dose or converted to UFH per the approved [facility] protocol and table as identified below.        Billy Ozuna is a 79 y.o. male.     Recent Labs     06/03/24  1719 06/04/24  0153   CREATININE 0.9 1.1       Estimated Creatinine Clearance: 60 mL/min (based on SCr of 1.1 mg/dL).    Recent Labs     06/03/24  1719 06/04/24  0153   HGB 9.7* 9.3*   HCT 33.0* 31.3*    239     Recent Labs     06/04/24  0153   INR 1.2       Height:   Ht Readings from Last 1 Encounters:   06/04/24 1.803 m (5' 11\")     Weight:  Wt Readings from Last 1 Encounters:   06/05/24 75.8 kg (167 lb)               Plan: Based upon the patient's weight and renal function    Ordered: Enoxaparin 30mg SUBQ Daily    Changed/converted to    New Order: Enoxaparin 40mg SUBQ Daily      Thank you,  Johanny Thomason, [facility]  6/5/2024, 8:26 AM

## 2024-06-05 NOTE — PROGRESS NOTES
Occupational Therapy  [facility] ACUTE CARE OCCUPATIONAL THERAPY EVALUATION    Billy Ozuna, 1945, 3022/3022-A, 6/5/2024    Discharge Recommendation: Encourage minimal to moderate OT services at discharge, typically 1-2 hours/day, 5 days/week.     History:  [place]:  The primary encounter diagnosis was Chest pain, unspecified type. Diagnoses of Elevated troponin, Chronic obstructive pulmonary disease, unspecified COPD type (HCC), and Cardiomyopathy, unspecified type (HCC) were also pertinent to this visit.    Subjective:  Patient states: \"This COPD is so frustrating\"  Pain: Pt denied pain this date  Communication with other providers: CEZAR Cain, left with respiratory therapist  Restrictions: General Precautions, Fall Risk, 4L NC O2, Telemetry, Bed Alarm    Home Setup/Prior level of function:  Social/Functional History  Lives With: Alone (daughter lives in [place])  Type of Home: House  Home Layout: One level  Home Access: Stairs to enter without rails  Entrance Stairs - Number of Steps: 1  Bathroom Shower/Tub: Tub/Shower unit  Bathroom Toilet: Standard  Bathroom Equipment: Shower chair  Bathroom Accessibility: Accessible  Home Equipment: Walker - 4-Wheeled  Has the patient had two or more falls in the past year or any fall with injury in the past year?: No  Receives Help From: Home health (aide 2x/week)  ADL Assistance: Needs assistance (aide assists with bathing)  Homemaking Assistance: Needs assistance (aide or daughter assist PRN, has Meals on Wheels)  Homemaking Responsibilities: Yes  Ambulation Assistance: Independent (mod I with 4ww)  Transfer Assistance: Independent  Active : No (daughter or friends primarily drive)  Occupation: Retired    Examination:  Observation: Supine in bed upon arrival, agreeable to session  Vision: WFL  Hearing: WFL  Vitals: SpO2 down to 70s after ambulation ~75' and back up to 93 after seated rest break and PLB    Body Systems and functions:  ROM: WFL in all  joints & in BL UEs  Strength: 4+/5 in BL UEs  Sensation: WFL  Tone: Normal  Coordination: WFL  Perception: WNL    Activities of Daily Living (ADLs):  Feeding: IND  Grooming: SBA while seated  UB bathing: SBA  LB bathing: CGA  UB dressing: SBA  LB dressing: CGA  Toileting: CGA    Cognitive and Psychosocial Functioning:  Overall cognitive status: WFL  Affect: Normal     Balance:   Sitting: SBA at EOB  Standing: CGA at FWW    Functional Mobility:  Bed Mobility: SBA for supine to sit to EOB  Transfers: CGA for STS from EOB, CGA for stand to sit to EOB  Ambulation: CGA using FWW short HH distance through hallway with chair to follow. Pt required multiple standing and seated rest breaks d/t oxygen desaturation/SOB      AM-PAC 6 click short form for inpatient daily activity:   How much help from another person does the patient currently need... Unable  Dep A Lot  Max A A Lot   Mod A A Little  Min A A Little   CGA  SBA None   Mod I  Indep  Sup   1.  Putting on and taking off regular lower body clothing? [] 1    [] 2   [] 2   [] 3   [x] 3   [] 4      2. Bathing (including washing, rinsing, drying)? [] 1   [] 2   [] 2 [] 3 [x] 3 [] 4   3. Toileting, which includes using toilet, bedpan, or urinal? [] 1    [] 2   [] 2   [] 3   [x] 3   [] 4     4. Putting on and taking off regular upper body clothing? [] 1   [] 2   [] 2   [] 3   [x] 3    [] 4      5. Taking care of personal grooming such as brushing teeth? [] 1   [] 2    [] 2 [] 3    [x] 3   [] 4      6. Eating meals?   [] 1   [] 2   [] 2   [] 3   [] 3   [x] 4      Raw Score:  19     [24=0% impaired(CH), 23=1-19%(CI), 20-22=20-39%(CJ), 15-19=40-59%(CK), 10-14=60-79%(CL), 7-9=80-99%(CM), 6=100%(CN)]     Treatment:  Therapeutic Activity Training:   Therapeutic activity training was instructed today.  Cues were given for safety, sequence, UE/LE placement, awareness, and balance.    Activities performed today included bed mobility training, sup-sit, sit-stand.  Pt educated on role

## 2024-06-05 NOTE — PLAN OF CARE
Problem: Discharge Planning  Goal: Discharge to home or other facility with appropriate resources  6/4/2024 2316 by Apple Walsh RN  Outcome: Progressing  6/4/2024 1714 by Cristina Rinaldi RN  Outcome: Progressing  Flowsheets (Taken 6/4/2024 1130)  Discharge to home or other facility with appropriate resources:   Identify barriers to discharge with patient and caregiver   Arrange for needed discharge resources and transportation as appropriate   Identify discharge learning needs (meds, wound care, etc)   Arrange for interpreters to assist at discharge as needed   Refer to discharge planning if patient needs post-hospital services based on physician order or complex needs related to functional status, cognitive ability or social support system     Problem: Safety - Adult  Goal: Free from fall injury  6/4/2024 2316 by Apple Walsh RN  Outcome: Progressing  6/4/2024 1714 by Cristina Rinaldi, RN  Outcome: Progressing     Problem: Pain  Goal: Verbalizes/displays adequate comfort level or baseline comfort level  6/4/2024 2316 by Apple Walsh RN  Outcome: Progressing  6/4/2024 1714 by Cristina Rinaldi, RN  Outcome: Progressing

## 2024-06-06 LAB
ANION GAP SERPL CALCULATED.3IONS-SCNC: 11 MMOL/L (ref 7–16)
ANTI-XA UNFRAC HEPARIN: <0.1 IU/ML
APTT: 35.1 SECONDS (ref 25.1–37.1)
APTT: 35.1 SECONDS (ref 25.1–37.1)
APTT: 35.7 SECONDS (ref 25.1–37.1)
APTT: 38.9 SECONDS (ref 25.1–37.1)
BUN SERPL-MCNC: 23 MG/DL (ref 6–23)
CALCIUM SERPL-MCNC: 8.7 MG/DL (ref 8.3–10.6)
CHLORIDE BLD-SCNC: 99 MMOL/L (ref 99–110)
CO2: 31 MMOL/L (ref 21–32)
CREAT SERPL-MCNC: 1.1 MG/DL (ref 0.9–1.3)
EKG ATRIAL RATE: 86 BPM
EKG DIAGNOSIS: NORMAL
EKG P AXIS: 21 DEGREES
EKG P-R INTERVAL: 168 MS
EKG Q-T INTERVAL: 362 MS
EKG QRS DURATION: 128 MS
EKG QTC CALCULATION (BAZETT): 433 MS
EKG R AXIS: -80 DEGREES
EKG T AXIS: 86 DEGREES
EKG VENTRICULAR RATE: 86 BPM
GFR, ESTIMATED: 69 ML/MIN/1.73M2
GLUCOSE BLD-MCNC: 121 MG/DL (ref 70–99)
GLUCOSE BLD-MCNC: 200 MG/DL (ref 70–99)
GLUCOSE BLD-MCNC: 206 MG/DL (ref 70–99)
GLUCOSE BLD-MCNC: 230 MG/DL (ref 70–99)
GLUCOSE SERPL-MCNC: 111 MG/DL (ref 70–99)
HCT VFR BLD CALC: 32 % (ref 42–52)
HEMOGLOBIN: 9.7 GM/DL (ref 13.5–18)
MCH RBC QN AUTO: 28.4 PG (ref 27–31)
MCHC RBC AUTO-ENTMCNC: 30.3 % (ref 32–36)
MCV RBC AUTO: 93.6 FL (ref 78–100)
PDW BLD-RTO: 16.2 % (ref 11.7–14.9)
PLATELET # BLD: 241 K/CU MM (ref 140–440)
PMV BLD AUTO: 10.8 FL (ref 7.5–11.1)
POTASSIUM SERPL-SCNC: 4.5 MMOL/L (ref 3.5–5.1)
RBC # BLD: 3.42 M/CU MM (ref 4.6–6.2)
SODIUM BLD-SCNC: 141 MMOL/L (ref 135–145)
WBC # BLD: 7.9 K/CU MM (ref 4–10.5)

## 2024-06-06 PROCEDURE — 6360000002 HC RX W HCPCS: Performed by: STUDENT IN AN ORGANIZED HEALTH CARE EDUCATION/TRAINING PROGRAM

## 2024-06-06 PROCEDURE — 94640 AIRWAY INHALATION TREATMENT: CPT

## 2024-06-06 PROCEDURE — 6370000000 HC RX 637 (ALT 250 FOR IP): Performed by: STUDENT IN AN ORGANIZED HEALTH CARE EDUCATION/TRAINING PROGRAM

## 2024-06-06 PROCEDURE — 85027 COMPLETE CBC AUTOMATED: CPT

## 2024-06-06 PROCEDURE — 2700000000 HC OXYGEN THERAPY PER DAY

## 2024-06-06 PROCEDURE — 85730 THROMBOPLASTIN TIME PARTIAL: CPT

## 2024-06-06 PROCEDURE — 6360000002 HC RX W HCPCS

## 2024-06-06 PROCEDURE — 94761 N-INVAS EAR/PLS OXIMETRY MLT: CPT

## 2024-06-06 PROCEDURE — 1200000000 HC SEMI PRIVATE

## 2024-06-06 PROCEDURE — 6370000000 HC RX 637 (ALT 250 FOR IP)

## 2024-06-06 PROCEDURE — 6370000000 HC RX 637 (ALT 250 FOR IP): Performed by: NURSE PRACTITIONER

## 2024-06-06 PROCEDURE — 36415 COLL VENOUS BLD VENIPUNCTURE: CPT

## 2024-06-06 PROCEDURE — 85520 HEPARIN ASSAY: CPT

## 2024-06-06 PROCEDURE — APPNB30 APP NON BILLABLE TIME 0-30 MINS

## 2024-06-06 PROCEDURE — 80048 BASIC METABOLIC PNL TOTAL CA: CPT

## 2024-06-06 PROCEDURE — 82962 GLUCOSE BLOOD TEST: CPT

## 2024-06-06 PROCEDURE — 93010 ELECTROCARDIOGRAM REPORT: CPT | Performed by: INTERNAL MEDICINE

## 2024-06-06 RX ORDER — ASPIRIN 81 MG/1
81 TABLET, CHEWABLE ORAL DAILY
Qty: 30 TABLET | Refills: 3 | Status: ON HOLD | OUTPATIENT
Start: 2024-06-07

## 2024-06-06 RX ORDER — BENZONATATE 100 MG/1
100 CAPSULE ORAL 3 TIMES DAILY PRN
Status: DISCONTINUED | OUTPATIENT
Start: 2024-06-06 | End: 2024-06-07 | Stop reason: HOSPADM

## 2024-06-06 RX ORDER — FUROSEMIDE 40 MG/1
40 TABLET ORAL 2 TIMES DAILY
Qty: 60 TABLET | Refills: 3 | Status: ON HOLD | OUTPATIENT
Start: 2024-06-06

## 2024-06-06 RX ORDER — FUROSEMIDE 40 MG/1
40 TABLET ORAL 2 TIMES DAILY
Status: DISCONTINUED | OUTPATIENT
Start: 2024-06-06 | End: 2024-06-07 | Stop reason: HOSPADM

## 2024-06-06 RX ADMIN — ASPIRIN 81 MG: 81 TABLET, CHEWABLE ORAL at 09:33

## 2024-06-06 RX ADMIN — ENOXAPARIN SODIUM 40 MG: 100 INJECTION SUBCUTANEOUS at 09:33

## 2024-06-06 RX ADMIN — IPRATROPIUM BROMIDE AND ALBUTEROL SULFATE 1 DOSE: 2.5; .5 SOLUTION RESPIRATORY (INHALATION) at 11:12

## 2024-06-06 RX ADMIN — BUDESONIDE AND FORMOTEROL FUMARATE DIHYDRATE 2 PUFF: 160; 4.5 AEROSOL RESPIRATORY (INHALATION) at 07:24

## 2024-06-06 RX ADMIN — EMPAGLIFLOZIN 20 MG: 10 TABLET, FILM COATED ORAL at 09:33

## 2024-06-06 RX ADMIN — MAGNESIUM OXIDE 400 MG (241.3 MG MAGNESIUM) TABLET 400 MG: TABLET at 09:33

## 2024-06-06 RX ADMIN — VENLAFAXINE 25 MG: 25 TABLET ORAL at 18:24

## 2024-06-06 RX ADMIN — FUROSEMIDE 40 MG: 40 TABLET ORAL at 20:26

## 2024-06-06 RX ADMIN — BENZONATATE 100 MG: 100 CAPSULE ORAL at 20:26

## 2024-06-06 RX ADMIN — PANTOPRAZOLE SODIUM 20 MG: 20 TABLET, DELAYED RELEASE ORAL at 05:01

## 2024-06-06 RX ADMIN — IPRATROPIUM BROMIDE AND ALBUTEROL SULFATE 1 DOSE: 2.5; .5 SOLUTION RESPIRATORY (INHALATION) at 15:07

## 2024-06-06 RX ADMIN — BENZONATATE 100 MG: 100 CAPSULE ORAL at 17:15

## 2024-06-06 RX ADMIN — GUAIFENESIN SYRUP AND DEXTROMETHORPHAN 5 ML: 100; 10 SYRUP ORAL at 00:47

## 2024-06-06 RX ADMIN — GUAIFENESIN SYRUP AND DEXTROMETHORPHAN 5 ML: 100; 10 SYRUP ORAL at 23:59

## 2024-06-06 RX ADMIN — METOPROLOL SUCCINATE 50 MG: 50 TABLET, EXTENDED RELEASE ORAL at 09:33

## 2024-06-06 RX ADMIN — IPRATROPIUM BROMIDE AND ALBUTEROL SULFATE 1 DOSE: 2.5; .5 SOLUTION RESPIRATORY (INHALATION) at 05:07

## 2024-06-06 RX ADMIN — IPRATROPIUM BROMIDE AND ALBUTEROL SULFATE 1 DOSE: 2.5; .5 SOLUTION RESPIRATORY (INHALATION) at 07:24

## 2024-06-06 RX ADMIN — LOSARTAN POTASSIUM 25 MG: 25 TABLET, FILM COATED ORAL at 09:33

## 2024-06-06 RX ADMIN — GUAIFENESIN SYRUP AND DEXTROMETHORPHAN 5 ML: 100; 10 SYRUP ORAL at 13:17

## 2024-06-06 RX ADMIN — IPRATROPIUM BROMIDE AND ALBUTEROL SULFATE 1 DOSE: 2.5; .5 SOLUTION RESPIRATORY (INHALATION) at 22:53

## 2024-06-06 RX ADMIN — GUAIFENESIN SYRUP AND DEXTROMETHORPHAN 5 ML: 100; 10 SYRUP ORAL at 09:32

## 2024-06-06 RX ADMIN — ATORVASTATIN CALCIUM 40 MG: 40 TABLET, FILM COATED ORAL at 20:26

## 2024-06-06 RX ADMIN — FUROSEMIDE 40 MG: 10 INJECTION, SOLUTION INTRAMUSCULAR; INTRAVENOUS at 09:33

## 2024-06-06 RX ADMIN — BUDESONIDE AND FORMOTEROL FUMARATE DIHYDRATE 2 PUFF: 160; 4.5 AEROSOL RESPIRATORY (INHALATION) at 22:53

## 2024-06-06 RX ADMIN — GUAIFENESIN SYRUP AND DEXTROMETHORPHAN 5 ML: 100; 10 SYRUP ORAL at 17:15

## 2024-06-06 ASSESSMENT — ENCOUNTER SYMPTOMS
CHEST TIGHTNESS: 0
VOICE CHANGE: 0
TROUBLE SWALLOWING: 0
VOMITING: 0
SINUS PRESSURE: 0
BACK PAIN: 0
SINUS PAIN: 0
WHEEZING: 0
COUGH: 0
CONSTIPATION: 0
NAUSEA: 0
SORE THROAT: 0
COLOR CHANGE: 0
SHORTNESS OF BREATH: 1
ABDOMINAL PAIN: 0
DIARRHEA: 0

## 2024-06-06 NOTE — PROGRESS NOTES
Admit Date:  6/3/2024    Admission diagnosis / Complaint :   Chest pain  dizziess    Subjective:  Mr. Ozuna is resting in bed. Denies cardiac complaints.   Discussed with him the importance of keeping follow up appointments with Cardiology, he voiced understanding    Objective:   /79   Pulse 80   Temp 98.9 °F (37.2 °C) (Oral)   Resp 17   Ht 1.803 m (5' 11\")   Wt 73 kg (160 lb 15 oz)   SpO2 93%   BMI 22.45 kg/m²     Intake/Output Summary (Last 24 hours) at 6/6/2024 1515  Last data filed at 6/6/2024 1007  Gross per 24 hour   Intake 120 ml   Output 300 ml   Net -180 ml       TELEMETRY: Sinus    has a past medical history of Acid reflux, Anemia, COPD (chronic obstructive pulmonary disease) (HCC), Dental decay, Diabetes mellitus (HCC), Emphysema of lung (HCC), Enlarged prostate, Stevens catheter in place, H/O cardiovascular stress test, Hematuria, HX OTHER MEDICAL, Hypertension, Low back sprain, Megacolon, On home oxygen therapy, and Wears glasses.   has a past surgical history that includes Revision Colostomy (1974); Abdominal exploration surgery (1974); other surgical history; other surgical history (01 07 2013); tumor removal; Appendectomy (11 years old); Abdomen surgery; TURP (N/A, 07/12/2018); Dental surgery; other surgical history (09/18/2018); and Colonoscopy (N/A, 10/24/2023).  Physical Exam:  General:  Awake, alert, NAD  Skin:  Warm and dry  Neck:  JVD not appreciated  Chest:  Clear to auscultation, respiration easy  Cardiovascular:  RRR S1S2, grade 2/6 systolic murmur  Abdomen:  Soft nontender  Extremities:  no edema    Medications:    furosemide  40 mg Oral BID    enoxaparin  40 mg SubCUTAneous Daily    magnesium oxide  400 mg Oral Daily    aspirin  81 mg Oral Daily    atorvastatin  40 mg Oral Nightly    ipratropium 0.5 mg-albuterol 2.5 mg  1 Dose Inhalation 4x Daily RT    budesonide-formoterol  2 puff Inhalation BID    empagliflozin  20 mg Oral Daily    insulin lispro  0-8 Units SubCUTAneous TID WC  spelling, as well as words and phrases that may be inappropriate. If there are any questions or concerns please feel free to contact the dictating provider for clarification.

## 2024-06-06 NOTE — PROGRESS NOTES
V2.0  [facility] [place]      Name:  Billy Ozuna /Age/Sex: 1945  (79 y.o. male)   MRN & CSN:  [id number] & [id number] Encounter Date/Time: 2024 12:38 PM EDT    Location:  [address] [place] [address] PCP: Kera Monique (Inactive)       Hospital Day: 4    Assessment and Plan:   Billy Ozuna is a 79 y.o. male  who presents with Chest pain at rest      Plan:    NSTEMI  Chest pain at rest: Likely from edema  Troponin 67> 73> 101 EKG interpreted personally and results NSR 86/min, PACs, new TWI in V2-V3, similar appearing TWI in V4-V6.  Chest x-ray reviewed cardiomegaly, mild pulmonary vascular congestion improved from prior exam.  IV heparin infusion: Now off  Aspirin and Lipitor    Acute systolic CHF  Patient with decreased EF of 20 to 25%.  Echo this admission does show further reduced EF of 10 to 15%  Continue Toprol-XL, losartan and Jardiance  IV Lasix twice daily  EP consulted for possible device in the future.  Will need LifeVest prior to discharge    Type 2 diabetes  Sliding scale insulin  Hypoglycemia protocol  Point-of-care glucose checks    GERD  Protonix      Diet ADULT DIET; Regular; 4 carb choices (60 gm/meal); Low Sodium (2 gm)   DVT Prophylaxis [] Lovenox, [x]  Heparin, [] SCDs, [] Ambulation,    [] Eliquis, [] Xarelto  [] Coumadin [] other   Code Status Full Code   Disposition From: Home Home  Expected Disposition: Home  Estimated Date of Discharge:1-2 days  Patient requires continued admission due to need for LifeVest   Surrogate Decision Maker/ POA      Personally reviewed Lab Studies and Imaging     Discussed management of the case with cardiology who recommended echo and IV Lasix    EKG interpreted personally and results no acute ST changes    Imaging that was interpreted personally includes chest x-ray and results vascular congestion    Drugs that require monitoring for toxicity include IV Lasix and the method of monitoring was BMP to monitor kidney function  PRN  dextrose, , Continuous PRN        Labs      Recent Results (from the past 24 hour(s))   POCT Glucose    Collection Time: 06/05/24  5:08 PM   Result Value Ref Range    POC Glucose 125 (H) 70 - 99 MG/DL   APTT    Collection Time: 06/05/24  5:54 PM   Result Value Ref Range    aPTT 37.5 (H) 25.1 - 37.1 SECONDS   POCT Glucose    Collection Time: 06/05/24  9:16 PM   Result Value Ref Range    POC Glucose 193 (H) 70 - 99 MG/DL   APTT    Collection Time: 06/05/24 10:15 PM   Result Value Ref Range    aPTT 35.8 25.1 - 37.1 SECONDS   APTT    Collection Time: 06/06/24  5:36 AM   Result Value Ref Range    aPTT 35.1 25.1 - 37.1 SECONDS   Anti-Xa, Unfractionated Heparin    Collection Time: 06/06/24  5:36 AM   Result Value Ref Range    Anti-XA Unfrac Heparin <0.10 IU/mL   Basic Metabolic Panel w/ Reflex to MG    Collection Time: 06/06/24  5:36 AM   Result Value Ref Range    Sodium 141 135 - 145 MMOL/L    Potassium 4.5 3.5 - 5.1 MMOL/L    Chloride 99 99 - 110 mMol/L    CO2 31 21 - 32 MMOL/L    Anion Gap 11 7 - 16    Glucose 111 (H) 70 - 99 MG/DL    BUN 23 6 - 23 MG/DL    Creatinine 1.1 0.9 - 1.3 MG/DL    Est, Glom Filt Rate 69 >60 mL/min/1.73m2    Calcium 8.7 8.3 - 10.6 MG/DL   CBC    Collection Time: 06/06/24  5:36 AM   Result Value Ref Range    WBC 7.9 4.0 - 10.5 K/CU MM    RBC 3.42 (L) 4.6 - 6.2 M/CU MM    Hemoglobin 9.7 (L) 13.5 - 18.0 GM/DL    Hematocrit 32.0 (L) 42 - 52 %    MCV 93.6 78 - 100 FL    MCH 28.4 27 - 31 PG    MCHC 30.3 (L) 32.0 - 36.0 %    RDW 16.2 (H) 11.7 - 14.9 %    Platelets 241 140 - 440 K/CU MM    MPV 10.8 7.5 - 11.1 FL   POCT Glucose    Collection Time: 06/06/24  9:21 AM   Result Value Ref Range    POC Glucose 121 (H) 70 - 99 MG/DL   APTT    Collection Time: 06/06/24 10:08 AM   Result Value Ref Range    aPTT 38.9 (H) 25.1 - 37.1 SECONDS   POCT Glucose    Collection Time: 06/06/24 12:14 PM   Result Value Ref Range    POC Glucose 230 (H) 70 - 99 MG/DL        Imaging/Diagnostics Last 24 Hours   XR CHEST

## 2024-06-06 NOTE — PROGRESS NOTES
Pulse 80   Temp 98.9 °F (37.2 °C) (Oral)   Resp 17   Ht 1.803 m (5' 11\")   Wt 73 kg (160 lb 15 oz)   SpO2 93%   BMI 22.45 kg/m²     Intake/Output Summary (Last 24 hours) at 6/6/2024 1809  Last data filed at 6/6/2024 1007  Gross per 24 hour   Intake 120 ml   Output 300 ml   Net -180 ml       TELEMETRY:      Physical Exam:  Constitutional:  Well developed, Well nourished, No acute distress, Non-toxic appearance.   HENT:  Normocephalic, Atraumatic, Bilateral external ears normal, Oropharynx moist, No oral exudates, Nose normal. Neck- Normal range of motion, No tenderness, Supple, No stridor.   Eyes:  PERRL, EOMI, Conjunctiva normal, No discharge.   Respiratory:  Normal breath sounds, No respiratory distress, No wheezing, No chest tenderness.,no use of accessory muscles, diaphragm movement is normal  Cardiovascular: (PMI) apex non displaced,no lifts no thrills, no s3,no s4, Normal heart rate, Normal rhythm, No murmurs, No rubs, No gallops. Carotid arteries pulse and amplitude are normal no bruit, no abdominal bruit noted ( normal abdominal aorta ausculation), femoral arteries pulse and amplitude are normal no bruit, pedal pulses are normal  GI:  Bowel sounds normal, Soft, No tenderness, No masses, No pulsatile masses.   : External genitalia appear normal, No masses or lesions. No discharge. No CVA tenderness.   Musculoskeletal:  Intact distal pulses, No edema, No tenderness, No cyanosis, No clubbing. Good range of motion in all major joints. No tenderness to palpation or major deformities noted. Back- No tenderness.   Integument:  Warm, Dry, No erythema, No rash.   Lymphatic:  No lymphadenopathy noted.   Neurologic:  Alert & oriented x 3, Normal motor function, Normal sensory function, No focal deficits noted.   Psychiatric:  Affect normal, Judgment normal, Mood normal.     Medications:    furosemide  40 mg Oral BID    enoxaparin  40 mg SubCUTAneous Daily    magnesium oxide  400 mg Oral Daily    aspirin  81

## 2024-06-06 NOTE — PROGRESS NOTES
Today's plan:  EP consult appreciated for ICD,OPTIMIZE medications      Admit Date:  6/3/2024    Subjective: ok      Chief complaints on admission  Chief Complaint   Patient presents with    Chest Pain     Started at 0700. 324 given by ems     Dizziness     Started at 0600          History of present illness:Billy is a 79 y.o.year old who  presents with had concerns including Chest Pain (Started at 0700. 324 given by ems ) and Dizziness (Started at 0600 ).      Past medical history:    has a past medical history of Acid reflux, Anemia, COPD (chronic obstructive pulmonary disease) (HCC), Dental decay, Diabetes mellitus (HCC), Emphysema of lung (HCC), Enlarged prostate, Stevens catheter in place, H/O cardiovascular stress test, Hematuria, HX OTHER MEDICAL, Hypertension, Low back sprain, Megacolon, On home oxygen therapy, and Wears glasses.  Past surgical history:   has a past surgical history that includes Revision Colostomy (1974); Abdominal exploration surgery (1974); other surgical history; other surgical history (01 07 2013); tumor removal; Appendectomy (11 years old); Abdomen surgery; TURP (N/A, 07/12/2018); Dental surgery; other surgical history (09/18/2018); and Colonoscopy (N/A, 10/24/2023).  Social History:   reports that he quit smoking about 11 years ago. His smoking use included cigarettes. He started smoking about 41 years ago. He has a 15.0 pack-year smoking history. He has never used smokeless tobacco. He reports that he does not drink alcohol and does not use drugs.  Family history:  family history includes Coronary Art Dis in his mother; Diabetes in his mother; Other in his sister; Stroke in his brother and father.    Allergies   Allergen Reactions    Lisinopril Other (See Comments)     Pt coughs uncontrollably    Dye [Iodides] Other (See Comments)     \"had iodine for IVP test- went into seizures\"    Lorazepam Other (See Comments)     CAUSES HIM TO HAVE NO SELF CONTROL         Objective:   /78

## 2024-06-06 NOTE — PROGRESS NOTES
Cardiology Progress Note    Subjective/Overnight Events:  Patient stating that he is feeling significantly improved in comparison to when he first arrived in the hospital.  Not currently complaining of any chest tightness or worsening shortness of breath.  Currently on his baseline level of oxygen.    Discussed LifeVest with patient.  Understands purpose and is agreeable to wear.  Informed patient of importance with outpatient follow-up    Assessment/Plan:  Nonischemic cardiomyopathy with reduced ejection fraction.  Nonsustained ventricular tachycardia  Medication noncompliance: Inconsistent with Toprol  -Volume overload improved, euvolemic at this time  -No further episodes of ventricular tachycardia noted overnight  -Left heart catheterization in 2021 negative for significant coronary artery disease.  -Echo showing EF of 10-15% which is even further reduced from prior echo in March.  -GDMT: Continue Toprol 50 mg daily, Jardiance 20 mg daily and losartan 25 mg daily.  -Oral lasix 40 mg twice daily  -Appreciate EP recommendations.  LifeVest ordered due to hx VT and reduced EF.  Hypertension  -Well-controlled at this time.  Hyperlipidemia:   -Continue Lipitor 40 mg daily  Obstructive sleep apnea: Noncompliant with CPAP  COPD on 4 L supplemental oxygen chronically  Type 2 diabetes mellitus      Echo 6/4/2024    Image quality is suboptimal. Contrast used: Definity.    Left Ventricle: Reduced left ventricular systolic function with a visually estimated EF of 10 -15%. No evidece of thrombus with definity contrast.    Right Ventricle: Not well visualized.    Valves not clearly visualized. Color doppler suggests mitral regurgitation.    Pericardium: No pericardial effusion.    Vimal Carvajal PA-C 06/06/24 11:24 AM

## 2024-06-07 VITALS
DIASTOLIC BLOOD PRESSURE: 86 MMHG | HEART RATE: 79 BPM | RESPIRATION RATE: 17 BRPM | TEMPERATURE: 98.4 F | SYSTOLIC BLOOD PRESSURE: 106 MMHG | WEIGHT: 171.96 LBS | HEIGHT: 71 IN | BODY MASS INDEX: 24.07 KG/M2 | OXYGEN SATURATION: 96 %

## 2024-06-07 LAB
APTT: 34.9 SECONDS (ref 25.1–37.1)
GLUCOSE BLD-MCNC: 168 MG/DL (ref 70–99)
GLUCOSE BLD-MCNC: 196 MG/DL (ref 70–99)

## 2024-06-07 PROCEDURE — 36415 COLL VENOUS BLD VENIPUNCTURE: CPT

## 2024-06-07 PROCEDURE — 6370000000 HC RX 637 (ALT 250 FOR IP): Performed by: STUDENT IN AN ORGANIZED HEALTH CARE EDUCATION/TRAINING PROGRAM

## 2024-06-07 PROCEDURE — 6360000002 HC RX W HCPCS

## 2024-06-07 PROCEDURE — 85520 HEPARIN ASSAY: CPT

## 2024-06-07 PROCEDURE — 94640 AIRWAY INHALATION TREATMENT: CPT

## 2024-06-07 PROCEDURE — 2700000000 HC OXYGEN THERAPY PER DAY

## 2024-06-07 PROCEDURE — 85730 THROMBOPLASTIN TIME PARTIAL: CPT

## 2024-06-07 PROCEDURE — 6370000000 HC RX 637 (ALT 250 FOR IP): Performed by: NURSE PRACTITIONER

## 2024-06-07 PROCEDURE — 94761 N-INVAS EAR/PLS OXIMETRY MLT: CPT

## 2024-06-07 PROCEDURE — 82962 GLUCOSE BLOOD TEST: CPT

## 2024-06-07 RX ADMIN — ASPIRIN 81 MG: 81 TABLET, CHEWABLE ORAL at 09:02

## 2024-06-07 RX ADMIN — LOSARTAN POTASSIUM 25 MG: 25 TABLET, FILM COATED ORAL at 09:02

## 2024-06-07 RX ADMIN — METOPROLOL SUCCINATE 50 MG: 50 TABLET, EXTENDED RELEASE ORAL at 09:01

## 2024-06-07 RX ADMIN — FUROSEMIDE 40 MG: 40 TABLET ORAL at 09:02

## 2024-06-07 RX ADMIN — EMPAGLIFLOZIN 20 MG: 10 TABLET, FILM COATED ORAL at 09:01

## 2024-06-07 RX ADMIN — IPRATROPIUM BROMIDE AND ALBUTEROL SULFATE 1 DOSE: 2.5; .5 SOLUTION RESPIRATORY (INHALATION) at 10:57

## 2024-06-07 RX ADMIN — IPRATROPIUM BROMIDE AND ALBUTEROL SULFATE 1 DOSE: 2.5; .5 SOLUTION RESPIRATORY (INHALATION) at 07:04

## 2024-06-07 RX ADMIN — MAGNESIUM OXIDE 400 MG (241.3 MG MAGNESIUM) TABLET 400 MG: TABLET at 09:02

## 2024-06-07 RX ADMIN — BUDESONIDE AND FORMOTEROL FUMARATE DIHYDRATE 2 PUFF: 160; 4.5 AEROSOL RESPIRATORY (INHALATION) at 07:05

## 2024-06-07 RX ADMIN — PANTOPRAZOLE SODIUM 20 MG: 20 TABLET, DELAYED RELEASE ORAL at 05:53

## 2024-06-07 RX ADMIN — ENOXAPARIN SODIUM 40 MG: 100 INJECTION SUBCUTANEOUS at 09:02

## 2024-06-07 RX ADMIN — IPRATROPIUM BROMIDE AND ALBUTEROL SULFATE 1 DOSE: 2.5; .5 SOLUTION RESPIRATORY (INHALATION) at 05:20

## 2024-06-07 NOTE — PROGRESS NOTES
Occupational Therapy    Attempted to see pt on this date for OT session. Pt preparing for discharge at this time. Will attempt as able and appropriate.    Tereza PALACIO

## 2024-06-07 NOTE — PLAN OF CARE
Problem: Discharge Planning  Goal: Discharge to home or other facility with appropriate resources  6/7/2024 1102 by Odalis Cronin RN  Outcome: Progressing  6/6/2024 2117 by Eddy Rocha RN  Outcome: Progressing     Problem: Safety - Adult  Goal: Free from fall injury  6/7/2024 1102 by Odalis Cronin RN  Outcome: Progressing  6/6/2024 2117 by Eddy Rocha RN  Outcome: Progressing     Problem: Pain  Goal: Verbalizes/displays adequate comfort level or baseline comfort level  6/7/2024 1102 by Odalis Cronin RN  Outcome: Progressing  6/6/2024 2117 by Eddy Rocha RN  Outcome: Progressing     Problem: Chronic Conditions and Co-morbidities  Goal: Patient's chronic conditions and co-morbidity symptoms are monitored and maintained or improved  6/7/2024 1102 by Odalis Cronin RN  Outcome: Progressing  6/6/2024 2117 by Eddy Rocha RN  Outcome: Progressing

## 2024-06-07 NOTE — PROGRESS NOTES
Physical Therapy  Attempted to see pt this date, pt politely declining stating he is too fatigued and would like to rest. Pt agreeable to afternoon session if not discharged.

## 2024-06-07 NOTE — PROGRESS NOTES
Pt setup with [facility] transport back home as he is on continuous O2. [facility] to pickup pt at 1400 as they are running behind today. Pt updated and aware of pickup time. All discharge paperwork discussed with pt and questions answered. PIV removed and pt has all equipment for the Life Vest. LG

## 2024-06-07 NOTE — DISCHARGE SUMMARY
V2.0  Discharge Summary    Name:  Billy Ozuna /Age/Sex: 1945 (79 y.o. male)   Admit Date: 6/3/2024  Discharge Date: 24    MRN & CSN:  [id number] & [id number] Encounter Date and Time 24 12:20 PM EDT    Attending:  Hu Harrington,* Discharging Provider: Hu Harrington MD       Hospital Course:     Brief HPI: Billy Ozuna is a 79 y.o. male   with COPD/chronic respiratory failure on 4 L NC O2, nonischemic cardiomyopathy/chronic systolic heart failure, T2DM, hypertension, hyperlipidemia, depression presented from home with chest pain.  Patient reports that when he woke up this morning had an episode of chest pain and dizziness after getting out of the restroom, called EMS but by the time EMS arrived his symptoms had improved hence he decided not to come to the hospital.  Again around 11:00 AM had similar symptoms but this time chest pain was persistent hence called EMS and was brought to [facility] ER.  Describes the chest pain as substernal chest pressure, associated with dizziness no aggravating or alleviating factors or associated diaphoresis.  During my evaluation patient was alert oriented x 3 hemodynamically stable in no acute respiratory distress on 4 L NC O2.  Denies nausea vomiting diarrhea fever night sweats or chills.     Brief Problem Based Course:     NSTEMI  Chest pain at rest: Likely from edema  Troponin 67> 73> 101 EKG interpreted personally and results NSR 86/min, PACs, new TWI in V2-V3, similar appearing TWI in V4-V6.  Chest x-ray reviewed cardiomegaly, mild pulmonary vascular congestion improved from prior exam. Continue Aspirin, beta blocker and Lipitor     Acute systolic CHF  Patient with decreased EF of 20 to 25%.  Echo this admission does show further reduced EF of 10 to 15%. Continue Toprol-XL, losartan and Jardiance. EP consulted for possible device in the future. LifeVest at discharge     Type 2 diabetes  Continue home meds    GERD  Protonix      The  Nose: Nose normal.      Mouth/Throat:      Mouth: Mucous membranes are moist.      Pharynx: Oropharynx is clear.   Eyes:      Extraocular Movements: Extraocular movements intact.      Conjunctiva/sclera: Conjunctivae normal.      Pupils: Pupils are equal, round, and reactive to light.   Cardiovascular:      Rate and Rhythm: Normal rate and regular rhythm.      Pulses: Normal pulses.      Heart sounds: Normal heart sounds.   Pulmonary:      Effort: Pulmonary effort is normal.   Abdominal:      General: Abdomen is flat. Bowel sounds are normal.      Palpations: Abdomen is soft.   Musculoskeletal:         General: Normal range of motion.      Cervical back: Normal range of motion and neck supple.   Skin:     General: Skin is warm and dry.   Neurological:      General: No focal deficit present.      Mental Status: He is alert and oriented to person, place, and time. Mental status is at baseline.   Psychiatric:         Mood and Affect: Mood normal.         Behavior: Behavior normal.         Thought Content: Thought content normal.                Labs and Imaging   Echo (TTE) limited (PRN contrast/bubble/strain/3D)    Result Date: 6/4/2024  •  Image quality is suboptimal. Contrast used: Definity. •  Left Ventricle: Reduced left ventricular systolic function with a visually estimated EF of 10 -15%. No evidece of thrombus with definity contrast. •  Right Ventricle: Not well visualized. •  Valves not clearly visualized. Color doppler suggests mitral regurgitation. •  Pericardium: No pericardial effusion.     XR CHEST PORTABLE    Result Date: 6/3/2024  EXAM: CHEST X-RAY HISTORY: Chest pain. COMPARISON: 3 March 2024 chest x-ray TECHNIQUE: 1  view chest is submitted for review. FINDINGS: Lines and tubes:  None. The lungs are hyperexpanded. Airspace opacity in the right lung base..  No effusion.  The cardiac silhouette is enlarged.. Pulmonary vascularity is minimally prominent Osseous structures are within expected limits for

## 2024-06-07 NOTE — CARE COORDINATION
CM in to see Pt to follow up on discharge planning.  Plan remains home with [facility].  Pt denies any needs at this time.     Ps to Dr Harrington to update, home care order requested     9:36 AM   Pt on discharge.  CM call to [facility] HC, spoke to Pastora, updated of discharge.  Pt information and HC order faxed to [phone]

## 2024-06-07 NOTE — PROGRESS NOTES
Physician Progress Note      PATIENT:               MERCEDEZ LENNON  [facility] #:                  [id number]  :                       1945  ADMIT DATE:       6/3/2024 3:56 PM  DISCH DATE:  RESPONDING  PROVIDER #:        CARLOS ROBLES          QUERY TEXT:    Internal Medicine,    Patient admitted with chest pain and has NSTEMI documented by Internal   Medicine, Cardiology does not document NSTEMI. If possible, please  clarify in   progress notes and discharge summary if NSTEMI was confirmed or if you are   evaluating and /or treating any of the following:    [NSTEMI:: This patient has an NSTEMI.]]    The medical record reflects the following:  Risk Factors:  pulmonary edema  Clinical Indicators: Internal Medicine documents: \"NSTEMI-Chest pain at rest:   Likely from edema.\"  Cardiology documents: \"Chest pain: atypical, most likely from  has pleuritic   component from flash pulmonary edema noted on 1st CXR, he also has  COPD   exacerbation , will change oral lasix to 40mg BID and will follow up echo also   ordered  Treatment: labs, imaging, Cardiology consult, EKG, ECHO, medical management    Thank you,  Inessa Schwartz RN [facility]  [phone]  Options provided:  -- Type 2 MI  -- Demand Ischemia with MI  -- Demand Ischemia only, no MI  -- Other - I will add my own diagnosis  -- Disagree - Not applicable / Not valid  -- Disagree - Clinically unable to determine / Unknown  -- Refer to Clinical Documentation Reviewer    PROVIDER RESPONSE TEXT:    This patient has demand ischemia with an MI.    Query created by: Inessa Schwartz on 2024 9:02 AM      Electronically signed by:  CARLOS ROBLES 2024 12:57 PM

## 2024-06-08 ENCOUNTER — APPOINTMENT (OUTPATIENT)
Dept: CT IMAGING | Age: 79
DRG: 189 | End: 2024-06-08
Payer: OTHER GOVERNMENT

## 2024-06-08 ENCOUNTER — HOSPITAL ENCOUNTER (INPATIENT)
Age: 79
LOS: 6 days | Discharge: NURSING, REHAB OR LONG-TERM CARE | DRG: 189 | End: 2024-06-17
Attending: EMERGENCY MEDICINE | Admitting: FAMILY MEDICINE
Payer: OTHER GOVERNMENT

## 2024-06-08 ENCOUNTER — APPOINTMENT (OUTPATIENT)
Dept: GENERAL RADIOLOGY | Age: 79
DRG: 189 | End: 2024-06-08
Payer: OTHER GOVERNMENT

## 2024-06-08 DIAGNOSIS — R53.1 GENERAL WEAKNESS: ICD-10-CM

## 2024-06-08 DIAGNOSIS — R06.02 SHORTNESS OF BREATH: Primary | ICD-10-CM

## 2024-06-08 DIAGNOSIS — F41.1 ANXIETY STATE: ICD-10-CM

## 2024-06-08 DIAGNOSIS — K59.00 CONSTIPATION, UNSPECIFIED CONSTIPATION TYPE: ICD-10-CM

## 2024-06-08 LAB
ALBUMIN SERPL-MCNC: 4.2 GM/DL (ref 3.4–5)
ALP BLD-CCNC: 185 IU/L (ref 40–129)
ALT SERPL-CCNC: 13 U/L (ref 10–40)
ANION GAP SERPL CALCULATED.3IONS-SCNC: 9 MMOL/L (ref 7–16)
AST SERPL-CCNC: 18 IU/L (ref 15–37)
BASOPHILS ABSOLUTE: 0 K/CU MM
BASOPHILS RELATIVE PERCENT: 0.5 % (ref 0–1)
BILIRUB SERPL-MCNC: 0.1 MG/DL (ref 0–1)
BUN SERPL-MCNC: 20 MG/DL (ref 6–23)
CALCIUM SERPL-MCNC: 9 MG/DL (ref 8.3–10.6)
CHLORIDE BLD-SCNC: 98 MMOL/L (ref 99–110)
CO2: 36 MMOL/L (ref 21–32)
CREAT SERPL-MCNC: 1 MG/DL (ref 0.9–1.3)
DIFFERENTIAL TYPE: ABNORMAL
EOSINOPHILS ABSOLUTE: 0.2 K/CU MM
EOSINOPHILS RELATIVE PERCENT: 2.7 % (ref 0–3)
GFR, ESTIMATED: 78 ML/MIN/1.73M2
GLUCOSE BLD-MCNC: 135 MG/DL (ref 70–99)
GLUCOSE BLD-MCNC: 144 MG/DL (ref 70–99)
GLUCOSE BLD-MCNC: 159 MG/DL (ref 70–99)
GLUCOSE BLD-MCNC: 225 MG/DL (ref 70–99)
GLUCOSE SERPL-MCNC: 182 MG/DL (ref 70–99)
HCT VFR BLD CALC: 37.8 % (ref 42–52)
HEMOGLOBIN: 11 GM/DL (ref 13.5–18)
IMMATURE NEUTROPHIL %: 0.1 % (ref 0–0.43)
LIPASE: 21 IU/L (ref 13–60)
LYMPHOCYTES ABSOLUTE: 0.7 K/CU MM
LYMPHOCYTES RELATIVE PERCENT: 8.3 % (ref 24–44)
MCH RBC QN AUTO: 28 PG (ref 27–31)
MCHC RBC AUTO-ENTMCNC: 29.1 % (ref 32–36)
MCV RBC AUTO: 96.2 FL (ref 78–100)
MONOCYTES ABSOLUTE: 1.1 K/CU MM
MONOCYTES RELATIVE PERCENT: 12.8 % (ref 0–4)
NEUTROPHILS ABSOLUTE: 6.4 K/CU MM
NEUTROPHILS RELATIVE PERCENT: 75.6 % (ref 36–66)
NUCLEATED RBC %: 0 %
PDW BLD-RTO: 16 % (ref 11.7–14.9)
PLATELET # BLD: 298 K/CU MM (ref 140–440)
PMV BLD AUTO: 10.8 FL (ref 7.5–11.1)
POTASSIUM SERPL-SCNC: 3.9 MMOL/L (ref 3.5–5.1)
PRO-BNP: 4273 PG/ML
RBC # BLD: 3.93 M/CU MM (ref 4.6–6.2)
SODIUM BLD-SCNC: 143 MMOL/L (ref 135–145)
TOTAL IMMATURE NEUTOROPHIL: 0.01 K/CU MM
TOTAL NUCLEATED RBC: 0 K/CU MM
TOTAL PROTEIN: 7.4 GM/DL (ref 6.4–8.2)
WBC # BLD: 8.4 K/CU MM (ref 4–10.5)

## 2024-06-08 PROCEDURE — 94761 N-INVAS EAR/PLS OXIMETRY MLT: CPT

## 2024-06-08 PROCEDURE — 97530 THERAPEUTIC ACTIVITIES: CPT

## 2024-06-08 PROCEDURE — 6370000000 HC RX 637 (ALT 250 FOR IP): Performed by: NURSE PRACTITIONER

## 2024-06-08 PROCEDURE — G0378 HOSPITAL OBSERVATION PER HR: HCPCS

## 2024-06-08 PROCEDURE — 6370000000 HC RX 637 (ALT 250 FOR IP): Performed by: INTERNAL MEDICINE

## 2024-06-08 PROCEDURE — 83880 ASSAY OF NATRIURETIC PEPTIDE: CPT

## 2024-06-08 PROCEDURE — 6360000002 HC RX W HCPCS: Performed by: INTERNAL MEDICINE

## 2024-06-08 PROCEDURE — 96372 THER/PROPH/DIAG INJ SC/IM: CPT

## 2024-06-08 PROCEDURE — 2580000003 HC RX 258: Performed by: INTERNAL MEDICINE

## 2024-06-08 PROCEDURE — 94640 AIRWAY INHALATION TREATMENT: CPT

## 2024-06-08 PROCEDURE — 71045 X-RAY EXAM CHEST 1 VIEW: CPT

## 2024-06-08 PROCEDURE — 97166 OT EVAL MOD COMPLEX 45 MIN: CPT

## 2024-06-08 PROCEDURE — 97116 GAIT TRAINING THERAPY: CPT

## 2024-06-08 PROCEDURE — 2500000003 HC RX 250 WO HCPCS: Performed by: NURSE PRACTITIONER

## 2024-06-08 PROCEDURE — 2700000000 HC OXYGEN THERAPY PER DAY

## 2024-06-08 PROCEDURE — 74176 CT ABD & PELVIS W/O CONTRAST: CPT

## 2024-06-08 PROCEDURE — 82962 GLUCOSE BLOOD TEST: CPT

## 2024-06-08 PROCEDURE — 97162 PT EVAL MOD COMPLEX 30 MIN: CPT

## 2024-06-08 PROCEDURE — 80053 COMPREHEN METABOLIC PANEL: CPT

## 2024-06-08 PROCEDURE — 99285 EMERGENCY DEPT VISIT HI MDM: CPT

## 2024-06-08 PROCEDURE — 85025 COMPLETE CBC W/AUTO DIFF WBC: CPT

## 2024-06-08 PROCEDURE — 83690 ASSAY OF LIPASE: CPT

## 2024-06-08 RX ORDER — METOPROLOL SUCCINATE 50 MG/1
50 TABLET, EXTENDED RELEASE ORAL DAILY
Status: DISCONTINUED | OUTPATIENT
Start: 2024-06-08 | End: 2024-06-12

## 2024-06-08 RX ORDER — ACETAMINOPHEN 650 MG/1
650 SUPPOSITORY RECTAL EVERY 6 HOURS PRN
Status: DISCONTINUED | OUTPATIENT
Start: 2024-06-08 | End: 2024-06-17 | Stop reason: HOSPADM

## 2024-06-08 RX ORDER — ACETAMINOPHEN 325 MG/1
650 TABLET ORAL EVERY 6 HOURS PRN
Status: DISCONTINUED | OUTPATIENT
Start: 2024-06-08 | End: 2024-06-17 | Stop reason: HOSPADM

## 2024-06-08 RX ORDER — ASPIRIN 81 MG/1
81 TABLET, CHEWABLE ORAL DAILY
Status: DISCONTINUED | OUTPATIENT
Start: 2024-06-08 | End: 2024-06-17 | Stop reason: HOSPADM

## 2024-06-08 RX ORDER — SODIUM CHLORIDE 9 MG/ML
INJECTION, SOLUTION INTRAVENOUS PRN
Status: DISCONTINUED | OUTPATIENT
Start: 2024-06-08 | End: 2024-06-17 | Stop reason: HOSPADM

## 2024-06-08 RX ORDER — LANOLIN ALCOHOL/MO/W.PET/CERES
1000 CREAM (GRAM) TOPICAL DAILY
Status: DISCONTINUED | OUTPATIENT
Start: 2024-06-08 | End: 2024-06-17 | Stop reason: HOSPADM

## 2024-06-08 RX ORDER — ATORVASTATIN CALCIUM 10 MG/1
10 TABLET, FILM COATED ORAL DAILY
Status: DISCONTINUED | OUTPATIENT
Start: 2024-06-08 | End: 2024-06-17 | Stop reason: HOSPADM

## 2024-06-08 RX ORDER — GUAIFENESIN 400 MG/1
400 TABLET ORAL 2 TIMES DAILY
Status: DISCONTINUED | OUTPATIENT
Start: 2024-06-08 | End: 2024-06-08

## 2024-06-08 RX ORDER — FUROSEMIDE 40 MG/1
40 TABLET ORAL 2 TIMES DAILY
Status: DISCONTINUED | OUTPATIENT
Start: 2024-06-08 | End: 2024-06-17 | Stop reason: HOSPADM

## 2024-06-08 RX ORDER — GLUCAGON 1 MG/ML
1 KIT INJECTION PRN
Status: DISCONTINUED | OUTPATIENT
Start: 2024-06-08 | End: 2024-06-17 | Stop reason: HOSPADM

## 2024-06-08 RX ORDER — POTASSIUM CHLORIDE 7.45 MG/ML
10 INJECTION INTRAVENOUS PRN
Status: DISCONTINUED | OUTPATIENT
Start: 2024-06-08 | End: 2024-06-11

## 2024-06-08 RX ORDER — DEXTROSE MONOHYDRATE 100 MG/ML
INJECTION, SOLUTION INTRAVENOUS CONTINUOUS PRN
Status: DISCONTINUED | OUTPATIENT
Start: 2024-06-08 | End: 2024-06-17 | Stop reason: HOSPADM

## 2024-06-08 RX ORDER — POTASSIUM CHLORIDE 20 MEQ/1
40 TABLET, EXTENDED RELEASE ORAL PRN
Status: DISCONTINUED | OUTPATIENT
Start: 2024-06-08 | End: 2024-06-11

## 2024-06-08 RX ORDER — MAGNESIUM SULFATE IN WATER 40 MG/ML
2000 INJECTION, SOLUTION INTRAVENOUS PRN
Status: DISCONTINUED | OUTPATIENT
Start: 2024-06-08 | End: 2024-06-11

## 2024-06-08 RX ORDER — INSULIN LISPRO 100 [IU]/ML
0-4 INJECTION, SOLUTION INTRAVENOUS; SUBCUTANEOUS
Status: DISCONTINUED | OUTPATIENT
Start: 2024-06-08 | End: 2024-06-17 | Stop reason: HOSPADM

## 2024-06-08 RX ORDER — SENNA AND DOCUSATE SODIUM 50; 8.6 MG/1; MG/1
2 TABLET, FILM COATED ORAL DAILY
Status: DISCONTINUED | OUTPATIENT
Start: 2024-06-08 | End: 2024-06-17 | Stop reason: HOSPADM

## 2024-06-08 RX ORDER — SODIUM CHLORIDE 0.9 % (FLUSH) 0.9 %
5-40 SYRINGE (ML) INJECTION EVERY 12 HOURS SCHEDULED
Status: DISCONTINUED | OUTPATIENT
Start: 2024-06-08 | End: 2024-06-17 | Stop reason: HOSPADM

## 2024-06-08 RX ORDER — GUAIFENESIN 200 MG/10ML
200 LIQUID ORAL EVERY 6 HOURS
Status: DISCONTINUED | OUTPATIENT
Start: 2024-06-08 | End: 2024-06-08

## 2024-06-08 RX ORDER — MONTELUKAST SODIUM 10 MG/1
10 TABLET ORAL NIGHTLY
Status: DISCONTINUED | OUTPATIENT
Start: 2024-06-08 | End: 2024-06-17 | Stop reason: HOSPADM

## 2024-06-08 RX ORDER — ONDANSETRON 2 MG/ML
4 INJECTION INTRAMUSCULAR; INTRAVENOUS EVERY 6 HOURS PRN
Status: DISCONTINUED | OUTPATIENT
Start: 2024-06-08 | End: 2024-06-17 | Stop reason: HOSPADM

## 2024-06-08 RX ORDER — GUAIFENESIN 200 MG/10ML
200 LIQUID ORAL EVERY 4 HOURS PRN
Status: DISCONTINUED | OUTPATIENT
Start: 2024-06-08 | End: 2024-06-08

## 2024-06-08 RX ORDER — IPRATROPIUM BROMIDE AND ALBUTEROL SULFATE 2.5; .5 MG/3ML; MG/3ML
1 SOLUTION RESPIRATORY (INHALATION)
Status: DISCONTINUED | OUTPATIENT
Start: 2024-06-08 | End: 2024-06-17 | Stop reason: HOSPADM

## 2024-06-08 RX ORDER — POTASSIUM CHLORIDE 1.5 G/1.58G
20 POWDER, FOR SOLUTION ORAL 2 TIMES DAILY
Status: DISCONTINUED | OUTPATIENT
Start: 2024-06-08 | End: 2024-06-08 | Stop reason: CLARIF

## 2024-06-08 RX ORDER — BUDESONIDE AND FORMOTEROL FUMARATE DIHYDRATE 160; 4.5 UG/1; UG/1
2 AEROSOL RESPIRATORY (INHALATION)
Status: DISCONTINUED | OUTPATIENT
Start: 2024-06-08 | End: 2024-06-17 | Stop reason: HOSPADM

## 2024-06-08 RX ORDER — ONDANSETRON 4 MG/1
4 TABLET, ORALLY DISINTEGRATING ORAL EVERY 8 HOURS PRN
Status: DISCONTINUED | OUTPATIENT
Start: 2024-06-08 | End: 2024-06-17 | Stop reason: HOSPADM

## 2024-06-08 RX ORDER — GUAIFENESIN 600 MG/1
600 TABLET, EXTENDED RELEASE ORAL 2 TIMES DAILY
Status: DISCONTINUED | OUTPATIENT
Start: 2024-06-08 | End: 2024-06-17 | Stop reason: HOSPADM

## 2024-06-08 RX ORDER — ENOXAPARIN SODIUM 100 MG/ML
40 INJECTION SUBCUTANEOUS DAILY
Status: DISCONTINUED | OUTPATIENT
Start: 2024-06-08 | End: 2024-06-17 | Stop reason: HOSPADM

## 2024-06-08 RX ORDER — SODIUM CHLORIDE 0.9 % (FLUSH) 0.9 %
5-40 SYRINGE (ML) INJECTION PRN
Status: DISCONTINUED | OUTPATIENT
Start: 2024-06-08 | End: 2024-06-17 | Stop reason: HOSPADM

## 2024-06-08 RX ORDER — VENLAFAXINE 25 MG/1
25 TABLET ORAL DAILY
Status: DISCONTINUED | OUTPATIENT
Start: 2024-06-08 | End: 2024-06-17 | Stop reason: HOSPADM

## 2024-06-08 RX ORDER — ALBUTEROL SULFATE 2.5 MG/3ML
0.63 SOLUTION RESPIRATORY (INHALATION) EVERY 6 HOURS PRN
Status: DISCONTINUED | OUTPATIENT
Start: 2024-06-08 | End: 2024-06-17 | Stop reason: HOSPADM

## 2024-06-08 RX ORDER — LOSARTAN POTASSIUM 25 MG/1
25 TABLET ORAL DAILY
Status: DISCONTINUED | OUTPATIENT
Start: 2024-06-08 | End: 2024-06-17 | Stop reason: HOSPADM

## 2024-06-08 RX ORDER — POLYETHYLENE GLYCOL 3350 17 G/17G
17 POWDER, FOR SOLUTION ORAL 2 TIMES DAILY
Status: DISCONTINUED | OUTPATIENT
Start: 2024-06-08 | End: 2024-06-17 | Stop reason: HOSPADM

## 2024-06-08 RX ORDER — PANTOPRAZOLE SODIUM 40 MG/1
40 TABLET, DELAYED RELEASE ORAL
Status: DISCONTINUED | OUTPATIENT
Start: 2024-06-08 | End: 2024-06-17 | Stop reason: HOSPADM

## 2024-06-08 RX ORDER — INSULIN LISPRO 100 [IU]/ML
0-4 INJECTION, SOLUTION INTRAVENOUS; SUBCUTANEOUS NIGHTLY
Status: DISCONTINUED | OUTPATIENT
Start: 2024-06-08 | End: 2024-06-17 | Stop reason: HOSPADM

## 2024-06-08 RX ADMIN — ATORVASTATIN CALCIUM 10 MG: 10 TABLET, FILM COATED ORAL at 10:24

## 2024-06-08 RX ADMIN — IPRATROPIUM BROMIDE AND ALBUTEROL SULFATE 1 DOSE: 2.5; .5 SOLUTION RESPIRATORY (INHALATION) at 12:06

## 2024-06-08 RX ADMIN — POLYETHYLENE GLYCOL (3350) 17 G: 17 POWDER, FOR SOLUTION ORAL at 21:15

## 2024-06-08 RX ADMIN — IPRATROPIUM BROMIDE AND ALBUTEROL SULFATE 1 DOSE: 2.5; .5 SOLUTION RESPIRATORY (INHALATION) at 21:18

## 2024-06-08 RX ADMIN — LOSARTAN POTASSIUM 25 MG: 25 TABLET, FILM COATED ORAL at 10:24

## 2024-06-08 RX ADMIN — IPRATROPIUM BROMIDE AND ALBUTEROL SULFATE 1 DOSE: 2.5; .5 SOLUTION RESPIRATORY (INHALATION) at 06:20

## 2024-06-08 RX ADMIN — SODIUM CHLORIDE, PRESERVATIVE FREE 10 ML: 5 INJECTION INTRAVENOUS at 10:45

## 2024-06-08 RX ADMIN — GUAIFENESIN 600 MG: 600 TABLET, EXTENDED RELEASE ORAL at 21:15

## 2024-06-08 RX ADMIN — ENOXAPARIN SODIUM 40 MG: 100 INJECTION SUBCUTANEOUS at 10:25

## 2024-06-08 RX ADMIN — VENLAFAXINE 25 MG: 25 TABLET ORAL at 22:23

## 2024-06-08 RX ADMIN — EMPAGLIFLOZIN 10 MG: 10 TABLET, FILM COATED ORAL at 10:24

## 2024-06-08 RX ADMIN — IPRATROPIUM BROMIDE AND ALBUTEROL SULFATE 1 DOSE: 2.5; .5 SOLUTION RESPIRATORY (INHALATION) at 17:05

## 2024-06-08 RX ADMIN — PANTOPRAZOLE SODIUM 40 MG: 40 TABLET, DELAYED RELEASE ORAL at 06:49

## 2024-06-08 RX ADMIN — SODIUM CHLORIDE, PRESERVATIVE FREE 10 ML: 5 INJECTION INTRAVENOUS at 21:15

## 2024-06-08 RX ADMIN — BUDESONIDE AND FORMOTEROL FUMARATE DIHYDRATE 2 PUFF: 160; 4.5 AEROSOL RESPIRATORY (INHALATION) at 12:06

## 2024-06-08 RX ADMIN — GUAIFENESIN 200 MG: 200 SOLUTION ORAL at 06:49

## 2024-06-08 RX ADMIN — METOPROLOL SUCCINATE 50 MG: 50 TABLET, EXTENDED RELEASE ORAL at 10:25

## 2024-06-08 RX ADMIN — POLYETHYLENE GLYCOL (3350) 17 G: 17 POWDER, FOR SOLUTION ORAL at 10:25

## 2024-06-08 RX ADMIN — FUROSEMIDE 40 MG: 40 TABLET ORAL at 10:24

## 2024-06-08 RX ADMIN — FUROSEMIDE 40 MG: 40 TABLET ORAL at 19:53

## 2024-06-08 RX ADMIN — SENNOSIDES, DOCUSATE SODIUM 2 TABLET: 8.6; 5 TABLET ORAL at 10:44

## 2024-06-08 RX ADMIN — BUDESONIDE AND FORMOTEROL FUMARATE DIHYDRATE 2 PUFF: 160; 4.5 AEROSOL RESPIRATORY (INHALATION) at 21:25

## 2024-06-08 RX ADMIN — METFORMIN HYDROCHLORIDE 1000 MG: 500 TABLET, FILM COATED ORAL at 19:53

## 2024-06-08 RX ADMIN — POTASSIUM BICARBONATE 20 MEQ: 782 TABLET, EFFERVESCENT ORAL at 10:24

## 2024-06-08 RX ADMIN — METFORMIN HYDROCHLORIDE 1000 MG: 500 TABLET, FILM COATED ORAL at 06:49

## 2024-06-08 RX ADMIN — GUAIFENESIN 600 MG: 600 TABLET, EXTENDED RELEASE ORAL at 15:03

## 2024-06-08 RX ADMIN — INSULIN LISPRO 1 UNITS: 100 INJECTION, SOLUTION INTRAVENOUS; SUBCUTANEOUS at 15:03

## 2024-06-08 RX ADMIN — MONTELUKAST 10 MG: 10 TABLET, FILM COATED ORAL at 21:15

## 2024-06-08 RX ADMIN — POTASSIUM BICARBONATE 20 MEQ: 782 TABLET, EFFERVESCENT ORAL at 21:15

## 2024-06-08 RX ADMIN — CYANOCOBALAMIN TAB 1000 MCG 1000 MCG: 1000 TAB at 10:24

## 2024-06-08 RX ADMIN — ASPIRIN 81 MG: 81 TABLET, CHEWABLE ORAL at 10:25

## 2024-06-08 ASSESSMENT — PAIN SCALES - GENERAL
PAINLEVEL_OUTOF10: 0

## 2024-06-08 ASSESSMENT — PAIN SCALES - WONG BAKER: WONGBAKER_NUMERICALRESPONSE: NO HURT

## 2024-06-08 ASSESSMENT — PAIN - FUNCTIONAL ASSESSMENT
PAIN_FUNCTIONAL_ASSESSMENT: NONE - DENIES PAIN
PAIN_FUNCTIONAL_ASSESSMENT: 0-10

## 2024-06-08 NOTE — DISCHARGE INSTRUCTIONS
Your labs today were overall stable.  The CT of your abdomen does show some constipation.    Please help with your primary care physician for further evaluation management    If you develop any worsening or concerning symptoms, please seek immediate medical evaluation

## 2024-06-08 NOTE — H&P
Recent Labs     06/05/24  1005 06/06/24  0536 06/08/24  0049   WBC 8.3 7.9 8.4   HGB 9.7* 9.7* 11.0*    241 298     BMP:    Recent Labs     06/05/24  1005 06/06/24  0536 06/08/24  0049    141 143   K 4.2 4.5 3.9   CL 99 99 98*   CO2 30 31 36*   BUN 26* 23 20   CREATININE 1.2 1.1 1.0   GLUCOSE 180* 111* 182*     Hepatic:   Recent Labs     06/08/24  0049   AST 18   ALT 13   BILITOT 0.1   ALKPHOS 185*     Lipids:   Lab Results   Component Value Date/Time    CHOL 137 06/04/2024 01:53 AM    HDL 63 06/04/2024 01:53 AM    TRIG 44 06/04/2024 01:53 AM     Hemoglobin A1C:   Lab Results   Component Value Date/Time    LABA1C 6.8 06/04/2024 01:53 AM     TSH: No results found for: \"TSH\"  Troponin:   Lab Results   Component Value Date/Time    TROPONINT 0.028 09/17/2023 04:22 PM    TROPONINT 0.039 09/17/2023 12:45 PM    TROPONINT 0.036 09/17/2023 07:58 AM     Lactic Acid: No results for input(s): \"LACTA\" in the last 72 hours.  BNP:   Recent Labs     06/08/24 0049   PROBNP 4,273*     UA:  Lab Results   Component Value Date/Time    NITRU POSITIVE 04/29/2018 03:15 PM    COLORU YELLOW 04/29/2018 03:15 PM    PHUR 6.0 04/29/2018 03:15 PM    WBCUA 948 04/29/2018 03:15 PM    RBCUA 11,278 04/29/2018 03:15 PM    MUCUS RARE 04/06/2018 01:20 AM    TRICHOMONAS NONE SEEN 04/29/2018 03:15 PM    YEAST OCCASIONAL 01/13/2013 02:00 AM    BACTERIA RARE 04/29/2018 03:15 PM    CLARITYU SLIGHTLY CLOUDY 04/29/2018 03:15 PM    LEUKOCYTESUR TRACE 04/29/2018 03:15 PM    UROBILINOGEN NORMAL 04/29/2018 03:15 PM    BILIRUBINUR NEGATIVE 04/29/2018 03:15 PM    BLOODU MODERATE 04/29/2018 03:15 PM    GLUCOSEU >500 04/29/2018 03:15 PM    KETUA NEGATIVE 04/29/2018 03:15 PM     Urine Cultures: No results found for: \"LABURIN\"  Blood Cultures: No results found for: \"BC\"  No results found for: \"BLOODCULT2\"  Organism:   Lab Results   Component Value Date/Time    ORG ENTBC 09/07/2018 08:45 AM       Imaging/Diagnostics Last 24 Hours   CT ABDOMEN PELVIS WO

## 2024-06-08 NOTE — ACP (ADVANCE CARE PLANNING)
Discussed CODE STATUS with patient-opted full code  Surrogate decision makers-daughter and daughter-in-law  Manjeet BergSkgur-[phone]  Sincere Thornton-[phone]

## 2024-06-08 NOTE — CARE COORDINATION
CM reviewed chart and saw pt at bedside.  Pt is very pleasant, he was recently d/c'd on 6/7 to with [facility].  Pt lives alone and has aid services twice weekly.  Pt states when he arrived home he was have issues with his concentrator, felt whelmed by a life vest.  He became SOB and anxious so he called 911.  Pt feels he needs to go to a SNF.  CM provided SNF choice list.  WB placed to PT/OT  CM will con't to follow.

## 2024-06-08 NOTE — ED PROVIDER NOTES
capsule by mouth daily      cyanocobalamin 1000 MCG tablet Take 1 tablet by mouth daily             ALLERGIES     Lisinopril, Dye [iodides], and Lorazepam    FAMILY HISTORY       Family History   Problem Relation Age of Onset    Coronary Art Dis Mother     Diabetes Mother     Stroke Father     Other Sister         aneurysm    Stroke Brother           SOCIAL HISTORY       Social History     Socioeconomic History    Marital status:     Number of children: 5   Tobacco Use    Smoking status: Former     Current packs/day: 0.00     Average packs/day: 0.5 packs/day for 30.0 years (15.0 ttl pk-yrs)     Types: Cigarettes     Start date: 10/25/1982     Quit date: 10/25/2012     Years since quittin.6    Smokeless tobacco: Never   Vaping Use    Vaping Use: Never used   Substance and Sexual Activity    Alcohol use: No    Drug use: No   Social History Narrative    , semi retired , retired [employer]     Social Determinants of Health     Financial Resource Strain: Low Risk  (10/15/2023)    Overall Financial Resource Strain (CARDIA)     Difficulty of Paying Living Expenses: Not hard at all   Food Insecurity: No Food Insecurity (2024)    Hunger Vital Sign     Worried About Running Out of Food in the Last Year: Never true     Ran Out of Food in the Last Year: Never true   Transportation Needs: No Transportation Needs (2024)    PRAPARE - Transportation     Lack of Transportation (Medical): No     Lack of Transportation (Non-Medical): No   Physical Activity: Inactive (10/15/2023)    Exercise Vital Sign     Days of Exercise per Week: 0 days     Minutes of Exercise per Session: 0 min   Stress: No Stress Concern Present (10/15/2023)    [employer] - Occupational Stress Questionnaire     Feeling of Stress : Not at all   Social Connections: Socially Isolated (10/15/2023)    Social Connection and Isolation Panel [NHANES]     Frequency of Communication with Friends and

## 2024-06-08 NOTE — ED NOTES
ED TO INPATIENT SBAR HANDOFF    Patient Name: Billy Ozuna   :  1945  79 y.o.   Preferred Name    Family/Caregiver Present no   Restraints no   C-SSRS: Risk of Suicide: No Risk  Sitter no   Sepsis Risk Score Sepsis Risk Score: 1.3      Situation  Chief Complaint   Patient presents with    Shortness of Breath     D/c today from inpatient.  C/o SOB and anxiety tonight.      Brief Description of Patient's Condition: Pt a/o x 4   VSS   On 4L at all times.   Was d/c and home for less than 12 hours when he became SOB and anxious.   Being admitted for placement.   Mental Status:   Arrived from: nursing home    Imaging:   CT ABDOMEN PELVIS WO CONTRAST Additional Contrast? None   Final Result      Distended colon filled with large volume stool. Correlate for ileus.         Electronically signed by Homero Plata      XR CHEST PORTABLE   Final Result      No acute findings in the chest         Electronically signed by Homero Plata        Abnormal labs:   Abnormal Labs Reviewed   COMPREHENSIVE METABOLIC PANEL - Abnormal; Notable for the following components:       Result Value    Chloride 98 (*)     CO2 36 (*)     Glucose 182 (*)     Alkaline Phosphatase 185 (*)     All other components within normal limits   CBC WITH AUTO DIFFERENTIAL - Abnormal; Notable for the following components:    RBC 3.93 (*)     Hemoglobin 11.0 (*)     Hematocrit 37.8 (*)     MCHC 29.1 (*)     RDW 16.0 (*)     Neutrophils % 75.6 (*)     Lymphocytes % 8.3 (*)     Monocytes % 12.8 (*)     All other components within normal limits   BRAIN NATRIURETIC PEPTIDE - Abnormal; Notable for the following components:    Pro-BNP 4,273 (*)     All other components within normal limits        Background  History:   Past Medical History:   Diagnosis Date    Acid reflux     Anemia     \"dx age 12    COPD (chronic obstructive pulmonary disease) (HCC)     [facility]    Dental decay     Diabetes mellitus (HCC)     \"dx  or so\"    Emphysema of lung (HCC)

## 2024-06-08 NOTE — ED NOTES
Pt walked to [facility] and back.  Became extremely SOB and anxious.  Pt 02 was 91% on 4L but was breathing 40x per Min and was SOB.   Spoke with Dr. Todd about admission.  She stated that pt would only meet criteria for admission for placement.  Spke with pt regarding this.  Him and family are agreeable to admission to placement.  Pt feels unsafe and anxious about being home alone.   MD notified.

## 2024-06-08 NOTE — CONSULTS
[facility] ACUTE CARE PHYSICAL THERAPY EVALUATION  Billy Ozuna, 1945, 4109/4109-A, 6/8/2024    History  [place]:  The primary encounter diagnosis was Shortness of breath. Diagnoses of Constipation, unspecified constipation type, Anxiety state, and General weakness were also pertinent to this visit.  Patient  has a past medical history of Acid reflux, Anemia, COPD (chronic obstructive pulmonary disease) (HCC), Dental decay, Diabetes mellitus (HCC), Emphysema of lung (HCC), Enlarged prostate, Stevens catheter in place, H/O cardiovascular stress test, Hematuria, HX OTHER MEDICAL, Hypertension, Low back sprain, Megacolon, On home oxygen therapy, and Wears glasses.  Patient  has a past surgical history that includes Revision Colostomy (1974); Abdominal exploration surgery (1974); other surgical history; other surgical history (01 07 2013); tumor removal; Appendectomy (11 years old); Abdomen surgery; TURP (N/A, 07/12/2018); Dental surgery; other surgical history (09/18/2018); and Colonoscopy (N/A, 10/24/2023).    Discharge Recommendation: Encourage facility for moderate post-acute rehabilitation, anticipate 1-2 hours per day and 5 days per week.     Equipment: TBD at next level of care    Subjective:    Patient states:  \"I don't like living at home alone, it's just too much.\"      Pain:  denies pain.      Communication with other providers:  Handoff to RN, OT    Restrictions: general precautions, fall risk    Home Setup/Prior level of function   Lives With: Alone (daughter lives in [place])  Type of Home: House  Home Layout: One level  Home Access: Stairs to enter without rails  Entrance Stairs - Number of Steps: 1  Bathroom Shower/Tub: Tub/Shower unit  Bathroom Toilet: Standard  Bathroom Equipment: Shower chair  Bathroom Accessibility: Accessible  Home Equipment: Walker - 4-Wheeled  Has the patient had two or more falls in the past year or any fall with injury in the past year?: No  Receives Help From:

## 2024-06-09 LAB
GLUCOSE BLD-MCNC: 132 MG/DL (ref 70–99)
GLUCOSE BLD-MCNC: 159 MG/DL (ref 70–99)
GLUCOSE BLD-MCNC: 173 MG/DL (ref 70–99)
GLUCOSE BLD-MCNC: 186 MG/DL (ref 70–99)
GLUCOSE BLD-MCNC: 201 MG/DL (ref 70–99)

## 2024-06-09 PROCEDURE — G0378 HOSPITAL OBSERVATION PER HR: HCPCS

## 2024-06-09 PROCEDURE — 6360000002 HC RX W HCPCS: Performed by: INTERNAL MEDICINE

## 2024-06-09 PROCEDURE — 94761 N-INVAS EAR/PLS OXIMETRY MLT: CPT

## 2024-06-09 PROCEDURE — 6370000000 HC RX 637 (ALT 250 FOR IP): Performed by: INTERNAL MEDICINE

## 2024-06-09 PROCEDURE — 2700000000 HC OXYGEN THERAPY PER DAY

## 2024-06-09 PROCEDURE — 6360000002 HC RX W HCPCS: Performed by: NURSE PRACTITIONER

## 2024-06-09 PROCEDURE — 6370000000 HC RX 637 (ALT 250 FOR IP): Performed by: NURSE PRACTITIONER

## 2024-06-09 PROCEDURE — 2580000003 HC RX 258: Performed by: INTERNAL MEDICINE

## 2024-06-09 PROCEDURE — 97535 SELF CARE MNGMENT TRAINING: CPT

## 2024-06-09 PROCEDURE — 96372 THER/PROPH/DIAG INJ SC/IM: CPT

## 2024-06-09 PROCEDURE — 94640 AIRWAY INHALATION TREATMENT: CPT

## 2024-06-09 PROCEDURE — 82962 GLUCOSE BLOOD TEST: CPT

## 2024-06-09 PROCEDURE — 97530 THERAPEUTIC ACTIVITIES: CPT

## 2024-06-09 RX ORDER — BENZONATATE 100 MG/1
200 CAPSULE ORAL 3 TIMES DAILY PRN
Status: DISCONTINUED | OUTPATIENT
Start: 2024-06-09 | End: 2024-06-17 | Stop reason: HOSPADM

## 2024-06-09 RX ORDER — ACETYLCYSTEINE 200 MG/ML
600 SOLUTION ORAL; RESPIRATORY (INHALATION) 2 TIMES DAILY
Status: DISCONTINUED | OUTPATIENT
Start: 2024-06-09 | End: 2024-06-11

## 2024-06-09 RX ADMIN — GUAIFENESIN 600 MG: 600 TABLET, EXTENDED RELEASE ORAL at 10:02

## 2024-06-09 RX ADMIN — POTASSIUM BICARBONATE 20 MEQ: 782 TABLET, EFFERVESCENT ORAL at 10:02

## 2024-06-09 RX ADMIN — EMPAGLIFLOZIN 10 MG: 10 TABLET, FILM COATED ORAL at 10:01

## 2024-06-09 RX ADMIN — IPRATROPIUM BROMIDE AND ALBUTEROL SULFATE 1 DOSE: 2.5; .5 SOLUTION RESPIRATORY (INHALATION) at 21:23

## 2024-06-09 RX ADMIN — FUROSEMIDE 40 MG: 40 TABLET ORAL at 10:01

## 2024-06-09 RX ADMIN — POTASSIUM BICARBONATE 20 MEQ: 782 TABLET, EFFERVESCENT ORAL at 20:43

## 2024-06-09 RX ADMIN — ASPIRIN 81 MG: 81 TABLET, CHEWABLE ORAL at 10:01

## 2024-06-09 RX ADMIN — IPRATROPIUM BROMIDE AND ALBUTEROL SULFATE 1 DOSE: 2.5; .5 SOLUTION RESPIRATORY (INHALATION) at 15:22

## 2024-06-09 RX ADMIN — ALBUTEROL SULFATE 0.63 MG: 2.5 SOLUTION RESPIRATORY (INHALATION) at 00:49

## 2024-06-09 RX ADMIN — ENOXAPARIN SODIUM 40 MG: 100 INJECTION SUBCUTANEOUS at 10:00

## 2024-06-09 RX ADMIN — GUAIFENESIN 600 MG: 600 TABLET, EXTENDED RELEASE ORAL at 20:43

## 2024-06-09 RX ADMIN — INSULIN LISPRO 1 UNITS: 100 INJECTION, SOLUTION INTRAVENOUS; SUBCUTANEOUS at 14:48

## 2024-06-09 RX ADMIN — POLYETHYLENE GLYCOL (3350) 17 G: 17 POWDER, FOR SOLUTION ORAL at 20:43

## 2024-06-09 RX ADMIN — ALBUTEROL SULFATE 0.63 MG: 2.5 SOLUTION RESPIRATORY (INHALATION) at 23:56

## 2024-06-09 RX ADMIN — ATORVASTATIN CALCIUM 10 MG: 10 TABLET, FILM COATED ORAL at 10:01

## 2024-06-09 RX ADMIN — IPRATROPIUM BROMIDE AND ALBUTEROL SULFATE 1 DOSE: 2.5; .5 SOLUTION RESPIRATORY (INHALATION) at 07:49

## 2024-06-09 RX ADMIN — MONTELUKAST 10 MG: 10 TABLET, FILM COATED ORAL at 20:43

## 2024-06-09 RX ADMIN — VENLAFAXINE 25 MG: 25 TABLET ORAL at 22:40

## 2024-06-09 RX ADMIN — ACETYLCYSTEINE 600 MG: 200 SOLUTION ORAL; RESPIRATORY (INHALATION) at 21:24

## 2024-06-09 RX ADMIN — SODIUM CHLORIDE, PRESERVATIVE FREE 10 ML: 5 INJECTION INTRAVENOUS at 20:43

## 2024-06-09 RX ADMIN — CYANOCOBALAMIN TAB 1000 MCG 1000 MCG: 1000 TAB at 10:01

## 2024-06-09 RX ADMIN — BUDESONIDE AND FORMOTEROL FUMARATE DIHYDRATE 2 PUFF: 160; 4.5 AEROSOL RESPIRATORY (INHALATION) at 21:23

## 2024-06-09 RX ADMIN — POLYETHYLENE GLYCOL (3350) 17 G: 17 POWDER, FOR SOLUTION ORAL at 10:00

## 2024-06-09 RX ADMIN — FUROSEMIDE 40 MG: 40 TABLET ORAL at 18:53

## 2024-06-09 RX ADMIN — BUDESONIDE AND FORMOTEROL FUMARATE DIHYDRATE 2 PUFF: 160; 4.5 AEROSOL RESPIRATORY (INHALATION) at 07:50

## 2024-06-09 RX ADMIN — METFORMIN HYDROCHLORIDE 1000 MG: 500 TABLET, FILM COATED ORAL at 10:01

## 2024-06-09 RX ADMIN — IPRATROPIUM BROMIDE AND ALBUTEROL SULFATE 1 DOSE: 2.5; .5 SOLUTION RESPIRATORY (INHALATION) at 11:42

## 2024-06-09 RX ADMIN — METFORMIN HYDROCHLORIDE 1000 MG: 500 TABLET, FILM COATED ORAL at 18:53

## 2024-06-09 RX ADMIN — METOPROLOL SUCCINATE 50 MG: 50 TABLET, EXTENDED RELEASE ORAL at 10:01

## 2024-06-09 RX ADMIN — SENNOSIDES, DOCUSATE SODIUM 2 TABLET: 8.6; 5 TABLET ORAL at 10:01

## 2024-06-09 RX ADMIN — LOSARTAN POTASSIUM 25 MG: 25 TABLET, FILM COATED ORAL at 10:02

## 2024-06-09 RX ADMIN — PANTOPRAZOLE SODIUM 40 MG: 40 TABLET, DELAYED RELEASE ORAL at 06:06

## 2024-06-10 LAB
GLUCOSE BLD-MCNC: 109 MG/DL (ref 70–99)
GLUCOSE BLD-MCNC: 169 MG/DL (ref 70–99)
GLUCOSE BLD-MCNC: 253 MG/DL (ref 70–99)
GLUCOSE BLD-MCNC: 254 MG/DL (ref 70–99)

## 2024-06-10 PROCEDURE — 82962 GLUCOSE BLOOD TEST: CPT

## 2024-06-10 PROCEDURE — 6360000002 HC RX W HCPCS: Performed by: INTERNAL MEDICINE

## 2024-06-10 PROCEDURE — G0378 HOSPITAL OBSERVATION PER HR: HCPCS

## 2024-06-10 PROCEDURE — 2700000000 HC OXYGEN THERAPY PER DAY

## 2024-06-10 PROCEDURE — 94640 AIRWAY INHALATION TREATMENT: CPT

## 2024-06-10 PROCEDURE — 6360000002 HC RX W HCPCS: Performed by: NURSE PRACTITIONER

## 2024-06-10 PROCEDURE — 6370000000 HC RX 637 (ALT 250 FOR IP): Performed by: INTERNAL MEDICINE

## 2024-06-10 PROCEDURE — 6370000000 HC RX 637 (ALT 250 FOR IP): Performed by: NURSE PRACTITIONER

## 2024-06-10 PROCEDURE — 2580000003 HC RX 258: Performed by: INTERNAL MEDICINE

## 2024-06-10 PROCEDURE — 96372 THER/PROPH/DIAG INJ SC/IM: CPT

## 2024-06-10 RX ADMIN — CYANOCOBALAMIN TAB 1000 MCG 1000 MCG: 1000 TAB at 10:35

## 2024-06-10 RX ADMIN — GUAIFENESIN 600 MG: 600 TABLET, EXTENDED RELEASE ORAL at 10:36

## 2024-06-10 RX ADMIN — GUAIFENESIN 600 MG: 600 TABLET, EXTENDED RELEASE ORAL at 21:43

## 2024-06-10 RX ADMIN — VENLAFAXINE 25 MG: 25 TABLET ORAL at 21:43

## 2024-06-10 RX ADMIN — ASPIRIN 81 MG: 81 TABLET, CHEWABLE ORAL at 10:33

## 2024-06-10 RX ADMIN — IPRATROPIUM BROMIDE AND ALBUTEROL SULFATE 1 DOSE: 2.5; .5 SOLUTION RESPIRATORY (INHALATION) at 20:34

## 2024-06-10 RX ADMIN — EMPAGLIFLOZIN 10 MG: 10 TABLET, FILM COATED ORAL at 10:33

## 2024-06-10 RX ADMIN — BUDESONIDE AND FORMOTEROL FUMARATE DIHYDRATE 2 PUFF: 160; 4.5 AEROSOL RESPIRATORY (INHALATION) at 07:11

## 2024-06-10 RX ADMIN — ACETYLCYSTEINE 600 MG: 200 SOLUTION ORAL; RESPIRATORY (INHALATION) at 20:35

## 2024-06-10 RX ADMIN — POLYETHYLENE GLYCOL (3350) 17 G: 17 POWDER, FOR SOLUTION ORAL at 10:37

## 2024-06-10 RX ADMIN — FUROSEMIDE 40 MG: 40 TABLET ORAL at 10:36

## 2024-06-10 RX ADMIN — METFORMIN HYDROCHLORIDE 1000 MG: 500 TABLET, FILM COATED ORAL at 18:51

## 2024-06-10 RX ADMIN — SODIUM CHLORIDE, PRESERVATIVE FREE 10 ML: 5 INJECTION INTRAVENOUS at 21:44

## 2024-06-10 RX ADMIN — METFORMIN HYDROCHLORIDE 1000 MG: 500 TABLET, FILM COATED ORAL at 10:41

## 2024-06-10 RX ADMIN — SENNOSIDES, DOCUSATE SODIUM 2 TABLET: 8.6; 5 TABLET ORAL at 10:33

## 2024-06-10 RX ADMIN — IPRATROPIUM BROMIDE AND ALBUTEROL SULFATE 1 DOSE: 2.5; .5 SOLUTION RESPIRATORY (INHALATION) at 15:15

## 2024-06-10 RX ADMIN — POLYETHYLENE GLYCOL (3350) 17 G: 17 POWDER, FOR SOLUTION ORAL at 21:43

## 2024-06-10 RX ADMIN — POTASSIUM BICARBONATE 20 MEQ: 782 TABLET, EFFERVESCENT ORAL at 10:33

## 2024-06-10 RX ADMIN — IPRATROPIUM BROMIDE AND ALBUTEROL SULFATE 1 DOSE: 2.5; .5 SOLUTION RESPIRATORY (INHALATION) at 11:07

## 2024-06-10 RX ADMIN — BUDESONIDE AND FORMOTEROL FUMARATE DIHYDRATE 2 PUFF: 160; 4.5 AEROSOL RESPIRATORY (INHALATION) at 20:36

## 2024-06-10 RX ADMIN — ACETYLCYSTEINE 600 MG: 200 SOLUTION ORAL; RESPIRATORY (INHALATION) at 07:10

## 2024-06-10 RX ADMIN — SODIUM CHLORIDE, PRESERVATIVE FREE 10 ML: 5 INJECTION INTRAVENOUS at 10:38

## 2024-06-10 RX ADMIN — ATORVASTATIN CALCIUM 10 MG: 10 TABLET, FILM COATED ORAL at 10:33

## 2024-06-10 RX ADMIN — ENOXAPARIN SODIUM 40 MG: 100 INJECTION SUBCUTANEOUS at 10:36

## 2024-06-10 RX ADMIN — ALBUTEROL SULFATE 0.63 MG: 2.5 SOLUTION RESPIRATORY (INHALATION) at 04:34

## 2024-06-10 RX ADMIN — IPRATROPIUM BROMIDE AND ALBUTEROL SULFATE 1 DOSE: 2.5; .5 SOLUTION RESPIRATORY (INHALATION) at 07:10

## 2024-06-10 RX ADMIN — FUROSEMIDE 40 MG: 40 TABLET ORAL at 18:51

## 2024-06-10 RX ADMIN — MONTELUKAST 10 MG: 10 TABLET, FILM COATED ORAL at 21:43

## 2024-06-10 RX ADMIN — PANTOPRAZOLE SODIUM 40 MG: 40 TABLET, DELAYED RELEASE ORAL at 05:29

## 2024-06-10 RX ADMIN — POTASSIUM BICARBONATE 20 MEQ: 782 TABLET, EFFERVESCENT ORAL at 21:43

## 2024-06-10 RX ADMIN — INSULIN LISPRO 2 UNITS: 100 INJECTION, SOLUTION INTRAVENOUS; SUBCUTANEOUS at 13:02

## 2024-06-10 NOTE — CARE COORDINATION
06/10/24 7343   Service Assessment   Patient Orientation Alert and Oriented   Cognition Alert   History Provided By Patient;Medical Record   Primary Caregiver Self   Support Systems Children   Patient's Healthcare Decision Maker is: Named in Scanned ACP Document  (Daughter Manjeet Berg, ERMA)   PCP Verified by CM Yes   Last Visit to PCP Within last year   Prior Functional Level Independent in ADLs/IADLs   Current Functional Level Assistance with the following:;Mobility   Can patient return to prior living arrangement Unknown at present   Ability to make needs known: Good   Family able to assist with home care needs: No   Would you like for me to discuss the discharge plan with any other family members/significant others, and if so, who? No   Financial Resources [place] [facility]   Social/Functional History   Lives With Alone;Spouse   Home Equipment Walker - Rolling   Active  No   Patient's  Info family   Mode of Transportation Car   Condition of Participation: Discharge Planning   The Patient and/or Patient Representative was provided with a Choice of Provider? Patient   The Patient and/Or Patient Representative agree with the Discharge Plan? Yes   Freedom of Choice list was provided with basic dialogue that supports the patient's individualized plan of care/goals, treatment preferences, and shares the quality data associated with the providers?  Yes     Reviewed chart, discussed in IDR and spoke with pt. His 1st response was he needs to go home to get AL apt lined up. Daughter who lives in [place] is to be helping him, [facility]  Then after CM left pt spoke with his nurse Odalys.  Pt then agreed to SNU he is reviewing PPO SNU.  CM will revisit this PM .       1625 Referral to [facility]. Per Mony

## 2024-06-10 NOTE — DISCHARGE INSTR - COC
Continuity of Care Form    Patient Name: Billy Ouzna   :  1945  MRN:  [id number]    Admit date:  2024  Discharge date:  2024    Code Status Order: Full Code   Advance Directives:     Admitting Physician:  Anders Zuluaga MD  PCP: Kera Monique (Inactive)    Discharging Nurse: Cecilia  Discharging Hospital Unit/Room#: 4109/4109-A  Discharging Unit Phone Number: [phone]    Emergency Contact:   Extended Emergency Contact Information  Primary Emergency Contact: Manjeet Berg  Home Phone: [phone]  Mobile Phone: [phone]  Relation: Child  Secondary Emergency Contact: Hillary Post  Home Phone: [phone]  Relation: Child    Past Surgical History:  Past Surgical History:   Procedure Laterality Date    ABDOMEN SURGERY      \"had abd surgery in the service one in [facility] and one in [facility]\"    ABDOMINAL EXPLORATION SURGERY  1974    volvulus\"had colostomy for 2 weeks then did reversal 2 weeks later\"( was in [place])    APPENDECTOMY  11 years old    COLONOSCOPY N/A 10/24/2023    COLONOSCOPY DIAGNOSTIC performed by Jose Patel MD at [facility] ENDOSCOPY    DENTAL SURGERY      OTHER SURGICAL HISTORY      exp lap,colon resection,subtotal colectomy,cysto     OTHER SURGICAL HISTORY  2013    exp lap,colon resection, subtotal colectomy, cysto    OTHER SURGICAL HISTORY  2018    TURP    REVISION COLOSTOMY  1974    TUMOR REMOVAL      ABD TUMOR/MASS 2013    TURP N/A 2018    cystoscopy, bipolar TURP       Immunization History:   Immunization History   Administered Date(s) Administered    Pneumococcal, PCV-13, PREVNAR 13, (age 6w+), IM, 0.5mL 2017       Active Problems:  Patient Active Problem List   Diagnosis Code    Megacolon K59.39    Bladder distention N32.89    Hypertension I10    Hypokalemia E87.6    Hypomagnesemia E83.42    Hyperglycemia R73.9    Respiratory acidosis E87.29    Pneumonia J18.9    Partial small bowel obstruction (HCC) K56.600    COPD

## 2024-06-11 PROBLEM — J96.01 ACUTE HYPOXIC RESPIRATORY FAILURE (HCC): Status: ACTIVE | Noted: 2024-06-11

## 2024-06-11 LAB
ALBUMIN SERPL-MCNC: 4 GM/DL (ref 3.4–5)
ALP BLD-CCNC: 167 IU/L (ref 40–129)
ALT SERPL-CCNC: 12 U/L (ref 10–40)
ANION GAP SERPL CALCULATED.3IONS-SCNC: 8 MMOL/L (ref 7–16)
AST SERPL-CCNC: 19 IU/L (ref 15–37)
BASOPHILS ABSOLUTE: 0 K/CU MM
BASOPHILS RELATIVE PERCENT: 0.5 % (ref 0–1)
BILIRUB SERPL-MCNC: 0.2 MG/DL (ref 0–1)
BUN SERPL-MCNC: 24 MG/DL (ref 6–23)
CALCIUM IONIZED: NORMAL MMOL/L (ref 1.12–1.32)
CALCIUM SERPL-MCNC: 8.7 MG/DL (ref 8.3–10.6)
CHLORIDE BLD-SCNC: 97 MMOL/L (ref 99–110)
CO2: 32 MMOL/L (ref 21–32)
CREAT SERPL-MCNC: 1.2 MG/DL (ref 0.9–1.3)
DIFFERENTIAL TYPE: ABNORMAL
EOSINOPHILS ABSOLUTE: 0.3 K/CU MM
EOSINOPHILS RELATIVE PERCENT: 3.2 % (ref 0–3)
GFR, ESTIMATED: 62 ML/MIN/1.73M2
GLUCOSE BLD-MCNC: 143 MG/DL (ref 70–99)
GLUCOSE BLD-MCNC: 165 MG/DL (ref 70–99)
GLUCOSE BLD-MCNC: 201 MG/DL (ref 70–99)
GLUCOSE BLD-MCNC: 203 MG/DL (ref 70–99)
GLUCOSE SERPL-MCNC: 213 MG/DL (ref 70–99)
HCT VFR BLD CALC: 36.2 % (ref 42–52)
HEMOGLOBIN: 10.6 GM/DL (ref 13.5–18)
IMMATURE NEUTROPHIL %: 0.3 % (ref 0–0.43)
LYMPHOCYTES ABSOLUTE: 0.7 K/CU MM
LYMPHOCYTES RELATIVE PERCENT: 9.1 % (ref 24–44)
MAGNESIUM: 1.4 MG/DL (ref 1.8–2.4)
MCH RBC QN AUTO: 27.9 PG (ref 27–31)
MCHC RBC AUTO-ENTMCNC: 29.3 % (ref 32–36)
MCV RBC AUTO: 95.3 FL (ref 78–100)
MONOCYTES ABSOLUTE: 0.7 K/CU MM
MONOCYTES RELATIVE PERCENT: 9.1 % (ref 0–4)
NEUTROPHILS ABSOLUTE: 6.1 K/CU MM
NEUTROPHILS RELATIVE PERCENT: 77.8 % (ref 36–66)
NUCLEATED RBC %: 0 %
PDW BLD-RTO: 16 % (ref 11.7–14.9)
PLATELET # BLD: 333 K/CU MM (ref 140–440)
PMV BLD AUTO: 10.3 FL (ref 7.5–11.1)
POTASSIUM SERPL-SCNC: 5.1 MMOL/L (ref 3.5–5.1)
RBC # BLD: 3.8 M/CU MM (ref 4.6–6.2)
SODIUM BLD-SCNC: 137 MMOL/L (ref 135–145)
TOTAL IMMATURE NEUTOROPHIL: 0.02 K/CU MM
TOTAL NUCLEATED RBC: 0 K/CU MM
TOTAL PROTEIN: 6.9 GM/DL (ref 6.4–8.2)
TROPONIN, HIGH SENSITIVITY: 83 NG/L (ref 0–22)
TROPONIN, HIGH SENSITIVITY: 91 NG/L (ref 0–22)
WBC # BLD: 7.9 K/CU MM (ref 4–10.5)

## 2024-06-11 PROCEDURE — 36415 COLL VENOUS BLD VENIPUNCTURE: CPT

## 2024-06-11 PROCEDURE — 83735 ASSAY OF MAGNESIUM: CPT

## 2024-06-11 PROCEDURE — 82962 GLUCOSE BLOOD TEST: CPT

## 2024-06-11 PROCEDURE — 84484 ASSAY OF TROPONIN QUANT: CPT

## 2024-06-11 PROCEDURE — 6370000000 HC RX 637 (ALT 250 FOR IP): Performed by: INTERNAL MEDICINE

## 2024-06-11 PROCEDURE — 85025 COMPLETE CBC W/AUTO DIFF WBC: CPT

## 2024-06-11 PROCEDURE — 2700000000 HC OXYGEN THERAPY PER DAY

## 2024-06-11 PROCEDURE — 93005 ELECTROCARDIOGRAM TRACING: CPT | Performed by: PHYSICIAN ASSISTANT

## 2024-06-11 PROCEDURE — 82330 ASSAY OF CALCIUM: CPT

## 2024-06-11 PROCEDURE — APPNB30 APP NON BILLABLE TIME 0-30 MINS

## 2024-06-11 PROCEDURE — 6360000002 HC RX W HCPCS

## 2024-06-11 PROCEDURE — 80053 COMPREHEN METABOLIC PANEL: CPT

## 2024-06-11 PROCEDURE — 94669 MECHANICAL CHEST WALL OSCILL: CPT

## 2024-06-11 PROCEDURE — 94640 AIRWAY INHALATION TREATMENT: CPT

## 2024-06-11 PROCEDURE — 2580000003 HC RX 258: Performed by: INTERNAL MEDICINE

## 2024-06-11 PROCEDURE — 6370000000 HC RX 637 (ALT 250 FOR IP): Performed by: NURSE PRACTITIONER

## 2024-06-11 PROCEDURE — 6360000002 HC RX W HCPCS: Performed by: INTERNAL MEDICINE

## 2024-06-11 PROCEDURE — 94761 N-INVAS EAR/PLS OXIMETRY MLT: CPT

## 2024-06-11 PROCEDURE — 1200000000 HC SEMI PRIVATE

## 2024-06-11 PROCEDURE — 96372 THER/PROPH/DIAG INJ SC/IM: CPT

## 2024-06-11 PROCEDURE — 6360000002 HC RX W HCPCS: Performed by: NURSE PRACTITIONER

## 2024-06-11 RX ORDER — POTASSIUM CHLORIDE 7.45 MG/ML
10 INJECTION INTRAVENOUS PRN
Status: DISCONTINUED | OUTPATIENT
Start: 2024-06-11 | End: 2024-06-17 | Stop reason: HOSPADM

## 2024-06-11 RX ORDER — MAGNESIUM SULFATE IN WATER 40 MG/ML
2000 INJECTION, SOLUTION INTRAVENOUS PRN
Status: DISCONTINUED | OUTPATIENT
Start: 2024-06-11 | End: 2024-06-17 | Stop reason: HOSPADM

## 2024-06-11 RX ORDER — MAGNESIUM SULFATE 4 G/50ML
4000 INJECTION INTRAVENOUS ONCE
Status: COMPLETED | OUTPATIENT
Start: 2024-06-11 | End: 2024-06-11

## 2024-06-11 RX ORDER — POTASSIUM CHLORIDE 20 MEQ/1
40 TABLET, EXTENDED RELEASE ORAL PRN
Status: DISCONTINUED | OUTPATIENT
Start: 2024-06-11 | End: 2024-06-17 | Stop reason: HOSPADM

## 2024-06-11 RX ADMIN — METOPROLOL SUCCINATE 50 MG: 50 TABLET, EXTENDED RELEASE ORAL at 11:08

## 2024-06-11 RX ADMIN — IPRATROPIUM BROMIDE AND ALBUTEROL SULFATE 1 DOSE: 2.5; .5 SOLUTION RESPIRATORY (INHALATION) at 20:26

## 2024-06-11 RX ADMIN — IPRATROPIUM BROMIDE AND ALBUTEROL SULFATE 1 DOSE: 2.5; .5 SOLUTION RESPIRATORY (INHALATION) at 15:07

## 2024-06-11 RX ADMIN — ACETYLCYSTEINE 600 MG: 200 SOLUTION ORAL; RESPIRATORY (INHALATION) at 11:13

## 2024-06-11 RX ADMIN — GUAIFENESIN 600 MG: 600 TABLET, EXTENDED RELEASE ORAL at 11:00

## 2024-06-11 RX ADMIN — VENLAFAXINE 25 MG: 25 TABLET ORAL at 21:01

## 2024-06-11 RX ADMIN — LOSARTAN POTASSIUM 25 MG: 25 TABLET, FILM COATED ORAL at 11:01

## 2024-06-11 RX ADMIN — ALBUTEROL SULFATE 0.63 MG: 2.5 SOLUTION RESPIRATORY (INHALATION) at 00:39

## 2024-06-11 RX ADMIN — FUROSEMIDE 40 MG: 40 TABLET ORAL at 17:49

## 2024-06-11 RX ADMIN — ENOXAPARIN SODIUM 40 MG: 100 INJECTION SUBCUTANEOUS at 11:02

## 2024-06-11 RX ADMIN — POLYETHYLENE GLYCOL (3350) 17 G: 17 POWDER, FOR SOLUTION ORAL at 21:01

## 2024-06-11 RX ADMIN — POTASSIUM BICARBONATE 20 MEQ: 782 TABLET, EFFERVESCENT ORAL at 11:08

## 2024-06-11 RX ADMIN — SODIUM CHLORIDE, PRESERVATIVE FREE 10 ML: 5 INJECTION INTRAVENOUS at 21:01

## 2024-06-11 RX ADMIN — MAGNESIUM SULFATE HEPTAHYDRATE 4000 MG: 80 INJECTION, SOLUTION INTRAVENOUS at 13:37

## 2024-06-11 RX ADMIN — PANTOPRAZOLE SODIUM 40 MG: 40 TABLET, DELAYED RELEASE ORAL at 05:43

## 2024-06-11 RX ADMIN — IPRATROPIUM BROMIDE AND ALBUTEROL SULFATE 1 DOSE: 2.5; .5 SOLUTION RESPIRATORY (INHALATION) at 07:44

## 2024-06-11 RX ADMIN — POLYETHYLENE GLYCOL (3350) 17 G: 17 POWDER, FOR SOLUTION ORAL at 11:10

## 2024-06-11 RX ADMIN — METFORMIN HYDROCHLORIDE 1000 MG: 500 TABLET, FILM COATED ORAL at 17:46

## 2024-06-11 RX ADMIN — IPRATROPIUM BROMIDE AND ALBUTEROL SULFATE 1 DOSE: 2.5; .5 SOLUTION RESPIRATORY (INHALATION) at 11:13

## 2024-06-11 RX ADMIN — ATORVASTATIN CALCIUM 10 MG: 10 TABLET, FILM COATED ORAL at 11:00

## 2024-06-11 RX ADMIN — ASPIRIN 81 MG: 81 TABLET, CHEWABLE ORAL at 11:01

## 2024-06-11 RX ADMIN — POTASSIUM BICARBONATE 20 MEQ: 782 TABLET, EFFERVESCENT ORAL at 21:01

## 2024-06-11 RX ADMIN — SODIUM CHLORIDE, PRESERVATIVE FREE 10 ML: 5 INJECTION INTRAVENOUS at 11:06

## 2024-06-11 RX ADMIN — FUROSEMIDE 40 MG: 40 TABLET ORAL at 11:01

## 2024-06-11 RX ADMIN — BENZONATATE 200 MG: 100 CAPSULE ORAL at 17:49

## 2024-06-11 RX ADMIN — BUDESONIDE AND FORMOTEROL FUMARATE DIHYDRATE 2 PUFF: 160; 4.5 AEROSOL RESPIRATORY (INHALATION) at 20:27

## 2024-06-11 RX ADMIN — IPRATROPIUM BROMIDE AND ALBUTEROL SULFATE 1 DOSE: 2.5; .5 SOLUTION RESPIRATORY (INHALATION) at 04:47

## 2024-06-11 RX ADMIN — BUDESONIDE AND FORMOTEROL FUMARATE DIHYDRATE 2 PUFF: 160; 4.5 AEROSOL RESPIRATORY (INHALATION) at 07:44

## 2024-06-11 RX ADMIN — MONTELUKAST 10 MG: 10 TABLET, FILM COATED ORAL at 21:00

## 2024-06-11 RX ADMIN — SENNOSIDES, DOCUSATE SODIUM 2 TABLET: 8.6; 5 TABLET ORAL at 11:01

## 2024-06-11 RX ADMIN — CYANOCOBALAMIN TAB 1000 MCG 1000 MCG: 1000 TAB at 11:01

## 2024-06-11 RX ADMIN — EMPAGLIFLOZIN 10 MG: 10 TABLET, FILM COATED ORAL at 11:00

## 2024-06-11 RX ADMIN — GUAIFENESIN 600 MG: 600 TABLET, EXTENDED RELEASE ORAL at 21:00

## 2024-06-11 RX ADMIN — METFORMIN HYDROCHLORIDE 1000 MG: 500 TABLET, FILM COATED ORAL at 11:00

## 2024-06-11 NOTE — CONSULTS
[facility] [place] [address]  Phone: [phone]    Fax [phone]                  Prachi Valente MD, [facility]       Yony Blackwell MD, [facility]  Rosalva Nova MD, [facility]    MD Scar Paez MD Tariq Rizvi, MD Bilal Alam, MD Melissa Kellis, ERNESTO Myles, ERNESTO Trinidad, ERNESTO Velasquez, APRN  Vimal Carvajal, PA-C    CARDIOLOGY CONSULT NOTE     Reason for consultation: Wide-complex tachycardia    Referring physician:  Abdiel Jacobsen MD     Primary care physician: Krea Monique (Inactive)      Thank you for the consult.    Chief Complaints :  Chief Complaint   Patient presents with    Shortness of Breath     D/c today from inpatient.  C/o SOB and anxiety tonight.         History of present illness:Billy is a 79 y.o.year old -American male with a past medical history of nonischemic cardiomyopathy, COPD chronically 4 L supplemental oxygen, type 2 diabetes mellitus, nonsustained ventricular tachycardia, obstructive sleep apnea noncompliant with CPAP, hypertension, hyperlipidemia.     Cardiology consulted for concerns for ventricular tachycardia.  Patient is well-known to us and we have seen him quite frequently here in the hospital unfortunately.  Today around 930 patient developed sudden onset of tachycardia.  Upon telemetry review patient went into supraventricular tachycardia.  Per patient at this time he was not having very many symptoms however later around 1050 nursing staff noticed that the patient began acting somewhat altered and were concern for possible CVA.  However patient pretty rapidly returned back to baseline.    I discussed with him via arrhythmia he was experiencing seemed to voiced understanding of his condition.  Also discussed LifeVest with him.  Currently patient is not wearing his LifeVest.  It is sitting at bedside.  Stated that he has not been wearing his LifeVest because he has been

## 2024-06-11 NOTE — CARE COORDINATION
Reviewed chart, discussed in IDR and VM left for Star to check it [facility] can take pt.        1330 2nd VM left for Star at [facility].      1420 Spoke with Len and [facility] can take pt, Chelita LYNN will start prior auth.      1430 Updated PT/OT notes needed for prior auth. WB to therapy.

## 2024-06-12 LAB
EKG ATRIAL RATE: 87 BPM
EKG DIAGNOSIS: NORMAL
EKG P AXIS: 3 DEGREES
EKG P-R INTERVAL: 164 MS
EKG Q-T INTERVAL: 416 MS
EKG QRS DURATION: 130 MS
EKG QTC CALCULATION (BAZETT): 500 MS
EKG R AXIS: -76 DEGREES
EKG T AXIS: 80 DEGREES
EKG VENTRICULAR RATE: 87 BPM
GLUCOSE BLD-MCNC: 150 MG/DL (ref 70–99)
GLUCOSE BLD-MCNC: 196 MG/DL (ref 70–99)
GLUCOSE BLD-MCNC: 219 MG/DL (ref 70–99)
GLUCOSE BLD-MCNC: 248 MG/DL (ref 70–99)
MAGNESIUM: 1.9 MG/DL (ref 1.8–2.4)

## 2024-06-12 PROCEDURE — 94664 DEMO&/EVAL PT USE INHALER: CPT

## 2024-06-12 PROCEDURE — 97116 GAIT TRAINING THERAPY: CPT

## 2024-06-12 PROCEDURE — 99254 IP/OBS CNSLTJ NEW/EST MOD 60: CPT | Performed by: INTERNAL MEDICINE

## 2024-06-12 PROCEDURE — 2700000000 HC OXYGEN THERAPY PER DAY

## 2024-06-12 PROCEDURE — 83735 ASSAY OF MAGNESIUM: CPT

## 2024-06-12 PROCEDURE — 1200000000 HC SEMI PRIVATE

## 2024-06-12 PROCEDURE — 94761 N-INVAS EAR/PLS OXIMETRY MLT: CPT

## 2024-06-12 PROCEDURE — 97530 THERAPEUTIC ACTIVITIES: CPT

## 2024-06-12 PROCEDURE — 6360000002 HC RX W HCPCS: Performed by: INTERNAL MEDICINE

## 2024-06-12 PROCEDURE — 94640 AIRWAY INHALATION TREATMENT: CPT

## 2024-06-12 PROCEDURE — 94669 MECHANICAL CHEST WALL OSCILL: CPT

## 2024-06-12 PROCEDURE — 6370000000 HC RX 637 (ALT 250 FOR IP)

## 2024-06-12 PROCEDURE — 89220 SPUTUM SPECIMEN COLLECTION: CPT

## 2024-06-12 PROCEDURE — 2580000003 HC RX 258: Performed by: INTERNAL MEDICINE

## 2024-06-12 PROCEDURE — 36415 COLL VENOUS BLD VENIPUNCTURE: CPT

## 2024-06-12 PROCEDURE — 82962 GLUCOSE BLOOD TEST: CPT

## 2024-06-12 PROCEDURE — 6370000000 HC RX 637 (ALT 250 FOR IP): Performed by: INTERNAL MEDICINE

## 2024-06-12 PROCEDURE — 6370000000 HC RX 637 (ALT 250 FOR IP): Performed by: NURSE PRACTITIONER

## 2024-06-12 PROCEDURE — 96372 THER/PROPH/DIAG INJ SC/IM: CPT

## 2024-06-12 PROCEDURE — APPNB15 APP NON BILLABLE TIME 0-15 MINS

## 2024-06-12 RX ORDER — LANOLIN ALCOHOL/MO/W.PET/CERES
400 CREAM (GRAM) TOPICAL DAILY
Status: DISCONTINUED | OUTPATIENT
Start: 2024-06-12 | End: 2024-06-13

## 2024-06-12 RX ADMIN — ENOXAPARIN SODIUM 40 MG: 100 INJECTION SUBCUTANEOUS at 09:02

## 2024-06-12 RX ADMIN — METFORMIN HYDROCHLORIDE 1000 MG: 500 TABLET, FILM COATED ORAL at 09:02

## 2024-06-12 RX ADMIN — BUDESONIDE AND FORMOTEROL FUMARATE DIHYDRATE 2 PUFF: 160; 4.5 AEROSOL RESPIRATORY (INHALATION) at 07:50

## 2024-06-12 RX ADMIN — EMPAGLIFLOZIN 10 MG: 10 TABLET, FILM COATED ORAL at 09:02

## 2024-06-12 RX ADMIN — GUAIFENESIN 600 MG: 600 TABLET, EXTENDED RELEASE ORAL at 21:13

## 2024-06-12 RX ADMIN — POTASSIUM BICARBONATE 20 MEQ: 782 TABLET, EFFERVESCENT ORAL at 21:13

## 2024-06-12 RX ADMIN — POTASSIUM BICARBONATE 20 MEQ: 782 TABLET, EFFERVESCENT ORAL at 09:00

## 2024-06-12 RX ADMIN — METFORMIN HYDROCHLORIDE 1000 MG: 500 TABLET, FILM COATED ORAL at 17:40

## 2024-06-12 RX ADMIN — SODIUM CHLORIDE, PRESERVATIVE FREE 10 ML: 5 INJECTION INTRAVENOUS at 09:03

## 2024-06-12 RX ADMIN — IPRATROPIUM BROMIDE AND ALBUTEROL SULFATE 1 DOSE: 2.5; .5 SOLUTION RESPIRATORY (INHALATION) at 20:02

## 2024-06-12 RX ADMIN — ASPIRIN 81 MG: 81 TABLET, CHEWABLE ORAL at 09:02

## 2024-06-12 RX ADMIN — PANTOPRAZOLE SODIUM 40 MG: 40 TABLET, DELAYED RELEASE ORAL at 05:33

## 2024-06-12 RX ADMIN — BUDESONIDE AND FORMOTEROL FUMARATE DIHYDRATE 2 PUFF: 160; 4.5 AEROSOL RESPIRATORY (INHALATION) at 20:02

## 2024-06-12 RX ADMIN — IPRATROPIUM BROMIDE AND ALBUTEROL SULFATE 1 DOSE: 2.5; .5 SOLUTION RESPIRATORY (INHALATION) at 11:21

## 2024-06-12 RX ADMIN — IPRATROPIUM BROMIDE AND ALBUTEROL SULFATE 1 DOSE: 2.5; .5 SOLUTION RESPIRATORY (INHALATION) at 15:19

## 2024-06-12 RX ADMIN — SODIUM CHLORIDE, PRESERVATIVE FREE 10 ML: 5 INJECTION INTRAVENOUS at 21:12

## 2024-06-12 RX ADMIN — INSULIN LISPRO 1 UNITS: 100 INJECTION, SOLUTION INTRAVENOUS; SUBCUTANEOUS at 11:47

## 2024-06-12 RX ADMIN — SENNOSIDES, DOCUSATE SODIUM 2 TABLET: 8.6; 5 TABLET ORAL at 09:02

## 2024-06-12 RX ADMIN — LOSARTAN POTASSIUM 25 MG: 25 TABLET, FILM COATED ORAL at 09:03

## 2024-06-12 RX ADMIN — METOPROLOL SUCCINATE 50 MG: 50 TABLET, EXTENDED RELEASE ORAL at 09:02

## 2024-06-12 RX ADMIN — ATORVASTATIN CALCIUM 10 MG: 10 TABLET, FILM COATED ORAL at 09:02

## 2024-06-12 RX ADMIN — VENLAFAXINE 25 MG: 25 TABLET ORAL at 21:13

## 2024-06-12 RX ADMIN — CYANOCOBALAMIN TAB 1000 MCG 1000 MCG: 1000 TAB at 09:02

## 2024-06-12 RX ADMIN — MONTELUKAST 10 MG: 10 TABLET, FILM COATED ORAL at 21:13

## 2024-06-12 RX ADMIN — IPRATROPIUM BROMIDE AND ALBUTEROL SULFATE 1 DOSE: 2.5; .5 SOLUTION RESPIRATORY (INHALATION) at 23:32

## 2024-06-12 RX ADMIN — FUROSEMIDE 40 MG: 40 TABLET ORAL at 09:02

## 2024-06-12 RX ADMIN — FUROSEMIDE 40 MG: 40 TABLET ORAL at 17:40

## 2024-06-12 RX ADMIN — Medication 400 MG: at 09:02

## 2024-06-12 RX ADMIN — GUAIFENESIN 600 MG: 600 TABLET, EXTENDED RELEASE ORAL at 09:02

## 2024-06-12 RX ADMIN — POLYETHYLENE GLYCOL (3350) 17 G: 17 POWDER, FOR SOLUTION ORAL at 21:13

## 2024-06-12 RX ADMIN — IPRATROPIUM BROMIDE AND ALBUTEROL SULFATE 1 DOSE: 2.5; .5 SOLUTION RESPIRATORY (INHALATION) at 07:42

## 2024-06-12 RX ADMIN — POLYETHYLENE GLYCOL (3350) 17 G: 17 POWDER, FOR SOLUTION ORAL at 09:00

## 2024-06-12 ASSESSMENT — PAIN SCALES - GENERAL: PAINLEVEL_OUTOF10: 0

## 2024-06-12 ASSESSMENT — PAIN SCALES - WONG BAKER: WONGBAKER_NUMERICALRESPONSE: NO HURT

## 2024-06-12 NOTE — CARE COORDINATION
Reviewed chart, discussed in IDR, plan to [facility].  Spoke with Len they can take pt anytime.  A Sandi will work on prior auth.

## 2024-06-12 NOTE — PLAN OF CARE
Problem: Safety - Adult  Goal: Free from fall injury  Outcome: Progressing     Problem: Safety - Adult  Goal: Free from fall injury  Outcome: Progressing     Problem: Chronic Conditions and Co-morbidities  Goal: Patient's chronic conditions and co-morbidity symptoms are monitored and maintained or improved  Outcome: Progressing

## 2024-06-12 NOTE — PLAN OF CARE
Problem: Safety - Adult  Goal: Free from fall injury  6/12/2024 0950 by Sierra Conner LPN  Outcome: Progressing  6/11/2024 2343 by Davon Joseph RN  Outcome: Progressing     Problem: Discharge Planning  Goal: Discharge to home or other facility with appropriate resources  6/12/2024 0950 by Sierra Conner LPN  Outcome: Progressing  6/11/2024 2343 by Davon Joseph RN  Outcome: Progressing     Problem: Chronic Conditions and Co-morbidities  Goal: Patient's chronic conditions and co-morbidity symptoms are monitored and maintained or improved  6/12/2024 0950 by Sierra Conner LPN  Outcome: Progressing  6/11/2024 2343 by Davon Joseph RN  Outcome: Progressing

## 2024-06-13 LAB
ANION GAP SERPL CALCULATED.3IONS-SCNC: 10 MMOL/L (ref 7–16)
BASOPHILS ABSOLUTE: 0.1 K/CU MM
BASOPHILS RELATIVE PERCENT: 0.5 % (ref 0–1)
BUN SERPL-MCNC: 25 MG/DL (ref 6–23)
CALCIUM SERPL-MCNC: 9 MG/DL (ref 8.3–10.6)
CHLORIDE BLD-SCNC: 96 MMOL/L (ref 99–110)
CO2: 30 MMOL/L (ref 21–32)
CREAT SERPL-MCNC: 1.2 MG/DL (ref 0.9–1.3)
DIFFERENTIAL TYPE: ABNORMAL
EKG ATRIAL RATE: 93 BPM
EKG DIAGNOSIS: NORMAL
EKG P AXIS: 45 DEGREES
EKG P-R INTERVAL: 164 MS
EKG Q-T INTERVAL: 378 MS
EKG QRS DURATION: 136 MS
EKG QTC CALCULATION (BAZETT): 469 MS
EKG R AXIS: -83 DEGREES
EKG T AXIS: 89 DEGREES
EKG VENTRICULAR RATE: 93 BPM
EOSINOPHILS ABSOLUTE: 0.4 K/CU MM
EOSINOPHILS RELATIVE PERCENT: 3.6 % (ref 0–3)
GFR, ESTIMATED: 62 ML/MIN/1.73M2
GLUCOSE BLD-MCNC: 128 MG/DL (ref 70–99)
GLUCOSE BLD-MCNC: 180 MG/DL (ref 70–99)
GLUCOSE BLD-MCNC: 198 MG/DL (ref 70–99)
GLUCOSE BLD-MCNC: 214 MG/DL (ref 70–99)
GLUCOSE SERPL-MCNC: 235 MG/DL (ref 70–99)
HCT VFR BLD CALC: 35.6 % (ref 42–52)
HEMOGLOBIN: 10.5 GM/DL (ref 13.5–18)
IMMATURE NEUTROPHIL %: 0.2 % (ref 0–0.43)
LYMPHOCYTES ABSOLUTE: 1 K/CU MM
LYMPHOCYTES RELATIVE PERCENT: 10.1 % (ref 24–44)
MAGNESIUM: 1.6 MG/DL (ref 1.8–2.4)
MAGNESIUM: 2.1 MG/DL (ref 1.8–2.4)
MCH RBC QN AUTO: 28.4 PG (ref 27–31)
MCHC RBC AUTO-ENTMCNC: 29.5 % (ref 32–36)
MCV RBC AUTO: 96.2 FL (ref 78–100)
MONOCYTES ABSOLUTE: 0.9 K/CU MM
MONOCYTES RELATIVE PERCENT: 9 % (ref 0–4)
NEUTROPHILS ABSOLUTE: 7.4 K/CU MM
NEUTROPHILS RELATIVE PERCENT: 76.6 % (ref 36–66)
NUCLEATED RBC %: 0 %
PDW BLD-RTO: 15.8 % (ref 11.7–14.9)
PLATELET # BLD: 300 K/CU MM (ref 140–440)
PMV BLD AUTO: 10.3 FL (ref 7.5–11.1)
POTASSIUM SERPL-SCNC: 5.2 MMOL/L (ref 3.5–5.1)
POTASSIUM SERPL-SCNC: 5.4 MMOL/L (ref 3.5–5.1)
RBC # BLD: 3.7 M/CU MM (ref 4.6–6.2)
SODIUM BLD-SCNC: 136 MMOL/L (ref 135–145)
TOTAL IMMATURE NEUTOROPHIL: 0.02 K/CU MM
TOTAL NUCLEATED RBC: 0 K/CU MM
WBC # BLD: 9.6 K/CU MM (ref 4–10.5)

## 2024-06-13 PROCEDURE — 94640 AIRWAY INHALATION TREATMENT: CPT

## 2024-06-13 PROCEDURE — 85025 COMPLETE CBC W/AUTO DIFF WBC: CPT

## 2024-06-13 PROCEDURE — 2580000003 HC RX 258: Performed by: INTERNAL MEDICINE

## 2024-06-13 PROCEDURE — 94669 MECHANICAL CHEST WALL OSCILL: CPT

## 2024-06-13 PROCEDURE — 93010 ELECTROCARDIOGRAM REPORT: CPT | Performed by: INTERNAL MEDICINE

## 2024-06-13 PROCEDURE — 6370000000 HC RX 637 (ALT 250 FOR IP)

## 2024-06-13 PROCEDURE — 6360000002 HC RX W HCPCS: Performed by: INTERNAL MEDICINE

## 2024-06-13 PROCEDURE — 80048 BASIC METABOLIC PNL TOTAL CA: CPT

## 2024-06-13 PROCEDURE — 82805 BLOOD GASES W/O2 SATURATION: CPT

## 2024-06-13 PROCEDURE — 6370000000 HC RX 637 (ALT 250 FOR IP): Performed by: INTERNAL MEDICINE

## 2024-06-13 PROCEDURE — 6360000002 HC RX W HCPCS

## 2024-06-13 PROCEDURE — 96372 THER/PROPH/DIAG INJ SC/IM: CPT

## 2024-06-13 PROCEDURE — 84132 ASSAY OF SERUM POTASSIUM: CPT

## 2024-06-13 PROCEDURE — 6370000000 HC RX 637 (ALT 250 FOR IP): Performed by: FAMILY MEDICINE

## 2024-06-13 PROCEDURE — 2700000000 HC OXYGEN THERAPY PER DAY

## 2024-06-13 PROCEDURE — 6370000000 HC RX 637 (ALT 250 FOR IP): Performed by: NURSE PRACTITIONER

## 2024-06-13 PROCEDURE — 36415 COLL VENOUS BLD VENIPUNCTURE: CPT

## 2024-06-13 PROCEDURE — 82962 GLUCOSE BLOOD TEST: CPT

## 2024-06-13 PROCEDURE — 94761 N-INVAS EAR/PLS OXIMETRY MLT: CPT

## 2024-06-13 PROCEDURE — 83735 ASSAY OF MAGNESIUM: CPT

## 2024-06-13 PROCEDURE — 1200000000 HC SEMI PRIVATE

## 2024-06-13 RX ORDER — LANOLIN ALCOHOL/MO/W.PET/CERES
400 CREAM (GRAM) TOPICAL 2 TIMES DAILY
Status: DISCONTINUED | OUTPATIENT
Start: 2024-06-13 | End: 2024-06-17 | Stop reason: HOSPADM

## 2024-06-13 RX ADMIN — Medication 400 MG: at 08:20

## 2024-06-13 RX ADMIN — IPRATROPIUM BROMIDE AND ALBUTEROL SULFATE 1 DOSE: 2.5; .5 SOLUTION RESPIRATORY (INHALATION) at 16:12

## 2024-06-13 RX ADMIN — POLYETHYLENE GLYCOL (3350) 17 G: 17 POWDER, FOR SOLUTION ORAL at 08:23

## 2024-06-13 RX ADMIN — METFORMIN HYDROCHLORIDE 1000 MG: 500 TABLET, FILM COATED ORAL at 08:21

## 2024-06-13 RX ADMIN — LOSARTAN POTASSIUM 25 MG: 25 TABLET, FILM COATED ORAL at 08:20

## 2024-06-13 RX ADMIN — IPRATROPIUM BROMIDE AND ALBUTEROL SULFATE 1 DOSE: 2.5; .5 SOLUTION RESPIRATORY (INHALATION) at 12:22

## 2024-06-13 RX ADMIN — FUROSEMIDE 40 MG: 40 TABLET ORAL at 08:22

## 2024-06-13 RX ADMIN — GUAIFENESIN 600 MG: 600 TABLET, EXTENDED RELEASE ORAL at 22:25

## 2024-06-13 RX ADMIN — METFORMIN HYDROCHLORIDE 1000 MG: 500 TABLET, FILM COATED ORAL at 17:42

## 2024-06-13 RX ADMIN — INSULIN LISPRO 1 UNITS: 100 INJECTION, SOLUTION INTRAVENOUS; SUBCUTANEOUS at 17:41

## 2024-06-13 RX ADMIN — SENNOSIDES, DOCUSATE SODIUM 2 TABLET: 8.6; 5 TABLET ORAL at 08:22

## 2024-06-13 RX ADMIN — ALBUTEROL SULFATE 0.63 MG: 2.5 SOLUTION RESPIRATORY (INHALATION) at 23:43

## 2024-06-13 RX ADMIN — POTASSIUM BICARBONATE 20 MEQ: 782 TABLET, EFFERVESCENT ORAL at 08:22

## 2024-06-13 RX ADMIN — BUDESONIDE AND FORMOTEROL FUMARATE DIHYDRATE 2 PUFF: 160; 4.5 AEROSOL RESPIRATORY (INHALATION) at 07:36

## 2024-06-13 RX ADMIN — IPRATROPIUM BROMIDE AND ALBUTEROL SULFATE 1 DOSE: 2.5; .5 SOLUTION RESPIRATORY (INHALATION) at 07:35

## 2024-06-13 RX ADMIN — IPRATROPIUM BROMIDE AND ALBUTEROL SULFATE 1 DOSE: 2.5; .5 SOLUTION RESPIRATORY (INHALATION) at 21:06

## 2024-06-13 RX ADMIN — FUROSEMIDE 40 MG: 40 TABLET ORAL at 17:43

## 2024-06-13 RX ADMIN — ATORVASTATIN CALCIUM 10 MG: 10 TABLET, FILM COATED ORAL at 08:21

## 2024-06-13 RX ADMIN — GUAIFENESIN 600 MG: 600 TABLET, EXTENDED RELEASE ORAL at 08:21

## 2024-06-13 RX ADMIN — SODIUM CHLORIDE, PRESERVATIVE FREE 10 ML: 5 INJECTION INTRAVENOUS at 22:27

## 2024-06-13 RX ADMIN — Medication 400 MG: at 22:26

## 2024-06-13 RX ADMIN — EMPAGLIFLOZIN 10 MG: 10 TABLET, FILM COATED ORAL at 08:22

## 2024-06-13 RX ADMIN — POLYETHYLENE GLYCOL (3350) 17 G: 17 POWDER, FOR SOLUTION ORAL at 22:25

## 2024-06-13 RX ADMIN — SODIUM CHLORIDE, PRESERVATIVE FREE 10 ML: 5 INJECTION INTRAVENOUS at 08:22

## 2024-06-13 RX ADMIN — CYANOCOBALAMIN TAB 1000 MCG 1000 MCG: 1000 TAB at 08:21

## 2024-06-13 RX ADMIN — ASPIRIN 81 MG: 81 TABLET, CHEWABLE ORAL at 08:22

## 2024-06-13 RX ADMIN — MONTELUKAST 10 MG: 10 TABLET, FILM COATED ORAL at 22:25

## 2024-06-13 RX ADMIN — VENLAFAXINE 25 MG: 25 TABLET ORAL at 22:31

## 2024-06-13 RX ADMIN — PANTOPRAZOLE SODIUM 40 MG: 40 TABLET, DELAYED RELEASE ORAL at 05:49

## 2024-06-13 RX ADMIN — ENOXAPARIN SODIUM 40 MG: 100 INJECTION SUBCUTANEOUS at 08:22

## 2024-06-13 RX ADMIN — BUDESONIDE AND FORMOTEROL FUMARATE DIHYDRATE 2 PUFF: 160; 4.5 AEROSOL RESPIRATORY (INHALATION) at 21:06

## 2024-06-13 RX ADMIN — MAGNESIUM SULFATE HEPTAHYDRATE 2000 MG: 40 INJECTION, SOLUTION INTRAVENOUS at 12:26

## 2024-06-13 RX ADMIN — METOPROLOL SUCCINATE 75 MG: 50 TABLET, EXTENDED RELEASE ORAL at 08:21

## 2024-06-13 ASSESSMENT — PAIN SCALES - WONG BAKER: WONGBAKER_NUMERICALRESPONSE: NO HURT

## 2024-06-13 ASSESSMENT — PAIN SCALES - GENERAL: PAINLEVEL_OUTOF10: 0

## 2024-06-13 NOTE — PLAN OF CARE
Problem: Safety - Adult  Goal: Free from fall injury  6/12/2024 2120 by Dulce Maria Zhang RN  Outcome: Progressing  6/12/2024 0950 by Sierra Conner LPN  Outcome: Progressing     Problem: Discharge Planning  Goal: Discharge to home or other facility with appropriate resources  6/12/2024 2120 by Dulce Maria Zhang RN  Outcome: Progressing  6/12/2024 0950 by Sierra Conner LPN  Outcome: Progressing     Problem: Chronic Conditions and Co-morbidities  Goal: Patient's chronic conditions and co-morbidity symptoms are monitored and maintained or improved  6/12/2024 2120 by Dulce Maria hZang RN  Outcome: Progressing  6/12/2024 0950 by Sierra Conner LPN  Outcome: Progressing

## 2024-06-13 NOTE — CARE COORDINATION
Patient has been denied for SNF by [facility].  P2P has been offered and needs to be called [phone] Option 5. Deadline is: 24 9:30 AM  Need patient name/ / Member ID: [id number]    Sent PS to SAMANTHA Chan CNP to update

## 2024-06-13 NOTE — CARE COORDINATION
Reviewed chart, discussed in IDR , plan remains to [facility] once prior auth completed.  CM will continue to follow.

## 2024-06-14 LAB
ANION GAP SERPL CALCULATED.3IONS-SCNC: 9 MMOL/L (ref 7–16)
BUN SERPL-MCNC: 27 MG/DL (ref 6–23)
CALCIUM SERPL-MCNC: 9 MG/DL (ref 8.3–10.6)
CHLORIDE BLD-SCNC: 97 MMOL/L (ref 99–110)
CO2: 31 MMOL/L (ref 21–32)
CREAT SERPL-MCNC: 1.1 MG/DL (ref 0.9–1.3)
GFR, ESTIMATED: 69 ML/MIN/1.73M2
GLUCOSE BLD-MCNC: 121 MG/DL (ref 70–99)
GLUCOSE BLD-MCNC: 150 MG/DL (ref 70–99)
GLUCOSE BLD-MCNC: 176 MG/DL (ref 70–99)
GLUCOSE BLD-MCNC: 218 MG/DL (ref 70–99)
GLUCOSE SERPL-MCNC: 160 MG/DL (ref 70–99)
POTASSIUM SERPL-SCNC: 5.1 MMOL/L (ref 3.5–5.1)
POTASSIUM SERPL-SCNC: 5.2 MMOL/L (ref 3.5–5.1)
SODIUM BLD-SCNC: 137 MMOL/L (ref 135–145)

## 2024-06-14 PROCEDURE — 96372 THER/PROPH/DIAG INJ SC/IM: CPT

## 2024-06-14 PROCEDURE — 97116 GAIT TRAINING THERAPY: CPT

## 2024-06-14 PROCEDURE — 6370000000 HC RX 637 (ALT 250 FOR IP): Performed by: FAMILY MEDICINE

## 2024-06-14 PROCEDURE — 94761 N-INVAS EAR/PLS OXIMETRY MLT: CPT

## 2024-06-14 PROCEDURE — 6370000000 HC RX 637 (ALT 250 FOR IP): Performed by: INTERNAL MEDICINE

## 2024-06-14 PROCEDURE — 6360000002 HC RX W HCPCS: Performed by: INTERNAL MEDICINE

## 2024-06-14 PROCEDURE — 2580000003 HC RX 258: Performed by: INTERNAL MEDICINE

## 2024-06-14 PROCEDURE — 80048 BASIC METABOLIC PNL TOTAL CA: CPT

## 2024-06-14 PROCEDURE — 6370000000 HC RX 637 (ALT 250 FOR IP)

## 2024-06-14 PROCEDURE — 2700000000 HC OXYGEN THERAPY PER DAY

## 2024-06-14 PROCEDURE — 94640 AIRWAY INHALATION TREATMENT: CPT

## 2024-06-14 PROCEDURE — 84132 ASSAY OF SERUM POTASSIUM: CPT

## 2024-06-14 PROCEDURE — 6370000000 HC RX 637 (ALT 250 FOR IP): Performed by: NURSE PRACTITIONER

## 2024-06-14 PROCEDURE — 82962 GLUCOSE BLOOD TEST: CPT

## 2024-06-14 PROCEDURE — 36415 COLL VENOUS BLD VENIPUNCTURE: CPT

## 2024-06-14 PROCEDURE — 1200000000 HC SEMI PRIVATE

## 2024-06-14 RX ORDER — HYDRALAZINE HYDROCHLORIDE 20 MG/ML
5 INJECTION INTRAMUSCULAR; INTRAVENOUS EVERY 6 HOURS PRN
Status: DISCONTINUED | OUTPATIENT
Start: 2024-06-14 | End: 2024-06-17 | Stop reason: HOSPADM

## 2024-06-14 RX ADMIN — GUAIFENESIN 600 MG: 600 TABLET, EXTENDED RELEASE ORAL at 21:21

## 2024-06-14 RX ADMIN — IPRATROPIUM BROMIDE AND ALBUTEROL SULFATE 1 DOSE: 2.5; .5 SOLUTION RESPIRATORY (INHALATION) at 08:34

## 2024-06-14 RX ADMIN — SODIUM ZIRCONIUM CYCLOSILICATE 10 G: 10 POWDER, FOR SUSPENSION ORAL at 14:49

## 2024-06-14 RX ADMIN — BUDESONIDE AND FORMOTEROL FUMARATE DIHYDRATE 2 PUFF: 160; 4.5 AEROSOL RESPIRATORY (INHALATION) at 11:48

## 2024-06-14 RX ADMIN — METFORMIN HYDROCHLORIDE 1000 MG: 500 TABLET, FILM COATED ORAL at 17:43

## 2024-06-14 RX ADMIN — IPRATROPIUM BROMIDE AND ALBUTEROL SULFATE 1 DOSE: 2.5; .5 SOLUTION RESPIRATORY (INHALATION) at 16:06

## 2024-06-14 RX ADMIN — INSULIN LISPRO 1 UNITS: 100 INJECTION, SOLUTION INTRAVENOUS; SUBCUTANEOUS at 17:43

## 2024-06-14 RX ADMIN — ASPIRIN 81 MG: 81 TABLET, CHEWABLE ORAL at 10:02

## 2024-06-14 RX ADMIN — SODIUM CHLORIDE, PRESERVATIVE FREE 10 ML: 5 INJECTION INTRAVENOUS at 10:01

## 2024-06-14 RX ADMIN — Medication 400 MG: at 21:21

## 2024-06-14 RX ADMIN — VENLAFAXINE 25 MG: 25 TABLET ORAL at 21:42

## 2024-06-14 RX ADMIN — Medication 400 MG: at 10:04

## 2024-06-14 RX ADMIN — ENOXAPARIN SODIUM 40 MG: 100 INJECTION SUBCUTANEOUS at 10:01

## 2024-06-14 RX ADMIN — IPRATROPIUM BROMIDE AND ALBUTEROL SULFATE 1 DOSE: 2.5; .5 SOLUTION RESPIRATORY (INHALATION) at 11:48

## 2024-06-14 RX ADMIN — METOPROLOL SUCCINATE 75 MG: 50 TABLET, EXTENDED RELEASE ORAL at 10:02

## 2024-06-14 RX ADMIN — BUDESONIDE AND FORMOTEROL FUMARATE DIHYDRATE 2 PUFF: 160; 4.5 AEROSOL RESPIRATORY (INHALATION) at 19:37

## 2024-06-14 RX ADMIN — FUROSEMIDE 40 MG: 40 TABLET ORAL at 10:03

## 2024-06-14 RX ADMIN — POLYETHYLENE GLYCOL (3350) 17 G: 17 POWDER, FOR SOLUTION ORAL at 10:04

## 2024-06-14 RX ADMIN — EMPAGLIFLOZIN 10 MG: 10 TABLET, FILM COATED ORAL at 10:03

## 2024-06-14 RX ADMIN — CYANOCOBALAMIN TAB 1000 MCG 1000 MCG: 1000 TAB at 10:03

## 2024-06-14 RX ADMIN — FUROSEMIDE 40 MG: 40 TABLET ORAL at 17:43

## 2024-06-14 RX ADMIN — SODIUM CHLORIDE, PRESERVATIVE FREE 10 ML: 5 INJECTION INTRAVENOUS at 21:21

## 2024-06-14 RX ADMIN — POLYETHYLENE GLYCOL (3350) 17 G: 17 POWDER, FOR SOLUTION ORAL at 21:21

## 2024-06-14 RX ADMIN — BENZONATATE 200 MG: 100 CAPSULE ORAL at 20:23

## 2024-06-14 RX ADMIN — IPRATROPIUM BROMIDE AND ALBUTEROL SULFATE 1 DOSE: 2.5; .5 SOLUTION RESPIRATORY (INHALATION) at 19:36

## 2024-06-14 RX ADMIN — INSULIN LISPRO 1 UNITS: 100 INJECTION, SOLUTION INTRAVENOUS; SUBCUTANEOUS at 14:49

## 2024-06-14 RX ADMIN — PANTOPRAZOLE SODIUM 40 MG: 40 TABLET, DELAYED RELEASE ORAL at 06:50

## 2024-06-14 RX ADMIN — SODIUM ZIRCONIUM CYCLOSILICATE 10 G: 10 POWDER, FOR SUSPENSION ORAL at 21:21

## 2024-06-14 RX ADMIN — SODIUM ZIRCONIUM CYCLOSILICATE 10 G: 10 POWDER, FOR SUSPENSION ORAL at 10:04

## 2024-06-14 RX ADMIN — SENNOSIDES, DOCUSATE SODIUM 2 TABLET: 8.6; 5 TABLET ORAL at 10:04

## 2024-06-14 RX ADMIN — MONTELUKAST 10 MG: 10 TABLET, FILM COATED ORAL at 21:21

## 2024-06-14 RX ADMIN — ATORVASTATIN CALCIUM 10 MG: 10 TABLET, FILM COATED ORAL at 10:04

## 2024-06-14 RX ADMIN — GUAIFENESIN 600 MG: 600 TABLET, EXTENDED RELEASE ORAL at 10:02

## 2024-06-14 RX ADMIN — METFORMIN HYDROCHLORIDE 1000 MG: 500 TABLET, FILM COATED ORAL at 10:02

## 2024-06-14 RX ADMIN — ALBUTEROL SULFATE 0.63 MG: 2.5 SOLUTION RESPIRATORY (INHALATION) at 04:03

## 2024-06-14 NOTE — CARE COORDINATION
Submitted Expedited appeal via TC to [phone] option 3:  Faxed supporting documents to Fax Number [phone]  Plan Auth ID [id number]  [facility] [id number]

## 2024-06-14 NOTE — PLAN OF CARE
Problem: Safety - Adult  Goal: Free from fall injury  6/13/2024 2324 by Penelope Cisneros RN  Outcome: Progressing  6/13/2024 1723 by Bonny Weldon RN  Outcome: Progressing     Problem: Discharge Planning  Goal: Discharge to home or other facility with appropriate resources  6/13/2024 2324 by Penelope Cisneros RN  Outcome: Progressing  6/13/2024 1723 by Bonny Weldon RN  Outcome: Progressing     Problem: Chronic Conditions and Co-morbidities  Goal: Patient's chronic conditions and co-morbidity symptoms are monitored and maintained or improved  6/13/2024 2324 by Penelope Cisneros RN  Outcome: Progressing  6/13/2024 1723 by Bonny Weldon RN  Outcome: Progressing

## 2024-06-14 NOTE — CARE COORDINATION
Reviewed chart, discussed in IDR and plan remains to [facility].  Expedited appeal started today.  CM will continue to follow.

## 2024-06-15 LAB
ANION GAP SERPL CALCULATED.3IONS-SCNC: 9 MMOL/L (ref 7–16)
BASOPHILS ABSOLUTE: 0.1 K/CU MM
BASOPHILS RELATIVE PERCENT: 0.6 % (ref 0–1)
BUN SERPL-MCNC: 25 MG/DL (ref 6–23)
CALCIUM SERPL-MCNC: 9.2 MG/DL (ref 8.3–10.6)
CHLORIDE BLD-SCNC: 99 MMOL/L (ref 99–110)
CO2: 32 MMOL/L (ref 21–32)
CREAT SERPL-MCNC: 1 MG/DL (ref 0.9–1.3)
DIFFERENTIAL TYPE: ABNORMAL
EOSINOPHILS ABSOLUTE: 0.3 K/CU MM
EOSINOPHILS RELATIVE PERCENT: 3.4 % (ref 0–3)
GFR, ESTIMATED: 78 ML/MIN/1.73M2
GLUCOSE BLD-MCNC: 137 MG/DL (ref 70–99)
GLUCOSE BLD-MCNC: 160 MG/DL (ref 70–99)
GLUCOSE BLD-MCNC: 180 MG/DL (ref 70–99)
GLUCOSE BLD-MCNC: 196 MG/DL (ref 70–99)
GLUCOSE SERPL-MCNC: 195 MG/DL (ref 70–99)
HCT VFR BLD CALC: 35.8 % (ref 42–52)
HEMOGLOBIN: 10.5 GM/DL (ref 13.5–18)
IMMATURE NEUTROPHIL %: 0.2 % (ref 0–0.43)
LYMPHOCYTES ABSOLUTE: 0.7 K/CU MM
LYMPHOCYTES RELATIVE PERCENT: 8.3 % (ref 24–44)
MCH RBC QN AUTO: 27.8 PG (ref 27–31)
MCHC RBC AUTO-ENTMCNC: 29.3 % (ref 32–36)
MCV RBC AUTO: 94.7 FL (ref 78–100)
MONOCYTES ABSOLUTE: 0.7 K/CU MM
MONOCYTES RELATIVE PERCENT: 8.5 % (ref 0–4)
NEUTROPHILS ABSOLUTE: 6.5 K/CU MM
NEUTROPHILS RELATIVE PERCENT: 79 % (ref 36–66)
NUCLEATED RBC %: 0 %
PDW BLD-RTO: 15.9 % (ref 11.7–14.9)
PLATELET # BLD: 313 K/CU MM (ref 140–440)
PMV BLD AUTO: 10.7 FL (ref 7.5–11.1)
POTASSIUM SERPL-SCNC: 4.8 MMOL/L (ref 3.5–5.1)
RBC # BLD: 3.78 M/CU MM (ref 4.6–6.2)
SODIUM BLD-SCNC: 140 MMOL/L (ref 135–145)
TOTAL IMMATURE NEUTOROPHIL: 0.02 K/CU MM
TOTAL NUCLEATED RBC: 0 K/CU MM
WBC # BLD: 8.2 K/CU MM (ref 4–10.5)

## 2024-06-15 PROCEDURE — 96372 THER/PROPH/DIAG INJ SC/IM: CPT

## 2024-06-15 PROCEDURE — 6370000000 HC RX 637 (ALT 250 FOR IP): Performed by: NURSE PRACTITIONER

## 2024-06-15 PROCEDURE — 2700000000 HC OXYGEN THERAPY PER DAY

## 2024-06-15 PROCEDURE — 6360000002 HC RX W HCPCS: Performed by: INTERNAL MEDICINE

## 2024-06-15 PROCEDURE — 80048 BASIC METABOLIC PNL TOTAL CA: CPT

## 2024-06-15 PROCEDURE — 6370000000 HC RX 637 (ALT 250 FOR IP): Performed by: INTERNAL MEDICINE

## 2024-06-15 PROCEDURE — 36415 COLL VENOUS BLD VENIPUNCTURE: CPT

## 2024-06-15 PROCEDURE — 94669 MECHANICAL CHEST WALL OSCILL: CPT

## 2024-06-15 PROCEDURE — 6370000000 HC RX 637 (ALT 250 FOR IP)

## 2024-06-15 PROCEDURE — 6370000000 HC RX 637 (ALT 250 FOR IP): Performed by: FAMILY MEDICINE

## 2024-06-15 PROCEDURE — 94761 N-INVAS EAR/PLS OXIMETRY MLT: CPT

## 2024-06-15 PROCEDURE — 85025 COMPLETE CBC W/AUTO DIFF WBC: CPT

## 2024-06-15 PROCEDURE — 1200000000 HC SEMI PRIVATE

## 2024-06-15 PROCEDURE — 2580000003 HC RX 258: Performed by: INTERNAL MEDICINE

## 2024-06-15 PROCEDURE — 94640 AIRWAY INHALATION TREATMENT: CPT

## 2024-06-15 PROCEDURE — 82962 GLUCOSE BLOOD TEST: CPT

## 2024-06-15 RX ADMIN — POLYETHYLENE GLYCOL (3350) 17 G: 17 POWDER, FOR SOLUTION ORAL at 08:36

## 2024-06-15 RX ADMIN — IPRATROPIUM BROMIDE AND ALBUTEROL SULFATE 1 DOSE: 2.5; .5 SOLUTION RESPIRATORY (INHALATION) at 06:55

## 2024-06-15 RX ADMIN — FUROSEMIDE 40 MG: 40 TABLET ORAL at 17:44

## 2024-06-15 RX ADMIN — CYANOCOBALAMIN TAB 1000 MCG 1000 MCG: 1000 TAB at 08:37

## 2024-06-15 RX ADMIN — MONTELUKAST 10 MG: 10 TABLET, FILM COATED ORAL at 21:08

## 2024-06-15 RX ADMIN — ASPIRIN 81 MG: 81 TABLET, CHEWABLE ORAL at 08:37

## 2024-06-15 RX ADMIN — GUAIFENESIN 600 MG: 600 TABLET, EXTENDED RELEASE ORAL at 21:08

## 2024-06-15 RX ADMIN — METOPROLOL SUCCINATE 75 MG: 50 TABLET, EXTENDED RELEASE ORAL at 08:36

## 2024-06-15 RX ADMIN — VENLAFAXINE 25 MG: 25 TABLET ORAL at 21:13

## 2024-06-15 RX ADMIN — SODIUM ZIRCONIUM CYCLOSILICATE 10 G: 10 POWDER, FOR SUSPENSION ORAL at 08:36

## 2024-06-15 RX ADMIN — SODIUM CHLORIDE, PRESERVATIVE FREE 10 ML: 5 INJECTION INTRAVENOUS at 08:40

## 2024-06-15 RX ADMIN — EMPAGLIFLOZIN 10 MG: 10 TABLET, FILM COATED ORAL at 08:37

## 2024-06-15 RX ADMIN — SODIUM CHLORIDE, PRESERVATIVE FREE 10 ML: 5 INJECTION INTRAVENOUS at 21:07

## 2024-06-15 RX ADMIN — GUAIFENESIN 600 MG: 600 TABLET, EXTENDED RELEASE ORAL at 08:37

## 2024-06-15 RX ADMIN — BUDESONIDE AND FORMOTEROL FUMARATE DIHYDRATE 2 PUFF: 160; 4.5 AEROSOL RESPIRATORY (INHALATION) at 06:56

## 2024-06-15 RX ADMIN — IPRATROPIUM BROMIDE AND ALBUTEROL SULFATE 1 DOSE: 2.5; .5 SOLUTION RESPIRATORY (INHALATION) at 20:23

## 2024-06-15 RX ADMIN — PANTOPRAZOLE SODIUM 40 MG: 40 TABLET, DELAYED RELEASE ORAL at 08:37

## 2024-06-15 RX ADMIN — IPRATROPIUM BROMIDE AND ALBUTEROL SULFATE 1 DOSE: 2.5; .5 SOLUTION RESPIRATORY (INHALATION) at 14:38

## 2024-06-15 RX ADMIN — ENOXAPARIN SODIUM 40 MG: 100 INJECTION SUBCUTANEOUS at 08:36

## 2024-06-15 RX ADMIN — IPRATROPIUM BROMIDE AND ALBUTEROL SULFATE 1 DOSE: 2.5; .5 SOLUTION RESPIRATORY (INHALATION) at 10:37

## 2024-06-15 RX ADMIN — METFORMIN HYDROCHLORIDE 1000 MG: 500 TABLET, FILM COATED ORAL at 08:37

## 2024-06-15 RX ADMIN — BUDESONIDE AND FORMOTEROL FUMARATE DIHYDRATE 2 PUFF: 160; 4.5 AEROSOL RESPIRATORY (INHALATION) at 20:24

## 2024-06-15 RX ADMIN — POLYETHYLENE GLYCOL (3350) 17 G: 17 POWDER, FOR SOLUTION ORAL at 21:08

## 2024-06-15 RX ADMIN — Medication 400 MG: at 08:37

## 2024-06-15 RX ADMIN — FUROSEMIDE 40 MG: 40 TABLET ORAL at 08:37

## 2024-06-15 RX ADMIN — SENNOSIDES, DOCUSATE SODIUM 2 TABLET: 8.6; 5 TABLET ORAL at 08:37

## 2024-06-15 RX ADMIN — METFORMIN HYDROCHLORIDE 1000 MG: 500 TABLET, FILM COATED ORAL at 17:44

## 2024-06-15 RX ADMIN — ATORVASTATIN CALCIUM 10 MG: 10 TABLET, FILM COATED ORAL at 08:37

## 2024-06-15 RX ADMIN — Medication 400 MG: at 21:08

## 2024-06-15 ASSESSMENT — PAIN SCALES - WONG BAKER: WONGBAKER_NUMERICALRESPONSE: NO HURT

## 2024-06-15 ASSESSMENT — PAIN SCALES - GENERAL: PAINLEVEL_OUTOF10: 0

## 2024-06-15 NOTE — PLAN OF CARE
Problem: Safety - Adult  Goal: Free from fall injury  6/15/2024 0056 by Penelope Cisneros RN  Outcome: Progressing  6/14/2024 1617 by Sybil Lora LPN  Outcome: Progressing     Problem: Discharge Planning  Goal: Discharge to home or other facility with appropriate resources  6/15/2024 0056 by Penelope Cisneros RN  Outcome: Progressing  6/14/2024 1617 by Sybil Lora LPN  Outcome: Progressing     Problem: Chronic Conditions and Co-morbidities  Goal: Patient's chronic conditions and co-morbidity symptoms are monitored and maintained or improved  6/15/2024 0056 by Penelope Cisneros RN  Outcome: Progressing  6/14/2024 1617 by Sybil Lora LPN  Outcome: Progressing

## 2024-06-15 NOTE — PLAN OF CARE
Problem: Discharge Planning  Goal: Discharge to home or other facility with appropriate resources  6/15/2024 1004 by Sierra Conner LPN  Outcome: Progressing  6/15/2024 0056 by Penelope Cisneros RN  Outcome: Progressing

## 2024-06-16 LAB
ANION GAP SERPL CALCULATED.3IONS-SCNC: 12 MMOL/L (ref 7–16)
BASOPHILS ABSOLUTE: 0.1 K/CU MM
BASOPHILS RELATIVE PERCENT: 0.6 % (ref 0–1)
BUN SERPL-MCNC: 28 MG/DL (ref 6–23)
CALCIUM SERPL-MCNC: 9 MG/DL (ref 8.3–10.6)
CHLORIDE BLD-SCNC: 99 MMOL/L (ref 99–110)
CO2: 28 MMOL/L (ref 21–32)
CREAT SERPL-MCNC: 1 MG/DL (ref 0.9–1.3)
DIFFERENTIAL TYPE: ABNORMAL
EOSINOPHILS ABSOLUTE: 0.3 K/CU MM
EOSINOPHILS RELATIVE PERCENT: 3.8 % (ref 0–3)
GFR, ESTIMATED: 78 ML/MIN/1.73M2
GLUCOSE BLD-MCNC: 131 MG/DL (ref 70–99)
GLUCOSE BLD-MCNC: 139 MG/DL (ref 70–99)
GLUCOSE BLD-MCNC: 168 MG/DL (ref 70–99)
GLUCOSE BLD-MCNC: 198 MG/DL (ref 70–99)
GLUCOSE SERPL-MCNC: 143 MG/DL (ref 70–99)
HCT VFR BLD CALC: 40.4 % (ref 42–52)
HEMOGLOBIN: 11.8 GM/DL (ref 13.5–18)
IMMATURE NEUTROPHIL %: 0.3 % (ref 0–0.43)
LYMPHOCYTES ABSOLUTE: 0.9 K/CU MM
LYMPHOCYTES RELATIVE PERCENT: 9.9 % (ref 24–44)
MCH RBC QN AUTO: 28.6 PG (ref 27–31)
MCHC RBC AUTO-ENTMCNC: 29.2 % (ref 32–36)
MCV RBC AUTO: 97.8 FL (ref 78–100)
MONOCYTES ABSOLUTE: 0.8 K/CU MM
MONOCYTES RELATIVE PERCENT: 8.7 % (ref 0–4)
NEUTROPHILS ABSOLUTE: 6.7 K/CU MM
NEUTROPHILS RELATIVE PERCENT: 76.7 % (ref 36–66)
NUCLEATED RBC %: 0 %
PDW BLD-RTO: 16 % (ref 11.7–14.9)
PLATELET # BLD: 326 K/CU MM (ref 140–440)
PMV BLD AUTO: 11 FL (ref 7.5–11.1)
POTASSIUM SERPL-SCNC: 5.3 MMOL/L (ref 3.5–5.1)
RBC # BLD: 4.13 M/CU MM (ref 4.6–6.2)
SODIUM BLD-SCNC: 139 MMOL/L (ref 135–145)
TOTAL IMMATURE NEUTOROPHIL: 0.03 K/CU MM
TOTAL NUCLEATED RBC: 0 K/CU MM
WBC # BLD: 8.8 K/CU MM (ref 4–10.5)

## 2024-06-16 PROCEDURE — 94640 AIRWAY INHALATION TREATMENT: CPT

## 2024-06-16 PROCEDURE — 2580000003 HC RX 258: Performed by: INTERNAL MEDICINE

## 2024-06-16 PROCEDURE — 94669 MECHANICAL CHEST WALL OSCILL: CPT

## 2024-06-16 PROCEDURE — 6360000002 HC RX W HCPCS: Performed by: INTERNAL MEDICINE

## 2024-06-16 PROCEDURE — 6370000000 HC RX 637 (ALT 250 FOR IP): Performed by: INTERNAL MEDICINE

## 2024-06-16 PROCEDURE — 6370000000 HC RX 637 (ALT 250 FOR IP): Performed by: FAMILY MEDICINE

## 2024-06-16 PROCEDURE — 94761 N-INVAS EAR/PLS OXIMETRY MLT: CPT

## 2024-06-16 PROCEDURE — 2700000000 HC OXYGEN THERAPY PER DAY

## 2024-06-16 PROCEDURE — 36415 COLL VENOUS BLD VENIPUNCTURE: CPT

## 2024-06-16 PROCEDURE — 1200000000 HC SEMI PRIVATE

## 2024-06-16 PROCEDURE — 6370000000 HC RX 637 (ALT 250 FOR IP)

## 2024-06-16 PROCEDURE — 80048 BASIC METABOLIC PNL TOTAL CA: CPT

## 2024-06-16 PROCEDURE — 96372 THER/PROPH/DIAG INJ SC/IM: CPT

## 2024-06-16 PROCEDURE — 85025 COMPLETE CBC W/AUTO DIFF WBC: CPT

## 2024-06-16 PROCEDURE — 82962 GLUCOSE BLOOD TEST: CPT

## 2024-06-16 PROCEDURE — 6370000000 HC RX 637 (ALT 250 FOR IP): Performed by: NURSE PRACTITIONER

## 2024-06-16 RX ADMIN — SENNOSIDES, DOCUSATE SODIUM 2 TABLET: 8.6; 5 TABLET ORAL at 09:28

## 2024-06-16 RX ADMIN — VENLAFAXINE 25 MG: 25 TABLET ORAL at 20:26

## 2024-06-16 RX ADMIN — FUROSEMIDE 40 MG: 40 TABLET ORAL at 09:28

## 2024-06-16 RX ADMIN — BENZONATATE 200 MG: 100 CAPSULE ORAL at 14:04

## 2024-06-16 RX ADMIN — ALBUTEROL SULFATE 0.63 MG: 2.5 SOLUTION RESPIRATORY (INHALATION) at 02:56

## 2024-06-16 RX ADMIN — Medication 400 MG: at 09:28

## 2024-06-16 RX ADMIN — PANTOPRAZOLE SODIUM 40 MG: 40 TABLET, DELAYED RELEASE ORAL at 06:15

## 2024-06-16 RX ADMIN — IPRATROPIUM BROMIDE AND ALBUTEROL SULFATE 1 DOSE: 2.5; .5 SOLUTION RESPIRATORY (INHALATION) at 20:28

## 2024-06-16 RX ADMIN — CYANOCOBALAMIN TAB 1000 MCG 1000 MCG: 1000 TAB at 09:29

## 2024-06-16 RX ADMIN — POLYETHYLENE GLYCOL (3350) 17 G: 17 POWDER, FOR SOLUTION ORAL at 09:29

## 2024-06-16 RX ADMIN — BUDESONIDE AND FORMOTEROL FUMARATE DIHYDRATE 2 PUFF: 160; 4.5 AEROSOL RESPIRATORY (INHALATION) at 20:29

## 2024-06-16 RX ADMIN — MONTELUKAST 10 MG: 10 TABLET, FILM COATED ORAL at 20:26

## 2024-06-16 RX ADMIN — METFORMIN HYDROCHLORIDE 1000 MG: 500 TABLET, FILM COATED ORAL at 16:28

## 2024-06-16 RX ADMIN — ENOXAPARIN SODIUM 40 MG: 100 INJECTION SUBCUTANEOUS at 09:31

## 2024-06-16 RX ADMIN — METOPROLOL SUCCINATE 75 MG: 50 TABLET, EXTENDED RELEASE ORAL at 09:28

## 2024-06-16 RX ADMIN — BUDESONIDE AND FORMOTEROL FUMARATE DIHYDRATE 2 PUFF: 160; 4.5 AEROSOL RESPIRATORY (INHALATION) at 07:59

## 2024-06-16 RX ADMIN — EMPAGLIFLOZIN 10 MG: 10 TABLET, FILM COATED ORAL at 09:29

## 2024-06-16 RX ADMIN — GUAIFENESIN 600 MG: 600 TABLET, EXTENDED RELEASE ORAL at 20:26

## 2024-06-16 RX ADMIN — SODIUM CHLORIDE, PRESERVATIVE FREE 10 ML: 5 INJECTION INTRAVENOUS at 20:26

## 2024-06-16 RX ADMIN — IPRATROPIUM BROMIDE AND ALBUTEROL SULFATE 1 DOSE: 2.5; .5 SOLUTION RESPIRATORY (INHALATION) at 11:30

## 2024-06-16 RX ADMIN — FUROSEMIDE 40 MG: 40 TABLET ORAL at 16:28

## 2024-06-16 RX ADMIN — POLYETHYLENE GLYCOL (3350) 17 G: 17 POWDER, FOR SOLUTION ORAL at 20:26

## 2024-06-16 RX ADMIN — Medication 400 MG: at 20:26

## 2024-06-16 RX ADMIN — SODIUM CHLORIDE, PRESERVATIVE FREE 10 ML: 5 INJECTION INTRAVENOUS at 09:32

## 2024-06-16 RX ADMIN — ATORVASTATIN CALCIUM 10 MG: 10 TABLET, FILM COATED ORAL at 09:28

## 2024-06-16 RX ADMIN — GUAIFENESIN 600 MG: 600 TABLET, EXTENDED RELEASE ORAL at 09:28

## 2024-06-16 RX ADMIN — ASPIRIN 81 MG: 81 TABLET, CHEWABLE ORAL at 09:28

## 2024-06-16 RX ADMIN — SODIUM ZIRCONIUM CYCLOSILICATE 10 G: 10 POWDER, FOR SUSPENSION ORAL at 12:48

## 2024-06-16 RX ADMIN — METFORMIN HYDROCHLORIDE 1000 MG: 500 TABLET, FILM COATED ORAL at 09:28

## 2024-06-16 RX ADMIN — IPRATROPIUM BROMIDE AND ALBUTEROL SULFATE 1 DOSE: 2.5; .5 SOLUTION RESPIRATORY (INHALATION) at 15:37

## 2024-06-16 RX ADMIN — BENZONATATE 200 MG: 100 CAPSULE ORAL at 23:24

## 2024-06-16 RX ADMIN — IPRATROPIUM BROMIDE AND ALBUTEROL SULFATE 1 DOSE: 2.5; .5 SOLUTION RESPIRATORY (INHALATION) at 07:57

## 2024-06-16 ASSESSMENT — PAIN SCALES - GENERAL: PAINLEVEL_OUTOF10: 0

## 2024-06-16 ASSESSMENT — PAIN SCALES - WONG BAKER: WONGBAKER_NUMERICALRESPONSE: NO HURT

## 2024-06-16 NOTE — CARE COORDINATION
TOMY talked to the doctor and Sivan. Appeal process is still pending at this time. Tomy is following.

## 2024-06-16 NOTE — PLAN OF CARE
Problem: Safety - Adult  Goal: Free from fall injury  6/15/2024 2243 by Dulce Maria Zhang RN  Outcome: Progressing  6/15/2024 1004 by Sierra Conner LPN  Outcome: Progressing     Problem: Discharge Planning  Goal: Discharge to home or other facility with appropriate resources  6/15/2024 2243 by Dulce Maria Zhang RN  Outcome: Progressing  6/15/2024 1004 by Sierra Conner LPN  Outcome: Progressing     Problem: Chronic Conditions and Co-morbidities  Goal: Patient's chronic conditions and co-morbidity symptoms are monitored and maintained or improved  6/15/2024 2243 by Dulce Maria Zhang RN  Outcome: Progressing  6/15/2024 1004 by Sierra Conner LPN  Outcome: Progressing

## 2024-06-16 NOTE — PLAN OF CARE
Problem: Safety - Adult  Goal: Free from fall injury  6/16/2024 0759 by Bonny Weldon RN  Outcome: Progressing  6/15/2024 2243 by Dulce Maria Zhang RN  Outcome: Progressing     Problem: Discharge Planning  Goal: Discharge to home or other facility with appropriate resources  6/16/2024 0759 by Bonny Weldon RN  Outcome: Progressing  6/15/2024 2243 by Dulce Maria Zhang RN  Outcome: Progressing     Problem: Chronic Conditions and Co-morbidities  Goal: Patient's chronic conditions and co-morbidity symptoms are monitored and maintained or improved  6/16/2024 0759 by Bonny Weldon RN  Outcome: Progressing  6/15/2024 2243 by Dulce Maria Zhang RN  Outcome: Progressing

## 2024-06-17 VITALS
SYSTOLIC BLOOD PRESSURE: 108 MMHG | DIASTOLIC BLOOD PRESSURE: 68 MMHG | RESPIRATION RATE: 24 BRPM | BODY MASS INDEX: 22.07 KG/M2 | HEART RATE: 61 BPM | OXYGEN SATURATION: 96 % | TEMPERATURE: 98.2 F | WEIGHT: 157.63 LBS | HEIGHT: 71 IN

## 2024-06-17 LAB
ANION GAP SERPL CALCULATED.3IONS-SCNC: 7 MMOL/L (ref 7–16)
BUN SERPL-MCNC: 31 MG/DL (ref 6–23)
CALCIUM SERPL-MCNC: 8.9 MG/DL (ref 8.3–10.6)
CHLORIDE BLD-SCNC: 99 MMOL/L (ref 99–110)
CO2: 33 MMOL/L (ref 21–32)
CREAT SERPL-MCNC: 1 MG/DL (ref 0.9–1.3)
GFR, ESTIMATED: 78 ML/MIN/1.73M2
GLUCOSE BLD-MCNC: 165 MG/DL (ref 70–99)
GLUCOSE BLD-MCNC: 175 MG/DL (ref 70–99)
GLUCOSE BLD-MCNC: 226 MG/DL (ref 70–99)
GLUCOSE SERPL-MCNC: 172 MG/DL (ref 70–99)
POTASSIUM SERPL-SCNC: 4.3 MMOL/L (ref 3.5–5.1)
SODIUM BLD-SCNC: 139 MMOL/L (ref 135–145)

## 2024-06-17 PROCEDURE — 6360000002 HC RX W HCPCS: Performed by: INTERNAL MEDICINE

## 2024-06-17 PROCEDURE — 6370000000 HC RX 637 (ALT 250 FOR IP): Performed by: INTERNAL MEDICINE

## 2024-06-17 PROCEDURE — 82962 GLUCOSE BLOOD TEST: CPT

## 2024-06-17 PROCEDURE — 96372 THER/PROPH/DIAG INJ SC/IM: CPT

## 2024-06-17 PROCEDURE — 94669 MECHANICAL CHEST WALL OSCILL: CPT

## 2024-06-17 PROCEDURE — 2580000003 HC RX 258: Performed by: INTERNAL MEDICINE

## 2024-06-17 PROCEDURE — 2700000000 HC OXYGEN THERAPY PER DAY

## 2024-06-17 PROCEDURE — 6370000000 HC RX 637 (ALT 250 FOR IP): Performed by: NURSE PRACTITIONER

## 2024-06-17 PROCEDURE — 94761 N-INVAS EAR/PLS OXIMETRY MLT: CPT

## 2024-06-17 PROCEDURE — 36415 COLL VENOUS BLD VENIPUNCTURE: CPT

## 2024-06-17 PROCEDURE — 6370000000 HC RX 637 (ALT 250 FOR IP): Performed by: FAMILY MEDICINE

## 2024-06-17 PROCEDURE — 80048 BASIC METABOLIC PNL TOTAL CA: CPT

## 2024-06-17 PROCEDURE — 94640 AIRWAY INHALATION TREATMENT: CPT

## 2024-06-17 RX ORDER — BUDESONIDE AND FORMOTEROL FUMARATE DIHYDRATE 160; 4.5 UG/1; UG/1
2 AEROSOL RESPIRATORY (INHALATION)
Qty: 10.2 G | Refills: 3 | Status: SHIPPED | OUTPATIENT
Start: 2024-06-17

## 2024-06-17 RX ORDER — METOPROLOL SUCCINATE 25 MG/1
75 TABLET, EXTENDED RELEASE ORAL DAILY
Qty: 30 TABLET | Refills: 0 | Status: SHIPPED | OUTPATIENT
Start: 2024-06-18

## 2024-06-17 RX ORDER — LOSARTAN POTASSIUM 25 MG/1
25 TABLET ORAL DAILY
Qty: 30 TABLET | Refills: 0 | Status: SHIPPED | OUTPATIENT
Start: 2024-06-17

## 2024-06-17 RX ORDER — BENZONATATE 200 MG/1
200 CAPSULE ORAL 3 TIMES DAILY PRN
Qty: 21 CAPSULE | Refills: 0 | Status: SHIPPED | OUTPATIENT
Start: 2024-06-17 | End: 2024-06-24

## 2024-06-17 RX ORDER — LANOLIN ALCOHOL/MO/W.PET/CERES
400 CREAM (GRAM) TOPICAL 2 TIMES DAILY
Qty: 30 TABLET | Refills: 1 | Status: SHIPPED | OUTPATIENT
Start: 2024-06-17

## 2024-06-17 RX ADMIN — METFORMIN HYDROCHLORIDE 1000 MG: 500 TABLET, FILM COATED ORAL at 17:39

## 2024-06-17 RX ADMIN — PANTOPRAZOLE SODIUM 40 MG: 40 TABLET, DELAYED RELEASE ORAL at 05:30

## 2024-06-17 RX ADMIN — ASPIRIN 81 MG: 81 TABLET, CHEWABLE ORAL at 09:10

## 2024-06-17 RX ADMIN — IPRATROPIUM BROMIDE AND ALBUTEROL SULFATE 1 DOSE: 2.5; .5 SOLUTION RESPIRATORY (INHALATION) at 11:14

## 2024-06-17 RX ADMIN — ATORVASTATIN CALCIUM 10 MG: 10 TABLET, FILM COATED ORAL at 09:10

## 2024-06-17 RX ADMIN — POLYETHYLENE GLYCOL (3350) 17 G: 17 POWDER, FOR SOLUTION ORAL at 09:10

## 2024-06-17 RX ADMIN — Medication 400 MG: at 09:10

## 2024-06-17 RX ADMIN — METFORMIN HYDROCHLORIDE 1000 MG: 500 TABLET, FILM COATED ORAL at 09:10

## 2024-06-17 RX ADMIN — CYANOCOBALAMIN TAB 1000 MCG 1000 MCG: 1000 TAB at 09:10

## 2024-06-17 RX ADMIN — ENOXAPARIN SODIUM 40 MG: 100 INJECTION SUBCUTANEOUS at 09:11

## 2024-06-17 RX ADMIN — IPRATROPIUM BROMIDE AND ALBUTEROL SULFATE 1 DOSE: 2.5; .5 SOLUTION RESPIRATORY (INHALATION) at 07:04

## 2024-06-17 RX ADMIN — INSULIN LISPRO 1 UNITS: 100 INJECTION, SOLUTION INTRAVENOUS; SUBCUTANEOUS at 13:00

## 2024-06-17 RX ADMIN — FUROSEMIDE 40 MG: 40 TABLET ORAL at 09:10

## 2024-06-17 RX ADMIN — GUAIFENESIN 600 MG: 600 TABLET, EXTENDED RELEASE ORAL at 09:10

## 2024-06-17 RX ADMIN — SODIUM CHLORIDE, PRESERVATIVE FREE 10 ML: 5 INJECTION INTRAVENOUS at 09:11

## 2024-06-17 RX ADMIN — IPRATROPIUM BROMIDE AND ALBUTEROL SULFATE 1 DOSE: 2.5; .5 SOLUTION RESPIRATORY (INHALATION) at 15:00

## 2024-06-17 RX ADMIN — BUDESONIDE AND FORMOTEROL FUMARATE DIHYDRATE 2 PUFF: 160; 4.5 AEROSOL RESPIRATORY (INHALATION) at 07:04

## 2024-06-17 RX ADMIN — EMPAGLIFLOZIN 10 MG: 10 TABLET, FILM COATED ORAL at 09:10

## 2024-06-17 RX ADMIN — METOPROLOL SUCCINATE 75 MG: 50 TABLET, EXTENDED RELEASE ORAL at 09:10

## 2024-06-17 RX ADMIN — SENNOSIDES, DOCUSATE SODIUM 2 TABLET: 8.6; 5 TABLET ORAL at 09:10

## 2024-06-17 NOTE — DISCHARGE SUMMARY
magnesium oxide 400 (240 Mg) MG tablet  Commonly known as: MAG-OX  Take 1 tablet by mouth 2 times daily            CHANGE how you take these medications      empagliflozin 10 MG tablet  Commonly known as: JARDIANCE  Take 1 tablet by mouth daily  What changed: additional instructions     losartan 25 MG tablet  Commonly known as: COZAAR  Take 1 tablet by mouth daily Hold for SBP(top number) less than 120  What changed: additional instructions     metoprolol succinate 25 MG extended release tablet  Commonly known as: TOPROL XL  Take 3 tablets by mouth daily Do not give for SBP(top number) is less than 100; HR less than 60  Start taking on: June 18, 2024  What changed:   medication strength  how much to take  additional instructions            CONTINUE taking these medications      albuterol 0.63 MG/3ML nebulizer solution  Commonly known as: ACCUNEB  Take 3 mLs by nebulization every 6 hours as needed for Wheezing     ALBUTEROL IN     aspirin 81 MG chewable tablet  Take 1 tablet by mouth daily     atorvastatin 20 MG tablet  Commonly known as: LIPITOR     cyanocobalamin 1000 MCG tablet     furosemide 40 MG tablet  Commonly known as: LASIX  Take 1 tablet by mouth in the morning and 1 tablet in the evening.     glipiZIDE 5 MG tablet  Commonly known as: GLUCOTROL     guaiFENesin 400 MG tablet  Take 1 tablet by mouth in the morning and 1 tablet in the evening.     ipratropium 0.06 % nasal spray  Commonly known as: ATROVENT     ipratropium 0.5 mg-albuterol 2.5 mg 0.5-2.5 (3) MG/3ML Soln nebulizer solution  Commonly known as: DUONEB     loratadine 10 MG capsule  Commonly known as: CLARITIN     metFORMIN 1000 MG tablet  Commonly known as: GLUCOPHAGE     montelukast 10 MG tablet  Commonly known as: SINGULAIR     omeprazole 20 MG delayed release capsule  Commonly known as: PRILOSEC     tiotropium 2.5 MCG/ACT Aers inhaler  Commonly known as: SPIRIVA RESPIMAT     venlafaxine 25 MG tablet  Commonly known as: EFFEXOR            STOP

## 2024-06-17 NOTE — CARE COORDINATION
Received call from [employer] [employer] that appeal has been approved and denial overturned.  Therefore pt is APPROVED to admit to [facility]. Auth Number: [id number]  [id number]     [id number]  [id number]  06/17/2024-06/21/2024  Approved    PS to SAMANTHA Chan CNP to update

## 2024-06-17 NOTE — CARE COORDINATION
Pt discharged to [place], set up with [facility] for 1800.  Updated nurse Luma and VM left for [facility] at [facility]. Pt agreeable with pt.

## 2024-06-17 NOTE — CARE COORDINATION
Reviewed chart, discussed in IDR, Expedited appeal still pending for [place] [facility].   Also faxed [employer] to [facility] [phone] per pt's request.

## 2024-06-17 NOTE — PROGRESS NOTES
V2.0  [facility] Hospitalist Progress Note      Name:  Billy Ozuna /Age/Sex: 1945  (79 y.o. male)   MRN & CSN:  [id number] & [id number] Encounter Date/Time: 2024 8:53 AM EDT    [facility] PCP: Kera Monique (Inactive)       Hospital Day: 1    Assessment and Plan:   Billy Ozuna is a 79 y.o. male with pmh of nonischemic cardiomyopathy, systolic HFrEF 10-15% with LifeVest, hypertension, diabetes mellitus type 2, not on long-term insulin, end-stage COPD, chronic respiratory failure on home oxygen, GERD, depression, anxiety ,  who presents with Shortness of breath      Plan:    #.  Shortness of breath-currently resolved  -Patient on 5 L of oxygen saturating 97-98%.  -Weaned down to 4 L-Home requirement and to monitor  -Chest x-ray-no acute process.  -adding mucinex 600 mg BID, DuoNebs Q4 WA  -PT/OT Evals for possible SNF placement      #.  Abdominal distention  -CT abdomen/pelvis-distended colon filled with large volume stool.  -Patient reported not having a BM for the last 2 days.  -Patient reports that he had multiple colon surgeries and his abdomen always looks distended  -Declined rectal suppository or enema.  -Patient states that he usually has a bowel movement when he gets his metformin  -MiraLAX twice daily ordered, Senokot-S 2 tabs PO QD   -No nausea, vomiting.  -Full liquid diet, advance as tolerated.  -Consult surgery as needed.    #.  Recent Hospitalization for NSTEMI and Acute Systolic HF with EF 10-15%     #.  History of sigmoid volvulus s/p decompressive colonoscopy-10/2023     #.  End Stage Chronic Systolic HFrEF  -Echo-2024-EF 10-15%  -Patient was discharged on LifeVest  -Continue home medications-aspirin, atorvastatin, Jardiance, losartan, metoprolol succinate.  -Continue Lasix.     #.  Patient had episodes of NSVT, SVT with aberrancy on telemetry as per documentation during admission 3/2024  -Was evaluated by EP  -Recommended goal-directed therapy for 3 months and 
    V2.0  [facility] Hospitalist Progress Note      Name:  Billy Ozuna /Age/Sex: 1945  (79 y.o. male)   MRN & CSN:  [id number] & [id number] Encounter Date/Time: 2024 11:25 AM EDT    Location:  [address] [place] [address]-A PCP: Kera Monique (Inactive)       Hospital Day: 10    Assessment and Plan:   Billy Ozuna is a 79 y.o. male with pmh as noted below who presents with Shortness of breath      Plan:  Acute on chronic respiratory failure with hypoxia, improved  COPD   -Required up to 5 L nasal cannula, weaned down to home 4 L-continues on 4 L, which patient reports is baseline  -CXR no acute process  -no wheezing, diminished.  More awake and alert on exam today,   Patient was sleepy and difficult to arouse on exam yesterday  , VBG ordered, this lab was never obtained.  Canceled.  -- Patient clinically improved  -Continue Mucinex, scheduled DuoNebs, pulmonary hygiene     Hyperkalemia- improved   -  K+ 5.4 today, s/p holding potassium supplement.  -  Holding losartan   --06/15 stop lokelma K+4.8    K+ 5.3 add one dose of Lokelma, repeat lab in AM  -- K+4.3      SVT   -Patient had episodes of NSVT, SVT with aberrancy on telemetry as per documentation during admission 3/2024  - 2 minute run of SVT noted  AM  -Was evaluated by EP, Recommended goal-directed therapy for 3 months and ICD if continues to have low EF.  -cardio consulted, recommended to replace magnesium, goal 2-2.2; within goal  -continue beta-blocker  -Monitor on telemetry  No further events noted overnight  --Cardiology/EP  signed off     Elevated troponin  -High-sensitivity Trop 91, 83. Appears chronically elevated, recent admission for NSTEMI  -EKG no acute ischemic change  -as documented by previous colleague, Patient reported some chest tightness during episode of SVT, No CP this AM   -Trend troponin, monitor on telemetry  -Cardiology s/o     Hypomagnesemia--resolved 2.2  - replacement per protocol.     Generalized 
    V2.0  [facility] [place]      Name:  Billy Ozuna /Age/Sex: 1945  (79 y.o. male)   MRN & CSN:  [id number] & [id number] Encounter Date/Time: 6/10/2024 8:53 AM EDT    [facility] PCP: Kera Monique (Inactive)       Hospital Day: 3    Assessment and Plan:   Billy Ozuna is a 79 y.o. male with pmh of nonischemic cardiomyopathy, systolic HFrEF 10-15% with LifeVest, hypertension, diabetes mellitus type 2, not on long-term insulin, end-stage COPD, chronic respiratory failure on home oxygen, GERD, depression, anxiety ,  who presents with Shortness of breath      Plan:    #.  Shortness of breath-currently resolved  -Patient on 5 L of oxygen saturating 97-98%.  -Weaned down to 4 L-Home requirement and to monitor  -Chest x-ray-no acute process.  -adding mucinex 600 mg BID, DuoNebs Q4 WA  -PT/OT Evals recommend Acute Rehab, he wants SNF placement   - having difficulty wthi mucous and coughing it up, added Mucomyst, Acapella Vest prn and Tessalon Perles     #.  Abdominal distention  -CT abdomen/pelvis-distended colon filled with large volume stool.  -Patient reported not having a BM for the last 2 days.  -Patient reports that he had multiple colon surgeries and his abdomen always looks distended  -Declined rectal suppository or enema.  -Patient states that he usually has a bowel movement when he gets his metformin  -MiraLAX twice daily ordered, Senokot-S 2 tabs PO QD   -No nausea, vomiting.  -Full liquid diet, advance as tolerated.  -had multiple BM's yesterday, abdomen feels much better and he is ready for Regular Diet    #.  Recent Hospitalization for NSTEMI and Acute Systolic HF with EF 10-15%     #.  History of sigmoid volvulus s/p decompressive colonoscopy-10/2023     #.  End Stage Chronic Systolic HFrEF  -Echo-2024-EF 10-15%  -Patient was discharged on LifeVest  -Continue home medications-aspirin, atorvastatin, Jardiance, losartan, metoprolol succinate.  -Continue Lasix.   
    V2.0  [facility] [place] Hospitalist Progress Note      Name:  Billy Ozuna /Age/Sex: 1945  (79 y.o. male)   MRN & CSN:  [id number] & [id number] Encounter Date/Time: 2024 8:10 AM EDT    Location:  [address] [place] [address]-A PCP: Kera Monique (Inactive)       Hospital Day: 7    Assessment and Plan:   Billy Ozuna is a 79 y.o. male with pmh as noted below who presents with Shortness of breath      Plan:  Acute on chronic respiratory failure with hypoxia, improved  COPD   -Required up to 5 L nasal cannula, weaned down to home 4 L-continues on 4 L, which patient reports is baseline  -CXR no acute process  -no wheezing, diminished.  More awake and alert on exam today,   Patient was sleepy and difficult to arouse on exam yesterday  , VBG ordered, this lab was never obtained.  Will cancel and monitor for now  -Continue Mucinex, scheduled DuoNebs, pulmonary hygiene     Hyperkalemia  - K+ 5.4 today, s/p holding potassium supplement.  - Holding losartan today,  Give Lokelma today, and repeat lab    SVT   -Patient had episodes of NSVT, SVT with aberrancy on telemetry as per documentation during admission 3/2024  - 2 minute run of SVT noted 6 AM  -Was evaluated by EP, Recommended goal-directed therapy for 3 months and ICD if continues to have low EF.  -cardio consulted, recommended to replace magnesium, goal 2-2.2; within goal  -continue beta-blocker  -Monitor on telemetry  No further events noted overnight  --Cardiology/EP  signed off     Elevated troponin  -High-sensitivity Trop 91, 83. Appears chronically elevated, recent admission for NSTEMI  -EKG no acute ischemic change  -as documented by previous colleague, Patient reported some chest tightness during episode of SVT, No CP this AM   -Trend troponin, monitor on telemetry  -Cardiology s/o     Hypomagnesemia--resolved 2.2  - replacement per protocol.     Generalized weakness  -PT/OT recommended SNF, case management following     End Stage Chronic Systolic 
    V2.0  [facility] Hospitalist Progress Note      Name:  Billy Ozuna /Age/Sex: 1945  (79 y.o. male)   MRN & CSN:  [id number] & [id number] Encounter Date/Time: 2024 11:25 AM EDT    Location:  [address] [place] [address]-A PCP: Kera Monique (Inactive)       Hospital Day: 7    Assessment and Plan:   Billy Ozuna is a 79 y.o. male with pmh as noted below who presents with Shortness of breath      Plan:  Acute on chronic respiratory failure with hypoxia, improved  COPD   -Required up to 5 L nasal cannula, weaned down to home 4 L-continues on 4 L, which patient reports is baseline  -CXR no acute process  -no wheezing, diminished.  More awake and alert on exam today,   Patient was sleepy and difficult to arouse on exam yesterday  , VBG ordered, this lab was never obtained.  Canceled. Patient is doing well  -Continue Mucinex, scheduled DuoNebs, pulmonary hygiene     Hyperkalemia- improved to 4.8  -  K+ 5.4 today, s/p holding potassium supplement.  -  Holding losartan   --06/15 stop lokelma K+4.8    K+ 5.3 add one dose of Lokelma, repeat lab in AM      SVT   -Patient had episodes of NSVT, SVT with aberrancy on telemetry as per documentation during admission 3/2024  - 2 minute run of SVT noted  AM  -Was evaluated by EP, Recommended goal-directed therapy for 3 months and ICD if continues to have low EF.  -cardio consulted, recommended to replace magnesium, goal 2-2.2; within goal  -continue beta-blocker  -Monitor on telemetry  No further events noted overnight  --Cardiology/EP  signed off     Elevated troponin  -High-sensitivity Trop 91, 83. Appears chronically elevated, recent admission for NSTEMI  -EKG no acute ischemic change  -as documented by previous colleague, Patient reported some chest tightness during episode of SVT, No CP this AM   -Trend troponin, monitor on telemetry  -Cardiology s/o     Hypomagnesemia--resolved 2.2  - replacement per protocol.     Generalized weakness  -PT/OT recommended 
    V2.0  [facility] [place] Note      Name:  Billy Ozuna /Age/Sex: 1945  (79 y.o. male)   MRN & CSN:  [id number] & [id number] Encounter Date/Time: 2024 7:35 AM EDT    [facility]:  [facility] PCP: Kera Monique (Inactive)       Hospital Day: 4    Assessment and Plan:   Billy Ozuna is a 79 y.o. male who presents with Shortness of breath    Acute on chronic respiratory failure with hypoxia, improved  COPD   -Required up to 5 L nasal cannula, weaned down to home 4 L  -CXR no acute process  -no wheezing to indication COPD exacerbation   -Continue Mucinex, scheduled DuoNebs, pulmonary hygiene    SVT   -Patient had episodes of NSVT, SVT with aberrancy on telemetry as per documentation during admission 3/2024, again this AM  -Was evaluated by EP, Recommended goal-directed therapy for 3 months and ICD if continues to have low EF.  - cardio consulted, recommended to replace magnesium  -Monitor on telemetry    Elevated troponin  -High-sensitivity Trop 91, appears chronically elevated, recent admission for NSTEMI  -EKG no acute ischemic change  -Patient reported some chest tightness during episode of SVT, now resolved  -Trend troponin, monitor on telemetry  -Cardiology following    Hypomagnesemia   - replacement ordered  - recheck in AM    Generalized weakness  -PT/OT recommended SNF, case management following     End Stage Chronic Systolic HFrEF  -Echo-2024-EF 10-15%  -Patient was discharged on LifeVest, noncompliant   -Continue home medications-aspirin, atorvastatin, Jardiance, losartan, metoprolol succinate.  -Continue Lasix.    Constipation   - resolved, continue PO bowel regimen      History of sigmoid volvulus   -s/p decompressive colonoscopy-10/2023    End-stage COPD, chronic respiratory failure on home oxygen  -Continue DuoNeb, Advair, Singulair and maintenance inhalers     Diabetes mellitus type 2, not on long-term insulin  -Patient is on metformin, glipizide  -Continue metformin, 
    V2.0  [facility] [place]      Name:  Billy Ozuna /Age/Sex: 1945  (79 y.o. male)   MRN & CSN:  [id number] & [id number] Encounter Date/Time: 2024 8:53 AM EDT    [facility]  [facility] PCP: Kera Monique (Inactive)       Hospital Day: 2    Assessment and Plan:   Billy Ozuna is a 79 y.o. male with pmh of nonischemic cardiomyopathy, systolic HFrEF 10-15% with LifeVest, hypertension, diabetes mellitus type 2, not on long-term insulin, end-stage COPD, chronic respiratory failure on home oxygen, GERD, depression, anxiety ,  who presents with Shortness of breath      Plan:    #.  Shortness of breath-currently resolved  -Patient on 5 L of oxygen saturating 97-98%.  -Weaned down to 4 L-Home requirement and to monitor  -Chest x-ray-no acute process.  -adding mucinex 600 mg BID, DuoNebs Q4 WA  -PT/OT Evals for possible SNF placement      #.  Abdominal distention  -CT abdomen/pelvis-distended colon filled with large volume stool.  -Patient reported not having a BM for the last 2 days.  -Patient reports that he had multiple colon surgeries and his abdomen always looks distended  -Declined rectal suppository or enema.  -Patient states that he usually has a bowel movement when he gets his metformin  -MiraLAX twice daily ordered, Senokot-S 2 tabs PO QD   -No nausea, vomiting.  -Full liquid diet, advance as tolerated.  -had multiple BM's yesterday, abdomen feels much better and he is ready for Regular Diet    #.  Recent Hospitalization for NSTEMI and Acute Systolic HF with EF 10-15%     #.  History of sigmoid volvulus s/p decompressive colonoscopy-10/2023     #.  End Stage Chronic Systolic HFrEF  -Echo-2024-EF 10-15%  -Patient was discharged on LifeVest  -Continue home medications-aspirin, atorvastatin, Jardiance, losartan, metoprolol succinate.  -Continue Lasix.     #.  Patient had episodes of NSVT, SVT with aberrancy on telemetry as per documentation during admission 3/2024  -Was evaluated 
    V2.0  [facility]      Name:  Billy Ozuna /Age/Sex: 1945  (79 y.o. male)   MRN & CSN:  [id number] & [id number] Encounter Date/Time: 2024 8:10 AM EDT    [facility]  [facility] PCP: Kera Monique (Inactive)       Hospital Day: 6    Assessment and Plan:   Billy Ozuna is a 79 y.o. male with pmh as noted below who presents with Shortness of breath      Plan:  Acute on chronic respiratory failure with hypoxia, improved  COPD   -Required up to 5 L nasal cannula, weaned down to home 4 L  -CXR no acute process  -no wheezing, diminished. Sleepy on exam, will get vbg.    -Continue Mucinex, scheduled DuoNebs, pulmonary hygiene     SVT   -Patient had episodes of NSVT, SVT with aberrancy on telemetry as per documentation during admission 3/2024  - 2 minute run of SVT noted  AM  -Was evaluated by EP, Recommended goal-directed therapy for 3 months and ICD if continues to have low EF.  -cardio consulted, recommended to replace magnesium, goal 2-2.2; will repeat electrolyte studies today  -continue beta-blocker  -Monitor on telemetry  No further events noted overnight  --Cardiology/EP  signed off     Elevated troponin  -High-sensitivity Trop 91, 83. Appears chronically elevated, recent admission for NSTEMI  -EKG no acute ischemic change  -as documented by previous colleague, Patient reported some chest tightness during episode of SVT, No CP this AM   -Trend troponin, monitor on telemetry  -Cardiology s/o     Hypomagnesemia--resolved at 1.9 on - repeat this AM is 1.6  - replacement per protocol. D/W with nursing  - recheck pending as goal is 2-2.2     Generalized weakness  -PT/OT recommended SNF, case management following     End Stage Chronic Systolic HFrEF  -Echo-2024-EF 10-15%  -Patient was discharged on LifeVest, noncompliant   -Continue home medications-aspirin, atorvastatin, Jardiance, losartan, metoprolol succinate.  -Continue Lasix.     Constipation   - resolved, 
   06/10/24 1545   Encounter Summary   Encounter Overview/Reason Initial Encounter   Service Provided For Patient   Referral/Consult From [facility]   Support System Children   Last Encounter  06/10/24  (Patient was very sleepy during the visit.)   Complexity of Encounter Low   Begin Time 1545   End Time  1550   Total Time Calculated 5 min   Spiritual/Emotional needs   Type Spiritual Support   Grief, Loss, and Adjustments   Type Adjustment to illness   Assessment/Intervention/Outcome   Assessment Coping   Intervention Sustaining Presence/Ministry of presence   Outcome Coping   Plan and Referrals   Plan/Referrals Continue Support (comment)       
  Physical Therapy Treatment Note  Name: Billy Ozuna MRN: [id number] :   1945   Date:  2024   Admission Date: 2024 Room:  [address] [place], [place] [address]A   Restrictions/Precautions:general precautions, fall risk   Communication with other providers:  Pt okay to see for therapy per RN   Subjective:  Patient states:  \"I've  been in this hospital too much\"   Pain:   Location, Type, Intensity (0/10 to 10/10):  Denies   Objective:    Observation:  Pt supine in bed upon PTA arrival.   Objective Measures:  Pocket tele.   Treatment, including education/measures:    Therapeutic Activity Training:   Therapeutic activity training was instructed today.  Cues were given for safety, sequence, UE/LE placement, awareness, and balance.  Activities performed today included bed mobility training, sup-sit, sit-stand, SPT.    Mobility:  Sup > sit: SBA  Scooting: SBA  STS: CGA from EOB x 2 reps.   SPT: CGA with RW     Gait:  Pt ambulated 80ft with RW and CGA for safety. Pt demos decreased step length, decreased foot clearance, decreased gait speed, forward flexed posture and downward gaze, decreased endurance overall and mild breathlessness by end of session.     Safety:   Pt returned safely to recliner with chair alarm activated, call light in reach, all needs met.   Assessment / Impression:    Pt tolerated OOB activities well this date but with mod fatigue by end of session.   Patient's tolerance of treatment:  Good   Adverse Reaction: none  Significant change in status and impact:  none  Barriers to improvement:  Endurance.   Plan for Next Session:    Plan to continue OOB activities.   Time in:  1445  Time out:  1502  Timed treatment minutes:  17  Total treatment time:  17    Previously filed items:  Social/Functional History  Lives With: Alone, Spouse  Home Equipment: Walker - Rolling  Active : No  Patient's  Info: family  Mode of Transportation: Car        Short Term Goals  Time Frame for Short Term Goals: 1 
4 Eyes Skin Assessment     NAME:  Billy Ozuna  YOB: 1945  MEDICAL RECORD NUMBER:  [id number]    The patient is being assessed for  Admission    I agree that at least one RN has performed a thorough Head to Toe Skin Assessment on the patient. ALL assessment sites listed below have been assessed.      Areas assessed by both nurses:    Head, Face, Ears, Shoulders, Back, Chest, Arms, Elbows, Hands, Sacrum. Buttock, Coccyx, Ischium, Legs. Feet and Heels, and Under Medical Devices         Does the Patient have a Wound? No noted wound(s)       [place] Prevention initiated by RN: Yes  Wound Care Orders initiated by RN: no    Pressure Injury (Stage 3,4, Unstageable, DTI, NWPT, and Complex wounds) if present, place Wound referral order by RN under : No    New Ostomies, if present place, Ostomy referral order under : No     Nurse 1 eSignature: Electronically signed by Christel Winters RN on 6/8/24 at 5:52 AM EDT    **SHARE this note so that the co-signing nurse can place an eSignature**    Nurse 2 eSignature: Electronically signed by Dulce Maria Zhang RN on 6/8/24 at 6:47 AM EDT    
Attempted to contact nurse at [facility] but no answer.  Will try again shortly.  
Comprehensive Nutrition Assessment    Type and Reason for Visit:  Initial (LOS)    Nutrition Recommendations/Plan:   Continue current diet   Ordered high protein, low calorie nutrition supplement daily.      Malnutrition Assessment:  Malnutrition Status:  At risk for malnutrition (Comment) (06/16/24 2222)    Context:  Acute Illness       Nutrition Assessment:    Pt admitted with shortness of breath, h/o COPD, DMII, HTN. Greater than 75% intake of recorded meals.    Nutrition Related Findings:    4L oxygen, K 5.3, glu 131-198 Wound Type: None       Current Nutrition Intake & Therapies:    Average Meal Intake: % (few recorded intakes)     ADULT DIET; Regular; 5 carb choices (75 gm/meal); Low Sodium (2 gm)  ADULT ORAL NUTRITION SUPPLEMENT; Lunch; Low Calorie/High Protein Oral Supplement    Anthropometric Measures:  Height: 180.3 cm (5' 11\")  Ideal Body Weight (IBW): 172 lbs (78 kg)    Admission Body Weight: 77.6 kg (171 lb 1.2 oz)  Current Body Weight: 71.5 kg (157 lb 10.1 oz), 91.6 % IBW. Weight Source: Bed Scale  Current BMI (kg/m2): 22  Usual Body Weight: 75 kg (165 lb 5.5 oz) (March 2024)  % Weight Change (Calculated): -4.7                    BMI Categories: Normal Weight (BMI 18.5-24.9)    Estimated Daily Nutrient Needs:  Energy Requirements Based On: Kcal/kg  Weight Used for Energy Requirements: Current  Energy (kcal/day): 9441-6400  Weight Used for Protein Requirements: Current  Protein (g/day): 72-85  Method Used for Fluid Requirements: 1 ml/kcal  Fluid (ml/day): 4471-6773    Nutrition Diagnosis:   Increased nutrient needs related to catabolic illness (aoc COPD) as evidenced by weight loss    Nutrition Interventions:   Food and/or Nutrient Delivery: Continue Current Diet, Start Oral Nutrition Supplement  Nutrition Education/Counseling: Education not indicated  Coordination of Nutrition Care: Continue to monitor while inpatient       Goals:     Goals: Meet at least 75% of estimated needs       Nutrition 
Met with patient and introduced myself as the Heart failure education R.N.   Patient knowledgeable about his health and improving his health. Understands he can no longer manage at home alone. He is hoping to obtain A.L. after SNF stay.  Voices concerns about being a burden to his family.  Reports having several chronic conditions and feels overwhelmed.  Patient has been receiving meals on wheels when at home.    Admitting diagnosis- Shortness of breath - end stage COPD  Cardiologist- John  Ejection fraction 10-15% as of 6/4/2024  Pro BNP- 4,273 on 6/8/24  Hospital follow up appt-  pending SNF placement  Cardiac rehabilitation referral- N/A  ICD information- patient has Life Vest- optimization of medication  Smoking Cessation information and referral-former smoker  Pneumonia vaccine- up to date  PCP- Javiergert    Able to obtain medications without difficulty?- Yes-       Reviewed Heart failure patient education with patient. Heart failure education included: symptoms, medications, diet, , exercise and fluid control.   Questions answered.    The following handouts were reviewed with patient and patient was given a copy of the following : Heart Failure education booklet, the 'Stop Light' Handout,  a link to the American [employer] Association's Healthier Living with Heart Failure Interactive workbook and a list of heart failure related education available on the hospital TV and how to access it.             
Occupational Therapy      Occupational Therapy Treatment Note    Name: Billy Ozuna MRN: [id number] :   1945   Date:  2024   Admission Date: 2024 Room:  [address] [place] [address]-A     Primary Problem:  SOB    Restrictions/Precautions:          General Precautions, Fall Risk    Communication with other providers: Nursing handoff    Subjective:  Patient states:  \"I would love to walk\"  Pain:   Location, Type, Intensity (0/10 to 10/10):  No    Objective:    Observation: Pt received sitting upright in chair, agreeable to therapy   Objective Measures:  4L of 02; SOB during functional mobility    Treatment, including education:  Self Care Training:   Cues were given for safety, sequence, UE/LE placement, visual cues, and balance.    Activities performed today included grooming    Therapeutic Activity Training:   Therapeutic activity training was instructed today.  Cues were given for safety, sequence, UE/LE placement, awareness, and balance.    Activities performed today included STS, functional mobility, stand to sit    Grooming washing face/hands w/ warm washcloth Independent. STS from reclining chair up to RW CGA, functional mobility w/ RW CGA to/from room w/ increasing SOB. Stand to sit from RW to reclining chair CGA.    Pt sitting upright in chair, call light at side, nursing notified    Assessment / Impression:    Patient's tolerance of treatment: Well  Adverse Reaction: None  Significant change in status and impact: Improved from initial evaluation  Barriers to improvement: None noted      Plan for Next Session:    Continue OT POC    Time in:  1448  Time out:  1513  Timed treatment minutes:  25  Total treatment time:  25 minutes      Electronically signed by:    Rona Doyle/L [id number]  3:40 PM,2024          
Occupational Therapy    Occupational Therapy Treatment Note    Name: Billy Ozuna MRN: [id number] :   1945   Date:  2024   Admission Date: 2024 Room:  [address] [place], [place] [address]     Restrictions/Precautions: General Precautions, Fall Risk, and 4L NC    Communication with other providers: RN approved session, co tx with PTA for precert needs.    Subjective:  Patient states:  Pt agreeable to therapy session.   Pain:   Location, Type, Intensity (0/10 to 10/10):  denies pain    Objective:    Observation: Pt supine in bed upon arrival.   Objective Measures:  Pt alert and oriented    Treatment, including education:  Therapeutic Activity Training:   Therapeutic activity training was instructed today.  Cues were given for safety, sequence, UE/LE placement, awareness, and balance.    Activities performed today included bed mobility training, sup-sit, sit-stand, SPT.    Pt supine in bed upon arrival and agreeable to therapy session. Pt completed supine to sit with head of bed elevated and SBA. Pt completed sit to stand from edge of bed with WW and CGA with limited safety awareness. Pt completed functional mobility with WW and chair follow less than household distance with 1 standing rest break. Pt returned in room and seated in chair. Pt denies ADLs this session. Pt needs met and call light in reach.     Assessment / Impression:    Patient's tolerance of treatment: fair  Adverse Reaction: None  Significant change in status and impact: Improved from initial evaluation  Barriers to improvement: None noted      Plan for Next Session:    Continue OT POC    Time in:  943  Time out:  957  Timed treatment minutes:  14  Total treatment time:  14      Electronically signed by:      TRISHA Martinez          
Occupational Therapy  Attempted to see pt on this date for OT session. Pt declined therapy at this time and requested to rest.  Will attempt as able and appropriate.    Tereza SALMERON/L  
Occupational Therapy  Attempted to see pt on this date for OT session. Pt declined therapy at this time and requested to rest. Noted increased lethargy this date.  Will attempt as able and appropriate.     Tereza SALMERON/TOBY  
Occupational Therapy  Attempted to see to see pt on this date for OT session. Per RN please hold for elevated troponin and run of vtach. Will attempt as able and appropriate.    Tereza SALMERON/L  
Physical Therapy    Physical Therapy Treatment Note  Name: Billy Ozuna MRN: [id number] :   1945   Date:  2024   Admission Date: 2024 Room:  [address] [place] [address]-A     PT session attempted: Per Nurse please hold for elevate troponin and run of vtach. PT will f/u as schedule allows.     Electronically signed by:    Bharat Lee PTA  2024, 2:08 PM    
This RT found patient with nasal cannula turned off. SpO2 was 85% on RA. Patient is now back on home O2 of 3 to 4L Nc. SpO2 is back to 94%.  
No  Patient's  Info: family  Mode of Transportation: Car        Short Term Goals  Time Frame for Short Term Goals: 1 week  Short Term Goal 1: pt to complete all bed mobility mod I  Short Term Goal 2: pt to complete all STS transfers to/from bed, commode, and chair mod I  Short Term Goal 3: pt to ambulate 100' with LRAD CGA    Electronically signed by:    Gaye Lobato PTA  6/12/2024, 10:00 AM      
  BUN 24*   CREATININE 1.2     LIVER PROFILE:   Recent Labs     06/11/24  1214   AST 19   ALT 12   BILITOT 0.2   ALKPHOS 167*     PT/INR: No results for input(s): \"PROTIME\", \"INR\" in the last 72 hours.  APTT: No results for input(s): \"APTT\" in the last 72 hours.  BNP:  No results for input(s): \"BNP\" in the last 72 hours.  TROPONIN: No results for input(s): \"TROPONINT\" in the last 72 hours.  Labs, consult, tests reviewed          Vimal Carvajal PA-C, 6/12/2024 1:49 PM   
Axis 89 degrees    Diagnosis       Sinus rhythm with premature supraventricular complexes  Left axis deviation  Right bundle branch block  Abnormal ECG  When compared with ECG of 08-JUN-2024 00:34,  premature supraventricular complexes are now present  T wave inversion more evident in Lateral leads     POCT Glucose    Collection Time: 06/11/24 11:46 AM   Result Value Ref Range    POC Glucose 203 (H) 70 - 99 MG/DL   Troponin    Collection Time: 06/11/24 12:14 PM   Result Value Ref Range    Troponin, High Sensitivity 91 (HH) 0 - 22 ng/L   Comprehensive Metabolic Panel    Collection Time: 06/11/24 12:14 PM   Result Value Ref Range    Sodium 137 135 - 145 MMOL/L    Potassium 5.1 3.5 - 5.1 MMOL/L    Chloride 97 (L) 99 - 110 mMol/L    CO2 32 21 - 32 MMOL/L    Anion Gap 8 7 - 16    Glucose 213 (H) 70 - 99 MG/DL    BUN 24 (H) 6 - 23 MG/DL    Creatinine 1.2 0.9 - 1.3 MG/DL    Est, Glom Filt Rate 62 >60 mL/min/1.73m2    Calcium 8.7 8.3 - 10.6 MG/DL    Total Protein 6.9 6.4 - 8.2 GM/DL    Albumin 4.0 3.4 - 5.0 GM/DL    Total Bilirubin 0.2 0.0 - 1.0 MG/DL    Alkaline Phosphatase 167 (H) 40 - 129 IU/L    ALT 12 10 - 40 U/L    AST 19 15 - 37 IU/L   Magnesium    Collection Time: 06/11/24 12:14 PM   Result Value Ref Range    Magnesium 1.4 (L) 1.8 - 2.4 mg/dl   Calcium, Ionized    Collection Time: 06/11/24 12:14 PM   Result Value Ref Range    Calcium, Ionized 1.15  4.60   1.12 - 1.32 mMOL/L   Troponin    Collection Time: 06/11/24  3:39 PM   Result Value Ref Range    Troponin, High Sensitivity 83 (HH) 0 - 22 ng/L   CBC with Auto Differential    Collection Time: 06/11/24  3:39 PM   Result Value Ref Range    WBC 7.9 4.0 - 10.5 K/CU MM    RBC 3.80 (L) 4.6 - 6.2 M/CU MM    Hemoglobin 10.6 (L) 13.5 - 18.0 GM/DL    Hematocrit 36.2 (L) 42 - 52 %    MCV 95.3 78 - 100 FL    MCH 27.9 27 - 31 PG    MCHC 29.3 (L) 32.0 - 36.0 %    RDW 16.0 (H) 11.7 - 14.9 %    Platelets 333 140 - 440 K/CU MM    MPV 10.3 7.5 - 11.1 FL    Differential Type 
Collection Time: 06/13/24  7:36 PM   Result Value Ref Range    POC Glucose 180 (H) 70 - 99 MG/DL   Magnesium    Collection Time: 06/13/24  7:57 PM   Result Value Ref Range    Magnesium 2.1 1.8 - 2.4 mg/dl   Potassium    Collection Time: 06/13/24  7:57 PM   Result Value Ref Range    Potassium 5.4 (H) 3.5 - 5.1 MMOL/L   POCT Glucose    Collection Time: 06/14/24  7:39 AM   Result Value Ref Range    POC Glucose 150 (H) 70 - 99 MG/DL   POCT Glucose    Collection Time: 06/14/24 11:43 AM   Result Value Ref Range    POC Glucose 218 (H) 70 - 99 MG/DL        Imaging/Diagnostics Last 24 Hours   No results found.    Electronically signed by ERNESTO Powell CNP on 6/14/2024 at 5:34 PM   
participation at this time.   Occupational Therapy Plan  Times Per Week: 3+         Goals:  Pt will complete all aspects of bed mobility for EOB/OOB ADLs w/ supervision.  Pt will complete UB ADLs w/ supervision.  Pt will complete LB ADLs w/ supervision.  Pt will complete all functional transfers to and from bed, chair, toilet, shower chair w/ supervision.  Pt will ambulate functional household distance w/ supervision.  Pt will complete all aspects of toileting task w/ supervision.  Pt will perform therex/theract in order to increase strength and functional activity tolerance necessary for increased independence w/ ADL routine.    Pt goal: go home, get stronger  Time Frame for STGs: discharge    Equipment: Continue to assess at next LOC    Time:   Time in: 1309  Time out: 1329  Total time: 20  Timed treatment minutes: 10        Electronically signed by:      SRINATH Browne/L  [id number]

## 2024-06-19 ENCOUNTER — TELEPHONE (OUTPATIENT)
Dept: CARDIOLOGY CLINIC | Age: 79
End: 2024-06-19

## 2024-06-19 NOTE — TELEPHONE ENCOUNTER
Aissatou @ [facility] called asking if this pt should be wearing a life vest, he was seen in the er dr wong saw him and they didn't get any notes from er on his care once he got back   [phone] this is Aissatou's direct number

## 2024-06-19 NOTE — TELEPHONE ENCOUNTER
Returned call to Aissatou at [facility]. Per notes, patient was discharged from the hospital with a Lifevest. Aissatou will confirm patient has Lifevest and assess his willingness to wear Lifevest.  She will call back for instructions if patient is willing to wear Lifevest.

## 2024-07-01 ENCOUNTER — HOSPITAL ENCOUNTER (INPATIENT)
Age: 79
LOS: 3 days | Discharge: HOME | End: 2024-07-04
Attending: EMERGENCY MEDICINE | Admitting: STUDENT IN AN ORGANIZED HEALTH CARE EDUCATION/TRAINING PROGRAM
Payer: OTHER GOVERNMENT

## 2024-07-01 ENCOUNTER — APPOINTMENT (OUTPATIENT)
Dept: GENERAL RADIOLOGY | Age: 79
End: 2024-07-01
Payer: OTHER GOVERNMENT

## 2024-07-01 DIAGNOSIS — R09.02 HYPOXEMIA: Primary | ICD-10-CM

## 2024-07-01 DIAGNOSIS — J96.01 ACUTE RESPIRATORY FAILURE WITH HYPOXIA AND HYPERCARBIA (HCC): ICD-10-CM

## 2024-07-01 DIAGNOSIS — J96.02 ACUTE RESPIRATORY FAILURE WITH HYPOXIA AND HYPERCARBIA (HCC): ICD-10-CM

## 2024-07-01 LAB
ALBUMIN SERPL-MCNC: 3.9 GM/DL (ref 3.4–5)
ALP BLD-CCNC: 108 IU/L (ref 40–128)
ALT SERPL-CCNC: 7 U/L (ref 10–40)
ANION GAP SERPL CALCULATED.3IONS-SCNC: 7 MMOL/L (ref 7–16)
AST SERPL-CCNC: 15 IU/L (ref 15–37)
BASE EXCESS MIXED: 11.4 (ref 0–3)
BASE EXCESS MIXED: 12.8 (ref 0–3)
BASOPHILS ABSOLUTE: 0 K/CU MM
BASOPHILS RELATIVE PERCENT: 0.2 % (ref 0–1)
BILIRUB SERPL-MCNC: 0.2 MG/DL (ref 0–1)
BUN SERPL-MCNC: 34 MG/DL (ref 6–23)
CALCIUM SERPL-MCNC: 8.8 MG/DL (ref 8.3–10.6)
CHLORIDE BLD-SCNC: 99 MMOL/L (ref 99–110)
CO2: 38 MMOL/L (ref 21–32)
COMMENT: ABNORMAL
COMMENT: ABNORMAL
CREAT SERPL-MCNC: 1.2 MG/DL (ref 0.9–1.3)
DIFFERENTIAL TYPE: ABNORMAL
EKG ATRIAL RATE: 267 BPM
EKG ATRIAL RATE: 77 BPM
EKG DIAGNOSIS: NORMAL
EKG DIAGNOSIS: NORMAL
EKG P AXIS: 54 DEGREES
EKG P AXIS: 56 DEGREES
EKG P-R INTERVAL: 178 MS
EKG Q-T INTERVAL: 410 MS
EKG Q-T INTERVAL: 420 MS
EKG QRS DURATION: 130 MS
EKG QRS DURATION: 134 MS
EKG QTC CALCULATION (BAZETT): 455 MS
EKG QTC CALCULATION (BAZETT): 475 MS
EKG R AXIS: -78 DEGREES
EKG R AXIS: -79 DEGREES
EKG T AXIS: 198 DEGREES
EKG T AXIS: 96 DEGREES
EKG VENTRICULAR RATE: 74 BPM
EKG VENTRICULAR RATE: 77 BPM
EOSINOPHILS ABSOLUTE: 0 K/CU MM
EOSINOPHILS RELATIVE PERCENT: 0.3 % (ref 0–3)
GFR, ESTIMATED: 62 ML/MIN/1.73M2
GLUCOSE BLD-MCNC: 193 MG/DL (ref 70–99)
GLUCOSE SERPL-MCNC: 207 MG/DL (ref 70–99)
HCO3 VENOUS: 43.8 MMOL/L (ref 22–29)
HCO3 VENOUS: 44.1 MMOL/L (ref 22–29)
HCT VFR BLD CALC: 36.9 % (ref 42–52)
HEMOGLOBIN: 10.6 GM/DL (ref 13.5–18)
IMMATURE NEUTROPHIL %: 0.3 % (ref 0–0.43)
LYMPHOCYTES ABSOLUTE: 0.5 K/CU MM
LYMPHOCYTES RELATIVE PERCENT: 5.3 % (ref 24–44)
MCH RBC QN AUTO: 28.2 PG (ref 27–31)
MCHC RBC AUTO-ENTMCNC: 28.7 % (ref 32–36)
MCV RBC AUTO: 98.1 FL (ref 78–100)
MONOCYTES ABSOLUTE: 0.7 K/CU MM
MONOCYTES RELATIVE PERCENT: 6.5 % (ref 0–4)
NEUTROPHILS ABSOLUTE: 8.9 K/CU MM
NEUTROPHILS RELATIVE PERCENT: 87.4 % (ref 36–66)
NUCLEATED RBC %: 0 %
O2 SAT, VEN: 82.2 % (ref 50–70)
O2 SAT, VEN: 94.3 % (ref 50–70)
PCO2 VENOUS: 107 MMHG (ref 41–51)
PCO2 VENOUS: 96 MMHG (ref 41–51)
PDW BLD-RTO: 16.1 % (ref 11.7–14.9)
PH VENOUS: 7.22 (ref 7.32–7.43)
PH VENOUS: 7.27 (ref 7.32–7.43)
PLATELET # BLD: 233 K/CU MM (ref 140–440)
PMV BLD AUTO: 11 FL (ref 7.5–11.1)
PO2 VENOUS: 51 MMHG (ref 28–48)
PO2 VENOUS: 92 MMHG (ref 28–48)
POTASSIUM SERPL-SCNC: 4.7 MMOL/L (ref 3.5–5.1)
PRO-BNP: 3750 PG/ML
RBC # BLD: 3.76 M/CU MM (ref 4.6–6.2)
SODIUM BLD-SCNC: 144 MMOL/L (ref 135–145)
TOTAL IMMATURE NEUTOROPHIL: 0.03 K/CU MM
TOTAL NUCLEATED RBC: 0 K/CU MM
TOTAL PROTEIN: 7.7 GM/DL (ref 6.4–8.2)
TROPONIN, HIGH SENSITIVITY: 57 NG/L (ref 0–22)
TROPONIN, HIGH SENSITIVITY: 60 NG/L (ref 0–22)
WBC # BLD: 10.2 K/CU MM (ref 4–10.5)

## 2024-07-01 PROCEDURE — 6360000002 HC RX W HCPCS: Performed by: EMERGENCY MEDICINE

## 2024-07-01 PROCEDURE — 6370000000 HC RX 637 (ALT 250 FOR IP): Performed by: EMERGENCY MEDICINE

## 2024-07-01 PROCEDURE — 84484 ASSAY OF TROPONIN QUANT: CPT

## 2024-07-01 PROCEDURE — 94660 CPAP INITIATION&MGMT: CPT

## 2024-07-01 PROCEDURE — 71045 X-RAY EXAM CHEST 1 VIEW: CPT

## 2024-07-01 PROCEDURE — 93010 ELECTROCARDIOGRAM REPORT: CPT | Performed by: INTERNAL MEDICINE

## 2024-07-01 PROCEDURE — 82962 GLUCOSE BLOOD TEST: CPT

## 2024-07-01 PROCEDURE — 2580000003 HC RX 258: Performed by: EMERGENCY MEDICINE

## 2024-07-01 PROCEDURE — 80048 BASIC METABOLIC PNL TOTAL CA: CPT

## 2024-07-01 PROCEDURE — 94640 AIRWAY INHALATION TREATMENT: CPT

## 2024-07-01 PROCEDURE — 80053 COMPREHEN METABOLIC PANEL: CPT

## 2024-07-01 PROCEDURE — 2140000000 HC CCU INTERMEDIATE R&B

## 2024-07-01 PROCEDURE — 99285 EMERGENCY DEPT VISIT HI MDM: CPT

## 2024-07-01 PROCEDURE — 5A09357 ASSISTANCE WITH RESPIRATORY VENTILATION, LESS THAN 24 CONSECUTIVE HOURS, CONTINUOUS POSITIVE AIRWAY PRESSURE: ICD-10-PCS | Performed by: STUDENT IN AN ORGANIZED HEALTH CARE EDUCATION/TRAINING PROGRAM

## 2024-07-01 PROCEDURE — 94761 N-INVAS EAR/PLS OXIMETRY MLT: CPT

## 2024-07-01 PROCEDURE — 6370000000 HC RX 637 (ALT 250 FOR IP): Performed by: STUDENT IN AN ORGANIZED HEALTH CARE EDUCATION/TRAINING PROGRAM

## 2024-07-01 PROCEDURE — 94664 DEMO&/EVAL PT USE INHALER: CPT

## 2024-07-01 PROCEDURE — 82805 BLOOD GASES W/O2 SATURATION: CPT

## 2024-07-01 PROCEDURE — 85025 COMPLETE CBC W/AUTO DIFF WBC: CPT

## 2024-07-01 PROCEDURE — 36415 COLL VENOUS BLD VENIPUNCTURE: CPT

## 2024-07-01 PROCEDURE — 2700000000 HC OXYGEN THERAPY PER DAY

## 2024-07-01 PROCEDURE — 96374 THER/PROPH/DIAG INJ IV PUSH: CPT

## 2024-07-01 PROCEDURE — 93005 ELECTROCARDIOGRAM TRACING: CPT | Performed by: EMERGENCY MEDICINE

## 2024-07-01 PROCEDURE — 83880 ASSAY OF NATRIURETIC PEPTIDE: CPT

## 2024-07-01 RX ORDER — POLYETHYLENE GLYCOL 3350 17 G/17G
17 POWDER, FOR SOLUTION ORAL DAILY PRN
Status: DISCONTINUED | OUTPATIENT
Start: 2024-07-01 | End: 2024-07-04 | Stop reason: HOSPADM

## 2024-07-01 RX ORDER — ONDANSETRON 4 MG/1
4 TABLET, ORALLY DISINTEGRATING ORAL EVERY 8 HOURS PRN
Status: DISCONTINUED | OUTPATIENT
Start: 2024-07-01 | End: 2024-07-04 | Stop reason: HOSPADM

## 2024-07-01 RX ORDER — ACETAMINOPHEN 325 MG/1
650 TABLET ORAL EVERY 6 HOURS PRN
Status: DISCONTINUED | OUTPATIENT
Start: 2024-07-01 | End: 2024-07-04 | Stop reason: HOSPADM

## 2024-07-01 RX ORDER — IPRATROPIUM BROMIDE AND ALBUTEROL SULFATE 2.5; .5 MG/3ML; MG/3ML
1 SOLUTION RESPIRATORY (INHALATION)
Status: DISCONTINUED | OUTPATIENT
Start: 2024-07-01 | End: 2024-07-04 | Stop reason: HOSPADM

## 2024-07-01 RX ORDER — POTASSIUM CHLORIDE 7.45 MG/ML
10 INJECTION INTRAVENOUS PRN
Status: DISCONTINUED | OUTPATIENT
Start: 2024-07-01 | End: 2024-07-04 | Stop reason: HOSPADM

## 2024-07-01 RX ORDER — GLUCAGON 1 MG/ML
1 KIT INJECTION PRN
Status: DISCONTINUED | OUTPATIENT
Start: 2024-07-01 | End: 2024-07-04 | Stop reason: HOSPADM

## 2024-07-01 RX ORDER — MONTELUKAST SODIUM 10 MG/1
10 TABLET ORAL NIGHTLY
Status: DISCONTINUED | OUTPATIENT
Start: 2024-07-01 | End: 2024-07-04 | Stop reason: HOSPADM

## 2024-07-01 RX ORDER — DEXTROSE MONOHYDRATE 100 MG/ML
INJECTION, SOLUTION INTRAVENOUS CONTINUOUS PRN
Status: DISCONTINUED | OUTPATIENT
Start: 2024-07-01 | End: 2024-07-04 | Stop reason: HOSPADM

## 2024-07-01 RX ORDER — INSULIN LISPRO 100 [IU]/ML
0-4 INJECTION, SOLUTION INTRAVENOUS; SUBCUTANEOUS
Status: DISCONTINUED | OUTPATIENT
Start: 2024-07-01 | End: 2024-07-04

## 2024-07-01 RX ORDER — ASPIRIN 81 MG/1
TABLET, CHEWABLE ORAL
Status: DISPENSED
Start: 2024-07-01 | End: 2024-07-02

## 2024-07-01 RX ORDER — ASPIRIN 81 MG/1
81 TABLET, CHEWABLE ORAL DAILY
Status: DISCONTINUED | OUTPATIENT
Start: 2024-07-02 | End: 2024-07-04 | Stop reason: HOSPADM

## 2024-07-01 RX ORDER — SODIUM CHLORIDE 9 MG/ML
INJECTION, SOLUTION INTRAVENOUS PRN
Status: DISCONTINUED | OUTPATIENT
Start: 2024-07-01 | End: 2024-07-04 | Stop reason: HOSPADM

## 2024-07-01 RX ORDER — BUDESONIDE AND FORMOTEROL FUMARATE DIHYDRATE 160; 4.5 UG/1; UG/1
2 AEROSOL RESPIRATORY (INHALATION)
Status: DISCONTINUED | OUTPATIENT
Start: 2024-07-01 | End: 2024-07-04 | Stop reason: HOSPADM

## 2024-07-01 RX ORDER — ACETAMINOPHEN 650 MG/1
650 SUPPOSITORY RECTAL EVERY 6 HOURS PRN
Status: DISCONTINUED | OUTPATIENT
Start: 2024-07-01 | End: 2024-07-04 | Stop reason: HOSPADM

## 2024-07-01 RX ORDER — LOSARTAN POTASSIUM 25 MG/1
25 TABLET ORAL DAILY
Status: DISCONTINUED | OUTPATIENT
Start: 2024-07-02 | End: 2024-07-04 | Stop reason: HOSPADM

## 2024-07-01 RX ORDER — MAGNESIUM SULFATE IN WATER 40 MG/ML
2000 INJECTION, SOLUTION INTRAVENOUS PRN
Status: DISCONTINUED | OUTPATIENT
Start: 2024-07-01 | End: 2024-07-04 | Stop reason: HOSPADM

## 2024-07-01 RX ORDER — ONDANSETRON 2 MG/ML
4 INJECTION INTRAMUSCULAR; INTRAVENOUS EVERY 6 HOURS PRN
Status: DISCONTINUED | OUTPATIENT
Start: 2024-07-01 | End: 2024-07-04 | Stop reason: HOSPADM

## 2024-07-01 RX ORDER — GUAIFENESIN 600 MG/1
600 TABLET, EXTENDED RELEASE ORAL 2 TIMES DAILY
Status: DISCONTINUED | OUTPATIENT
Start: 2024-07-01 | End: 2024-07-04 | Stop reason: HOSPADM

## 2024-07-01 RX ORDER — POTASSIUM CHLORIDE 20 MEQ/1
40 TABLET, EXTENDED RELEASE ORAL PRN
Status: DISCONTINUED | OUTPATIENT
Start: 2024-07-01 | End: 2024-07-04 | Stop reason: HOSPADM

## 2024-07-01 RX ORDER — ENOXAPARIN SODIUM 100 MG/ML
40 INJECTION SUBCUTANEOUS DAILY
Status: DISCONTINUED | OUTPATIENT
Start: 2024-07-02 | End: 2024-07-04 | Stop reason: HOSPADM

## 2024-07-01 RX ORDER — INSULIN LISPRO 100 [IU]/ML
0-4 INJECTION, SOLUTION INTRAVENOUS; SUBCUTANEOUS NIGHTLY
Status: DISCONTINUED | OUTPATIENT
Start: 2024-07-01 | End: 2024-07-04

## 2024-07-01 RX ORDER — IPRATROPIUM BROMIDE AND ALBUTEROL SULFATE 2.5; .5 MG/3ML; MG/3ML
2 SOLUTION RESPIRATORY (INHALATION) ONCE
Status: COMPLETED | OUTPATIENT
Start: 2024-07-01 | End: 2024-07-01

## 2024-07-01 RX ORDER — ATORVASTATIN CALCIUM 10 MG/1
10 TABLET, FILM COATED ORAL DAILY
Status: DISCONTINUED | OUTPATIENT
Start: 2024-07-02 | End: 2024-07-04 | Stop reason: HOSPADM

## 2024-07-01 RX ORDER — SODIUM CHLORIDE 0.9 % (FLUSH) 0.9 %
5-40 SYRINGE (ML) INJECTION EVERY 12 HOURS SCHEDULED
Status: DISCONTINUED | OUTPATIENT
Start: 2024-07-01 | End: 2024-07-04 | Stop reason: HOSPADM

## 2024-07-01 RX ORDER — LANOLIN ALCOHOL/MO/W.PET/CERES
1000 CREAM (GRAM) TOPICAL DAILY
Status: DISCONTINUED | OUTPATIENT
Start: 2024-07-02 | End: 2024-07-04 | Stop reason: HOSPADM

## 2024-07-01 RX ORDER — SODIUM CHLORIDE 0.9 % (FLUSH) 0.9 %
5-40 SYRINGE (ML) INJECTION PRN
Status: DISCONTINUED | OUTPATIENT
Start: 2024-07-01 | End: 2024-07-04 | Stop reason: HOSPADM

## 2024-07-01 RX ORDER — FUROSEMIDE 20 MG/1
40 TABLET ORAL 2 TIMES DAILY
Status: DISCONTINUED | OUTPATIENT
Start: 2024-07-01 | End: 2024-07-04 | Stop reason: HOSPADM

## 2024-07-01 RX ORDER — PANTOPRAZOLE SODIUM 40 MG/1
40 TABLET, DELAYED RELEASE ORAL
Status: DISCONTINUED | OUTPATIENT
Start: 2024-07-02 | End: 2024-07-04 | Stop reason: HOSPADM

## 2024-07-01 RX ADMIN — IPRATROPIUM BROMIDE AND ALBUTEROL SULFATE 2 DOSE: 2.5; .5 SOLUTION RESPIRATORY (INHALATION) at 15:06

## 2024-07-01 RX ADMIN — WATER 125 MG: 1 INJECTION INTRAMUSCULAR; INTRAVENOUS; SUBCUTANEOUS at 15:13

## 2024-07-01 RX ADMIN — IPRATROPIUM BROMIDE AND ALBUTEROL SULFATE 1 DOSE: 2.5; .5 SOLUTION RESPIRATORY (INHALATION) at 19:40

## 2024-07-01 RX ADMIN — BUDESONIDE AND FORMOTEROL FUMARATE DIHYDRATE 2 PUFF: 160; 4.5 AEROSOL RESPIRATORY (INHALATION) at 19:48

## 2024-07-01 ASSESSMENT — LIFESTYLE VARIABLES
HOW OFTEN DO YOU HAVE A DRINK CONTAINING ALCOHOL: NEVER
HOW MANY STANDARD DRINKS CONTAINING ALCOHOL DO YOU HAVE ON A TYPICAL DAY: PATIENT DOES NOT DRINK

## 2024-07-01 ASSESSMENT — PAIN - FUNCTIONAL ASSESSMENT
PAIN_FUNCTIONAL_ASSESSMENT: 0-10
PAIN_FUNCTIONAL_ASSESSMENT: 0-10

## 2024-07-01 ASSESSMENT — PAIN SCALES - GENERAL: PAINLEVEL_OUTOF10: 4

## 2024-07-01 ASSESSMENT — PAIN DESCRIPTION - LOCATION: LOCATION: CHEST

## 2024-07-01 ASSESSMENT — PAIN DESCRIPTION - DESCRIPTORS: DESCRIPTORS: ACHING

## 2024-07-01 NOTE — H&P
V2.0  History and Physical      Name:  Billy Ozuna /Age/Sex: 1945  (79 y.o. male)   MRN & CSN:  [id number] & [id number] Encounter Date/Time: 2024 4:02 PM EDT   [facility]  [facility] PCP: Kera Monique (Inactive)       Hospital Day: 1      History of Present Illness:     Chief Complaint: SOB    Billy Ozuna is a 79 y.o. male with PMH of COPD/chronic respiratory failure on 4 L NC O2, nonischemic cardiomyopathy/chronic systolic heart failure, T2DM, hypertension, hyperlipidemia, depression  who presents with fall and chest pain.  Pt came from home as he was discharged from skilled nursing facility recently. Pt had a fall after getting up from chair as he lost balance. Pt was complaining of SOB, and cough. He reports URI symptoms of runny nose last week. Pt was given ASA and nitro by EMS and his BP dropped to the 80's.     At ED, pt was afebrile, SBP 80's; placed on NRB. Labs  Cr 1.2, troponin 60, Hgb 10.6, WBC 10.2, VBG pH 7.22, pCO2 102. CXR no acute changes. Pt received breathing tx and solu-medrol 125 mcg. Discussed with ED, will admit pt to step down  for Acute hypoxic hypercapnic respiratory failure  workup and management.       Assessment and Plan:   Acute hypoxic hyperpcanic respiratory failure 2/2 COPD exacerbation likely triggered by viral infection  Pt has diagnosis COPD  Hx of chronic resp failure on 4L   P/w SOB, chest pain  - start BIPAP  - breathing tx, steroids  - monitor VBG  - resume home meds    Chronic systolic CHF  ECHO 2024 EF of 10 to 15%  No sign of volume overload.   - Continue home Toprol-XL and Jardiance  - Resume Home lasix  - Continue ARB    Elevated troponin. Trending down. No ischemic changes on ECG. No chest pain  - monitor on tele       Type 2 diabetes  - Sliding scale insulin  - Hypoglycemia protocol  - Point-of-care glucose checks     GERD  Protonix      Code Status: Code status was discussed in detail with Patient/POA; verbalized understanding of the  Abused: No   Housing Stability: Low Risk  (6/8/2024)    Housing Stability Vital Sign     Unable to Pay for Housing in the Last Year: No     Number of Places Lived in the Last Year: 1     Unstable Housing in the Last Year: No       Medications:   Medications:    aspirin        [START ON 7/2/2024] aspirin  81 mg Oral Daily    [START ON 7/2/2024] atorvastatin  10 mg Oral Daily    budesonide-formoterol  2 puff Inhalation BID RT    [START ON 7/2/2024] cyanocobalamin  1,000 mcg Oral Daily    [START ON 7/2/2024] empagliflozin  10 mg Oral Daily    furosemide  40 mg Oral BID    guaiFENesin  600 mg Oral BID    ipratropium 0.5 mg-albuterol 2.5 mg  1 Dose Inhalation Q4H RT    [START ON 7/2/2024] losartan  25 mg Oral Daily    [START ON 7/2/2024] metoprolol succinate  75 mg Oral Daily    montelukast  10 mg Oral Nightly    [START ON 7/2/2024] pantoprazole  40 mg Oral QAM AC    insulin lispro  0-4 Units SubCUTAneous TID WC    insulin lispro  0-4 Units SubCUTAneous Nightly    sodium chloride flush  5-40 mL IntraVENous 2 times per day    [START ON 7/2/2024] enoxaparin  40 mg SubCUTAneous Daily    [START ON 7/2/2024] methylPREDNISolone  40 mg IntraVENous Q12H      Infusions:    dextrose      sodium chloride       PRN Meds: aspirin, ,   glucose, 4 tablet, PRN  dextrose bolus, 125 mL, PRN   Or  dextrose bolus, 250 mL, PRN  glucagon (rDNA), 1 mg, PRN  dextrose, , Continuous PRN  sodium chloride flush, 5-40 mL, PRN  sodium chloride, , PRN  potassium chloride, 40 mEq, PRN   Or  potassium alternative oral replacement, 40 mEq, PRN   Or  potassium chloride, 10 mEq, PRN  magnesium sulfate, 2,000 mg, PRN  ondansetron, 4 mg, Q8H PRN   Or  ondansetron, 4 mg, Q6H PRN  polyethylene glycol, 17 g, Daily PRN  acetaminophen, 650 mg, Q6H PRN   Or  acetaminophen, 650 mg, Q6H PRN        Labs      CBC:   Recent Labs     07/01/24  1440   WBC 10.2   HGB 10.6*        BMP:    Recent Labs     07/01/24  1440      K 4.7   CL 99   CO2 38*   BUN 34*

## 2024-07-01 NOTE — CARE COORDINATION
CM review of pt chart for discharge needs. Pt has [employer] medicare and [facility] benefits. Pt was at [facility] when discharged from hospital on 6/17/24, discharge two days ago back home. Pt returns to ER today due to fall and c/o CP.    CM visited pt who is alert and oriented. CM discussed possible decline health indicating need to return to SNF, pt agrees. CM asked pt if facility discussed possible LTC or assisted living. Pt stated he was not ready to give up his home, car and income, also said he could not afford the cost os assisted living either.   CM did suggest he talk with his dtr/MPOA to plan for when he can no longer take care of self and what his next step needs to be for his own safety.    Pt is willing to return to SNF for additional rehab.   TOMY spoke with Dr Koehler, ER provider, POC is for admission. CM to follow for discharge plan.    Call to [facility] # [phone], left VM. CINDYRN/TOMY

## 2024-07-01 NOTE — ED NOTES
Dr. Novao notified of patients increase in agitation and confusion. Patient is alert and oriented to timer and place.

## 2024-07-01 NOTE — PROGRESS NOTES
Patient is now on BiPAP 15/5/40%. He is currently sleeping while on the machine. No increase WOB at this time. Will continue to monitor.

## 2024-07-01 NOTE — ED PROVIDER NOTES
I did not see this patient, I signed up in error. Please see attending note.     CHUY Lynn James A, PA-C  07/01/24 [id number]

## 2024-07-01 NOTE — ED PROVIDER NOTES
Emergency Department Encounter    Patient: Billy Ozuna  MRN: [id number]  : 1945  Date of Evaluation: 2024  ED Provider:  Eddie Koehler MD    Triage Chief Complaint:   Chest Pain (States chest pain and feeling disoriented. Patient was recently discharged from Rehab at [place]. )    [place]:  Billy Ozuna is a 79 y.o. male history of acid reflux, anemia, COPD on 4 L of [facility], type 2 diabetes, megacolon and that presents coming into the emergency room from a local nursing home for acute chest pain associated with shortness of breath status post fall.  Patient states history of from a chair and he lost his balance and fell down to the floor.  He does not believe he sustained any injury from the fall as he has no acute pain after the fall.  The chest pain developed sometime later.  He states he was having some shortness of breath with this.  He does not admit to fever or chills or leg pain or leg swelling or abdominal pain or lightheadedness or dizziness.  He states he feels somewhat dazed.  Patient received nitroglycerin aspirin en route to ER by EMS.  His blood pressure dropped on arrival it is 82/77 and it improved to 99 systolic with improvement of his mentation.  Patient denies headache or neck pain or back pain    ROS - see HPI, below listed is current ROS at time of my eval:  General:  No fevers, no chills, no weakness  Eyes:  No recent vison changes, no discharge  ENT:  No sore throat, no nasal congestion, no hearing changes  Cardiovascular: Chest pain, hypotension  Respiratory: Shortness of breath  Gastrointestinal:  No pain, no nausea, no vomiting, no diarrhea  Musculoskeletal:  No muscle pain, no joint pain  Skin:  No rash, no pruritis, no easy bruising  Neurologic:  No speech problems, no headache, no extremity numbness, no extremity tingling, no extremity weakness  Psychiatric:  No anxiety  Genitourinary:  No dysuria, no hematuria  Endocrine:  No unexpected  Lauro 7.22 (L) 7.32 - 7.43    pCO2, Lauro 107 (H) 41 - 51 mmHG    PO2, Lauro 92 (H) 28 - 48 mmHG    Base Exc, Mixed 11.4 (H) 0 - 3.0    HCO3, Venous 43.8 (H) 22 - 29 MMOL/L    O2 Sat, Lauro 94.3 (H) 50 - 70 %    Comment VBG    EKG 12 Lead   Result Value Ref Range    Ventricular Rate 77 BPM    Atrial Rate 77 BPM    P-R Interval 178 ms    QRS Duration 134 ms    Q-T Interval 420 ms    QTc Calculation (Bazett) 475 ms    P Axis 56 degrees    R Axis -78 degrees    T Axis 96 degrees    Diagnosis       Normal sinus rhythm  Left axis deviation  Right bundle branch block  T wave abnormality, consider lateral ischemia  Abnormal ECG  When compared with ECG of 01-JUL-2024 14:38,  Sinus rhythm has replaced Atrial flutter  T wave inversion no longer evident in Inferior leads  T wave inversion less evident in Lateral leads     EKG 12 Lead   Result Value Ref Range    Ventricular Rate 74 BPM    Atrial Rate 267 BPM    QRS Duration 130 ms    Q-T Interval 410 ms    QTc Calculation (Bazett) 455 ms    P Axis 54 degrees    R Axis -79 degrees    T Axis 198 degrees    Diagnosis       Atrial flutter with variable AV block  Left axis deviation  Right bundle branch block  T wave abnormality, consider inferolateral ischemia  When compared with ECG of 11-JUN-2024 10:50,  Atrial flutter has replaced Sinus rhythm  ST no longer elevated in Inferior leads  T wave inversion now evident in Inferior leads  T wave inversion more evident in Lateral leads        Radiographs (if obtained):  Radiologist's Report Reviewed:  No results found.    EKG (if obtained): (All EKG's are interpreted by myself in the absence of a cardiologist)      MDM:  Elderly frail looking gentleman comes into emergency room status post fall subsequent developed chest pain or shortness of breath.  Chest pain or shortness of breath seems to be improving.  Patient received aspirin and nitroglycerin x 1 dose which dropped his blood pressure.  On arrival he is 82/70 but blood pressure is

## 2024-07-01 NOTE — ED NOTES
ED TO INPATIENT SBAR HANDOFF    Patient Name: Billy Ozuna   :  1945  79 y.o.   Preferred Name  Billy  Family/Caregiver Present no   Restraints no   C-SSRS: Risk of Suicide: No Risk  Sitter no   Sepsis Risk Score        Situation  Chief Complaint   Patient presents with    Chest Pain     States chest pain and feeling disoriented. Patient was recently discharged from Rehab at [place].      Brief Description of Patient's Condition:   Patient to ER with c/o of chest pain that started after a fall at home. Patient A & O x4. Patient currently on 4 Liters via Nasal Cannula which is baseline for patient.   Mental Status: oriented and alert  Arrived from: home    Imaging:   XR CHEST PORTABLE   Final Result   No acute cardiopulmonary abnormality.         Electronically signed by Hector Guaman        Abnormal labs:   Abnormal Labs Reviewed   COMPREHENSIVE METABOLIC PANEL - Abnormal; Notable for the following components:       Result Value    CO2 38 (*)     Glucose 207 (*)     BUN 34 (*)     ALT 7 (*)     All other components within normal limits   TROPONIN - Abnormal; Notable for the following components:    Troponin, High Sensitivity 60 (*)     All other components within normal limits   CBC WITH AUTO DIFFERENTIAL - Abnormal; Notable for the following components:    RBC 3.76 (*)     Hemoglobin 10.6 (*)     Hematocrit 36.9 (*)     MCHC 28.7 (*)     RDW 16.1 (*)     Neutrophils % 87.4 (*)     Lymphocytes % 5.3 (*)     Monocytes % 6.5 (*)     All other components within normal limits   BLOOD GAS, VENOUS - Abnormal; Notable for the following components:    pH, Lauro 7.22 (*)     pCO2, Lauro 107 (*)     PO2, Lauro 92 (*)     Base Exc, Mixed 11.4 (*)     HCO3, Venous 43.8 (*)     O2 Sat, Lauro 94.3 (*)     All other components within normal limits        Background  History:   Past Medical History:   Diagnosis Date    Acid reflux     Anemia     \"dx age 12    COPD (chronic obstructive pulmonary disease) (HCC)     [facility]

## 2024-07-01 NOTE — ED TRIAGE NOTES
Patient to rm. 15 via EMS with c/o chest pain that started after falling at home. Patient states he was discharged 2 days ago from [facility] for rehab. Patient also states he feels disoriented but is currently A & O x 4. Patient was given 324 mg. ASA and nitro per EMS. Per EMS, patients blood pressure was normal prior to administration of nitro. Patients blood pressure on arrival to ED is 82/70. Dr. Koehler notified and at bedside. Resps even and unlabored.

## 2024-07-01 NOTE — ED NOTES
Pt c/o of shortness of breath. Pt unable to get up mucous. Pt's oxygen saturation dropped to high 70's. Dr. Koehler notified and at bed side.   Pt placed on NRB per Dr. Koehler. Respiratory contacted and at bed side. X-ray notified of stat chest x-ray.

## 2024-07-02 LAB
ALBUMIN SERPL-MCNC: 4 GM/DL (ref 3.4–5)
ALP BLD-CCNC: 115 IU/L (ref 40–128)
ALT SERPL-CCNC: 8 U/L (ref 10–40)
ANION GAP SERPL CALCULATED.3IONS-SCNC: 10 MMOL/L (ref 7–16)
ANION GAP SERPL CALCULATED.3IONS-SCNC: 10 MMOL/L (ref 7–16)
AST SERPL-CCNC: 15 IU/L (ref 15–37)
BASE EXCESS MIXED: 10.6 (ref 0–3)
BASE EXCESS MIXED: 12.5 (ref 0–3)
BASOPHILS ABSOLUTE: 0 K/CU MM
BASOPHILS ABSOLUTE: 0 K/CU MM
BASOPHILS RELATIVE PERCENT: 0 % (ref 0–1)
BASOPHILS RELATIVE PERCENT: 0.2 % (ref 0–1)
BILIRUB SERPL-MCNC: 0.2 MG/DL (ref 0–1)
BUN SERPL-MCNC: 36 MG/DL (ref 6–23)
BUN SERPL-MCNC: 44 MG/DL (ref 6–23)
CALCIUM SERPL-MCNC: 9 MG/DL (ref 8.3–10.6)
CALCIUM SERPL-MCNC: 9.2 MG/DL (ref 8.3–10.6)
CHLORIDE BLD-SCNC: 100 MMOL/L (ref 99–110)
CHLORIDE BLD-SCNC: 97 MMOL/L (ref 99–110)
CO2: 35 MMOL/L (ref 21–32)
CO2: 35 MMOL/L (ref 21–32)
COMMENT: ABNORMAL
COMMENT: ABNORMAL
CREAT SERPL-MCNC: 1.2 MG/DL (ref 0.9–1.3)
CREAT SERPL-MCNC: 1.3 MG/DL (ref 0.9–1.3)
DIFFERENTIAL TYPE: ABNORMAL
DIFFERENTIAL TYPE: ABNORMAL
EOSINOPHILS ABSOLUTE: 0 K/CU MM
EOSINOPHILS ABSOLUTE: 0 K/CU MM
EOSINOPHILS RELATIVE PERCENT: 0 % (ref 0–3)
EOSINOPHILS RELATIVE PERCENT: 0 % (ref 0–3)
GFR, ESTIMATED: 57 ML/MIN/1.73M2
GFR, ESTIMATED: 62 ML/MIN/1.73M2
GLUCOSE BLD-MCNC: 155 MG/DL (ref 70–99)
GLUCOSE BLD-MCNC: 237 MG/DL (ref 70–99)
GLUCOSE BLD-MCNC: 283 MG/DL (ref 70–99)
GLUCOSE BLD-MCNC: 98 MG/DL (ref 70–99)
GLUCOSE SERPL-MCNC: 211 MG/DL (ref 70–99)
GLUCOSE SERPL-MCNC: 235 MG/DL (ref 70–99)
HCO3 VENOUS: 40.3 MMOL/L (ref 22–29)
HCO3 VENOUS: 44.6 MMOL/L (ref 22–29)
HCT VFR BLD CALC: 37.8 % (ref 42–52)
HCT VFR BLD CALC: 39.5 % (ref 42–52)
HEMOGLOBIN: 10.7 GM/DL (ref 13.5–18)
HEMOGLOBIN: 11.2 GM/DL (ref 13.5–18)
IMMATURE NEUTROPHIL %: 0.3 % (ref 0–0.43)
IMMATURE NEUTROPHIL %: 0.4 % (ref 0–0.43)
L PNEUMO AG UR QL IA: NEGATIVE
LACTATE: 1.1 MMOL/L (ref 0.5–1.9)
LYMPHOCYTES ABSOLUTE: 0.3 K/CU MM
LYMPHOCYTES ABSOLUTE: 0.5 K/CU MM
LYMPHOCYTES RELATIVE PERCENT: 4.6 % (ref 24–44)
LYMPHOCYTES RELATIVE PERCENT: 5.1 % (ref 24–44)
MCH RBC QN AUTO: 28 PG (ref 27–31)
MCH RBC QN AUTO: 28.2 PG (ref 27–31)
MCHC RBC AUTO-ENTMCNC: 28.3 % (ref 32–36)
MCHC RBC AUTO-ENTMCNC: 28.4 % (ref 32–36)
MCV RBC AUTO: 98.8 FL (ref 78–100)
MCV RBC AUTO: 99.7 FL (ref 78–100)
MONOCYTES ABSOLUTE: 0.1 K/CU MM
MONOCYTES ABSOLUTE: 0.3 K/CU MM
MONOCYTES RELATIVE PERCENT: 2 % (ref 0–4)
MONOCYTES RELATIVE PERCENT: 3.1 % (ref 0–4)
MRSA, DNA, NASAL: NEGATIVE
NEUTROPHILS ABSOLUTE: 6.2 K/CU MM
NEUTROPHILS ABSOLUTE: 9.4 K/CU MM
NEUTROPHILS RELATIVE PERCENT: 91.7 % (ref 36–66)
NEUTROPHILS RELATIVE PERCENT: 92.6 % (ref 36–66)
NUCLEATED RBC %: 0 %
NUCLEATED RBC %: 0 %
O2 SAT, VEN: 38 % (ref 50–70)
O2 SAT, VEN: 81.7 % (ref 50–70)
PCO2 VENOUS: 104 MMHG (ref 41–51)
PCO2 VENOUS: 80 MMHG (ref 41–51)
PDW BLD-RTO: 16 % (ref 11.7–14.9)
PDW BLD-RTO: 16 % (ref 11.7–14.9)
PH VENOUS: 7.24 (ref 7.32–7.43)
PH VENOUS: 7.31 (ref 7.32–7.43)
PLATELET # BLD: 230 K/CU MM (ref 140–440)
PLATELET # BLD: 251 K/CU MM (ref 140–440)
PMV BLD AUTO: 10.7 FL (ref 7.5–11.1)
PMV BLD AUTO: 11.1 FL (ref 7.5–11.1)
PO2 VENOUS: 27 MMHG (ref 28–48)
PO2 VENOUS: 49 MMHG (ref 28–48)
POTASSIUM SERPL-SCNC: 4.7 MMOL/L (ref 3.5–5.1)
POTASSIUM SERPL-SCNC: 5.3 MMOL/L (ref 3.5–5.1)
PROCALCITONIN SERPL-MCNC: 0.32 NG/ML
RBC # BLD: 3.79 M/CU MM (ref 4.6–6.2)
RBC # BLD: 4 M/CU MM (ref 4.6–6.2)
S PNEUM AG CSF QL: NORMAL
SODIUM BLD-SCNC: 142 MMOL/L (ref 135–145)
SODIUM BLD-SCNC: 145 MMOL/L (ref 135–145)
SPECIMEN DESCRIPTION: NORMAL
TOTAL IMMATURE NEUTOROPHIL: 0.02 K/CU MM
TOTAL IMMATURE NEUTOROPHIL: 0.04 K/CU MM
TOTAL NUCLEATED RBC: 0 K/CU MM
TOTAL NUCLEATED RBC: 0 K/CU MM
TOTAL PROTEIN: 7.1 GM/DL (ref 6.4–8.2)
WBC # BLD: 10.2 K/CU MM (ref 4–10.5)
WBC # BLD: 6.6 K/CU MM (ref 4–10.5)

## 2024-07-02 PROCEDURE — 6370000000 HC RX 637 (ALT 250 FOR IP): Performed by: STUDENT IN AN ORGANIZED HEALTH CARE EDUCATION/TRAINING PROGRAM

## 2024-07-02 PROCEDURE — 87641 MR-STAPH DNA AMP PROBE: CPT

## 2024-07-02 PROCEDURE — 84145 PROCALCITONIN (PCT): CPT

## 2024-07-02 PROCEDURE — 94640 AIRWAY INHALATION TREATMENT: CPT

## 2024-07-02 PROCEDURE — 94660 CPAP INITIATION&MGMT: CPT

## 2024-07-02 PROCEDURE — 87070 CULTURE OTHR SPECIMN AEROBIC: CPT

## 2024-07-02 PROCEDURE — 89220 SPUTUM SPECIMEN COLLECTION: CPT

## 2024-07-02 PROCEDURE — 97163 PT EVAL HIGH COMPLEX 45 MIN: CPT

## 2024-07-02 PROCEDURE — 2700000000 HC OXYGEN THERAPY PER DAY

## 2024-07-02 PROCEDURE — 87899 AGENT NOS ASSAY W/OPTIC: CPT

## 2024-07-02 PROCEDURE — 94761 N-INVAS EAR/PLS OXIMETRY MLT: CPT

## 2024-07-02 PROCEDURE — 2580000003 HC RX 258: Performed by: STUDENT IN AN ORGANIZED HEALTH CARE EDUCATION/TRAINING PROGRAM

## 2024-07-02 PROCEDURE — 82805 BLOOD GASES W/O2 SATURATION: CPT

## 2024-07-02 PROCEDURE — 94664 DEMO&/EVAL PT USE INHALER: CPT

## 2024-07-02 PROCEDURE — 83605 ASSAY OF LACTIC ACID: CPT

## 2024-07-02 PROCEDURE — 97530 THERAPEUTIC ACTIVITIES: CPT

## 2024-07-02 PROCEDURE — 87205 SMEAR GRAM STAIN: CPT

## 2024-07-02 PROCEDURE — 2140000000 HC CCU INTERMEDIATE R&B

## 2024-07-02 PROCEDURE — 85025 COMPLETE CBC W/AUTO DIFF WBC: CPT

## 2024-07-02 PROCEDURE — 36415 COLL VENOUS BLD VENIPUNCTURE: CPT

## 2024-07-02 PROCEDURE — 87449 NOS EACH ORGANISM AG IA: CPT

## 2024-07-02 PROCEDURE — 6360000002 HC RX W HCPCS: Performed by: STUDENT IN AN ORGANIZED HEALTH CARE EDUCATION/TRAINING PROGRAM

## 2024-07-02 PROCEDURE — 80053 COMPREHEN METABOLIC PANEL: CPT

## 2024-07-02 PROCEDURE — 82962 GLUCOSE BLOOD TEST: CPT

## 2024-07-02 RX ORDER — VENLAFAXINE 25 MG/1
25 TABLET ORAL DAILY
Status: DISCONTINUED | OUTPATIENT
Start: 2024-07-02 | End: 2024-07-04 | Stop reason: HOSPADM

## 2024-07-02 RX ORDER — GUAIFENESIN/DEXTROMETHORPHAN 100-10MG/5
5 SYRUP ORAL EVERY 4 HOURS PRN
Status: DISCONTINUED | OUTPATIENT
Start: 2024-07-02 | End: 2024-07-04 | Stop reason: HOSPADM

## 2024-07-02 RX ADMIN — IPRATROPIUM BROMIDE AND ALBUTEROL SULFATE 1 DOSE: 2.5; .5 SOLUTION RESPIRATORY (INHALATION) at 00:47

## 2024-07-02 RX ADMIN — ENOXAPARIN SODIUM 40 MG: 100 INJECTION SUBCUTANEOUS at 09:55

## 2024-07-02 RX ADMIN — WATER 40 MG: 1 INJECTION INTRAMUSCULAR; INTRAVENOUS; SUBCUTANEOUS at 21:42

## 2024-07-02 RX ADMIN — GUAIFENESIN 600 MG: 600 TABLET, EXTENDED RELEASE ORAL at 17:50

## 2024-07-02 RX ADMIN — METOPROLOL SUCCINATE 75 MG: 50 TABLET, FILM COATED, EXTENDED RELEASE ORAL at 09:54

## 2024-07-02 RX ADMIN — IPRATROPIUM BROMIDE AND ALBUTEROL SULFATE 1 DOSE: 2.5; .5 SOLUTION RESPIRATORY (INHALATION) at 07:09

## 2024-07-02 RX ADMIN — SODIUM CHLORIDE, PRESERVATIVE FREE 10 ML: 5 INJECTION INTRAVENOUS at 02:42

## 2024-07-02 RX ADMIN — POLYETHYLENE GLYCOL (3350) 17 G: 17 POWDER, FOR SOLUTION ORAL at 10:35

## 2024-07-02 RX ADMIN — SODIUM CHLORIDE, PRESERVATIVE FREE 10 ML: 5 INJECTION INTRAVENOUS at 09:58

## 2024-07-02 RX ADMIN — IPRATROPIUM BROMIDE AND ALBUTEROL SULFATE 1 DOSE: 2.5; .5 SOLUTION RESPIRATORY (INHALATION) at 20:40

## 2024-07-02 RX ADMIN — IPRATROPIUM BROMIDE AND ALBUTEROL SULFATE 1 DOSE: 2.5; .5 SOLUTION RESPIRATORY (INHALATION) at 15:12

## 2024-07-02 RX ADMIN — VENLAFAXINE 25 MG: 25 TABLET ORAL at 10:35

## 2024-07-02 RX ADMIN — BUDESONIDE AND FORMOTEROL FUMARATE DIHYDRATE 2 PUFF: 160; 4.5 AEROSOL RESPIRATORY (INHALATION) at 07:17

## 2024-07-02 RX ADMIN — GUAIFENESIN 600 MG: 600 TABLET, EXTENDED RELEASE ORAL at 09:54

## 2024-07-02 RX ADMIN — INSULIN LISPRO 1 UNITS: 100 INJECTION, SOLUTION INTRAVENOUS; SUBCUTANEOUS at 17:50

## 2024-07-02 RX ADMIN — ASPIRIN 81 MG: 81 TABLET, CHEWABLE ORAL at 09:54

## 2024-07-02 RX ADMIN — WATER 40 MG: 1 INJECTION INTRAMUSCULAR; INTRAVENOUS; SUBCUTANEOUS at 10:34

## 2024-07-02 RX ADMIN — MONTELUKAST 10 MG: 10 TABLET, FILM COATED ORAL at 02:42

## 2024-07-02 RX ADMIN — FUROSEMIDE 40 MG: 20 TABLET ORAL at 17:50

## 2024-07-02 RX ADMIN — IPRATROPIUM BROMIDE AND ALBUTEROL SULFATE 1 DOSE: 2.5; .5 SOLUTION RESPIRATORY (INHALATION) at 11:22

## 2024-07-02 RX ADMIN — BUDESONIDE AND FORMOTEROL FUMARATE DIHYDRATE 2 PUFF: 160; 4.5 AEROSOL RESPIRATORY (INHALATION) at 20:41

## 2024-07-02 RX ADMIN — GUAIFENESIN 600 MG: 600 TABLET, EXTENDED RELEASE ORAL at 02:42

## 2024-07-02 RX ADMIN — SODIUM CHLORIDE, PRESERVATIVE FREE 10 ML: 5 INJECTION INTRAVENOUS at 21:43

## 2024-07-02 RX ADMIN — EMPAGLIFLOZIN 10 MG: 10 TABLET, FILM COATED ORAL at 09:54

## 2024-07-02 RX ADMIN — LOSARTAN POTASSIUM 25 MG: 25 TABLET, FILM COATED ORAL at 09:54

## 2024-07-02 RX ADMIN — GUAIFENESIN AND DEXTROMETHORPHAN 5 ML: 100; 10 SYRUP ORAL at 13:50

## 2024-07-02 RX ADMIN — ATORVASTATIN CALCIUM 10 MG: 10 TABLET, FILM COATED ORAL at 09:55

## 2024-07-02 RX ADMIN — CYANOCOBALAMIN TAB 1000 MCG 1000 MCG: 1000 TAB at 09:55

## 2024-07-02 RX ADMIN — WATER 1000 MG: 1 INJECTION INTRAMUSCULAR; INTRAVENOUS; SUBCUTANEOUS at 09:55

## 2024-07-02 RX ADMIN — MONTELUKAST 10 MG: 10 TABLET, FILM COATED ORAL at 21:42

## 2024-07-02 RX ADMIN — SODIUM CHLORIDE 25 ML: 9 INJECTION, SOLUTION INTRAVENOUS at 10:15

## 2024-07-02 RX ADMIN — FUROSEMIDE 40 MG: 20 TABLET ORAL at 09:54

## 2024-07-02 RX ADMIN — AZITHROMYCIN MONOHYDRATE 500 MG: 500 INJECTION, POWDER, LYOPHILIZED, FOR SOLUTION INTRAVENOUS at 10:17

## 2024-07-02 RX ADMIN — FUROSEMIDE 40 MG: 20 TABLET ORAL at 03:48

## 2024-07-02 ASSESSMENT — PAIN SCALES - GENERAL: PAINLEVEL_OUTOF10: 0

## 2024-07-02 NOTE — PROGRESS NOTES
This [facility] and they will be up to get morning labs plus VBG and procal.

## 2024-07-02 NOTE — PROGRESS NOTES
V2.0  [facility] [place]      Name:  Billy Ozuna /Age/Sex: 1945  (79 y.o. male)   MRN & CSN:  [id number] & [id number] Encounter Date/Time: 2024 11:18 AM EDT    Location:  [address] [place] [address]-A PCP: Kera Monique (Inactive)       Hospital Day: 2      Subjective:     Chief Complaint:  Chest Pain (States chest pain and feeling disoriented. Patient was recently discharged from Rehab at [place]. )     Pt improved with BIPAP but was taken off overnight as he was having cough spill.   Patient seen and examined at bedside. On 4L NC sating 95%. Doing well. Denies SOB or worsening cough. Was worried about this venlafaxine not resumed.   Waiting today VBG     Assessment and Plan:   Acute hypoxic hyperpcanic respiratory failure 2/2 COPD exacerbation likely triggered by viral infection  Pt has diagnosis COPD states he is compliant with meds  Hx of chronic resp failure on 4L   P/w SOB, chest pain  CXR no acute change, BNP 3750 from 4273 2024.   - c/w BIPAP await repeat VBG  - breathing tx, steroids  - will do abx for COPD exacerbation azithro+rocephin  - check procalc, resp cx, strep, legionella, MRSA  - monitor VBG  - resume home meds   - pulm consulted; pt needs to follow up with pulm outpatient    Chronic systolic CHF  ECHO 2024 EF of 10 to 15%  No sign of volume overload.   - Continue home Toprol-XL and Jardiance  - Resume Home lasix  - Continue ARB     Elevated troponin. Trending down. No ischemic changes on ECG. No chest pain  - monitor on tele      Type 2 diabetes  - Sliding scale insulin  - Hypoglycemia protocol  - Point-of-care glucose checks     GERD  - Protonix      Personally reviewed Lab Studies and Imaging     Imaging that was interpreted personally includes CXR and results as above    Drugs that require monitoring for toxicity include loveno and the method of monitoring was cbc      Diet ADULT DIET; Regular; 4 carb choices (60 gm/meal); Low Fat/Low Chol/High Fiber/MILDRED; Low Sodium (2

## 2024-07-02 NOTE — PROGRESS NOTES
Pt was placed on BIPAP at ordered settings and soon after,he was removed from BIPAP the pt had started coughing violently and expectorating large anounts of mucus. The patient's RN was notified and was asked if the patient coiuld receive Mucinex.. The patient is currently on a 5 LPM NC to maintain his sats.

## 2024-07-02 NOTE — CONSULTS
[facility] ACUTE CARE PHYSICAL THERAPY EVALUATION  Billy Ozuna, 1945, 3128/3128-A, 7/2/2024    Assessment:  Patient admitted for acute hypoxic hypercapnic respiratory failure with CHF DM2, and gerd, recently to home from SNF and had fall at home with unstable/unpredictable medical features and evolving rehabilitative features.  Patient with new and chronic functional impairments in setting of new fall, remains at high risk of future falls and likely needs more support than he had upon return home from SNF.    Body Structures, Functions, Activity Limitations Requiring Skilled Therapeutic Intervention: Decreased functional mobility , Decreased high-level IADLs, Decreased balance, Decreased coordination, Decreased posture, Decreased cognition, Decreased endurance, Decreased tolerance to work activity, Decreased ADL status, Decreased strength, Decreased safe awareness.  While in hospital, skilled physical therapy services are appropriate.  There are no significant educational impairments or barriers to learning which prevent participation with service. Prognosis: fair.  Clinical decision making:  high complexity:  hx 3+ personal factors/comorbidities, exam tests and measures for 3+ elements of body/structures/functions/activity limitation/participation restriction, unstable/unpredictable presentation.      Discharge recommendations:  Patient has moderate post-acute physical therapy therapy service needs. Encourage facility for post-acute rehabilitation, anticipate 1-2 hours per day and 5 days per week. Durable medical equipment recommendations include:  front-wheeled rolling walker and bedside commode.         Plan/Goals:  Physical Therapy Plan  General Plan: 3-5 times per week.  Acute care physical therapy treatment to include patient/caregiver education, therapeutic activity training, gait training, neuromuscular re-education, therapeutic exercise and self care training for home management.  
(ROCEPHIN) 1,000 mg in sterile water 10 mL IV syringe, 1,000 mg, IntraVENous, Q24H  venlafaxine (EFFEXOR) tablet 25 mg, 25 mg, Oral, Daily  aspirin chewable tablet 81 mg, 81 mg, Oral, Daily  atorvastatin (LIPITOR) tablet 10 mg, 10 mg, Oral, Daily  budesonide-formoterol (SYMBICORT) 160-4.5 MCG/ACT inhaler 2 puff, 2 puff, Inhalation, BID RT  vitamin B-12 (CYANOCOBALAMIN) tablet 1,000 mcg, 1,000 mcg, Oral, Daily  empagliflozin (JARDIANCE) tablet 10 mg, 10 mg, Oral, Daily  furosemide (LASIX) tablet 40 mg, 40 mg, Oral, BID  guaiFENesin (MUCINEX) extended release tablet 600 mg, 600 mg, Oral, BID  ipratropium 0.5 mg-albuterol 2.5 mg (DUONEB) nebulizer solution 1 Dose, 1 Dose, Inhalation, Q4H RT  losartan (COZAAR) tablet 25 mg, 25 mg, Oral, Daily  metoprolol succinate (TOPROL XL) extended release tablet 75 mg, 75 mg, Oral, Daily  montelukast (SINGULAIR) tablet 10 mg, 10 mg, Oral, Nightly  pantoprazole (PROTONIX) tablet 40 mg, 40 mg, Oral, QAM AC  glucose chewable tablet 16 g, 4 tablet, Oral, PRN  dextrose bolus 10% 125 mL, 125 mL, IntraVENous, PRN **OR** dextrose bolus 10% 250 mL, 250 mL, IntraVENous, PRN  glucagon injection 1 mg, 1 mg, SubCUTAneous, PRN  dextrose 10 % infusion, , IntraVENous, Continuous PRN  insulin lispro (HUMALOG,ADMELOG) injection vial 0-4 Units, 0-4 Units, SubCUTAneous, TID WC  insulin lispro (HUMALOG,ADMELOG) injection vial 0-4 Units, 0-4 Units, SubCUTAneous, Nightly  sodium chloride flush 0.9 % injection 5-40 mL, 5-40 mL, IntraVENous, 2 times per day  sodium chloride flush 0.9 % injection 5-40 mL, 5-40 mL, IntraVENous, PRN  0.9 % sodium chloride infusion, , IntraVENous, PRN  potassium chloride (KLOR-CON M) extended release tablet 40 mEq, 40 mEq, Oral, PRN **OR** potassium bicarb-citric acid (EFFER-K) effervescent tablet 40 mEq, 40 mEq, Oral, PRN **OR** potassium chloride 10 mEq/100 mL IVPB (Peripheral Line), 10 mEq, IntraVENous, PRN  magnesium sulfate 2000 mg in 50 mL IVPB premix, 2,000 mg,

## 2024-07-02 NOTE — PROGRESS NOTES
4 Eyes Skin Assessment     NAME:  Billy Ozuna  YOB: 1945  MEDICAL RECORD NUMBER:  [id number]    The patient is being assessed for  Admission    I agree that at least one RN has performed a thorough Head to Toe Skin Assessment on the patient. ALL assessment sites listed below have been assessed.      Areas assessed by both nurses:    Head, Face, Ears, Shoulders, Back, Chest, Arms, Elbows, Hands, Sacrum. Buttock, Coccyx, Ischium, and Legs. Feet and Heels        Does the Patient have a Wound? No noted wound(s)       [place] Prevention initiated by RN: No  Wound Care Orders initiated by RN: No    Pressure Injury (Stage 3,4, Unstageable, DTI, NWPT, and Complex wounds) if present, place Wound referral order by RN under : No    New Ostomies, if present place, Ostomy referral order under : No     Nurse 1 eSignature: Electronically signed by Bharat Smith RN on 7/2/24 at 2:36 AM EDT    **SHARE this note so that the co-signing nurse can place an eSignature**    Nurse 2 eSignature: Electronically signed by Eddy Rocha RN on 7/2/24 at 3:46 AM EDT

## 2024-07-02 NOTE — PROGRESS NOTES
This nurse called lab again and spoke to Renee Duran to follow up and draw if labs haven't been done.

## 2024-07-02 NOTE — PROGRESS NOTES
Occupational Therapy  OT attempted to see pt for initial evaluation. Pt resting in bed, w/ CM at bedside. Pt deferring therapy at this time, citing increased fatigue from recent administration of medication and requesting therapy to return at a later hour. Will reattempt to see pt as able.     Petra James OTR/TOBY [id number]  7/2/2024     12:28 PM

## 2024-07-02 NOTE — PROGRESS NOTES
Comprehensive Nutrition Assessment    Type and Reason for Visit:  Initial (BMI low for age)    Nutrition Recommendations/Plan:   Continue carb controlled diet with no added salt   Will offer low calorie, high protein oral nutrition supplement   Will continue to follow up during stay      Malnutrition Assessment:  Malnutrition Status:  Insufficient data (at risk with acute/chronic illness) (07/02/24 1232)    Context:  Acute Illness       Nutrition Assessment:    Admit with shortness of breath, hx COPD, CHF, DM. Currently on carb controlled, cardiac diet with limited po data at this time. Patient sound asleep on attempted visits today. Weight fluctuating with loss noted in past 4 months. Concern for adequate intake, will follow at high nutrition risk at this time.    Nutrition Related Findings:    possible plan for SNF rehab,  meds notedL jardiance, lasix, insulin, steroid, B12   usually on 4 L O2 Wound Type: None       Current Nutrition Intake & Therapies:    Average Meal Intake: Unable to assess  Average Supplements Intake: None Ordered  ADULT DIET; Regular; 4 carb choices (60 gm/meal); Low Fat/Low Chol/High Fiber/MILDRED; Low Sodium (2 gm)    Anthropometric Measures:  Height: 180.3 cm (5' 11\")  Ideal Body Weight (IBW): 172 lbs (78 kg)       Current Body Weight: 70.5 kg (155 lb 6.8 oz), 90.4 % IBW. Weight Source: Bed Scale  Current BMI (kg/m2): 21.7  Usual Body Weight: 75 kg (165 lb 5.5 oz) (March 2024)  % Weight Change (Calculated): -6  Weight Adjustment For: No Adjustment                 BMI Categories: Underweight (BMI less than 22) age over 65    Estimated Daily Nutrient Needs:  Energy Requirements Based On: Kcal/kg  Weight Used for Energy Requirements: Current  Energy (kcal/day): 5115-0430 (25-30 adriana/kg)  Weight Used for Protein Requirements: Current  Protein (g/day): 84-98 (1.2-1.4 g/kg)  Method Used for Fluid Requirements: 1 ml/kcal  Fluid (ml/day): 2000    Nutrition Diagnosis:   Predicted inadequate energy

## 2024-07-02 NOTE — CARE COORDINATION
CM in to see Pt to follow up on discharge planning.  Verified ED case management plan.  Pt remains agreeable to returning to [place] if recommended.  Pt was unable to work with therapy this morning due to fatigue.      4:42 PM   CM in to see Pt.  Pt is not agreeable with PT recommendation at this time. Discharge plan is home.  Pt is active with [facility].  Pt denies any needs at this time.  CM following

## 2024-07-03 LAB
ANION GAP SERPL CALCULATED.3IONS-SCNC: 11 MMOL/L (ref 7–16)
BASE EXCESS MIXED: 10.6 (ref 0–3)
BASOPHILS ABSOLUTE: 0 K/CU MM
BASOPHILS RELATIVE PERCENT: 0.1 % (ref 0–1)
BUN SERPL-MCNC: 44 MG/DL (ref 6–23)
CALCIUM SERPL-MCNC: 9.1 MG/DL (ref 8.3–10.6)
CHLORIDE BLD-SCNC: 97 MMOL/L (ref 99–110)
CO2: 33 MMOL/L (ref 21–32)
COMMENT: ABNORMAL
CREAT SERPL-MCNC: 1.2 MG/DL (ref 0.9–1.3)
DIFFERENTIAL TYPE: ABNORMAL
EOSINOPHILS ABSOLUTE: 0 K/CU MM
EOSINOPHILS RELATIVE PERCENT: 0 % (ref 0–3)
GFR, ESTIMATED: 62 ML/MIN/1.73M2
GLUCOSE BLD-MCNC: 105 MG/DL (ref 70–99)
GLUCOSE BLD-MCNC: 235 MG/DL (ref 70–99)
GLUCOSE BLD-MCNC: 254 MG/DL (ref 70–99)
GLUCOSE BLD-MCNC: 310 MG/DL (ref 70–99)
GLUCOSE SERPL-MCNC: 280 MG/DL (ref 70–99)
HCO3 VENOUS: 40.3 MMOL/L (ref 22–29)
HCT VFR BLD CALC: 37.4 % (ref 42–52)
HEMOGLOBIN: 10.5 GM/DL (ref 13.5–18)
IMMATURE NEUTROPHIL %: 0.6 % (ref 0–0.43)
LYMPHOCYTES ABSOLUTE: 0.5 K/CU MM
LYMPHOCYTES RELATIVE PERCENT: 5.3 % (ref 24–44)
MCH RBC QN AUTO: 27.6 PG (ref 27–31)
MCHC RBC AUTO-ENTMCNC: 28.1 % (ref 32–36)
MCV RBC AUTO: 98.4 FL (ref 78–100)
MONOCYTES ABSOLUTE: 0.2 K/CU MM
MONOCYTES RELATIVE PERCENT: 2.2 % (ref 0–4)
NEUTROPHILS ABSOLUTE: 7.8 K/CU MM
NEUTROPHILS RELATIVE PERCENT: 91.8 % (ref 36–66)
NUCLEATED RBC %: 0 %
O2 SAT, VEN: 92.8 % (ref 50–70)
PCO2 VENOUS: 80 MMHG (ref 41–51)
PDW BLD-RTO: 15.9 % (ref 11.7–14.9)
PH VENOUS: 7.31 (ref 7.32–7.43)
PLATELET # BLD: 239 K/CU MM (ref 140–440)
PMV BLD AUTO: 10.6 FL (ref 7.5–11.1)
PO2 VENOUS: 75 MMHG (ref 28–48)
POTASSIUM SERPL-SCNC: 4.7 MMOL/L (ref 3.5–5.1)
RBC # BLD: 3.8 M/CU MM (ref 4.6–6.2)
SODIUM BLD-SCNC: 141 MMOL/L (ref 135–145)
TOTAL IMMATURE NEUTOROPHIL: 0.05 K/CU MM
TOTAL NUCLEATED RBC: 0 K/CU MM
WBC # BLD: 8.5 K/CU MM (ref 4–10.5)

## 2024-07-03 PROCEDURE — 82962 GLUCOSE BLOOD TEST: CPT

## 2024-07-03 PROCEDURE — 6360000002 HC RX W HCPCS: Performed by: STUDENT IN AN ORGANIZED HEALTH CARE EDUCATION/TRAINING PROGRAM

## 2024-07-03 PROCEDURE — 80048 BASIC METABOLIC PNL TOTAL CA: CPT

## 2024-07-03 PROCEDURE — 36415 COLL VENOUS BLD VENIPUNCTURE: CPT

## 2024-07-03 PROCEDURE — 2700000000 HC OXYGEN THERAPY PER DAY

## 2024-07-03 PROCEDURE — 85025 COMPLETE CBC W/AUTO DIFF WBC: CPT

## 2024-07-03 PROCEDURE — 2140000000 HC CCU INTERMEDIATE R&B

## 2024-07-03 PROCEDURE — 82805 BLOOD GASES W/O2 SATURATION: CPT

## 2024-07-03 PROCEDURE — 97165 OT EVAL LOW COMPLEX 30 MIN: CPT

## 2024-07-03 PROCEDURE — 2580000003 HC RX 258: Performed by: STUDENT IN AN ORGANIZED HEALTH CARE EDUCATION/TRAINING PROGRAM

## 2024-07-03 PROCEDURE — 6370000000 HC RX 637 (ALT 250 FOR IP)

## 2024-07-03 PROCEDURE — 6370000000 HC RX 637 (ALT 250 FOR IP): Performed by: STUDENT IN AN ORGANIZED HEALTH CARE EDUCATION/TRAINING PROGRAM

## 2024-07-03 PROCEDURE — 94761 N-INVAS EAR/PLS OXIMETRY MLT: CPT

## 2024-07-03 PROCEDURE — 94640 AIRWAY INHALATION TREATMENT: CPT

## 2024-07-03 PROCEDURE — 6360000002 HC RX W HCPCS

## 2024-07-03 RX ORDER — ACETYLCYSTEINE 100 MG/ML
4 SOLUTION ORAL; RESPIRATORY (INHALATION)
Status: DISCONTINUED | OUTPATIENT
Start: 2024-07-03 | End: 2024-07-04 | Stop reason: HOSPADM

## 2024-07-03 RX ORDER — BENZONATATE 100 MG/1
100 CAPSULE ORAL 3 TIMES DAILY PRN
Status: DISCONTINUED | OUTPATIENT
Start: 2024-07-03 | End: 2024-07-04 | Stop reason: HOSPADM

## 2024-07-03 RX ADMIN — IPRATROPIUM BROMIDE AND ALBUTEROL SULFATE 1 DOSE: 2.5; .5 SOLUTION RESPIRATORY (INHALATION) at 15:10

## 2024-07-03 RX ADMIN — IPRATROPIUM BROMIDE AND ALBUTEROL SULFATE 1 DOSE: 2.5; .5 SOLUTION RESPIRATORY (INHALATION) at 03:29

## 2024-07-03 RX ADMIN — SODIUM CHLORIDE, PRESERVATIVE FREE 10 ML: 5 INJECTION INTRAVENOUS at 09:43

## 2024-07-03 RX ADMIN — WATER 1000 MG: 1 INJECTION INTRAMUSCULAR; INTRAVENOUS; SUBCUTANEOUS at 09:43

## 2024-07-03 RX ADMIN — WATER 40 MG: 1 INJECTION INTRAMUSCULAR; INTRAVENOUS; SUBCUTANEOUS at 20:29

## 2024-07-03 RX ADMIN — GUAIFENESIN 600 MG: 600 TABLET, EXTENDED RELEASE ORAL at 09:42

## 2024-07-03 RX ADMIN — ASPIRIN 81 MG: 81 TABLET, CHEWABLE ORAL at 12:31

## 2024-07-03 RX ADMIN — INSULIN LISPRO 1 UNITS: 100 INJECTION, SOLUTION INTRAVENOUS; SUBCUTANEOUS at 17:37

## 2024-07-03 RX ADMIN — SODIUM CHLORIDE 25 ML: 9 INJECTION, SOLUTION INTRAVENOUS at 09:57

## 2024-07-03 RX ADMIN — INSULIN LISPRO 1 UNITS: 100 INJECTION, SOLUTION INTRAVENOUS; SUBCUTANEOUS at 09:42

## 2024-07-03 RX ADMIN — GUAIFENESIN 600 MG: 600 TABLET, EXTENDED RELEASE ORAL at 17:23

## 2024-07-03 RX ADMIN — VENLAFAXINE 25 MG: 25 TABLET ORAL at 09:44

## 2024-07-03 RX ADMIN — IPRATROPIUM BROMIDE AND ALBUTEROL SULFATE 1 DOSE: 2.5; .5 SOLUTION RESPIRATORY (INHALATION) at 00:47

## 2024-07-03 RX ADMIN — GUAIFENESIN AND DEXTROMETHORPHAN 5 ML: 100; 10 SYRUP ORAL at 09:41

## 2024-07-03 RX ADMIN — SODIUM CHLORIDE, PRESERVATIVE FREE 10 ML: 5 INJECTION INTRAVENOUS at 20:29

## 2024-07-03 RX ADMIN — ENOXAPARIN SODIUM 40 MG: 100 INJECTION SUBCUTANEOUS at 09:41

## 2024-07-03 RX ADMIN — ACETYLCYSTEINE 400 MG: 100 SOLUTION ORAL; RESPIRATORY (INHALATION) at 15:21

## 2024-07-03 RX ADMIN — IPRATROPIUM BROMIDE AND ALBUTEROL SULFATE 1 DOSE: 2.5; .5 SOLUTION RESPIRATORY (INHALATION) at 23:14

## 2024-07-03 RX ADMIN — MONTELUKAST 10 MG: 10 TABLET, FILM COATED ORAL at 20:29

## 2024-07-03 RX ADMIN — IPRATROPIUM BROMIDE AND ALBUTEROL SULFATE 1 DOSE: 2.5; .5 SOLUTION RESPIRATORY (INHALATION) at 20:26

## 2024-07-03 RX ADMIN — WATER 40 MG: 1 INJECTION INTRAMUSCULAR; INTRAVENOUS; SUBCUTANEOUS at 12:32

## 2024-07-03 RX ADMIN — AZITHROMYCIN MONOHYDRATE 500 MG: 500 INJECTION, POWDER, LYOPHILIZED, FOR SOLUTION INTRAVENOUS at 09:58

## 2024-07-03 RX ADMIN — METOPROLOL SUCCINATE 75 MG: 50 TABLET, FILM COATED, EXTENDED RELEASE ORAL at 09:42

## 2024-07-03 RX ADMIN — FUROSEMIDE 40 MG: 20 TABLET ORAL at 17:23

## 2024-07-03 RX ADMIN — POLYETHYLENE GLYCOL (3350) 17 G: 17 POWDER, FOR SOLUTION ORAL at 12:32

## 2024-07-03 RX ADMIN — GUAIFENESIN AND DEXTROMETHORPHAN 5 ML: 100; 10 SYRUP ORAL at 01:45

## 2024-07-03 RX ADMIN — EMPAGLIFLOZIN 10 MG: 10 TABLET, FILM COATED ORAL at 09:42

## 2024-07-03 RX ADMIN — CYANOCOBALAMIN TAB 1000 MCG 1000 MCG: 1000 TAB at 09:41

## 2024-07-03 RX ADMIN — ATORVASTATIN CALCIUM 10 MG: 10 TABLET, FILM COATED ORAL at 09:42

## 2024-07-03 RX ADMIN — FUROSEMIDE 40 MG: 20 TABLET ORAL at 12:31

## 2024-07-03 RX ADMIN — BENZONATATE 100 MG: 100 CAPSULE ORAL at 15:52

## 2024-07-03 RX ADMIN — BUDESONIDE AND FORMOTEROL FUMARATE DIHYDRATE 2 PUFF: 160; 4.5 AEROSOL RESPIRATORY (INHALATION) at 20:28

## 2024-07-03 ASSESSMENT — PAIN SCALES - GENERAL: PAINLEVEL_OUTOF10: 0

## 2024-07-03 NOTE — PROGRESS NOTES
Patients [facility] aide in to visit and received update from this nurse with patients approval.    [facility] aide requesting a call back from CM and to let them know that patient is refusing to return to [facility] at discharge.     TOMY-Deep Nicholson notified via confidential voicemail.

## 2024-07-03 NOTE — CARE COORDINATION
CM reviewed notes. Pt will be returning home with [facility] home care.  CM called [facility] and spoke with Dorothy. CM confirmed that pt is active with them and they will continue to follow.    Upon discharge pt will be going home with [facility] care  Please fax H/P, D/S.  AVS, order, and face sheet to [phone]  Please call [phone] and inform of discharge

## 2024-07-03 NOTE — PROGRESS NOTES
pulmonary      SUBJECTIVE:  on o2 and dont wear bipap     OBJECTIVE    VITALS:  /65   Pulse 72   Temp 98.2 °F (36.8 °C) (Oral)   Resp 20   Ht 1.803 m (5' 11\")   Wt 70.5 kg (155 lb 6.8 oz)   SpO2 95%   BMI 21.68 kg/m²   HEAD AND FACE EXAM:  No throat injection, no active exudate,no thrush  NECK EXAM;No JVD, no masses, symmetrical  CHEST EXAM; Expansion equal and symmetrical, no masses  LUNG EXAM; Good breath sounds bilaterally. There are expiratory wheezes both lungs, there are crackles at both lung bases  CARDIOVASCULAR EXAM: Positive S1 and S2, no S3 or S4, no clicks ,no murmurs  RIGHT AND LEFT LOWER EXTRIMITY EXAM: No edema, no swelling, no inflamation  CNS EXAM: Alert and oriented X3          LABS   Lab Results   Component Value Date    WBC 8.5 07/03/2024    HGB 10.5 (L) 07/03/2024    HCT 37.4 (L) 07/03/2024    MCV 98.4 07/03/2024     07/03/2024     Lab Results   Component Value Date    CREATININE 1.2 07/03/2024    BUN 44 (H) 07/03/2024     07/03/2024    K 4.7 07/03/2024    CL 97 (L) 07/03/2024    CO2 33 (H) 07/03/2024     Lab Results   Component Value Date    INR 1.2 06/04/2024    PROTIME 16.1 (H) 06/04/2024          Lab Results   Component Value Date/Time    PHOS 3.0 09/20/2023 10:35 AM    PHOS 3.4 01/19/2013 05:20 AM    PHOS 3.2 01/17/2013 05:30 AM      No results for input(s): \"PH\", \"PO2ART\", \"ZKV9HWX\", \"HCO3\", \"BEART\", \"O2SAT\" in the last 72 hours.      Wt Readings from Last 3 Encounters:   07/02/24 70.5 kg (155 lb 6.8 oz)   06/16/24 71.5 kg (157 lb 10.1 oz)   06/07/24 78 kg (171 lb 15.3 oz)               ASSESMENT  Ac on ch resp failure  Ac copd  Ac chf        PLAN  D/w pt  Cpm  Cont o2  Bipap if pt allows it  Pt is well aware that he may end up on vent as his pco2 is high    7/3/2024  Justin Buck MD, M.D.

## 2024-07-03 NOTE — PROGRESS NOTES
Occupational Therapy  [facility] ACUTE CARE OCCUPATIONAL THERAPY EVALUATION  Billy Ozuna, 1945, 3128/3128-A, 7/3/2024    Discharge Recommendation: Encourage facility for moderate post-acute rehabilitation, anticipate 1-2 hours per day and 5 days per week.    History  [place]:  The primary encounter diagnosis was Hypoxemia. A diagnosis of Acute respiratory failure with hypoxia and hypercarbia (HCC) was also pertinent to this visit.  Patient  has a past medical history of Acid reflux, Anemia, COPD (chronic obstructive pulmonary disease) (HCC), Diabetes mellitus (HCC), Emphysema of lung (HCC), Enlarged prostate, Stevens catheter in place, H/O cardiovascular stress test, Hematuria, HX OTHER MEDICAL, Hypertension, Megacolon, On home oxygen therapy, and Wears glasses.  Patient  has a past surgical history that includes Revision Colostomy (1974); Abdominal exploration surgery (1974); other surgical history; other surgical history (01 07 2013); tumor removal; Appendectomy (11 years old); Abdomen surgery; TURP (N/A, 07/12/2018); Dental surgery; other surgical history (09/18/2018); and Colonoscopy (N/A, 10/24/2023).    Subjective:  Patient comments: \"My anxiety is just too much right now\".    Pain:  Denied.    Communication with other providers: Nurse okayarmani session.  Restrictions: General Precautions, Fall Risk     Home Setup/Prior level of function   Lives With: Alone (daughter lives in [place])  Type of Home: House  Home Layout: One level  Home Access: Stairs to enter without rails  Entrance Stairs - Number of Steps: 1  Bathroom Shower/Tub: Tub/Shower unit  Bathroom Toilet: Standard  Bathroom Equipment: Shower chair  Bathroom Accessibility: Accessible  Home Equipment: Walker - 4-Wheeled  Has the patient had two or more falls in the past year or any fall with injury in the past year?: No  Receives Help From: Home health (aide 2x/week)  ADL Assistance: Needs assistance (aide assists with  bathing)  Homemaking Assistance: Needs assistance (aide or daughter assist PRN, has Meals on Wheels)  Homemaking Responsibilities: Yes  Ambulation Assistance: Independent (mod I with 4ww)  Transfer Assistance: Independent  Active : No (daughter or friends primarily drive)  Occupation: Retired    Examination of body systems (includes body structures/functions, activity/participation limitations):  Observation:  Semi-fowlers in bed upon arrival, agreeable to therapy  Vision: WFL  Hearing: WFL  Cardiopulmonary:  On 4L O2, tele, vitals remained stable throughout session    Body Systems and functions:  ROM R/L:  WFL    Strength R/L:  BUE 4+/5,   4+/5  Sensation: WFL  Tone: Normal  Coordination: Resting tremors and myoclonic jerks in BUE/trunk. Pt reports recent onset w/ unknown etiology.   Perception: WFL    Cognitive and Psychosocial Functioning:  Overall cognitive status:  Pt is A&Ox4, attention appears intact, and is able to follow multi-step commands w/o difficulty. Pt reports significant anxiety at this time and that it limits his ability to perform ADLs or daily tasks. Pt has impaired insight, decreased safety awareness.    Affect: Anxious        Activities of Daily Living (ADLs):  Feeding: Independent   Grooming: SBA   UB bathing: SBA   LB bathing: Minda   UB dressing: SBA   LB dressing: Minda (pt demo'ed ability to reach to and adjust socks while long sitting w/ modified figure 4)   Toileting: Minda     Pt ADL function inferred from observation of gross motor function, and assessment of mobility, balance, posture, safety awareness, cognition, and activity tolerance.     AM-PAC 6 click short form for inpatient daily activity:   How much help from another person does the patient currently need... Unable  Dep A Lot  Max A A Lot   Mod A A Little  Min A A Little   CGA  SBA None   Mod I  Indep  Sup   1.  Putting on and taking off regular lower body clothing? [] 1    [] 2   [] 2   [x] 3   [] 3   [] 4      2.

## 2024-07-03 NOTE — PLAN OF CARE
Problem: Discharge Planning  Goal: Discharge to home or other facility with appropriate resources  Outcome: Progressing     Problem: Pain  Goal: Verbalizes/displays adequate comfort level or baseline comfort level  Outcome: Progressing     Problem: Safety - Adult  Goal: Free from fall injury  Outcome: Progressing     Problem: Nutrition Deficit:  Goal: Optimize nutritional status  Outcome: Progressing     Problem: Chronic Conditions and Co-morbidities  Goal: Patient's chronic conditions and co-morbidity symptoms are monitored and maintained or improved  Outcome: Progressing

## 2024-07-03 NOTE — PROGRESS NOTES
V2.0  [facility] Hospitalist Progress Note      Name:  Billy Ozuna /Age/Sex: 1945  (79 y.o. male)   MRN & CSN:  [id number] & [id number] Encounter Date/Time: 7/3/2024 11:18 AM EDT    Location:  [address] [place] [address]-A PCP: Kera Monique (Inactive)       Hospital Day: 3      Subjective:     Chief Complaint:  Chest Pain (States chest pain and feeling disoriented. Patient was recently discharged from Rehab at [facility]. )     Pt not tolerating BIPAP now on 4L NC. Procalc noted 0.32 already on rocephin and pt is improving.   Patient seen and examined at bedside. Denies SOB or worsening cough.     Assessment and Plan:   Acute hypoxic hyperpcanic respiratory failure 2/2 COPD exacerbation likely triggered by viral infection and suspected bacterial pneumonia  Pt has diagnosis COPD states he is compliant with meds  Hx of chronic resp failure on 4L   P/w SOB, chest pain  CXR no acute change, BNP 3750 from 4273 2024.   VBG improving pH 7.31, pCO2 80.   MRSA negative. Strep, legionella negative. Resp cx gram positive cocci.   - breathing tx, steroids  - continue rocephin, azithromycin  - f/u resp cx  - monitor VBG  - resume home meds   - pulm consulted; pt needs to follow up with pulm outpatient    Chronic systolic CHF  ECHO 2024 EF of 10 to 15%  No sign of volume overload.   - Continue home Toprol-XL and Jardiance  - Resume Home lasix  - Continue ARB     Elevated troponin. Trending down. No ischemic changes on ECG. No chest pain  - monitor on tele      Type 2 diabetes  - Sliding scale insulin  - Hypoglycemia protocol  - Point-of-care glucose checks     GERD  - Protonix      Personally reviewed Lab Studies and Imaging     Imaging that was interpreted personally includes CXR and results as above    Drugs that require monitoring for toxicity include loveno and the method of monitoring was cbc      Diet ADULT DIET; Regular; 4 carb choices (60 gm/meal); Low Fat/Low Chol/High Fiber/MILDRED; Low Sodium (2 gm)  ADULT ORAL NUTRITION

## 2024-07-04 VITALS
HEART RATE: 72 BPM | BODY MASS INDEX: 22.04 KG/M2 | HEIGHT: 71 IN | RESPIRATION RATE: 21 BRPM | SYSTOLIC BLOOD PRESSURE: 129 MMHG | DIASTOLIC BLOOD PRESSURE: 82 MMHG | TEMPERATURE: 98 F | OXYGEN SATURATION: 96 % | WEIGHT: 157.41 LBS

## 2024-07-04 LAB
ANION GAP SERPL CALCULATED.3IONS-SCNC: 10 MMOL/L (ref 7–16)
BASE EXCESS MIXED: 13.5 (ref 0–3)
BASOPHILS ABSOLUTE: 0 K/CU MM
BASOPHILS RELATIVE PERCENT: 0.1 % (ref 0–1)
BUN SERPL-MCNC: 40 MG/DL (ref 6–23)
CALCIUM SERPL-MCNC: 8.9 MG/DL (ref 8.3–10.6)
CHLORIDE BLD-SCNC: 95 MMOL/L (ref 99–110)
CO2: 34 MMOL/L (ref 21–32)
COMMENT: ABNORMAL
CREAT SERPL-MCNC: 1 MG/DL (ref 0.9–1.3)
CULTURE: NORMAL
DIFFERENTIAL TYPE: ABNORMAL
EOSINOPHILS ABSOLUTE: 0 K/CU MM
EOSINOPHILS RELATIVE PERCENT: 0 % (ref 0–3)
GFR, ESTIMATED: 78 ML/MIN/1.73M2
GLUCOSE BLD-MCNC: 234 MG/DL (ref 70–99)
GLUCOSE BLD-MCNC: 269 MG/DL (ref 70–99)
GLUCOSE SERPL-MCNC: 407 MG/DL (ref 70–99)
GRAM SMEAR: NORMAL
HCO3 VENOUS: 42 MMOL/L (ref 22–29)
HCT VFR BLD CALC: 36.1 % (ref 42–52)
HEMOGLOBIN: 10.2 GM/DL (ref 13.5–18)
IMMATURE NEUTROPHIL %: 1.1 % (ref 0–0.43)
LYMPHOCYTES ABSOLUTE: 0.3 K/CU MM
LYMPHOCYTES RELATIVE PERCENT: 3.5 % (ref 24–44)
Lab: NORMAL
MCH RBC QN AUTO: 27.5 PG (ref 27–31)
MCHC RBC AUTO-ENTMCNC: 28.3 % (ref 32–36)
MCV RBC AUTO: 97.3 FL (ref 78–100)
MONOCYTES ABSOLUTE: 0.2 K/CU MM
MONOCYTES RELATIVE PERCENT: 2.2 % (ref 0–4)
NEUTROPHILS ABSOLUTE: 7.9 K/CU MM
NEUTROPHILS RELATIVE PERCENT: 93.1 % (ref 36–66)
NUCLEATED RBC %: 0 %
O2 SAT, VEN: 95.6 % (ref 50–70)
PCO2 VENOUS: 71 MMHG (ref 41–51)
PDW BLD-RTO: 15.9 % (ref 11.7–14.9)
PH VENOUS: 7.38 (ref 7.32–7.43)
PLATELET # BLD: 267 K/CU MM (ref 140–440)
PMV BLD AUTO: 10.7 FL (ref 7.5–11.1)
PO2 VENOUS: 87 MMHG (ref 28–48)
POTASSIUM SERPL-SCNC: 4.6 MMOL/L (ref 3.5–5.1)
RBC # BLD: 3.71 M/CU MM (ref 4.6–6.2)
SODIUM BLD-SCNC: 139 MMOL/L (ref 135–145)
SPECIMEN: NORMAL
TOTAL IMMATURE NEUTOROPHIL: 0.09 K/CU MM
TOTAL NUCLEATED RBC: 0 K/CU MM
WBC # BLD: 8.5 K/CU MM (ref 4–10.5)

## 2024-07-04 PROCEDURE — 36415 COLL VENOUS BLD VENIPUNCTURE: CPT

## 2024-07-04 PROCEDURE — 6370000000 HC RX 637 (ALT 250 FOR IP): Performed by: STUDENT IN AN ORGANIZED HEALTH CARE EDUCATION/TRAINING PROGRAM

## 2024-07-04 PROCEDURE — 6370000000 HC RX 637 (ALT 250 FOR IP)

## 2024-07-04 PROCEDURE — 2580000003 HC RX 258: Performed by: STUDENT IN AN ORGANIZED HEALTH CARE EDUCATION/TRAINING PROGRAM

## 2024-07-04 PROCEDURE — 85025 COMPLETE CBC W/AUTO DIFF WBC: CPT

## 2024-07-04 PROCEDURE — 94761 N-INVAS EAR/PLS OXIMETRY MLT: CPT

## 2024-07-04 PROCEDURE — 6360000002 HC RX W HCPCS: Performed by: STUDENT IN AN ORGANIZED HEALTH CARE EDUCATION/TRAINING PROGRAM

## 2024-07-04 PROCEDURE — 2700000000 HC OXYGEN THERAPY PER DAY

## 2024-07-04 PROCEDURE — 94640 AIRWAY INHALATION TREATMENT: CPT

## 2024-07-04 PROCEDURE — 82805 BLOOD GASES W/O2 SATURATION: CPT

## 2024-07-04 PROCEDURE — 82962 GLUCOSE BLOOD TEST: CPT

## 2024-07-04 PROCEDURE — 80048 BASIC METABOLIC PNL TOTAL CA: CPT

## 2024-07-04 RX ORDER — LEVOFLOXACIN 750 MG/1
750 TABLET, FILM COATED ORAL DAILY
Qty: 5 TABLET | Refills: 0 | Status: SHIPPED | OUTPATIENT
Start: 2024-07-04 | End: 2024-07-09

## 2024-07-04 RX ORDER — INSULIN LISPRO 100 [IU]/ML
0-8 INJECTION, SOLUTION INTRAVENOUS; SUBCUTANEOUS
Status: DISCONTINUED | OUTPATIENT
Start: 2024-07-04 | End: 2024-07-04 | Stop reason: HOSPADM

## 2024-07-04 RX ORDER — INSULIN LISPRO 100 [IU]/ML
0-4 INJECTION, SOLUTION INTRAVENOUS; SUBCUTANEOUS NIGHTLY
Status: DISCONTINUED | OUTPATIENT
Start: 2024-07-04 | End: 2024-07-04 | Stop reason: HOSPADM

## 2024-07-04 RX ORDER — PREDNISONE 10 MG/1
TABLET ORAL
Qty: 20 TABLET | Refills: 0 | Status: SHIPPED | OUTPATIENT
Start: 2024-07-04 | End: 2024-07-04 | Stop reason: HOSPADM

## 2024-07-04 RX ORDER — PREDNISONE 10 MG/1
TABLET ORAL
Qty: 30 TABLET | Refills: 0 | Status: SHIPPED | OUTPATIENT
Start: 2024-07-04

## 2024-07-04 RX ORDER — GUAIFENESIN/DEXTROMETHORPHAN 100-10MG/5
5 SYRUP ORAL EVERY 4 HOURS PRN
Qty: 120 ML | Refills: 0 | Status: SHIPPED | OUTPATIENT
Start: 2024-07-04 | End: 2024-07-14

## 2024-07-04 RX ADMIN — IPRATROPIUM BROMIDE AND ALBUTEROL SULFATE 1 DOSE: 2.5; .5 SOLUTION RESPIRATORY (INHALATION) at 07:22

## 2024-07-04 RX ADMIN — INSULIN LISPRO 4 UNITS: 100 INJECTION, SOLUTION INTRAVENOUS; SUBCUTANEOUS at 11:57

## 2024-07-04 RX ADMIN — GUAIFENESIN AND DEXTROMETHORPHAN 5 ML: 100; 10 SYRUP ORAL at 08:46

## 2024-07-04 RX ADMIN — SODIUM CHLORIDE 25 ML: 9 INJECTION, SOLUTION INTRAVENOUS at 09:02

## 2024-07-04 RX ADMIN — ASPIRIN 81 MG: 81 TABLET, CHEWABLE ORAL at 08:51

## 2024-07-04 RX ADMIN — SODIUM CHLORIDE, PRESERVATIVE FREE 10 ML: 5 INJECTION INTRAVENOUS at 08:56

## 2024-07-04 RX ADMIN — IPRATROPIUM BROMIDE AND ALBUTEROL SULFATE 1 DOSE: 2.5; .5 SOLUTION RESPIRATORY (INHALATION) at 15:12

## 2024-07-04 RX ADMIN — INSULIN LISPRO 4 UNITS: 100 INJECTION, SOLUTION INTRAVENOUS; SUBCUTANEOUS at 00:59

## 2024-07-04 RX ADMIN — BENZONATATE 100 MG: 100 CAPSULE ORAL at 00:43

## 2024-07-04 RX ADMIN — GUAIFENESIN 600 MG: 600 TABLET, EXTENDED RELEASE ORAL at 08:50

## 2024-07-04 RX ADMIN — WATER 1000 MG: 1 INJECTION INTRAMUSCULAR; INTRAVENOUS; SUBCUTANEOUS at 08:54

## 2024-07-04 RX ADMIN — PANTOPRAZOLE SODIUM 40 MG: 40 TABLET, DELAYED RELEASE ORAL at 05:31

## 2024-07-04 RX ADMIN — AZITHROMYCIN MONOHYDRATE 500 MG: 500 INJECTION, POWDER, LYOPHILIZED, FOR SOLUTION INTRAVENOUS at 09:03

## 2024-07-04 RX ADMIN — BENZONATATE 100 MG: 100 CAPSULE ORAL at 09:19

## 2024-07-04 RX ADMIN — WATER 40 MG: 1 INJECTION INTRAMUSCULAR; INTRAVENOUS; SUBCUTANEOUS at 10:17

## 2024-07-04 RX ADMIN — METOPROLOL SUCCINATE 75 MG: 50 TABLET, FILM COATED, EXTENDED RELEASE ORAL at 08:49

## 2024-07-04 RX ADMIN — GUAIFENESIN AND DEXTROMETHORPHAN 5 ML: 100; 10 SYRUP ORAL at 00:55

## 2024-07-04 RX ADMIN — CYANOCOBALAMIN TAB 1000 MCG 1000 MCG: 1000 TAB at 08:49

## 2024-07-04 RX ADMIN — IPRATROPIUM BROMIDE AND ALBUTEROL SULFATE 1 DOSE: 2.5; .5 SOLUTION RESPIRATORY (INHALATION) at 11:55

## 2024-07-04 RX ADMIN — INSULIN LISPRO 2 UNITS: 100 INJECTION, SOLUTION INTRAVENOUS; SUBCUTANEOUS at 09:19

## 2024-07-04 RX ADMIN — ENOXAPARIN SODIUM 40 MG: 100 INJECTION SUBCUTANEOUS at 08:51

## 2024-07-04 RX ADMIN — BUDESONIDE AND FORMOTEROL FUMARATE DIHYDRATE 2 PUFF: 160; 4.5 AEROSOL RESPIRATORY (INHALATION) at 07:22

## 2024-07-04 RX ADMIN — ATORVASTATIN CALCIUM 10 MG: 10 TABLET, FILM COATED ORAL at 08:51

## 2024-07-04 RX ADMIN — IPRATROPIUM BROMIDE AND ALBUTEROL SULFATE 1 DOSE: 2.5; .5 SOLUTION RESPIRATORY (INHALATION) at 03:16

## 2024-07-04 RX ADMIN — FUROSEMIDE 40 MG: 20 TABLET ORAL at 08:50

## 2024-07-04 RX ADMIN — VENLAFAXINE 25 MG: 25 TABLET ORAL at 08:49

## 2024-07-04 RX ADMIN — EMPAGLIFLOZIN 10 MG: 10 TABLET, FILM COATED ORAL at 08:51

## 2024-07-04 NOTE — DISCHARGE INSTRUCTIONS
- please schedule an appointment to see your PCP  - please schedule an appointment to see pulmonologist  and cardiologist   - please take medications as prescribed

## 2024-07-04 NOTE — PROGRESS NOTES
This nurse went over discharge instructions and removed PIV with no complications. Discharged delayed until portable O2 arrives via family.     1530 family here with O2 and will be discharged.

## 2024-07-04 NOTE — PLAN OF CARE
Problem: Discharge Planning  Goal: Discharge to home or other facility with appropriate resources  7/4/2024 1305 by Hillary Blanchard RN  Outcome: Completed  7/4/2024 1047 by Hillary Blanchard RN  Outcome: Progressing     Problem: Pain  Goal: Verbalizes/displays adequate comfort level or baseline comfort level  7/4/2024 1305 by Hillary Blanchard RN  Outcome: Completed  7/4/2024 1047 by Hillary Blanchard RN  Outcome: Progressing     Problem: Safety - Adult  Goal: Free from fall injury  7/4/2024 1305 by Hillary Blanchard RN  Outcome: Completed  7/4/2024 1047 by Hillary Blanchard RN  Outcome: Progressing     Problem: Nutrition Deficit:  Goal: Optimize nutritional status  7/4/2024 1305 by Hillary Blanchard RN  Outcome: Completed  7/4/2024 1047 by Hillary Blanchard RN  Outcome: Progressing     Problem: Chronic Conditions and Co-morbidities  Goal: Patient's chronic conditions and co-morbidity symptoms are monitored and maintained or improved  7/4/2024 1305 by Hillary Blanchard RN  Outcome: Completed  7/4/2024 1047 by Hillary Blanchard RN  Outcome: Progressing

## 2024-07-04 NOTE — DISCHARGE SUMMARY
Discharge Summary    Name:  Billy Ozuna /Age/Sex: 1945  (79 y.o. male)   MRN & CSN:  [id number] & [id number] Admission Date/Time: 2024  2:26 PM   Attending:  Daniel Novoa MD Discharging Physician: Daniel Novoa MD     Hospital Course:   Billy Ozuna is a 79 y.o.  male  who presents with Acute hypercapnic respiratory failure (HCC)    HPI  \"Billy Ozuna is a 79 y.o. male with PMH of COPD/chronic respiratory failure on 4 L NC O2, nonischemic cardiomyopathy/chronic systolic heart failure, T2DM, hypertension, hyperlipidemia, depression  who presents with fall and chest pain.  Pt came from home as he was discharged from skilled nursing facility recently. Pt had a fall after getting up from chair as he lost balance. Pt was complaining of SOB, and cough. He reports URI symptoms of runny nose last week. Pt was given ASA and nitro by EMS and his BP dropped to the 80's.      At ED, pt was afebrile, SBP 80's; placed on NRB. Labs  Cr 1.2, troponin 60, Hgb 10.6, WBC 10.2, VBG pH 7.22, pCO2 102. CXR no acute changes. Pt received breathing tx and solu-medrol 125 mcg. Discussed with ED, will admit pt to step down  for Acute hypoxic hypercapnic respiratory failure  workup and management. \"       The following problems have been addressed during this hospitalization.    Acute hypoxic hyperpcanic respiratory failure 2/2 COPD exacerbation likely triggered by viral infection and suspected bacterial pneumonia  Pt has diagnosis COPD states he is compliant with meds  Hx of chronic resp failure on 4L   P/w SOB, chest pain  CXR no acute change, BNP 3750 from 4273 2024.   VBG on admission  pH 7.22, pCO2 102. Was on BIPAP maury improvement in blood gas.   MRSA negative. Strep, legionella negative. Resp cx gram positive cocci ; normal resp hector.   - received breathing tx, IV steroids; discharged on po prednisone taper  - received rocephin, azithromycin; discharged on levofloxacin 5 days  - resumed home meds   - pulm  consulted; pt needs to follow up with pulm outpatient     Chronic systolic CHF  ECHO 6/4/2024 EF of 10 to 15%  No sign of volume overload.   - Continue home Toprol-XL and Jardiance  - Resume Home lasix  - Continue ARB     Elevated troponin. Trending down. No ischemic changes on ECG. No chest pain        Type 2 diabetes  Glucose was high likely 2/2 steroids. On home glipizide and metformin.       Dispo  Seen by PT/OT who recommended SNF but pt wanted to go home w home health.      Patient was seen and examined at bedside on day of discharge. This note can be used as the progress note for today's visit.  Pt is doing well today. He had walk test where he walked 100ft O2 95% on home 4L , stable vitals. Discussed w pulm who cleared pt for discharge. Pt denies SOB or worsening cough.       Physical Exam  Vitals:   Vitals:    07/04/24 1149   BP:    Pulse: 87   Resp: 23   Temp:    SpO2: 95%       General: NAD  Eyes: EOMI  ENT: neck supple  Cardiovascular: Regular rate.  Respiratory: decreased breathing sounds  Gastrointestinal: Soft, non tender  Genitourinary: no suprapubic tenderness  Musculoskeletal: No edema  Skin: warm, dry  Neuro: Alert.  Psych: Mood appropriate.     The patient expressed appropriate understanding of and agreement with the discharge recommendations, medications, and plan.     Consults this admission:  IP CONSULT TO PULMONOLOGY  IP CONSULT TO HOME CARE NEEDS      Discharge Instruction:   Handoff to PCP:   - monitor glucose    Follow up appointments: pulm, cards  Primary care physician: Kera Monique (Inactive)        Diet:  cardiac diet and diabetic diet   Activity: activity as tolerated  Disposition: Discharged to:    []Home, [x][facility], []SNF, []Acute Rehab, []Hospice   Condition on discharge: Stable    Discharge Medications:        Medication List        START taking these medications      budesonide-formoterol 160-4.5 MCG/ACT Aero  Commonly known as: SYMBICORT  Inhale 2 puffs into the lungs in the

## 2024-07-04 NOTE — PROGRESS NOTES
pulmonary      SUBJECTIVE:  he is much more alert and feels better     OBJECTIVE    VITALS:  BP (!) 111/99   Pulse 87   Temp 98.3 °F (36.8 °C) (Oral)   Resp 23   Ht 1.803 m (5' 11\")   Wt 71.4 kg (157 lb 6.5 oz)   SpO2 95%   BMI 21.95 kg/m²   HEAD AND FACE EXAM:  No throat injection, no active exudate,no thrush  NECK EXAM;No JVD, no masses, symmetrical  CHEST EXAM; Expansion equal and symmetrical, no masses  LUNG EXAM; Good breath sounds bilaterally. There are expiratory wheezes both lungs, there are crackles at both lung bases  CARDIOVASCULAR EXAM: Positive S1 and S2, no S3 or S4, no clicks ,no murmurs  RIGHT AND LEFT LOWER EXTRIMITY EXAM: No edema, no swelling, no inflamation  CNS EXAM: Alert and oriented X3          LABS   Lab Results   Component Value Date    WBC 8.5 07/04/2024    HGB 10.2 (L) 07/04/2024    HCT 36.1 (L) 07/04/2024    MCV 97.3 07/04/2024     07/04/2024     Lab Results   Component Value Date    CREATININE 1.0 07/04/2024    BUN 40 (H) 07/04/2024     07/04/2024    K 4.6 07/04/2024    CL 95 (L) 07/04/2024    CO2 34 (H) 07/04/2024     Lab Results   Component Value Date    INR 1.2 06/04/2024    PROTIME 16.1 (H) 06/04/2024          Lab Results   Component Value Date/Time    PHOS 3.0 09/20/2023 10:35 AM    PHOS 3.4 01/19/2013 05:20 AM    PHOS 3.2 01/17/2013 05:30 AM      No results for input(s): \"PH\", \"PO2ART\", \"XMX8KGX\", \"HCO3\", \"BEART\", \"O2SAT\" in the last 72 hours.      Wt Readings from Last 3 Encounters:   07/04/24 71.4 kg (157 lb 6.5 oz)   06/16/24 71.5 kg (157 lb 10.1 oz)   06/07/24 78 kg (171 lb 15.3 oz)               ASSESMENT  Ac on ch resp failure  Ac copd  Ac chf        PLAN  Cpm  As pt being dc I will fu as outpt    7/4/2024  Justin Buck MD, M.D.

## 2024-07-04 NOTE — CARE COORDINATION
CM met with pt to verify d/c plan.  Pt states that he is going home with [facility].  CM notified Dr Novoa via WB. Pt denies any new d/c needs. TE      Upon discharge pt will be going home with [facility] care  Please fax H/P, D/S.  AVS, order, and face sheet to [phone]  Please call [phone] and inform of discharge

## 2024-07-17 PROBLEM — E78.5 DYSLIPIDEMIA: Status: ACTIVE | Noted: 2024-07-17

## 2024-07-17 PROBLEM — I42.0 DILATED CARDIOMYOPATHY (HCC): Status: ACTIVE | Noted: 2024-07-17

## 2024-11-25 ENCOUNTER — HOSPITAL ENCOUNTER (OUTPATIENT)
Age: 79
Setting detail: SPECIMEN
Discharge: HOME | End: 2024-11-25

## 2024-11-25 PROCEDURE — 87086 URINE CULTURE/COLONY COUNT: CPT

## 2024-11-25 PROCEDURE — 87077 CULTURE AEROBIC IDENTIFY: CPT

## 2024-11-27 LAB
MICROORGANISM SPEC CULT: ABNORMAL
MICROORGANISM SPEC CULT: ABNORMAL
SPECIMEN DESCRIPTION: ABNORMAL

## (undated) DEVICE — Z DISCONTINUED NO SUB IDED TUBING ETCO2 AD L6.5FT NSL ORAL CVD PRNG NONFLARED TIP OVR

## (undated) DEVICE — ENDOSCOPY KIT: Brand: MEDLINE INDUSTRIES, INC.